# Patient Record
Sex: MALE | Race: BLACK OR AFRICAN AMERICAN | Employment: UNEMPLOYED | URBAN - METROPOLITAN AREA
[De-identification: names, ages, dates, MRNs, and addresses within clinical notes are randomized per-mention and may not be internally consistent; named-entity substitution may affect disease eponyms.]

---

## 2017-09-10 ENCOUNTER — APPOINTMENT (OUTPATIENT)
Dept: GENERAL RADIOLOGY | Age: 72
DRG: 064 | End: 2017-09-10
Attending: EMERGENCY MEDICINE
Payer: MEDICARE

## 2017-09-10 ENCOUNTER — APPOINTMENT (OUTPATIENT)
Dept: CT IMAGING | Age: 72
DRG: 064 | End: 2017-09-10
Attending: EMERGENCY MEDICINE
Payer: MEDICARE

## 2017-09-10 ENCOUNTER — HOSPITAL ENCOUNTER (INPATIENT)
Age: 72
LOS: 3 days | Discharge: NURSING, REHAB OR LONG-TERM CARE | DRG: 064 | End: 2017-09-13
Attending: EMERGENCY MEDICINE | Admitting: FAMILY MEDICINE
Payer: MEDICARE

## 2017-09-10 DIAGNOSIS — I63.331 CEREBROVASCULAR ACCIDENT (CVA) DUE TO THROMBOSIS OF RIGHT POSTERIOR CEREBRAL ARTERY (HCC): ICD-10-CM

## 2017-09-10 DIAGNOSIS — R56.9 SEIZURE (HCC): ICD-10-CM

## 2017-09-10 DIAGNOSIS — I63.9 ACUTE ISCHEMIC STROKE (HCC): Primary | ICD-10-CM

## 2017-09-10 PROBLEM — R47.81 SLURRED SPEECH: Status: ACTIVE | Noted: 2017-09-10

## 2017-09-10 PROBLEM — R41.82 ALTERED MENTAL STATUS: Status: ACTIVE | Noted: 2017-09-10

## 2017-09-10 LAB
ALBUMIN SERPL-MCNC: 3.5 G/DL (ref 3.5–5)
ALBUMIN/GLOB SERPL: 0.8 {RATIO} (ref 1.1–2.2)
ALP SERPL-CCNC: 79 U/L (ref 45–117)
ALT SERPL-CCNC: 15 U/L (ref 12–78)
AMPHET UR QL SCN: NEGATIVE
ANION GAP SERPL CALC-SCNC: 7 MMOL/L (ref 5–15)
APPEARANCE UR: CLEAR
APTT PPP: <20 SEC (ref 22.1–32.5)
AST SERPL-CCNC: 19 U/L (ref 15–37)
BACTERIA URNS QL MICRO: NEGATIVE /HPF
BARBITURATES UR QL SCN: NEGATIVE
BASOPHILS # BLD: 0 K/UL (ref 0–0.1)
BASOPHILS NFR BLD: 0 % (ref 0–1)
BENZODIAZ UR QL: NEGATIVE
BILIRUB SERPL-MCNC: 0.3 MG/DL (ref 0.2–1)
BILIRUB UR QL: NEGATIVE
BUN SERPL-MCNC: 12 MG/DL (ref 6–20)
BUN/CREAT SERPL: 15 (ref 12–20)
CALCIUM SERPL-MCNC: 9 MG/DL (ref 8.5–10.1)
CANNABINOIDS UR QL SCN: NEGATIVE
CHLORIDE SERPL-SCNC: 96 MMOL/L (ref 97–108)
CK SERPL-CCNC: 83 U/L (ref 39–308)
CO2 SERPL-SCNC: 31 MMOL/L (ref 21–32)
COCAINE UR QL SCN: NEGATIVE
COLOR UR: ABNORMAL
CREAT SERPL-MCNC: 0.81 MG/DL (ref 0.7–1.3)
DRUG SCRN COMMENT,DRGCM: NORMAL
EOSINOPHIL # BLD: 0 K/UL (ref 0–0.4)
EOSINOPHIL NFR BLD: 0 % (ref 0–7)
EPITH CASTS URNS QL MICRO: ABNORMAL /LPF
ERYTHROCYTE [DISTWIDTH] IN BLOOD BY AUTOMATED COUNT: 16.2 % (ref 11.5–14.5)
ETHANOL SERPL-MCNC: <10 MG/DL
GLOBULIN SER CALC-MCNC: 4.2 G/DL (ref 2–4)
GLUCOSE SERPL-MCNC: 182 MG/DL (ref 65–100)
GLUCOSE UR STRIP.AUTO-MCNC: NEGATIVE MG/DL
HCT VFR BLD AUTO: 41.7 % (ref 36.6–50.3)
HGB BLD-MCNC: 13.2 G/DL (ref 12.1–17)
HGB UR QL STRIP: ABNORMAL
INR BLD: 1.2 (ref 0.9–1.2)
INR PPP: 1.1 (ref 0.9–1.1)
KETONES UR QL STRIP.AUTO: NEGATIVE MG/DL
LEUKOCYTE ESTERASE UR QL STRIP.AUTO: NEGATIVE
LYMPHOCYTES # BLD: 0.9 K/UL (ref 0.8–3.5)
LYMPHOCYTES NFR BLD: 13 % (ref 12–49)
MCH RBC QN AUTO: 27.4 PG (ref 26–34)
MCHC RBC AUTO-ENTMCNC: 31.7 G/DL (ref 30–36.5)
MCV RBC AUTO: 86.7 FL (ref 80–99)
METHADONE UR QL: NEGATIVE
MONOCYTES # BLD: 0.4 K/UL (ref 0–1)
MONOCYTES NFR BLD: 6 % (ref 5–13)
NEUTS SEG # BLD: 5.5 K/UL (ref 1.8–8)
NEUTS SEG NFR BLD: 81 % (ref 32–75)
NITRITE UR QL STRIP.AUTO: NEGATIVE
OPIATES UR QL: NEGATIVE
OTHER,OTHU: ABNORMAL
PCP UR QL: NEGATIVE
PH UR STRIP: 5 [PH] (ref 5–8)
PLATELET # BLD AUTO: 240 K/UL (ref 150–400)
POTASSIUM SERPL-SCNC: 4.1 MMOL/L (ref 3.5–5.1)
PROT SERPL-MCNC: 7.7 G/DL (ref 6.4–8.2)
PROT UR STRIP-MCNC: NEGATIVE MG/DL
PROTHROMBIN TIME: 11 SEC (ref 9–11.1)
RBC # BLD AUTO: 4.81 M/UL (ref 4.1–5.7)
RBC #/AREA URNS HPF: ABNORMAL /HPF (ref 0–5)
SODIUM SERPL-SCNC: 134 MMOL/L (ref 136–145)
SP GR UR REFRACTOMETRY: 1.01 (ref 1–1.03)
THERAPEUTIC RANGE,PTTT: ABNORMAL SECS (ref 58–77)
TROPONIN I SERPL-MCNC: <0.04 NG/ML
UROBILINOGEN UR QL STRIP.AUTO: 0.2 EU/DL (ref 0.2–1)
WBC # BLD AUTO: 6.8 K/UL (ref 4.1–11.1)
WBC URNS QL MICRO: ABNORMAL /HPF (ref 0–4)

## 2017-09-10 PROCEDURE — 80307 DRUG TEST PRSMV CHEM ANLYZR: CPT | Performed by: FAMILY MEDICINE

## 2017-09-10 PROCEDURE — 36415 COLL VENOUS BLD VENIPUNCTURE: CPT | Performed by: EMERGENCY MEDICINE

## 2017-09-10 PROCEDURE — 65660000000 HC RM CCU STEPDOWN

## 2017-09-10 PROCEDURE — 74011250636 HC RX REV CODE- 250/636

## 2017-09-10 PROCEDURE — 85730 THROMBOPLASTIN TIME PARTIAL: CPT | Performed by: EMERGENCY MEDICINE

## 2017-09-10 PROCEDURE — 51702 INSERT TEMP BLADDER CATH: CPT

## 2017-09-10 PROCEDURE — 74011636320 HC RX REV CODE- 636/320: Performed by: EMERGENCY MEDICINE

## 2017-09-10 PROCEDURE — 99285 EMERGENCY DEPT VISIT HI MDM: CPT

## 2017-09-10 PROCEDURE — 80307 DRUG TEST PRSMV CHEM ANLYZR: CPT | Performed by: EMERGENCY MEDICINE

## 2017-09-10 PROCEDURE — 70450 CT HEAD/BRAIN W/O DYE: CPT

## 2017-09-10 PROCEDURE — 81001 URINALYSIS AUTO W/SCOPE: CPT | Performed by: EMERGENCY MEDICINE

## 2017-09-10 PROCEDURE — 74011000258 HC RX REV CODE- 258: Performed by: EMERGENCY MEDICINE

## 2017-09-10 PROCEDURE — 71010 XR CHEST PORT: CPT

## 2017-09-10 PROCEDURE — 85610 PROTHROMBIN TIME: CPT | Performed by: EMERGENCY MEDICINE

## 2017-09-10 PROCEDURE — 84484 ASSAY OF TROPONIN QUANT: CPT | Performed by: EMERGENCY MEDICINE

## 2017-09-10 PROCEDURE — 96374 THER/PROPH/DIAG INJ IV PUSH: CPT

## 2017-09-10 PROCEDURE — 70496 CT ANGIOGRAPHY HEAD: CPT

## 2017-09-10 PROCEDURE — 80053 COMPREHEN METABOLIC PANEL: CPT | Performed by: EMERGENCY MEDICINE

## 2017-09-10 PROCEDURE — 77030034848

## 2017-09-10 PROCEDURE — 85610 PROTHROMBIN TIME: CPT

## 2017-09-10 PROCEDURE — 82550 ASSAY OF CK (CPK): CPT | Performed by: EMERGENCY MEDICINE

## 2017-09-10 PROCEDURE — 85025 COMPLETE CBC W/AUTO DIFF WBC: CPT | Performed by: EMERGENCY MEDICINE

## 2017-09-10 RX ORDER — SODIUM CHLORIDE 0.9 % (FLUSH) 0.9 %
10 SYRINGE (ML) INJECTION
Status: COMPLETED | OUTPATIENT
Start: 2017-09-10 | End: 2017-09-10

## 2017-09-10 RX ORDER — LORAZEPAM 2 MG/ML
1 INJECTION INTRAMUSCULAR
Status: COMPLETED | OUTPATIENT
Start: 2017-09-10 | End: 2017-09-10

## 2017-09-10 RX ORDER — LORAZEPAM 2 MG/ML
INJECTION INTRAMUSCULAR
Status: COMPLETED
Start: 2017-09-10 | End: 2017-09-10

## 2017-09-10 RX ADMIN — LORAZEPAM 1 MG: 2 INJECTION INTRAMUSCULAR at 21:16

## 2017-09-10 RX ADMIN — Medication 10 ML: at 21:47

## 2017-09-10 RX ADMIN — IOPAMIDOL 100 ML: 755 INJECTION, SOLUTION INTRAVENOUS at 21:47

## 2017-09-10 RX ADMIN — LORAZEPAM 1 MG: 2 INJECTION INTRAMUSCULAR; INTRAVENOUS at 21:16

## 2017-09-10 RX ADMIN — SODIUM CHLORIDE 100 ML: 900 INJECTION, SOLUTION INTRAVENOUS at 21:47

## 2017-09-10 NOTE — IP AVS SNAPSHOT
[address] 
[phone] Patient: Thom Maki MRN: [id number] ODU:5/0/6418 Current Discharge Medication List  
  
START taking these medications Dose & Instructions Dispensing Information Comments Morning Noon Evening Bedtime  
 aspirin 81 mg chewable tablet Your last dose was: Your next dose is:    
   
   
 Dose:  81 mg Take 1 Tab by mouth daily. Quantity:  60 Tab Refills:  0  
     
   
   
   
  
 atorvastatin 40 mg tablet Commonly known as:  LIPITOR Your last dose was: Your next dose is:    
   
   
 Dose:  40 mg Take 1 Tab by mouth nightly. Quantity:  60 Tab Refills:  0  
     
   
   
   
  
 levETIRAcetam 500 mg tablet Commonly known as:  KEPPRA Your last dose was: Your next dose is:    
   
   
 Dose:  500 mg Take 1 Tab by mouth two (2) times a day. Quantity:  60 Tab Refills:  0  
     
   
   
   
  
 oxyCODONE-acetaminophen  mg per tablet Commonly known as:  PERCOCET 10 Your last dose was: Your next dose is:    
   
   
 Dose:  1 Tab Take 1 Tab by mouth every six (6) hours as needed. Max Daily Amount: 4 Tabs. Quantity:  20 Tab Refills:  0 CONTINUE these medications which have NOT CHANGED Dose & Instructions Dispensing Information Comments Morning Noon Evening Bedtime FIBER GUMMIES PO Your last dose was: Your next dose is: Take  by mouth. Takes two po once daily. Refills:  0 INCRUSE ELLIPTA 62.5 mcg/actuation inhaler Generic drug:  umeclidinium Your last dose was: Your next dose is:    
   
   
 Dose:  1 Puff Take 1 Puff by inhalation daily. Indications: Rescue inhaler Refills:  0  
     
   
   
   
  
 SYMBICORT 160-4.5 mcg/actuation HFA inhaler Generic drug:  budesonide-formoterol Your last dose was: Your next dose is:    
   
   
 Dose:  2 Puff Take 2 Puffs by inhalation two (2) times a day. Indications: MAINTENANCE THERAPY FOR ASTHMA Refills:  0 Where to Get Your Medications These medications were sent to [address] [place] [address] 1  800 [facility], [address] [place] [address] Phone:  [phone]  
  aspirin 81 mg chewable tablet  
 atorvastatin 40 mg tablet  
 levETIRAcetam 500 mg tablet Information on where to get these meds will be given to you by the nurse or doctor. ! Ask your nurse or doctor about these medications  
  oxyCODONE-acetaminophen  mg per tablet

## 2017-09-10 NOTE — IP AVS SNAPSHOT
[address] 
[phone] Patient: Marylen Odor MRN: [id number] TDO:5/2/4446 You are allergic to the following No active allergies Recent Documentation Height Weight BMI Smoking Status 1.88 m 75 kg 21.23 kg/m2 Current Every Day Smoker Emergency Contacts Name Discharge Info Relation Home Work Mobile [employer] [1] Other Relative [6] [phone] About your hospitalization You were admitted on:  September 10, 2017 You last received care in the:  [facility] 6S NEURO-SCI TELE You were discharged on:  September 13, 2017 Unit phone number:  [phone] Why you were hospitalized Your primary diagnosis was:  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) Your diagnoses also included:  Slurred Speech, Seizures (Hcc), Altered Mental Status Providers Seen During Your Hospitalizations Provider Role Specialty Primary office phone Susannah Acosta MD Attending Provider Emergency Medicine [phone] Jewel Hernandez MD Attending Provider [facility] [phone] Oneil Cardoso MD Attending Provider Internal Medicine [phone] Your Primary Care Physician (PCP) Primary Care Physician Office Phone Office Fax Luis E Monsivais [phone] Follow-up Information Follow up With Details Comments Contact Info Richelle Holcomb MD   [facility] 7 [address] 
[phone] [address] [place] [address], [address]  Referred for inpatient rehab. [address] [facility] [address] 
[phone] Current Discharge Medication List  
  
START taking these medications Dose & Instructions Dispensing Information Comments Morning Noon Evening Bedtime  
 aspirin 81 mg chewable tablet Your last dose was: Your next dose is:    
   
   
 Dose:  81 mg Take 1 Tab by mouth daily. Quantity:  60 Tab Refills:  0  
     
   
   
   
  
 atorvastatin 40 mg tablet Commonly known as:  LIPITOR Your last dose was: Your next dose is:    
   
   
 Dose:  40 mg Take 1 Tab by mouth nightly. Quantity:  60 Tab Refills:  0  
     
   
   
   
  
 levETIRAcetam 500 mg tablet Commonly known as:  KEPPRA Your last dose was: Your next dose is:    
   
   
 Dose:  500 mg Take 1 Tab by mouth two (2) times a day. Quantity:  60 Tab Refills:  0  
     
   
   
   
  
 oxyCODONE-acetaminophen  mg per tablet Commonly known as:  PERCOCET 10 Your last dose was: Your next dose is:    
   
   
 Dose:  1 Tab Take 1 Tab by mouth every six (6) hours as needed. Max Daily Amount: 4 Tabs. Quantity:  20 Tab Refills:  0 CONTINUE these medications which have NOT CHANGED Dose & Instructions Dispensing Information Comments Morning Noon Evening Bedtime FIBER GUMMIES PO Your last dose was: Your next dose is: Take  by mouth. Takes two po once daily. Refills:  0 INCRUSE ELLIPTA 62.5 mcg/actuation inhaler Generic drug:  umeclidinium Your last dose was: Your next dose is:    
   
   
 Dose:  1 Puff Take 1 Puff by inhalation daily. Indications: Rescue inhaler Refills:  0  
     
   
   
   
  
 SYMBICORT 160-4.5 mcg/actuation HFA inhaler Generic drug:  budesonide-formoterol Your last dose was: Your next dose is:    
   
   
 Dose:  2 Puff Take 2 Puffs by inhalation two (2) times a day. Indications: MAINTENANCE THERAPY FOR ASTHMA Refills:  0 Where to Get Your Medications These medications were sent to [facility]pharmacy #[id number], [address] [facility] [address]  [address] Final Phone:  [phone]  
  aspirin 81 mg chewable tablet  
 atorvastatin 40 mg tablet  
 levETIRAcetam 500 mg tablet Information on where to get these meds will be given to you by the nurse or doctor. ! Ask your nurse or doctor about these medications  
  oxyCODONE-acetaminophen  mg per tablet Discharge Instructions Discharge Instructions PATIENT ID: Maile Hillman MRN: [id number] YOB: 1945 DATE OF ADMISSION: 9/10/2017  9:03 PM   
DATE OF DISCHARGE: 9/13/2017 PRIMARY CARE PROVIDER: Addis Sanches MD  
 
ATTENDING PHYSICIAN: Osiris Ennis MD 
DISCHARGING PROVIDER: Osiris Ennis MD   
To contact this individual call [phone] and ask the  to page. If unavailable ask to be transferred the Adult Hospitalist Department. DISCHARGE DIAGNOSES: CVA CONSULTATIONS: IP CONSULT TO HOSPITALIST 
IP CONSULT TO NEUROLOGY PROCEDURES/SURGERIES: * No surgery found * PENDING TEST RESULTS:  
At the time of discharge the following test results are still pending: None FOLLOW UP APPOINTMENTS:  
Follow-up Information None ADDITIONAL CARE RECOMMENDATIONS: None discharged to Rehab center DIET: Cardiac Diet ACTIVITY: Activity as tolerated, patient is on home oxygen will be sent with home oxygen. DISCHARGE MEDICATIONS: 
 See Medication Reconciliation Form · It is important that you take the medication exactly as they are prescribed. · Keep your medication in the bottles provided by the pharmacist and keep a list of the medication names, dosages, and times to be taken in your wallet. · Do not take other medications without consulting your doctor. NOTIFY YOUR PHYSICIAN FOR ANY OF THE FOLLOWING:  
Fever over 101 degrees for 24 hours. Chest pain, shortness of breath, fever, chills, nausea, vomiting, diarrhea, change in mentation, falling, weakness, bleeding.  Severe pain or pain not relieved by medications. Or, any other signs or symptoms that you may have questions about. DISPOSITION: 
  Home With: 
 OT  PT  HH  RN  
  
X SNF/Inpatient Rehab/LTAC Independent/assisted living Hospice Other: CDMP Checked: Yes X PROBLEM LIST Updated: Yes X Signed:  
Rabia Issa MD 
9/13/2017 
2:27 PM 
 
Discharge Orders None [employer] be Announcement We are excited to announce that we are making your provider's discharge notes available to you in [employer]. You will see these notes when they are completed and signed by the physician that discharged you from your recent hospital stay. If you have any questions or concerns about any information you see in [employer], please call the Health Information Department where you were seen or reach out to your Primary Care Provider for more information about your plan of care. Introducing [facility]! [facility] introduces [employer] patient portal. Now you can access parts of your medical record, email your doctor's office, and request medication refills online. 1. In your internet browser, go to [email or web address] 2. Click on the First Time User? Click Here link in the Sign In box. You will see the New Member Sign Up page. 3. Enter your [employer] Access Code exactly as it appears below. You will not need to use this code after youve completed the sign-up process. If you do not sign up before the expiration date, you must request a new code. · [employer] Access Code: [email or web address] Expires: 12/11/2017 10:42 AM 
 
4. Enter the last four digits of your Social Security Number (xxxx) and Date of Birth (mm/dd/yyyy) as indicated and click Submit. You will be taken to the next sign-up page. 5. Create a [employer] ID. This will be your [employer] login ID and cannot be changed, so think of one that is secure and easy to remember. 6. Create a [employer] password. You can change your password at any time. 7. Enter your Password Reset Question and Answer. This can be used at a later time if you forget your password. 8. Enter your e-mail address. You will receive e-mail notification when new information is available in [address] 9. Click Sign Up. You can now view and download portions of your medical record. 10. Click the Download Summary menu link to download a portable copy of your medical information. If you have questions, please visit the Frequently Asked Questions section of the [employer] website. Remember, [employer] is NOT to be used for urgent needs. For medical emergencies, dial 911. Now available from your iPhone and Android! General Information Please provide this summary of care documentation to your next provider. Patient Signature:  ____________________________________________________________ Date:  ____________________________________________________________  
  
Peggy Mahan Provider Signature:  ____________________________________________________________ Date:  ____________________________________________________________

## 2017-09-10 NOTE — IP AVS SNAPSHOT
Summary of Care Report The Summary of Care report has been created to help improve care coordination. Users with access to [employer] or [address] (Web-based application) may access additional patient information including the Discharge Summary. If you are not currently a [address] user and need more information, please call the number listed below in the [place] [address] section and ask to be connected with Medical Records. Facility Information Name Address Phone [address] [place] [phone] Patient Information Patient Name Sex  Janel Gomez ([id number]) Male 1945 Discharge Information Admitting Provider Service Area Unit Miladys Harper MD / [phone] [address] [facility] / [phone] Discharge Provider Discharge Date/Time Discharge Disposition Destination (none) 2017 Afternoon (Pending) SNF (none) Patient Language Language ENGLISH [13] Hospital Problems as of 2017  Reviewed: 2017 12:22 AM by Miladys Harper MD  
  
  
  
 Class Noted - Resolved Last Modified POA Active Problems Slurred speech  9/10/2017 - Present 9/10/2017 by Miladys Harper MD Unknown Entered by Miladys Harper MD  
  Seizures ([facility] [address]  9/10/2017 - Present 9/10/2017 by Miladys Harper MD Unknown Entered by Miladys Harper MD  
  Altered mental status  9/10/2017 - Present 9/10/2017 by Miladys Harper MD Unknown Entered by Miladys Harper MD  
  * (Principal)Cerebrovascular accident (CVA) due to thrombosis of right posterior cerebral artery ([facility] [address]  9/10/2017 - Present 2017 by Leo De La Cruz DO Unknown Entered by Miladys Harper MD  
  
Non-Hospital Problems as of 2017  Reviewed: 2017 12:22 AM by Miladys Harper MD  
 None You are allergic to the following No active allergies Current Discharge Medication List  
  
START taking these medications Dose & Instructions Dispensing Information Comments  
 aspirin 81 mg chewable tablet Dose:  81 mg Take 1 Tab by mouth daily. Quantity:  60 Tab Refills:  0  
   
 atorvastatin 40 mg tablet Commonly known as:  LIPITOR Dose:  40 mg Take 1 Tab by mouth nightly. Quantity:  60 Tab Refills:  0  
   
 levETIRAcetam 500 mg tablet Commonly known as:  KEPPRA Dose:  500 mg Take 1 Tab by mouth two (2) times a day. Quantity:  60 Tab Refills:  0  
   
 oxyCODONE-acetaminophen  mg per tablet Commonly known as:  PERCOCET 10 Dose:  1 Tab Take 1 Tab by mouth every six (6) hours as needed. Max Daily Amount: 4 Tabs. Quantity:  20 Tab Refills:  0 CONTINUE these medications which have NOT CHANGED Dose & Instructions Dispensing Information Comments FIBER GUMMIES PO Take  by mouth. Takes two po once daily. Refills:  0 INCRUSE ELLIPTA 62.5 mcg/actuation inhaler Generic drug:  umeclidinium Dose:  1 Puff Take 1 Puff by inhalation daily. Indications: Rescue inhaler Refills:  0  
   
 SYMBICORT 160-4.5 mcg/actuation HFA inhaler Generic drug:  budesonide-formoterol Dose:  2 Puff Take 2 Puffs by inhalation two (2) times a day. Indications: MAINTENANCE THERAPY FOR ASTHMA Refills:  0 Follow-up Information Follow up With Details Comments Contact Info Gali Covarrubias MD   [facility] [address] 
[phone] [address] [place] [address], [place] [address]  Referred for inpatient rehab. [address] [place], [place] [address] 
[phone] Discharge Instructions Discharge Instructions PATIENT ID: Francoise Russell MRN: [id number] YOB: 1945 DATE OF ADMISSION: 9/10/2017  9:03 PM   
DATE OF DISCHARGE: 9/13/2017 PRIMARY CARE PROVIDER: Aj Miramontes MD  
 
ATTENDING PHYSICIAN: Mikhail Jones MD 
DISCHARGING PROVIDER: Mikhail Jones MD   
To contact this individual call [phone] and ask the  to page. If unavailable ask to be transferred the Adult Hospitalist Department. DISCHARGE DIAGNOSES: CVA CONSULTATIONS: IP CONSULT TO HOSPITALIST 
IP CONSULT TO NEUROLOGY PROCEDURES/SURGERIES: * No surgery found * PENDING TEST RESULTS:  
At the time of discharge the following test results are still pending: None FOLLOW UP APPOINTMENTS:  
Follow-up Information None ADDITIONAL CARE RECOMMENDATIONS: None discharged to Rehab center DIET: Cardiac Diet ACTIVITY: Activity as tolerated, patient is on home oxygen will be sent with home oxygen. DISCHARGE MEDICATIONS: 
 See Medication Reconciliation Form · It is important that you take the medication exactly as they are prescribed. · Keep your medication in the bottles provided by the pharmacist and keep a list of the medication names, dosages, and times to be taken in your wallet. · Do not take other medications without consulting your doctor. NOTIFY YOUR PHYSICIAN FOR ANY OF THE FOLLOWING:  
Fever over 101 degrees for 24 hours. Chest pain, shortness of breath, fever, chills, nausea, vomiting, diarrhea, change in mentation, falling, weakness, bleeding. Severe pain or pain not relieved by medications. Or, any other signs or symptoms that you may have questions about. DISPOSITION: 
  Home With: 
 OT  PT  HH  RN  
  
X SNF/Inpatient Rehab/LTAC Independent/assisted living Hospice Other: CDMP Checked: Yes X PROBLEM LIST Updated: Yes X Signed:  
Mikhail Jones MD 
9/13/2017 
2:27 PM 
 
Chart Review Routing History Recipient Method Report Sent By Anne Marie Miramontes MD  
Fax: [phone] Phone: [phone]  Fax Zulema Villatoro MD NOTES AUTO ROUTING REPORT Mikahil Jones MD [[id number]] 9/13/2017  2:43 PM 09/13/2017 Valeri Sicard, MD  
Fax: [phone] Phone: [phone] Fax Sindhu Munson MD NOTES AUTO ROUTING REPORT Deshawn Estrella MD [[id number]] 9/13/2017  3:03 PM 09/13/2017

## 2017-09-11 ENCOUNTER — APPOINTMENT (OUTPATIENT)
Dept: MRI IMAGING | Age: 72
DRG: 064 | End: 2017-09-11
Attending: PSYCHIATRY & NEUROLOGY
Payer: MEDICARE

## 2017-09-11 ENCOUNTER — APPOINTMENT (OUTPATIENT)
Dept: MRI IMAGING | Age: 72
DRG: 064 | End: 2017-09-11
Attending: FAMILY MEDICINE
Payer: MEDICARE

## 2017-09-11 LAB
APAP SERPL-MCNC: <2 UG/ML (ref 10–30)
CHOLEST SERPL-MCNC: 187 MG/DL
EST. AVERAGE GLUCOSE BLD GHB EST-MCNC: 140 MG/DL
HBA1C MFR BLD: 6.5 % (ref 4.2–6.3)
HDLC SERPL-MCNC: 63 MG/DL
HDLC SERPL: 3 {RATIO} (ref 0–5)
LDLC SERPL CALC-MCNC: 109 MG/DL (ref 0–100)
LIPID PROFILE,FLP: ABNORMAL
SALICYLATES SERPL-MCNC: <1.7 MG/DL (ref 2.8–20)
TRIGL SERPL-MCNC: 75 MG/DL (ref ?–150)
TSH SERPL DL<=0.05 MIU/L-ACNC: 0.92 UIU/ML (ref 0.36–3.74)
VLDLC SERPL CALC-MCNC: 15 MG/DL

## 2017-09-11 PROCEDURE — 84443 ASSAY THYROID STIM HORMONE: CPT | Performed by: FAMILY MEDICINE

## 2017-09-11 PROCEDURE — 74011250636 HC RX REV CODE- 250/636: Performed by: FAMILY MEDICINE

## 2017-09-11 PROCEDURE — 77030021566 MRA NECK W CONT

## 2017-09-11 PROCEDURE — 92610 EVALUATE SWALLOWING FUNCTION: CPT

## 2017-09-11 PROCEDURE — 74011250636 HC RX REV CODE- 250/636: Performed by: INTERNAL MEDICINE

## 2017-09-11 PROCEDURE — 77010033678 HC OXYGEN DAILY

## 2017-09-11 PROCEDURE — A9577 INJ MULTIHANCE: HCPCS | Performed by: INTERNAL MEDICINE

## 2017-09-11 PROCEDURE — 74011000258 HC RX REV CODE- 258: Performed by: FAMILY MEDICINE

## 2017-09-11 PROCEDURE — 65660000000 HC RM CCU STEPDOWN

## 2017-09-11 PROCEDURE — G8978 MOBILITY CURRENT STATUS: HCPCS

## 2017-09-11 PROCEDURE — 36415 COLL VENOUS BLD VENIPUNCTURE: CPT | Performed by: FAMILY MEDICINE

## 2017-09-11 PROCEDURE — G8979 MOBILITY GOAL STATUS: HCPCS

## 2017-09-11 PROCEDURE — 97161 PT EVAL LOW COMPLEX 20 MIN: CPT

## 2017-09-11 PROCEDURE — 70544 MR ANGIOGRAPHY HEAD W/O DYE: CPT

## 2017-09-11 PROCEDURE — 97530 THERAPEUTIC ACTIVITIES: CPT

## 2017-09-11 PROCEDURE — 74011000258 HC RX REV CODE- 258: Performed by: EMERGENCY MEDICINE

## 2017-09-11 PROCEDURE — 83036 HEMOGLOBIN GLYCOSYLATED A1C: CPT | Performed by: FAMILY MEDICINE

## 2017-09-11 PROCEDURE — 74011250637 HC RX REV CODE- 250/637: Performed by: INTERNAL MEDICINE

## 2017-09-11 PROCEDURE — 70553 MRI BRAIN STEM W/O & W/DYE: CPT

## 2017-09-11 PROCEDURE — 74011250637 HC RX REV CODE- 250/637: Performed by: FAMILY MEDICINE

## 2017-09-11 PROCEDURE — 80061 LIPID PANEL: CPT | Performed by: FAMILY MEDICINE

## 2017-09-11 PROCEDURE — 74011250636 HC RX REV CODE- 250/636: Performed by: EMERGENCY MEDICINE

## 2017-09-11 PROCEDURE — 74011000258 HC RX REV CODE- 258: Performed by: INTERNAL MEDICINE

## 2017-09-11 RX ORDER — BUDESONIDE AND FORMOTEROL FUMARATE DIHYDRATE 160; 4.5 UG/1; UG/1
2 AEROSOL RESPIRATORY (INHALATION) 2 TIMES DAILY
COMMUNITY
End: 2017-09-29

## 2017-09-11 RX ORDER — ATORVASTATIN CALCIUM 40 MG/1
40 TABLET, FILM COATED ORAL
Status: DISCONTINUED | OUTPATIENT
Start: 2017-09-11 | End: 2017-09-13 | Stop reason: HOSPADM

## 2017-09-11 RX ORDER — SODIUM CHLORIDE 0.9 % (FLUSH) 0.9 %
5-10 SYRINGE (ML) INJECTION EVERY 8 HOURS
Status: DISCONTINUED | OUTPATIENT
Start: 2017-09-11 | End: 2017-09-13 | Stop reason: HOSPADM

## 2017-09-11 RX ORDER — ASPIRIN 300 MG/1
300 SUPPOSITORY RECTAL DAILY
Status: DISCONTINUED | OUTPATIENT
Start: 2017-09-11 | End: 2017-09-12

## 2017-09-11 RX ORDER — SODIUM CHLORIDE 0.9 % (FLUSH) 0.9 %
5-10 SYRINGE (ML) INJECTION AS NEEDED
Status: DISCONTINUED | OUTPATIENT
Start: 2017-09-11 | End: 2017-09-13 | Stop reason: HOSPADM

## 2017-09-11 RX ORDER — SODIUM CHLORIDE 0.9 % (FLUSH) 0.9 %
10 SYRINGE (ML) INJECTION
Status: COMPLETED | OUTPATIENT
Start: 2017-09-11 | End: 2017-09-11

## 2017-09-11 RX ADMIN — GADOBENATE DIMEGLUMINE 15 ML: 529 INJECTION, SOLUTION INTRAVENOUS at 15:11

## 2017-09-11 RX ADMIN — Medication 10 ML: at 15:19

## 2017-09-11 RX ADMIN — LEVETIRACETAM 500 MG: 100 INJECTION, SOLUTION, CONCENTRATE INTRAVENOUS at 09:05

## 2017-09-11 RX ADMIN — LEVETIRACETAM 500 MG: 100 INJECTION, SOLUTION, CONCENTRATE INTRAVENOUS at 21:12

## 2017-09-11 RX ADMIN — Medication 10 ML: at 21:12

## 2017-09-11 RX ADMIN — SODIUM CHLORIDE 100 ML: 900 INJECTION, SOLUTION INTRAVENOUS at 15:11

## 2017-09-11 RX ADMIN — ASPIRIN 300 MG: 300 SUPPOSITORY RECTAL at 00:57

## 2017-09-11 RX ADMIN — SODIUM CHLORIDE 1000 MG: 900 INJECTION, SOLUTION INTRAVENOUS at 00:16

## 2017-09-11 RX ADMIN — Medication 10 ML: at 15:11

## 2017-09-11 RX ADMIN — ATORVASTATIN CALCIUM 40 MG: 40 TABLET, FILM COATED ORAL at 21:12

## 2017-09-11 NOTE — ED NOTES
Pt return from CT. Placed on monitor X3. Pt able to speak some words at this moment but speech is slurred and mostly incomprehensible.

## 2017-09-11 NOTE — ED PROVIDER NOTES
HPI Comments: 71 y.o. male with past medical history significant for Asthma and Prostate Cancer who presents from Home [facility] with chief complaint of evaluation for altered mental status. Patient's history was limited due to the condition of the patient. Patient's last known normal was 1430. Per EMS the patient's sister returned to the patient's house \"around 2000\" when she noticed the patient was lethargic and slurring his words. Per EMS upon arrival patient had left hand weakness, questionable L sided facial droop, and hard to understand speech. Per EMS patient had a seizure for a duration of \"one and a half minutes\" in route prior to arrival. Upon arrival to ED, patient is moaning not responding to commands and incontinent of urine. Upon exam, patient had a second a Seizure after which the patient was completely flaccid on the left side. Patient does not have a previous history of seizures. There are no other acute medical concerns at this time. Note written by Arina Franz, as dictated by Dre Guzman MD 9:10 PM    The history is provided by the patient and the EMS personnel. The history is limited by the condition of the patient. Past Medical History:   Diagnosis Date    Asthma     hx of/has wheezing    Cancer ([facility] [address]     prostate - not treated yet    Ill-defined condition     shortness of breath - being evaluated    Psychiatric disorder     mental disability       Past Surgical History:   Procedure Laterality Date    HX OTHER SURGICAL      cyst removed from neck         Family History:   Problem Relation Age of Onset    Colon Cancer Maternal Aunt     Colon Cancer Maternal Aunt     Colon Cancer Maternal Aunt        Social History     Social History    Marital status: LEGALLY      Spouse name: N/A    Number of children: N/A    Years of education: N/A     Occupational History    Not on file.      Social History Main Topics    Smoking status: Current Every Day Smoker  Smokeless tobacco: Not on file      Comment: cigarettes    Alcohol use No    Drug use: Not on file    Sexual activity: Not on file     Other Topics Concern    Not on file     Social History Narrative         ALLERGIES: Review of patient's allergies indicates no known allergies. Review of Systems   Unable to perform ROS: Acuity of condition       Vitals:    09/10/17 2106   BP: 153/89   Pulse: (!) 104   Resp: 20   SpO2: 94%   Weight: 77.6 kg (171 lb 2 oz)   Height: 6' 2\" (1.88 m)            Physical Exam   Vital signs reviewed. Nursing notes reviewed. Const: Moans name when asked, otherwise does not follow commands. Incomprehensible speech  Head:  Atraumatic, normocephalic  Eyes:  Eyes deviated to the right  Neck:  Supple, trachea midline  Cardiovascular:  RRR, no murmurs, no gallops, no rubs  Resp:  No resp distress, no increased work of breathing, no wheezes, no rhonchi, no rales,  Abd:  Soft, mild distended  :  Deferred  MSK:  Flaccid left upper side  Neuro:  Withdraws to pain in bilateral lower extremities, Awake  Skin:  No trauma, no bruising  Note written by Arina Noyola, as dictated by Guillermina Wynn MD 9:10 PM      MDM  Number of Diagnoses or Management Options  Acute ischemic stroke [facility]):   Seizure [facility]):      Amount and/or Complexity of Data Reviewed  Clinical lab tests: ordered and reviewed  Tests in the radiology section of CPT®: ordered and reviewed  Review and summarize past medical records: yes      ED Course     Pt. Presents to the ER with sx concerning for an acute stroke. Pt. Also has had multiple seizures. No mass or bleed seen on head CT.  LIkely acute stroke. Pt.'s seizures have been controlled after one dose of ativan and keppra. Pt. To be evaluated for admission by the hospitalist.      Procedures      CONSULT NOTE:  9:22 PM Guillermina Wynn MD spoke with Dr. Bruce Epsinoza for Neurology. Discussed available diagnostic tests and clinical findings.   He is in agreement with care plans as outlined.   Will see patient in ED

## 2017-09-11 NOTE — PROGRESS NOTES
Problem: Dysphagia (Adult)  Goal: *Acute Goals and Plan of Care (Insert Text)  Speech pathology goals  Initiated 9/11/2017  1. Patient will tolerate full liquid diet with no overt s/s aspiration within 7 days  2. Patient will tolerate solid trials with timely/complete mastication pending family bringing dentures to hospital  [address] EVALUATION  Patient: Alyssa Chery (89 y.o. male)  Date: 9/11/2017  Primary Diagnosis: Stroke (cerebrum) (HCC)  Seizures (HCC)  Altered mental status  Slurred speech        Precautions:          ASSESSMENT :  Based on the objective data described below, the patient presents with a functional oropharyngeal swallow. Patient with prolonged mastication likely related to edentulous status. Suspect swallow function will be at baseline once patient receives his dentures, and patient reports sister will bring in dentures. Pharyngeal phase of swallow also appears functional and no overt s/s aspiration observed. Patient reports motor speech function has returned to baseline, however suspect patient is a poor historian. Per chart review, patient with baseline developmental disability. Patient will benefit from skilled intervention to address the above impairments.   Patients rehabilitation potential is considered to be Good  Factors which may influence rehabilitation potential include:   [ ]            None noted  [X]            Mental ability/status  [X]            Medical condition  [ ]            Home/family situation and support systems  [ ]            Safety awareness  [ ]            Pain tolerance/management  [ ]            Other:        PLAN :  Recommendations and Planned Interventions:  --Full liquid diet  --Straws ok  --Meds whole in thin liquid  --SLP to follow for diet tolerance, liberalization, and motor speech evaluation pending baseline level of function  Frequency/Duration: Patient will be followed by speech-language pathology 3 times a week to address goals. Discharge Recommendations: To Be Determined       SUBJECTIVE:   Patient stated I'm so thankful for all my nurses.  Patient alert, cooperative. Oriented x4. OBJECTIVE:       Past Medical History:   Diagnosis Date    Asthma       hx of/has wheezing    Cancer ([facility] [address]       prostate - not treated yet    Ill-defined condition       shortness of breath - being evaluated    Psychiatric disorder       mental disability     Past Surgical History:   Procedure Laterality Date    HX OTHER SURGICAL         cyst removed from neck     Prior Level of Function/Home Situation:   Home Situation  Home Environment: Private residence  # Steps to Enter: 5  Rails to Enter: Yes  One/Two Story Residence: Two story  # of Interior Steps: 12  Height of Each Step (in): 8 inches  Interior Rails: Right  Lift Chair Available: No  Living Alone: No  Support Systems: Family member(s)  Patient Expects to be Discharged to[de-identified] Unknown  Current DME Used/Available at Home: None  Diet prior to admission: dental soft/thin liquid per patient report  Current Diet:  Mechanical soft/thin   Cognitive and Communication Status:  Neurologic State: Alert, Appropriate for age  Orientation Level: Oriented X4  Cognition: Follows commands  Perception: Appears intact  Perseveration: No perseveration noted  Safety/Judgement: Awareness of environment, Decreased insight into deficits  Oral Assessment:  Oral Assessment  Labial: Left droop  Dentition: Edentulous (reports dentures are at home)  Oral Hygiene: moist oral mucosa  Lingual: Left deviation  Velum: No impairment  Mandible: No impairment  P.O. Trials:  Patient Position: upright in bed, partially side-lying  Vocal quality prior to P.O.: No impairment  Consistency Presented: Ice chips; Thin liquid;Puree; Solid  How Presented: SLP-fed/presented;Spoon;Self-fed/presented;Straw;Successive swallows     Bolus Acceptance: No impairment  Bolus Formation/Control: Impaired  Type of Impairment: Delayed;Mastication  Propulsion: No impairment  Oral Residue: None  Initiation of Swallow: No impairment  Laryngeal Elevation: Functional  Aspiration Signs/Symptoms: None (increased RR with mastication)  Pharyngeal Phase Characteristics: Double swallow  Effective Modifications: Alternate liquids/solids;Straw  Cues for Modifications: Minimal        Oral Phase Severity: Mild  Pharyngeal Phase Severity : No impairment     NOMS:   The NOMS functional outcome measure was used to quantify this patient's level of swallowing impairment. Based on the NOMS, the patient was determined to be at level 3 for swallow function      G Codes: In compliance with CMSs Claims Based Outcome Reporting, the following G-code set was chosen for this patient based the use of the NOMS functional outcome to quantify this patient's level of swallowing impairment. Using the NOMS, the patient was determined to be at level 3 for swallow function which correlates with the CL= 60-79% level of severity. Based on the objective assessment provided within this note, the current, goal, and discharge g-codes are as follows:     Swallow  Swallowing:   Swallow Current Status CL= 60-79%   Swallow Goal Status CJ= 20-39%         NOMS Swallowing Levels:  Level 1 (CN): NPO  Level 2 (CM): NPO but takes consistency in therapy  Level 3 (CL): Takes less than 50% of nutrition p.o. and continues with nonoral feedings; and/or safe with mod cues; and/or max diet restriction  Level 4 (CK): Safe swallow but needs mod cues; and/or mod diet restriction; and/or still requires some nonoral feeding/supplements  Level 5 (CJ): Safe swallow with min diet restriction; and/or needs min cues  Level 6 (CI): Independent with p.o.; rare cues; usually self cues; may need to avoid some foods or needs extra time  Level 7 ([address] [place] [address]): Independent for all p.o.  LUCERO. (2003). National Outcomes Measurement System (NOMS): Adult Speech-Language Pathology User's Guide. Pain:  Pain Scale 1: Numeric (0 - 10)  Pain Intensity 1: 0     After treatment:   [ ]            Patient left in no apparent distress sitting up in chair  [X]            Patient left in no apparent distress in bed  [X]            Call bell left within reach  [X]            Nursing notified  [ ]            Caregiver present  [ ]            Bed alarm activated      COMMUNICATION/EDUCATION:   The patients plan of care including recommendations, planned interventions, and recommended diet changes were discussed with: Registered Nurse. Patient was educated regarding His deficit(s) of dysphagia as this relates to His diagnosis of CVA. He demonstrated Good understanding as evidenced by verbalizing understanding. [X]            Patient/family have participated as able in goal setting and plan of care. [X]            Patient/family agree to work toward stated goals and plan of care. [ ]            Patient understands intent and goals of therapy, but is neutral about his/her participation. [ ]            Patient is unable to participate in goal setting and plan of care.      Thank you for this referral.  NORMAN Condon  Time Calculation: 14 mins

## 2017-09-11 NOTE — CDMP QUERY
Please clarify if this patient is being treated/managed for:    =>Hemiparesis in the setting of CVA requiring increased safety precautions   =>Other Explanation of clinical findings  =>Unable to Determine (no explanation of clinical findings)    The medical record reflects the following clinical findings, treatment, and risk factors:    Risk Factors: CVA  Clinical Indicators: Left-sided weakness. The patient has generalized weakness, but no focal deficits on current exam (could weakness be related to the CVA?)  Treatment: increased safety precautions     Please clarify and document your clinical opinion in the progress notes and discharge summary including the definitive and/or presumptive diagnosis, (suspected or probable), related to the above clinical findings. Please include clinical findings supporting your diagnosis.     Thank you,         Dionne Gutiérrez [facility]

## 2017-09-11 NOTE — CDMP QUERY
#2    Please clarify if this patient is being treated/managed for:    =>Encephalopathy POA in the setting of CVA w/ AMS requiring lab monitoring/neuro consult  =>Other Explanation of clinical findings  =>Unable to Determine (no explanation of clinical findings)    The medical record reflects the following clinical findings, treatment, and risk factors:    Risk Factors: CVA w/ AMS  Clinical Indicators: ED-pt. was lethargic and was slurring his words when she returned home, H/P-Altered mental status. Plan as noted above. Check acetaminophen and salicylate levels, check TSH. Treatment: lab monitoring/neuro consult    Please clarify and document your clinical opinion in the progress notes and discharge summary including the definitive and/or presumptive diagnosis, (suspected or probable), related to the above clinical findings.  Please include clinical findings supporting your diagnosis    Thank you,         Brigette Azevedo [facility]

## 2017-09-11 NOTE — PROGRESS NOTES
Patient seen at the bed side, not in respiratory distress. Patient admitted with a diagnosis of Stroke involving right inferior parietal/posterior temporal area. Patient evaluated by Neurology. Assessment and plan   1. CVA   With CT finding of subacute infarction with right temporal occipital junction   Will follow with recommendations by Neurology   MRI showing minimal petechiae   Started on ASA and statin     2. Left-sided weakness. Improved   Appreciate SLP evaluation and will start feeding as per recommendation   PT-OT evaluation for discharge planning     3. Altered mental status. Resolved     4. Slurred speech. Resolved and seem to be at his baseline   Appreciate SLP evaluation will follow their recommendations     5. R/O ?  Seizures  Neurology on board   Will continue Keppra   EEG with Normal finding     DVT prophylaxis   Continue compression devices

## 2017-09-11 NOTE — ED TRIAGE NOTES
Per EMS were called to pt.'s home by sister because pt. was lethargic and was slurring his words when she returned home. States sister left at 1430 and was normal. EMS reports L wrist and hand weakness and questionable L sided facial droop. En route pt. Had 1 1/2 min SZ. On arrival pt. Mourning, not responding to commands and was incontinent of urine. Immediately after arrival pt. Had another SZ.

## 2017-09-11 NOTE — PROGRESS NOTES
0150 TRANSFER - IN REPORT:    Verbal report received from [facility] (name) on Charis Yuen  being received from ER for routine care  Report consisted of patients Situation, Background, Assessment and   Recommendations(SBAR). Information from the following report(s) SBAR, Kardex, ED Summary, Intake/Output, MAR, Accordion, Recent Results, Med Rec Status, Cardiac Rhythm normal sinus and Alarm Parameters  was reviewed with the receiving nurse. Opportunity for questions and clarification was provided. Assessment completed upon patients arrival to unit and care assumed. Primary Nurse Navi Aragon RN and Saskia Jones RN performed a dual skin assessment on this patient No impairment noted  Carlos Enrique score is 21  Patient does have left sided weakness more upper than lower, no facial droop present.  Patient denies any pain

## 2017-09-11 NOTE — PROGRESS NOTES
0800: Assumed care of patient. Drowsy, oriented x4, 4 L nasal cannula, Travis, peripheral IVs, no complaints of pain, R  > L , PERRL. Plan for MRI & EEG today. 1000: Dr. Nay Coffman at bedside, MD updated on patient's status. 1015: Patient's sister, Bertha Innocent, updated on patient's transfer to [facility]. 1035: Bedside STAND performed, patient drowsy, remains NPO at this time. MRI screening sheet completed. 1045: Report given to Sabino Rincon RN receiving patient to [facility].

## 2017-09-11 NOTE — ROUTINE PROCESS
TRANSFER - OUT REPORT:    Verbal report given to Chandrika Randle RN(name) on Luis M Peter  being transferred to [facility](unit) for routine progression of care       Report consisted of patients Situation, Background, Assessment and   Recommendations(SBAR). Information from the following report(s) SBAR, ED Summary, [place] and Recent Results was reviewed with the receiving nurse. Lines:   Peripheral IV 09/10/17 Left Antecubital (Active)   Site Assessment Clean, dry, & intact 9/10/2017 10:22 PM   Phlebitis Assessment 0 9/10/2017 10:22 PM   Infiltration Assessment 0 9/10/2017 10:22 PM   Dressing Status Clean, dry, & intact 9/10/2017 10:22 PM   Dressing Type Transparent 9/10/2017 10:22 PM   Hub Color/Line Status Pink;Capped;Flushed;Patent 9/10/2017 10:22 PM   Action Taken Blood drawn 9/10/2017 10:22 PM        Opportunity for questions and clarification was provided.       Patient transported with:   Monitor  Registered Nurse

## 2017-09-11 NOTE — ED NOTES
Pt incontinent of urine. Pt linen changed and new disposable brief placed on pt. Pt repositioned on bed for comfort. Mason and pillows provided. Call bell within reach.

## 2017-09-11 NOTE — PROGRESS NOTES
Problem: Mobility Impaired (Adult and Pediatric)  Goal: *Acute Goals and Plan of Care (Insert Text)  Physical Therapy Goals  Initiated 9/11/2017  1. Patient will move from supine to sit and sit to supine and scoot up and down in bed with modified independence within 7 day(s). 2. Patient will transfer from bed to chair and chair to bed with modified independence using the least restrictive device within 7 day(s). 3. Patient will perform sit to stand with modified independence within 7 day(s). 4. Patient will ambulate with modified independence for 150 feet with the least restrictive device within 7 day(s). 5. Patient will ascend/descend 12 stairs with right handrail(s) with modified independence within 7 day(s). 6. Patient will improve Sorto Balance score by 7 points within 7 days. PHYSICAL THERAPY EVALUATION- NEURO POPULATION     Patient: Jess Dennis (70 y.o. male)  Date: 9/11/2017  Primary Diagnosis: Stroke (cerebrum) ([facility])  Seizures (HCC)  Altered mental status  Slurred speech        Precautions: seizure         ASSESSMENT :  Based on the objective data described below, the patient presents with impaired functional mobility as compared to baseline level 2* decreased functional strength, decreased cardiopulmonary tolerance to activity (SOB with minimal activity), occasional L sided inattention and decreased coordination, and c/o lumbar pain with mobility following admission for acute CVA and seizures. CT showed R inferior parietal and occipital lobe infarcts. MRI pending. Prior to this admission, pt reports that he lived with family in a 2story house (5 steps to enter) and was independent with ADLs and mobility. He was on home O2 at baseline - has friends take him to MD appointments. Pt was cleared for mobilization by RN - agreeable to participation in tx session. He required increased time and up to min A for supine>sit 2* back pain.  Demos good sitting balance and dynamic postural control in unsupported sitting at EOB. He required 2 attempts with up to mod A to successfully come to full stand - able to take several steps over to chair with min/mod A. Pt expresses increased SOB with minimal exertion however SpO2 remained >94% on supplemental O2. Pt appears below baseline level and may benefit from rehab upon discharge pending progress. Will continue to progress during acute admission. Patient will benefit from skilled intervention to address the above impairments. Patients rehabilitation potential is considered to be Good  Factors which may influence rehabilitation potential include:   [X]           None noted  [ ]           Mental ability/status  [ ]           Medical condition  [ ]           Home/family situation and support systems  [ ]           Safety awareness  [ ]           Pain tolerance/management  [ ]           Other:        PLAN :  Recommendations and Planned Interventions:  [X]             Bed Mobility Training             [X]      Neuromuscular Re-Education  [X]             Transfer Training                   [ ]      Orthotic/Prosthetic Training  [X]             Gait Training                         [ ]      Modalities  [X]             Therapeutic Exercises           [ ]      Edema Management/Control  [X]             Therapeutic Activities            [X]      Patient and Family Training/Education  [ ]             Other (comment):  Frequency/Duration: Patient will be followed by physical therapy 6 times a week to address goals. Discharge Recommendations: Rehab and To Be Determined  Further Equipment Recommendations for Discharge: to be determined       SUBJECTIVE:   Patient stated You are just the sweetest, can I take you home with me? Aimee Head      OBJECTIVE DATA SUMMARY:   HISTORY:    Past Medical History:   Diagnosis Date    Asthma       hx of/has wheezing    Cancer ([facility] [address]       prostate - not treated yet    Ill-defined condition       shortness of breath - being evaluated    Psychiatric disorder mental disability     Past Surgical History:   Procedure Laterality Date    HX OTHER SURGICAL         cyst removed from neck     Prior Level of Function/Home Situation: lives with family however is alone most of the day. Has 5 steps to enter, bedroom and bathroom on 2nd level. Was indep with all ADLs and mobility, was on home O2. Personal factors and/or comorbidities impacting plan of care:      Home Situation  Home Environment: Private residence  # Steps to Enter: 5  Rails to Enter: Yes  One/Two Story Residence: Two story  # of Interior Steps: 12  Height of Each Step (in): 8 inches  Interior Rails: Right  Lift Chair Available: No  Living Alone: No  Support Systems: Family member(s)  Patient Expects to be Discharged to[de-identified] Unknown  Current DME Used/Available at Home: None     EXAMINATION/PRESENTATION/DECISION MAKING:   Critical Behavior:  Neurologic State: Drowsy  Orientation Level: Oriented X4  Cognition: Follows commands  Safety/Judgement: Decreased insight into deficits, Decreased awareness of need for safety  Hearing: Auditory  Auditory Impairment: None  Skin:  Intact where exposed  Edema: none noted  Range Of Motion:  AROM: Generally decreased, functional     Strength:    Strength: Generally decreased, functional     Tone & Sensation:   Tone: Normal   Sensation: Intact      Coordination:  Coordination: Generally decreased, functional  Vision:   Tracking:  (decreased smooth pursuit and difficulty tracking L)  Diplopia: No  Functional Mobility:  Bed Mobility:     Supine to Sit: Minimum assistance     Transfers:  Sit to Stand: Minimum assistance; Moderate assistance  Stand to Sit: Minimum assistance     Balance:   Sitting: Intact  Standing: Impaired  Standing - Static: Fair  Standing - Dynamic : Fair  Ambulation/Gait Training:  Distance (ft): 3 Feet (ft)  Assistive Device: Gait belt  Ambulation - Level of Assistance: Minimal assistance  Gait Description (WDL): Exceptions to WDL  Gait Abnormalities: Antalgic;Decreased step clearance  Base of Support: Narrowed  Speed/Sylvia: Slow;Shuffled;Pace decreased (<100 feet/min)  Step Length: Right shortened;Left shortened     Functional Measure  Sorto Balance Test:      Sitting to Standin  Standing Unsupported: 0  Sitting with Back Unsupported: 3  Standing to Sittin  Transfers: 1  Standing Unsupported with Eyes Closed: 0  Standing Unsupported with Feet Together: 0  Reach Forward with Outstretched Arm: 0   Object: 0  Turn to Look Over Shoulders: 0  Turn 360 Degrees: 0  Alternate Foot on Step/Stool: 0  Standing Unsupported One Foot in Front: 0  Stand on One Le  Total: 4             56=Maximum possible score;   0-20=High fall risk  21-40=Moderate fall risk   41-56=Low fall risk      Sorto Balance Test and G-code impairment scale:  Percentage of Impairment CH     0%    CI     1-19% CJ     20-39% CK     40-59% CL     60-79% CM     80-99% CN      100%   Sorto   Score 0-56 56 45-55 34-44 23-33 12-22 1-11 0         G codes: In compliance with CMSs Claims Based Outcome Reporting, the following G-code set was chosen for this patient based on their primary functional limitation being treated: The outcome measure chosen to determine the severity of the functional limitation was the Isaac with a score of 4/56 which was correlated with the impairment scale.       · Mobility - Walking and Moving Around:               - CURRENT STATUS:    CM - 80%-99% impaired, limited or restricted               - GOAL STATUS:           CL - 60%-79% impaired, limited or restricted               - D/C STATUS:                       ---------------To be determined---------------       Physical Therapy Evaluation Charge Determination   History Examination Presentation Decision-Making   HIGH Complexity :3+ comorbidities / personal factors will impact the outcome/ POC  MEDIUM Complexity : 3 Standardized tests and measures addressing body structure, function, activity limitation and / or participation in recreation  LOW Complexity : Stable, uncomplicated  HIGH Complexity : FOTO score of 1- 25       Based on the above components, the patient evaluation is determined to be of the following complexity level: LOW      Pain:  Pain Scale 1: Numeric (0 - 10)  Pain Intensity 1: 0           Activity Tolerance:   VSS  Please refer to the flowsheet for vital signs taken during this treatment. After treatment:   [X]     Patient left in no apparent distress sitting up in chair  [ ]     Patient left in no apparent distress in bed  [X]     Call bell left within reach  [X]     Nursing notified  [ ]     Caregiver present  [ ]     Bed alarm activated      COMMUNICATION/EDUCATION:   The patients plan of care was discussed with: Registered Nurse. Patient was educated regarding His deficit(s) of weakness as this relates to His diagnosis of CVA. He demonstrated Good understanding as evidenced by no further questions. Patient and/or family was verbally educated on the BE FAST acronym for signs/symptoms of CVA and TIA. All questions answered with patient indicating good understanding.      [X]  Fall prevention education was provided and the patient/caregiver indicated understanding. [X]  Patient/family have participated as able in goal setting and plan of care. [X]  Patient/family agree to work toward stated goals and plan of care. [ ]  Patient understands intent and goals of therapy, but is neutral about his/her participation. [ ]  Patient is unable to participate in goal setting and plan of care.      Thank you for this referral.  Ana Maria Ha, PT , DPT   Time Calculation: 29 mins

## 2017-09-11 NOTE — ROUTINE PROCESS
1120: RN spoke with SLP recommending eval for diet as patient is alert now and able to participate in swallow evaluation.

## 2017-09-11 NOTE — PROCEDURES
ELECTROENCEPHALOGRAM REPORT     Patient Name: Octavia Tyler  : 1945  Age: 71 y.o. Ordering physician: Candace Mi  Date of EE2017    Interpreting physician: Garrett Cerna DO      PROCEDURE: EEG. CLINICAL INDICATION: The patient is a 71 y.o. male who is being evaluated for baseline electro cerebral activities and to rule out seizure focus. DESCRIPTION OF THE RECORD:   The background of this recording contains a posteriorly-located occipital alpha rhythm of 8-10 Hz that attenuates with eye opening. There was a normal frequency-amplitude gradient. Throughout the recording, there were neither clear areas of focal slowing nor spike or spike-and-wave discharges seen. Hyperventilation was not performed. Photic stimulation produced a minimal driving response in the posterior head regions. INTERPRETATION: This is a normal electroencephalogram with the patient   awake showing no clear focal abnormalities or epileptiform   activity. A normal EEG does not rule out seizures. Clinical correlation recommended.           [address] [place] [address],   Diplomate, American Board of Psychiatry & Neurology (Neurology)

## 2017-09-11 NOTE — ROUTINE PROCESS
0730: Bedside and Verbal shift change report given to Tc Bucio RN (oncoming nurse) by Nico Muhammad RN (offgoing nurse). Report included the following information SBAR, Kardex, Intake/Output, Recent Results, Med Rec Status, Cardiac Rhythm NSR and Alarm Parameters . 1025: TRANSFER - OUT REPORT:    Verbal report given to Mark Valdez RN on Margaux Duarte  being transferred to [facility], Room 656 for routine progression of care       Report consisted of patients Situation, Background, Assessment and   Recommendations(SBAR). Information from the following report(s) SBAR, Kardex, Intake/Output, MAR, Recent Results, Cardiac Rhythm NSR and Alarm Parameters  was reviewed with the receiving nurse. Lines:   Peripheral IV 09/10/17 Left Antecubital (Active)   Site Assessment Clean, dry, & intact 9/11/2017  8:00 AM   Phlebitis Assessment 0 9/11/2017  8:00 AM   Infiltration Assessment 0 9/11/2017  8:00 AM   Dressing Status Clean, dry, & intact 9/11/2017  8:00 AM   Dressing Type Transparent;Tape 9/11/2017  8:00 AM   Hub Color/Line Status Pink;Capped 9/11/2017  8:00 AM   Action Taken Open ports on tubing capped 9/11/2017  8:00 AM       Peripheral IV 09/11/17 Right Forearm (Active)   Site Assessment Clean, dry, & intact 9/11/2017  8:00 AM   Phlebitis Assessment 0 9/11/2017  8:00 AM   Infiltration Assessment 0 9/11/2017  8:00 AM   Dressing Status Clean, dry, & intact 9/11/2017  8:00 AM   Dressing Type Transparent;Tape 9/11/2017  8:00 AM   Hub Color/Line Status Pink;Capped;Flushed 9/11/2017  8:00 AM   Action Taken Open ports on tubing capped 9/11/2017  8:00 AM        Opportunity for questions and clarification was provided.       Patient transported with:   Monitor  O2 @ 4 liters  Registered Nurse  [facility]

## 2017-09-11 NOTE — H&P
[address] [facility] [place] [address]   HISTORY AND PHYSICAL       Name:  Holly Jacques   MR#:  [id number]   :  1945   Account #:  [de-identified]        Date of Adm:  09/10/2017       CHIEF COMPLAINT: The patient is not able to provide. HISTORY OF PRESENT ILLNESS: A 81-year-old    male with past medical history of asthma, prostate cancer, mental   disability, presented to the emergency department from home via EMS   with reported altered mental status. The patient is a poor historian. As   such, majority of history was obtained from review of ED and medical   reports. Per the collective reports, the patient was last seen normal at   approximately 14:30 hours on 09/10/2017. According to reports, the   patient's sister returned to the patient's house at approximately 20:00   hours when she noted the patient was lethargic and slurring his speech   with left hand weakness, questionable left facial droop with   incomprehensible speech. EMS had been called to scene; however,   noted that the patient had a seizure en route. On arrival in emergency   department, the patient had been noted to have a second seizure and   had been flaccid on his left side, according to the ER note. Initial   recorded vital signs were blood pressure 153/89, heart rate 104,   respiratory rate 20, O2 saturation 94% on 6 liters oxygen via nasal   cannula. Workup included CT code neuro head without contrast, which   showed low attenuation area in the right inferior parietal and right   occipital lobe, most likely due to acute infarction.  The patient also had   a CTA code neuro head with IV contrast tonight which showed no   acute thrombosis or significant stenosis in the extracranial or   intracranial circulation with wedge-shaped low attenuation areas in the   right inferior parietal/posterior temporal regions, most likely due to an   acute infarction gyriform enhancement along the lateral margin   suggesting a subacute component. Chest x-ray, portable, which   showed mild discoid atelectasis of the right lung base. ED provider   notably has already spoken with neuro-telemedicine stroke specialist.   The patient not noted to be a t-PA candidate. The patient had been   given Ativan and Keppra per the ED. He is now seen for admission to   our hospitalist service for continued evaluation and treatment. PAST MEDICAL HISTORY:   1. Asthma. 2. Prostate cancer. 3. Mental disability (nonspecified). PAST SURGICAL HISTORY: Cyst removal from neck. MEDICATIONS: Fiber gummies 1-2 tablets p.o. daily. ALLERGIES: NO KNOWN DRUG ALLERGIES. SOCIAL HISTORY: Per chart records, reportedly quit smoking   cigarettes 5 days ago. Reportedly quit alcohol. No reports of illicit   drugs. FAMILY HISTORY: Colon cancer maternal aunt. REVIEW OF SYSTEMS   Unable to obtain a complete review of systems as the patient not   answering questions appropriately. PHYSICAL EXAMINATION   VITAL SIGNS: Temperature is 97.7 degrees Fahrenheit, blood   pressure 132/69, heart rate 82, respiratory rate of 12, O2 saturation   100% on 4 liters oxygen nasal cannula. Reported weight 171 pounds   (77.6 kg), reported height of 6 feet 2 inches tall. GENERAL: Severely debilitated male in no acute respiratory distress. PSYCHIATRIC: The patient was initially asleep, but aroused to loud   verbal stimuli then oriented slowly x3, slow with responses to   questions. NEUROLOGIC: GCS of 14 (E4 V4 M6), spontaneous eye opening,   occasional inappropriate verbal responses. Follows some commands. Moves extremities x4 with generalized weakness. Strength   approximately at 2/5 bilateral lower extremities and 3/5 bilateral upper   extremities. Sensation is grossly intact with slow, but not slurred   speech. Slight left facial droop. HEENT: Normocephalic, atraumatic. PERRLA. EOMs intact. Sclerae   anicteric. Conjunctiva clear.   Nares patent. Oropharynx clear. Tongue midline/ non-edematous. NECK: Supple, without lymphadenopathy, JVD, carotid bruits,   thyromegaly, adenopathy. RESPIRATORY: Lungs clear to auscultation bilaterally. CARDIOVASCULAR: Heart is regular rhythm, normal S1, S2, without   murmurs, rubs or gallops. ABDOMEN: Soft, nontender, nondistended, normoactive bowel   sounds. No rebound, guarding or rigidity. No auscultated abdominal   bruits. No pulsatile mass. BACK: No CVA tenderness. No step-off deformity. MUSCULOSKELETAL: No acute palpable bony deformity. Negative for   calf tenderness. VASCULAR: 2+ radial, 1+ dorsalis pedis pulses, without cyanosis,   clubbing. Negative for edema. SKIN: Warm and dry. LABORATORY DATA: I reviewed as follows, sodium 134, potassium   4.1, chloride 96, CO2 of 31, BUN of 12, creatinine 0.81, glucose 182,   anion gap 7, calcium is 9.3, GFR greater than 60, total bilirubin 0.3,   total protein 7.7, albumin 3.5, ALT 15, AST 19, alkaline phosphatase   79. CK total 83. Troponin I less than 0.04. WBC 6.8, hemoglobin 13.2,   hematocrit 41.7, platelets 905, neutrophils 81%. INR 1.1, PT of 11.0. PTT less than 20. Urine drug screen negative. Serum alcohol less than   10. CT code neuro of the head without contrast and CTA code   neuro with IV contrast: Results reviewed and noted in HPI. Chest x-  ray, portable, which showed mild discoid atelectasis at the right lung   base. IMPRESSION AND PLAN:   1. Stroke involving the right inferior parietal/posterior temporal regions. The patient on neurovascular checks, fall precautions. Admit to the   neuro stroke floor. Consult neurologist. Order MRA of the head and   neck. 2. Left-sided weakness. The patient has generalized weakness, but no   focal deficits on current exam. Continue to monitor closely. Consult   with physical and occupational therapists. 3. Altered mental status. Plan as noted above.  Check acetaminophen   and salicylate levels, check TSH. 4. Slurred speech. Plan as noted above, keep n.p.o. until he passes   dysphagia screen. Consult with speech pathologist.   5. Seizures, new onset. Continue on Keppra 500 mg IV q.12h. Consult   with neurologist. Order EEG, further workup. 6. Generalized weakness and debility. Plan as noted above. 7. Tachycardia. Continue with telemetry monitoring. 8. Venous thromboembolism prophylaxis: Sequential compression   devices, bilateral lower extremities. ADDIE Leyva MD      MP / HC   D:  09/11/2017   01:36   T:  09/11/2017   06:20   Job #:  [id number]

## 2017-09-11 NOTE — CONSULTS
NEUROLOGY  9/11/2017     Consulted by: Deshawn Estrella MD        Patient ID:  Charis Yuen  [id number]  71 y.o.  1945    Chief Complaint   Patient presents with    Seizure    Dysarthria       HPI    Charis Yuen is a 76-year-old gentleman with a history of developmental disability who presented with worsening left-sided weakness and slurred speech. Report of a seizure during transport. CTA was done without e/o any occlusive disease but there appear to be evidence for a right-sided infarct. LDL is 109. He is not a very good historian. He tells me he feels fine. EEG today benign. Review of Systems   Unable to perform ROS: Mental acuity       Past Medical History:   Diagnosis Date    Asthma     hx of/has wheezing    Cancer ([facility] [address]     prostate - not treated yet    Ill-defined condition     shortness of breath - being evaluated    Psychiatric disorder     mental disability     Family History   Problem Relation Age of Onset    Colon Cancer Maternal Aunt     Colon Cancer Maternal Aunt     Colon Cancer Maternal Aunt      Social History     Social History    Marital status: LEGALLY      Spouse name: N/A    Number of children: N/A    Years of education: N/A     Occupational History    Not on file.      Social History Main Topics    Smoking status: Current Every Day Smoker    Smokeless tobacco: Not on file      Comment: cigarettes    Alcohol use No    Drug use: Not on file    Sexual activity: Not on file     Other Topics Concern    Not on file     Social History Narrative     Current Facility-Administered Medications   Medication Dose Route Frequency    sodium chloride (NS) flush 5-10 mL  5-10 mL IntraVENous Q8H    sodium chloride (NS) flush 5-10 mL  5-10 mL IntraVENous PRN    aspirin (ASA) suppository 300 mg  300 mg Rectal DAILY    levETIRAcetam (KEPPRA) 500 mg in 0.9% sodium chloride IVPB  500 mg IntraVENous Q12H     No Known Allergies    Visit Vitals    /77 (BP 1 Location: Right arm, BP Patient Position: At rest)    Pulse 86    Temp 97.9 °F (36.6 °C)    Resp 16    Ht 6' 2\" (1.88 m)    Wt 73.7 kg (162 lb 7.7 oz)    SpO2 92%    BMI 20.86 kg/m2     Physical Exam   Constitutional: He appears well-developed and well-nourished. Cardiovascular: Normal rate. Pulmonary/Chest: Effort normal.   Skin: Skin is warm and dry. Neurologic Exam     Mental Status   Level of consciousness: alert       In bed, wakes easily to voice. Pleasant and talkative. Slightly dysarthric. I do not know his baseline. Cranial Nerves   Cranial nerves II through XII intact. CN VII   Left facial weakness: central    Motor Exam   Muscle bulk: normal  Overall muscle tone: normal       Her is grossly intact but slightly decreased  on the left     Sensory Exam        Grossly intact to touch     Gait, Coordination, and Reflexes     Gait  Gait: (Deferred due to patient condition)    Tremor   Resting tremor: absent           Lab Results  Component Value Date/Time   WBC 6.8 09/10/2017 10:09 PM   HGB 13.2 09/10/2017 10:09 PM   HCT 41.7 09/10/2017 10:09 PM   PLATELET 580 51/17/7796 10:09 PM   MCV 86.7 09/10/2017 10:09 PM     Lab Results  Component Value Date/Time   Hemoglobin A1c 6.5 09/11/2017 05:53 AM   Glucose 182 09/10/2017 10:09 PM   LDL, calculated 109 09/11/2017 05:53 AM   Creatinine 0.81 09/10/2017 10:09 PM      Lab Results  Component Value Date/Time   Cholesterol, total 187 09/11/2017 05:53 AM   HDL Cholesterol 63 09/11/2017 05:53 AM   LDL, calculated 109 09/11/2017 05:53 AM   Triglyceride 75 09/11/2017 05:53 AM   CHOL/HDL Ratio 3.0 09/11/2017 05:53 AM   Lab Results  Component Value Date/Time   ALT (SGPT) 15 09/10/2017 10:09 PM   AST (SGOT) 19 09/10/2017 10:09 PM   Alk.  phosphatase 79 09/10/2017 10:09 PM   Bilirubin, total 0.3 09/10/2017 10:09 PM   Albumin 3.5 09/10/2017 10:09 PM   Protein, total 7.7 09/10/2017 10:09 PM   INR 1.1 09/10/2017 10:09 PM   INR (POC) 1.2 09/10/2017 10:21 PM Prothrombin time 11.0 09/10/2017 10:09 PM   PLATELET 032 38/57/4212 10:09 PM       Lab Results  Component Value Date/Time   TSH 0.92 09/11/2017 05:53 AM                     CT Results (maximum last 3): Results from [facility] [place] encounter on 09/10/17   CTA CODE NEURO HEAD AND NECK W CONT   Narrative EXAM:  CTA CODE NEURO HEAD AND NECK W CONT    INDICATION:  possible stroke    COMPARISON:  Noncontrast head CT of earlier in the day    TECHNIQUE:    Multislice helical axial CT angiography was performed from the aortic arch to  the top of the head during uneventful rapid bolus intravenous administration of  mL of Isovue 370. Postprocessing was performed to include MIP and volume  rendered images. Standard images of the head were also obtained following  contrast administration and a delay. . CT dose reduction was achieved through  the use of a standardized protocol tailored for this examination and automatic  exposure control for dose modulation. CT HEAD  The visualized portion of the brain demonstrates a wedge-shaped low-attenuation  area in the right inferior parietal lobe. There is gyriform enhancement along  the margin of this region, suggesting that there may be an element of subacute  infarction. CTA NECK  There is conventional three vessel arch anatomy. The bilateral subclavian and  common carotid  are patent with no flow-limiting stenosis. There is streak  artifact projecting through the proximal common carotid arteries bilaterally  such that enhancement is not seen in focal areas which are bilaterally  symmetrical. The common carotid arteries are patent. There is mild plaque  formation in the carotid bifurcations bilaterally without flow-limiting  stenosis. There is a 0-25% stenosis of the proximal internal carotid arteries. The distal cervical internal carotid arteries are patent. NASCET method was  utilized for calculating stenosis. The vertebral arteries are codominant and  patent.   The cervical soft tissues are unremarkable. There are degenerative  changes in the cervical spine. CTA HEAD  The vertebral arteries are codominant. The basilar artery and its branches  demonstrate no significant abnormalities. . The internal carotid, anterior  cerebral, and middle cerebral arteries are patent. No thrombus or flow limiting  stenosis is demonstrated. There is lack of vessels in the right posterior  temporal/inferior parietal region in the area of acute infarction, in the right  middle cerebral artery territory. No major vessel occlusion is demonstrated. . No  aneurysms are demonstrated. . There are no no significant posterior communicating  arteries. Impression IMPRESSION:  1. No acute thrombosis or significant stenosis in the extracranial or  intracranial circulation. 2. Wedge-shaped low-attenuation area in the right inferior parietal/posterior  temporal regions, most likely due to an acute infarct. Gyriform enhancement  along the lateral margin suggests a subacute component. Clinical correlation and  follow-up recommended. CT CODE NEURO HEAD WO CONTRAST   Narrative EXAM:  CT CODE NEURO HEAD WO CONTRAST    INDICATION:   L sided weakness slurred speech    COMPARISON: None. CONTRAST:  None. TECHNIQUE: Unenhanced CT of the head was performed using 5 mm images. Brain and  bone windows were generated. CT dose reduction was achieved through use of a  standardized protocol tailored for this examination and automatic exposure  control for dose modulation. FINDINGS:  The ventricles and sulci are normal in size, shape and configuration and  midline. There is a moderate-sized low-attenuation area in the right inferior  parietal lobe and right occipital lobe, likely due to an area of acute  infarction. There is no associated hemorrhage or mass effect there is  preservation of cortical density in some areas with an appearance suggesting  vasogenic edema.  Therefore, follow-up imaging is recommended to confirm that  this area represents ischemic infarction. . There is no intracranial hemorrhage,  extra-axial collection, mass, mass effect or midline shift. The basilar  cisterns are open. . The bone windows demonstrate no abnormalities. The  visualized portions of the paranasal sinuses and mastoid air cells are clear. Impression IMPRESSION: Low-attenuation area in the right inferior parietal and right  occipital lobe, most likely due to acute infarction. Recommend follow-up as  discussed above. Assessment and Plan        58-year-old gentleman who has some evidence of left-sided weakness to correlate with the right-sided infarct noted on CTA. Will obtain MRI for further clarification. Needs to be on aspirin 81 mg daily and statin therapy. Rehab needs. EEG to be done today given the concern for seizure. Following.         [address] [facility], DO  NEUROLOGIST  Diplomate JOSEN  9/11/2017

## 2017-09-11 NOTE — PROGRESS NOTES
Occupational Therapy Note 1414    Orders acknowledged, chart reviewed. Spoke with RN, patient currently off the floor to MRI. Will follow up for acute OT evaluation tomorrow. Thank you.     Jose Casillas, OTR/L

## 2017-09-11 NOTE — PROGRESS NOTES
Speech pathology note  Reviewed chart and discussed case with RN. Patient off the floor for MRI. Will follow up tomorrow for SLP/swallowing evaluation. Thank you.     Alfred Gil., CCC-SLP

## 2017-09-12 PROBLEM — I63.331 CEREBROVASCULAR ACCIDENT (CVA) DUE TO THROMBOSIS OF RIGHT POSTERIOR CEREBRAL ARTERY (HCC): Status: ACTIVE | Noted: 2017-09-10

## 2017-09-12 PROCEDURE — 65660000000 HC RM CCU STEPDOWN

## 2017-09-12 PROCEDURE — 74011250637 HC RX REV CODE- 250/637: Performed by: FAMILY MEDICINE

## 2017-09-12 PROCEDURE — 97530 THERAPEUTIC ACTIVITIES: CPT

## 2017-09-12 PROCEDURE — 74011000258 HC RX REV CODE- 258: Performed by: FAMILY MEDICINE

## 2017-09-12 PROCEDURE — 97165 OT EVAL LOW COMPLEX 30 MIN: CPT

## 2017-09-12 PROCEDURE — 74011250636 HC RX REV CODE- 250/636: Performed by: FAMILY MEDICINE

## 2017-09-12 PROCEDURE — 92526 ORAL FUNCTION THERAPY: CPT | Performed by: SPEECH-LANGUAGE PATHOLOGIST

## 2017-09-12 PROCEDURE — 74011250637 HC RX REV CODE- 250/637: Performed by: INTERNAL MEDICINE

## 2017-09-12 PROCEDURE — 97116 GAIT TRAINING THERAPY: CPT

## 2017-09-12 RX ORDER — GUAIFENESIN 100 MG/5ML
81 LIQUID (ML) ORAL DAILY
Status: DISCONTINUED | OUTPATIENT
Start: 2017-09-12 | End: 2017-09-13 | Stop reason: HOSPADM

## 2017-09-12 RX ADMIN — ATORVASTATIN CALCIUM 40 MG: 40 TABLET, FILM COATED ORAL at 21:47

## 2017-09-12 RX ADMIN — LEVETIRACETAM 500 MG: 100 INJECTION, SOLUTION, CONCENTRATE INTRAVENOUS at 21:47

## 2017-09-12 RX ADMIN — LEVETIRACETAM 500 MG: 100 INJECTION, SOLUTION, CONCENTRATE INTRAVENOUS at 08:46

## 2017-09-12 RX ADMIN — ASPIRIN 300 MG: 300 SUPPOSITORY RECTAL at 08:30

## 2017-09-12 RX ADMIN — Medication 10 ML: at 13:56

## 2017-09-12 RX ADMIN — Medication 10 ML: at 05:59

## 2017-09-12 RX ADMIN — Medication 10 ML: at 21:47

## 2017-09-12 NOTE — PROGRESS NOTES
Hospitalist Progress Note  Mikhail Jones MD  Office: [phone]      Date of Service:  2017  NAME:  Shakira Castellano  :  1945  MRN:  [id number]      Admission Summary:   A 71year old AA male with history of mentally challenged, prostate cancer, asthma presented tot hospital with a c/c of Altered mental status. MRI showing right temporal occipital infarction with petechiae. Neurology on board. Interval history / Subjective:   Seen at the bed side, sitting on bed, answers to questions appropriately     Visit Vitals    BP (!) 137/93 (BP 1 Location: Right arm, BP Patient Position: At rest)    Pulse 96    Temp 98.5 °F (36.9 °C)    Resp 17    Ht 6' 2\" (1.88 m)    Wt 72.1 kg (158 lb 15.2 oz)    SpO2 98%    BMI 20.41 kg/m2          Assessment & Plan:     CVA   With CT finding of subacute infarction with right temporal occipital junction   Will follow with recommendations by Neurology   MRI showing minimal petechiae   Started on ASA and statin   For possible inpatient Rehabilitation      Left-sided weakness. Improved   Appreciate SLP evaluation and will start feeding as per recommendation   PT-OT evaluation for discharge planning: with a recommendation for Rehab      Altered mental status. Resolved      Slurred speech. Resolved and seem to be at his baseline   Appreciate SLP evaluation will follow their recommendations      R/O ?  Seizures  Neurology on board   Will continue Keppra   EEG with Normal finding       Code status: Full Code   DVT prophylaxis: Continue with     Care Plan discussed with: Patient/Family and Nurse  Disposition: SNF/LTC and TBD     Hospital Problems  Date Reviewed: 2017          Codes Class Noted POA    Slurred speech ICD-10-CM: R47.81  ICD-9-CM: 784.59  9/10/2017 Unknown        Seizures ([facility] [address] ICD-10-CM: R56.9  ICD-9-CM: 780.39  9/10/2017 Unknown        Altered mental status ICD-10-CM: R41.82  ICD-9-CM: 780.97  9/10/2017 Unknown        * (Principal)Stroke (cerebrum) [facility]) ICD-10-CM: I63.9  ICD-9-CM: 434.91  9/10/2017 Unknown                Review of Systems:   A comprehensive review of systems was negative except for that written in the HPI. Vital Signs:    Last 24hrs VS reviewed since prior progress note. Most recent are:  Visit Vitals    /70 (BP 1 Location: Right arm, BP Patient Position: At rest)    Pulse 89    Temp 98.2 °F (36.8 °C)    Resp 22    Ht 6' 2\" (1.88 m)    Wt 72.1 kg (158 lb 15.2 oz)    SpO2 100%    BMI 20.41 kg/m2         Intake/Output Summary (Last 24 hours) at 09/12/17 1051  Last data filed at 09/12/17 0400   Gross per 24 hour   Intake                0 ml   Output              695 ml   Net             -695 ml        Physical Examination:             Constitutional:  No acute distress, cooperative, pleasant    ENT:  Oral mucous moist, oropharynx benign. Neck supple,    Resp:  CTA bilaterally. No wheezing/rhonchi/rales. No accessory muscle use   CV:  Regular rhythm, normal rate, no murmurs, gallops, rubs    GI:  Soft, non distended, non tender. normoactive bowel sounds, no hepatosplenomegaly     Musculoskeletal:  No edema, warm, 2+ pulses throughout    Neurologic:  Moves all extremities. AAOx3, CN II-XII reviewed     Psych:  Good insight, Not anxious nor agitated. Data Review:    Review and/or order of clinical lab test      Labs:     Recent Labs      09/10/17   2209   WBC  6.8   HGB  13.2   HCT  41.7   PLT  240     Recent Labs      09/10/17   2209   NA  134*   K  4.1   CL  96*   CO2  31   BUN  12   CREA  0.81   GLU  182*   CA  9.0     Recent Labs      09/10/17   2209   SGOT  19   ALT  15   AP  79   TBILI  0.3   TP  7.7   ALB  3.5   GLOB  4.2*     Recent Labs      09/10/17   2221  09/10/17   2209   INR  1.2*  1.1   PTP   --   11.0   APTT   --   <20.0*      No results for input(s): FE, TIBC, PSAT, FERR in the last 72 hours.    No results found for: FOL, RBCF   No results for input(s): PH, PCO2, PO2 in the last 72 hours.   Recent Labs      09/10/17   2209   TROIQ  <0.04     Lab Results   Component Value Date/Time    Cholesterol, total 187 09/11/2017 05:53 AM    HDL Cholesterol 63 09/11/2017 05:53 AM    LDL, calculated 109 09/11/2017 05:53 AM    Triglyceride 75 09/11/2017 05:53 AM    CHOL/HDL Ratio 3.0 09/11/2017 05:53 AM     No results found for: GLUCPOC  Lab Results   Component Value Date/Time    Color YELLOW/STRAW 09/10/2017 10:09 PM    Appearance CLEAR 09/10/2017 10:09 PM    Specific gravity 1.010 09/10/2017 10:09 PM    pH (UA) 5.0 09/10/2017 10:09 PM    Protein NEGATIVE  09/10/2017 10:09 PM    Glucose NEGATIVE  09/10/2017 10:09 PM    Ketone NEGATIVE  09/10/2017 10:09 PM    Bilirubin NEGATIVE  09/10/2017 10:09 PM    Urobilinogen 0.2 09/10/2017 10:09 PM    Nitrites NEGATIVE  09/10/2017 10:09 PM    Leukocyte Esterase NEGATIVE  09/10/2017 10:09 PM    Epithelial cells MODERATE 09/10/2017 10:09 PM    Bacteria NEGATIVE  09/10/2017 10:09 PM    WBC 5-10 09/10/2017 10:09 PM    RBC 0-5 09/10/2017 10:09 PM         Medications Reviewed:     Current Facility-Administered Medications   Medication Dose Route Frequency    aspirin chewable tablet 81 mg  81 mg Oral DAILY    sodium chloride (NS) flush 5-10 mL  5-10 mL IntraVENous Q8H    sodium chloride (NS) flush 5-10 mL  5-10 mL IntraVENous PRN    levETIRAcetam (KEPPRA) 500 mg in 0.9% sodium chloride IVPB  500 mg IntraVENous Q12H    atorvastatin (LIPITOR) tablet 40 mg  40 mg Oral QHS     ______________________________________________________________________  EXPECTED LENGTH OF STAY: 2d 7h  ACTUAL LENGTH OF STAY:          2                 Maximus Carroll MD

## 2017-09-12 NOTE — ROUTINE PROCESS
Primary Nurse Renate Clayton RN and Johnny Landau, RN performed a dual skin assessment on this patient No impairment noted, old scars.   Carlos Enrique score is 18

## 2017-09-12 NOTE — PROGRESS NOTES
Problem: Falls - Risk of  Goal: *Absence of Falls  Document Vika Fall Risk and appropriate interventions in the flowsheet.    Outcome: Progressing Towards Goal  Fall Risk Interventions:  Mobility Interventions: Assess mobility with egress test, Patient to call before getting OOB, OT consult for ADLs, PT Consult for assist device competence, PT Consult for mobility concerns     Mentation Interventions: Door open when patient unattended, Familiar objects from home, Increase mobility, More frequent rounding, Toileting rounds, Adequate sleep, hydration, pain control     Medication Interventions: Teach patient to arise slowly, Patient to call before getting OOB, Evaluate medications/consider consulting pharmacy     Elimination Interventions: Call light in reach, Patient to call for help with toileting needs, Toileting schedule/hourly rounds

## 2017-09-12 NOTE — PROGRESS NOTES
Bedside and Verbal shift change report given to Marcus Jarquin (oncoming nurse) by Maycol Mcconnell (offgoing nurse). Report included the following information SBAR, Kardex, ED Summary, Procedure Summary, Intake/Output, Recent Results, Med Rec Status and Cardiac Rhythm NSR/ST.

## 2017-09-12 NOTE — ROUTINE PROCESS
Bedside shift change report given to [facility] (oncoming nurse) by Arsenio Sosa (offgoing nurse). Report included the following information SBAR, Kardex, Recent Results and Cardiac Rhythm NSR.

## 2017-09-12 NOTE — PROGRESS NOTES
Problem: Falls - Risk of  Goal: *Absence of Falls  Document Vika Fall Risk and appropriate interventions in the flowsheet.    Outcome: Progressing Towards Goal  Fall Risk Interventions:  Mobility Interventions: Assess mobility with egress test, Communicate number of staff needed for ambulation/transfer, OT consult for ADLs, Patient to call before getting OOB, PT Consult for mobility concerns, PT Consult for assist device competence, Strengthening exercises (ROM-active/passive)     Mentation Interventions: Adequate sleep, hydration, pain control, Door open when patient unattended, Evaluate medications/consider consulting pharmacy, Eyeglasses and hearing aids, Familiar objects from home, Gait belt with transfers/ambulation, HELP ([address]) if available, Increase mobility, Reorient patient, More frequent rounding, Toileting rounds     Medication Interventions: Assess postural VS orthostatic hypotension, Evaluate medications/consider consulting pharmacy, Patient to call before getting OOB, Teach patient to arise slowly     Elimination Interventions: Call light in reach, Patient to call for help with toileting needs, Toilet paper/wipes in reach, Toileting schedule/hourly rounds

## 2017-09-12 NOTE — PROGRESS NOTES
Bedside shift change report given to Jennifer Kennedy RN (oncoming nurse) by Luis M Subramanian RN (offgoing nurse). Report included the following information SBAR, Kardex, ED Summary, Procedure Summary, Intake/Output, MAR, Accordion, Recent Results, Med Rec Status and Cardiac Rhythm NSR.

## 2017-09-12 NOTE — INTERDISCIPLINARY ROUNDS
IDR/SLIDR Summary          Patient: Roland Cornejo MRN: [id number]    Age: 71 y.o. YOB: 1945 [place] [facility] [place] [facility]   Admit Diagnosis: Stroke (cerebrum) (HCC)  Seizures (HCC)  Altered mental status  Slurred speech  Principal Diagnosis: Stroke (cerebrum) (HCC)   Goals: PT/OT  Readmission: NO  Quality Measure: Not applicable  VTE Prophylaxis: Mechanical  Influenza Vaccine screening completed? YES  Pneumococcal Vaccine screening completed? NO  Mobility needs: Yes   Nutrition plan:Yes  Consults:P.T, O.T. and Speech    Financial concerns:No  Escalated to CM? YES  RRAT Score: 12   Interventions:Home Health  Testing due for pt today?  NO  LOS: 2 days Expected length of stay 3 days  Discharge plan: Pending   PCP: Charles Campbell MD  Transportation needs: Yes    Days before discharge:one day until discharge   Discharge disposition: Rehab    Hugo Weathers  9/12/2017  2:12 AM

## 2017-09-12 NOTE — PROGRESS NOTES
Cm spoke with pt's sister, Edy Swain ([phone]) to introduce her to the role of CM and transition of care. She verbalized understanding. She stated that this pt lives with her and her  and has been independent. He does use continuous O2, but she could not remember the name of the company that provides it. She did verity that he has a concentrator and portable tanks. CM informed her that the team is talking about pt possibly needing inpatient rehab. She is in agreement with this and would want a referral to go to [facility]. Cm sent a referral to [facility]. [address] Management Interventions  PCP Verified by CM: Yes  Palliative Care Consult (Criteria: CHF and RRAT>21): No  Transition of Care Consult (CM Consult): Discharge Planning  MyChart Signup: No  Discharge Durable Medical Equipment: No  Physical Therapy Consult: Yes  Occupational Therapy Consult: Yes  Speech Therapy Consult: No  Current Support Network: Relative's Home (Pt lives with his sister.)  Confirm Follow Up Transport: Family  Plan discussed with Pt/Family/Caregiver: Yes  Freedom of Choice Offered:  Yes

## 2017-09-12 NOTE — PROGRESS NOTES
Problem: Self Care Deficits Care Plan (Adult)  Goal: *Acute Goals and Plan of Care (Insert Text)  Occupational Therapy Goals  Initiated 9/12/2017   1. Patient will perform grooming standing at sink with supervision/set-up within 7 day(s). 2. Patient will perform upper body dressing with supervision/set-up within 7 day(s). 3. Patient will perform lower body dressing with supervision/set-up within 7 day(s). 4. Patient will perform toilet transfers with supervision/set-up within 7 day(s). 5. Patient will perform all aspects of toileting with supervision/set-up within 7 day(s). 6. Patient will participate in upper extremity therapeutic exercise/activities with supervision/set-up for 10 minutes within 7 day(s). OCCUPATIONAL THERAPY EVALUATION  Patient: Devonna Collet (89 y.o. male)  Date: 9/12/2017  Primary Diagnosis: Stroke (cerebrum) (HCC)  Seizures (HCC)  Altered mental status  Slurred speech        Precautions: Fall        ASSESSMENT :  Based on the objective data described below, the patient presents with impairments with functional mobility, BUE strength, and visual-perceptual deficits necessary to complete basic ADLs compared to PLOF. PTA, pt was living with family in [facility], was independent with all ADLs/IADLs. Pt was received sitting in chair, very pleasant and agreeable to therapy evaluation. Breezy [place]s performed with pt, Gross motor UE WFL, pt demo'd slower processing and slightly impaired motor coordination during test, but all subsets were [facility]. Vision screen revealed slight inattention on L side, but pt able to attend to L with cues. Occulomotor, saccades, convergence, and all visual fields tested, all WFL. Pt does report blurriness when attempting to read white board, states he uses glasses for reading. Pt able to reach feet to pull up B socks, completed STS with Min Ax2 to RW level, demo'd sound balance while marching in place at RW level.       Pt educated on role of OT, plan of care and potential d/c to [facility] for rehab, and educated on BEFAST for stroke symptoms, pt verbalizes understanding to all. Pt shows great rehabilitation potential due to his high motivation to return to PLOF, recommend d/c to IRF to maximize pt's independence before home. Patient will benefit from skilled intervention to address the above impairments. Patients rehabilitation potential is considered to be Excellent  Factors which may influence rehabilitation potential include:   [X]                None noted  [ ]                Mental ability/status  [ ]                Medical condition  [ ]                Home/family situation and support systems  [ ]                Safety awareness  [ ]                Pain tolerance/management  [ ]                Other:        PLAN :  Recommendations and Planned Interventions:  [X]                  Self Care Training                  [X]           Therapeutic Activities  [X]                  Functional Mobility Training    [ ]           Cognitive Retraining  [X]                  Therapeutic Exercises           [X]           Endurance Activities  [X]                  Balance Training                   [X]           Neuromuscular Re-Education  [X]                  Visual/Perceptual Training     [X]      Home Safety Training  [X]                  Patient Education                 [X]           Family Training/Education  [ ]                  Other (comment):     Frequency/Duration: Patient will be followed by occupational therapy 5 times a week to address goals. Discharge Recommendations: Inpatient Rehab  Further Equipment Recommendations for Discharge: TBD       SUBJECTIVE:   Patient stated \"The doctors tested my leg and arm strength.       OBJECTIVE DATA SUMMARY:   HISTORY:   Past Medical History:   Diagnosis Date    Asthma       hx of/has wheezing    Cancer ([facility] [address]       prostate - not treated yet    Ill-defined condition       shortness of breath - being evaluated    Psychiatric disorder       mental disability     Past Surgical History:   Procedure Laterality Date    HX OTHER SURGICAL         cyst removed from neck        Prior Level of Function/Home Situation: Living with family in [place], bedroom/bathroom on 2nd floor, reports grab bars in bathroom by tub/shower. Home Situation  Home Environment: Private residence  # Steps to Enter: 5  Rails to Enter: Yes  One/Two Story Residence: Two story  # of Interior Steps: 12  Height of Each Step (in): 8 inches  Interior Rails: Right  Lift Chair Available: No  Living Alone: No  Support Systems: Family member(s)  Patient Expects to be Discharged to[de-identified] Rehabilitation facility  Current DME Used/Available at Home: Grab bars  Tub or Shower Type: Tub/Shower combination  [X]  Right hand dominant             [ ]  Left hand dominant     EXAMINATION OF PERFORMANCE DEFICITS:  Cognitive/Behavioral Status:  Neurologic State: Alert  Orientation Level: Oriented X4  Cognition: Follows commands  Perception: Cues to attend left visual field  Perseveration: No perseveration noted  Safety/Judgement: Awareness of environment     Skin: Intact BUE     Edema: None BUE     Hearing: Auditory  Auditory Impairment: None     Vision/Perceptual:    Tracking: Able to track stimulus in all quadrants w/o difficulty (let inattention)    Saccades: Within Defined Limits    Convergence: Normal (within 6 inches from nose)    Visual Fields: Difficulty detecting stimulus  in right lateral quadrant; Difficulty detecting stimulus in right lower quadrant; Difficulty detecting stimulus in right upper quadrant  Diplopia: No    Acuity: Impaired far vision (unable to read board)    Corrective Lenses: Reading glasses     Range of Motion:  AROM: Generally decreased, functional- BUE        Strength:  Strength: Generally decreased, functional- BUE                 Coordination:  Coordination: Generally decreased, functional  Fine Motor Skills-Upper: Left Impaired;Right Impaired Tone & Sensation:  Tone: Normal  Sensation: Intact                       Balance:  Sitting: Intact  Standing: Intact; With support  Standing - Static: Good  Standing - Dynamic : Fair     Functional Mobility and Transfers for ADLs:  Bed Mobility:  Supine to Sit: Stand-by asssistance; Additional time     Transfers:  Sit to Stand: Contact guard assistance     ADL Intervention and task modifications:  Pt educated on BEFAST for stroke symptoms, pt verbalized understanding. Cognitive Retraining  Safety/Judgement: Awareness of environment        Functional Measure:   Fugl-Morgan Assessment of Motor Recovery after Stroke:       Reflex Activity  Flexors/Biceps/Fingers: Can be elicited  Extensors/Triceps: Can be elicited  Reflex Subtotal: 4     Volitional Movement Within Synergies  Shoulder Retraction: Full  Shoulder Elevation: Full  Shoulder Abduction (90 degrees): Full  Shoulder External Rotation: Full  Elbow Flexion: Full  Forearm Supination: Full  Shoulder Adduction/Internal Rotation: Full  Elbow Extension: Full  Forearm Pronation: Full  Subtotal: 18     Volitional Movement Mixing Synergies  Hand to Lumbar Spine: Full  Shoulder Flexion (0-90 degrees): Full  Pronation-Supination: Full  Subtotal: 6     Volitional Movement With Little or No Synergy  Shoulder Abduction (0-90 degrees): Full  Shoulder Flexion ( degrees): Full  Pronation/Supination: Full  Subtotal : 6     Normal Reflex Activity  Biceps, Triceps, Finger Flexors:  Full  Subtotal : 2     Upper Extremity Total   Upper Extremity Total: 36     Wrist  Stability at 15 Degree Dorsiflexion: Full  Repeated Dorsiflexion/ Volar Flexion: Full  Stability at 15 Degree Dorsiflexion: Full  Repeated Dorsiflexion/ Volar Flexion: Full  Circumduction: Full  Wrist Total: 10     Hand  Mass Flexion: Full  Mass Extension: Full  Grasp A: Full  Grasp B: Full  Grasp C: Full  Grasp D: Full  Grasp E: Full  Hand Total: 14     Coordination/Speed  Tremor: None  Dysmetria: None  Time: <1s  Coordination/Speed Total : 6     Total A-D  Total A-D (Motor Function): 66/66      Percentage of impairment CH  0% CI  1-19% CJ  20-39% CK  40-59% CL  60-79% CM  80-99% CN  100%   Fugl-Morgan score: 0-66 66 53-65 39-52 26-38 13-25 1-12    0      This is a reliable/valid measure of arm function after a neurological event. It has established value to characterize functional status and for measuring spontaneous and therapy-induced recovery; tests proximal and distal motor functions. Fugl-Morgan Assessment  UE scores recorded between five and 30 days post neurologic event can be used to predict UE recovery at six months post neurologic event. Severe = 0-21 points   Moderately Severe = 22-33 points   Moderate = 34-47 points   Mild = 48-66 points  ANTONIO Triplett, NICOLE Rousseau, & LUIS Neal (1992). Measurement of motor recovery after stroke: Outcome assessment and sample size requirements.  Stroke, 23, pp. 5165-2884.   ------------------------------------------------------------------------------------------------------------------------------------------------------------------  MCID:  Stroke:   Nimesh Dee et al, 2001; n = 171; mean age 79 (5) years; assessed within 16 (12) days of stroke, Acute Stroke)  FMA Motor Scores from Admission to Discharge   · 10 point increase in FMA Upper Extremity = 1.5 change in discharge FIM  · 10 point increase in FMA Lower Extremity = 1.9 change in discharge FIM  MDC:   Stroke:   Christiano Mc et al, 2008, n = 14, mean age = 59.9 (14.6) years, assessed on average 14 (6.5) months post stroke, Chronic Stroke)   · FMA = 5.2 points for the Upper Extremity portion of the assessment        Barthel Index:     Barthel and G-code impairment scale:  Percentage of impairment CH  0% CI  1-19% CJ  20-39% CK  40-59% CL  60-79% CM  80-99% CN  100%   Barthel Score 0-100 100 99-80 79-60 59-40 20-39 1-19    0   Barthel Score 0-20 20 17-19 13-16 9-12 5-8 1-4 0      The Barthel ADL Index: Guidelines  1. The index should be used as a record of what a patient does, not as a record of what a patient could do. 2. The main aim is to establish degree of independence from any help, physical or verbal, however minor and for whatever reason. 3. The need for supervision renders the patient not independent. 4. A patient's performance should be established using the best available evidence. Asking the patient, friends/relatives and nurses are the usual sources, but direct observation and common sense are also important. However direct testing is not needed. 5. Usually the patient's performance over the preceding 24-48 hours is important, but occasionally longer periods will be relevant. 6. Middle categories imply that the patient supplies over 50 per cent of the effort. 7. Use of aids to be independent is allowed. Zuleika Byers., Barthel, D.W. ([id number]). Functional evaluation: the Barthel Index. [address] [facility] (14)2. Aryan Sanford gabi ADILIA Cuevas, Colleen Day., Bhargavjareth Garcia., [place], [address] [place] [address] (1999). Measuring the change indisability after inpatient rehabilitation; comparison of the responsiveness of the Barthel Index and Functional [place] Measure. Journal of Neurology, Neurosurgery, and Psychiatry, 66(4), 327-760. Farooq Diaz, N.J.A, DIVINE Jamil, & Carisa Singleton M.A. (2004.) Assessment of post-stroke quality of life in cost-effectiveness studies: The usefulness of the Barthel Index and the EuroQoL-5D. Quality of Life Research, 13, 425-16               G codes: In compliance with CMSs Claims Based Outcome Reporting, the following G-code set was chosen for this patient based on their primary functional limitation being treated: The outcome measure chosen to determine the severity of the functional limitation was the Barthel Index with a score of 60/100 which was correlated with the impairment scale.       · Self Care:               - CURRENT STATUS:    CJ - 20%-39% impaired, limited or restricted               - GOAL STATUS:           CI - 1%-19% impaired, limited or restricted               - D/C STATUS:                       ---------------To be determined---------------       Occupational Therapy Evaluation Charge Determination   History Examination Decision-Making   LOW Complexity : Brief history review  MEDIUM Complexity : 3-5 performance deficits relating to physical, cognitive , or psychosocial skils that result in activity limitations and / or participation restrictions MEDIUM Complexity : Patient may present with comorbidities that affect occupational performnce. Miniml to moderate modification of tasks or assistance (eg, physical or verbal ) with assesment(s) is necessary to enable patient to complete evaluation       Based on the above components, the patient evaluation is determined to be of the following complexity level: LOW      Pain:  Pain Scale 1: Numeric (0 - 10)  Pain Intensity 1: 0              Activity Tolerance: Tolerated evaluation well. Patient received on 2 L O2, at % Spo2. Pt taken off O2(RA) during STS xfer to RW, completed ~1min standing balance activities, O2 dropped to 90%, pt returned to seated position and sats returned to %. Left on 2 L        Please refer to the flowsheet for vital signs taken during this treatment. After treatment:   [X]  Patient left in no apparent distress sitting up in chair  [ ]  Patient left in no apparent distress in bed  [X]  Call bell left within reach  [X]  Nursing notified  [ ]  Caregiver present  [ ]  Bed alarm activated      COMMUNICATION/EDUCATION:   The patients plan of care was discussed with: Physical Therapist, Registered Nurse and patient. Patient and/or family was verbally educated on the BE FAST acronym for signs/symptoms of CVA and TIA. BE FAST was written on patient's communication board  for visual education and reinforcement.   All questions answered with patient indicating good understanding.      [X]      Home safety education was provided and the patient/caregiver indicated understanding. [X]      Patient/family have participated as able and agree with findings and recommendations. [ ]      Patient is unable to participate in plan of care at this time. This patients plan of care is appropriate for delegation to [facility]. Thank you for this referral.  Willaim Bamberger, OTS  Time Calculation: 30 mins               Regarding student involvement in patient care:  A student participated in this treatment session. Per CMS Medicare statements and AOTA guidelines I certify that the following was true:  1. I was present and directly observed the entire session. 2. I made all skilled judgments and clinical decisions regarding care. 3. I am the practitioner responsible for assessment, treatment, and documentation.     Flory Canoing OTR/L

## 2017-09-12 NOTE — PROGRESS NOTES
Speech pathology goals  Initiated 9/11/2017  1. Patient will tolerate full liquid diet with no overt s/s aspiration within 7 days MET 9/12/2017   2. Patient will tolerate solid trials with timely/complete mastication pending family bringing dentures to hospital MET 9/12/2017     Speech language pathology dysphagia treatment/discharge  Patient: Ana Loja (75 y.o. male)  Date: 9/12/2017  Diagnosis: Stroke (cerebrum) ([facility])  Seizures (HCC)  Altered mental status  Slurred speech Stroke (cerebrum) ([facility] [address]       Precautions: Fall      ASSESSMENT:  Patient presents with a functional oropharyngeal swallow. Continues to demonstrate mildly slow but functional mastication now that dentures are in place. Piecemeal propulsion noted, however, patient reports this is baseline stating \"I like to take my time chewing\" and it did not impact function. Patient demonstrated functional hyolaryngeal excursion/elevation with no s/s of penetration or aspiration. Progression toward goals:  [x]           Improving appropriately and progressing toward goals  []           Improving slowly and progressing toward goals  []           Not making progress toward goals and plan of care will be adjusted     PLAN:  -upgrade diet to dental soft, patient reports enjoying softer foods at home  - SLP to follow peripherally to determine need for motor speech evaluation. Patient reports he is at his baseline, however, per yesterdays notes, he was not aware of slurred speech on admission. He is 100% intelligible in conversation with unusual tone to voice that is likely baseline. Will attempt to clarify with family any changes in function. Patient will be discharged from speech therapy at this time for dysphagia. Rationale for discharge:  [x]      Goals Achieved  []      [address]  []      Patient not participating in therapy  []      Other:  Discharge Recommendations:   To Be Determined     SUBJECTIVE:   Patient stated I'm glad you're here taking care of me. When SLP had finished exam. Patient was excited to cooperate with evaluation and wanted to get a full meal. Patient stated he had no concerns about his speech or language but his sister may have a different opinion. OBJECTIVE:   Cognitive and Communication Status:  Neurologic State: Alert  Orientation Level: Oriented X4  Cognition: Follows commands    Perception: Appears intact    Perseveration: No perseveration noted    Safety/Judgement: Awareness of environment  Dysphagia Treatment:  Oral Assessment:  Oral Assessment  Labial: Left droop (mild)  Dentition: Upper & lower dentures  Oral Hygiene: moist mucosa  Lingual: Left deviation  Mandible: No impairment  P.O. Trials:  Patient Position: upright in chair  Vocal quality prior to P.O.: No impairment  Consistency Presented: Thin liquid;Puree; Solid  How Presented: Self-fed/presented;Cup/sip;Spoon;Straw;Successive swallows     Bolus Acceptance: No impairment  Bolus Formation/Control: Impaired  Type of Impairment: Piecemeal;Delayed;Mastication; Other (comment) (Patient reports slow mastication and piecemeal is baseline)  Propulsion: No impairment  Oral Residue: None  Initiation of Swallow: No impairment  Laryngeal Elevation: Functional  Aspiration Signs/Symptoms: None  Pharyngeal Phase Characteristics: No impairment, issues, or problems            Oral Phase Severity: No impairment  Pharyngeal Phase Severity : No impairment    G Codes: In compliance with CMSs Claims Based Outcome Reporting, the following G-code set was chosen for this patient based the use of the NOMS functional outcome to quantify this patient's level of swallowing impairment. Using the NOMS, the patient was determined to be at level 7 for swallow function which correlates with the CH= 0% level of severity.     Based on the objective assessment provided within this note, the current, goal, and discharge g-codes are as follows:    Swallow  Swallowing:   Swallow Goal Status CH= 0%   Swallow D/C Status CH= 0%        NOMS Swallowing Levels:  Level 1 (CN): NPO  Level 2 (CM): NPO but takes consistency in therapy  Level 3 (CL): Takes less than 50% of nutrition p.o. and continues with nonoral feedings; and/or safe with mod cues; and/or max diet restriction  Level 4 (CK): Safe swallow but needs mod cues; and/or mod diet restriction; and/or still requires some nonoral feeding/supplements  Level 5 (CJ): Safe swallow with min diet restriction; and/or needs min cues  Level 6 (CI): Independent with p.o.; rare cues; usually self cues; may need to avoid some foods or needs extra time  Level 7 ([address] [place] [address]): Independent for all p.o.  LUCERO. (2003). National Outcomes Measurement System (NOMS): Adult Speech-Language Pathology User's Guide. Pain:Pain Scale 1: Numeric (0 - 10)  Pain Intensity 1: 0     After treatment:   [x]                Patient left in no apparent distress sitting up in chair  []                Patient left in no apparent distress in bed  [x]                Call bell left within reach  [x]                Nursing notified  []                Caregiver present  []                Bed alarm activated    COMMUNICATION/EDUCATION:   Patient was educated regarding His functional swallow as this relates to His diagnosis of CVA. He demonstrated Good understanding as evidenced by verbalized understanding.     The patients plan of care including recommendations, planned interventions, and recommended diet changes were discussed with:     Walker Ruiz Student SLP  Time Calculation: 14 mins

## 2017-09-12 NOTE — PROGRESS NOTES
Problem: Mobility Impaired (Adult and Pediatric)  Goal: *Acute Goals and Plan of Care (Insert Text)  Physical Therapy Goals  Initiated 9/11/2017  1. Patient will move from supine to sit and sit to supine and scoot up and down in bed with modified independence within 7 day(s). 2. Patient will transfer from bed to chair and chair to bed with modified independence using the least restrictive device within 7 day(s). 3. Patient will perform sit to stand with modified independence within 7 day(s). 4. Patient will ambulate with modified independence for 150 feet with the least restrictive device within 7 day(s). 5. Patient will ascend/descend 12 stairs with right handrail(s) with modified independence within 7 day(s). 6. Patient will improve Sorto Balance score by 7 points within 7 days. PHYSICAL THERAPY TREATMENT  Patient: Valdez Rodriguez (77 y.o. male)  Date: 9/12/2017  Diagnosis: Stroke (cerebrum) (HCC)  Seizures (HCC)  Altered mental status  Slurred speech Stroke (cerebrum) ([facility])       Precautions:    Chart, physical therapy assessment, plan of care and goals were reviewed. ASSESSMENT:  Pt presents with decrease strength and activity tolerance limiting functional mobility. Pt was able to increase gait tolerance but significant decrease david with fatigue. Pt required seated rest break and then was able to return to room. increase dyspnea with activity. SpO2 95-90% on 2 L O2 's bpm. PTA pt was independent. At this time pt appears below baseline and could benefit from inpt rehab vs SNF at discharge. Progression toward goals:  [X]      Improving appropriately and progressing toward goals  [ ]      Improving slowly and progressing toward goals  [ ]      Not making progress toward goals and plan of care will be adjusted       PLAN:  Patient continues to benefit from skilled intervention to address the above impairments. Continue treatment per established plan of care.   Discharge Recommendations: inpt rehab vs SNF at discharge. Further Equipment Recommendations for Discharge: TBD       SUBJECTIVE:   Patient stated You are motivating me.       OBJECTIVE DATA SUMMARY:   Critical Behavior:  Neurologic State: Alert, Appropriate for age  Orientation Level: Oriented X4  Cognition: Follows commands  Safety/Judgement: Awareness of environment, Decreased insight into deficits  Functional Mobility Training:  Bed Mobility:     Supine to Sit: Stand-by asssistance; Additional time                          Transfers:  Sit to Stand: Contact guard assistance  Stand to Sit: Contact guard assistance                             Balance:  Sitting: Intact  Standing: Impaired  Standing - Static: Fair  Standing - Dynamic : Fair  Ambulation/Gait Training:  Distance (ft): 70 Feet (ft) (seated rest break then 30feet )  Assistive Device: Gait belt (wall rail. 2 L O2)  Ambulation - Level of Assistance: Contact guard assistance        Gait Abnormalities: Decreased step clearance        Base of Support: Narrowed     Speed/Sylvia: Slow  Step Length: Left shortened;Right shortened                    Stairs:              Neuro Re-Education:     Therapeutic Exercises:      Pain:  Pain Scale 1: Numeric (0 - 10)  Pain Intensity 1: 0              Activity Tolerance:   limited  Please refer to the flowsheet for vital signs taken during this treatment.   After treatment:   [X] Patient left in no apparent distress sitting up in chair  [ ] Patient left in no apparent distress in bed  [X] Call bell left within reach  [X] Nursing notified  [ ] Caregiver present  [ ] Bed alarm activated      COMMUNICATION/COLLABORATION:   The patients plan of care was discussed with: Registered Nurse     Bridger Elena PTA   Time Calculation: 32 mins

## 2017-09-12 NOTE — CONSULTS
Neurology Progress Note    Patient ID:  Devonna Collet  [id number]  71 y.o.  1945    Chief Complaint: Confusion and weakness    Subjective:     52-year-old gentleman who presented with altered mental status and questionable left-sided weakness and speech changes. MRI was done and it shows a subacute stroke in the right PCA distribution. There was some minimal contrast enhancement but no underlying lesion identified. No hemorrhage. EEG yesterday within normal limits. Today he is up and about. Feeling better. Objective:       ROS:  Cannot obtain secondary to patient medical condition  Developmental disability    Meds:  Current Facility-Administered Medications   Medication Dose Route Frequency    aspirin chewable tablet 81 mg  81 mg Oral DAILY    sodium chloride (NS) flush 5-10 mL  5-10 mL IntraVENous Q8H    sodium chloride (NS) flush 5-10 mL  5-10 mL IntraVENous PRN    levETIRAcetam (KEPPRA) 500 mg in 0.9% sodium chloride IVPB  500 mg IntraVENous Q12H    atorvastatin (LIPITOR) tablet 40 mg  40 mg Oral QHS       MRI Results (maximum last 3): Results from [place] encounter on 09/10/17   MRA NECK W CONT   Narrative INDICATION:  Stroke     COMPARISON:  CT 9/10/2017    TECHNIQUE:  MR imaging of the brain was performed with sagittal T1, axial T1,  T2, FLAIR, GRE, DWI/ADC; pre and post contrast multiplanar T1 utilizing 15 mL  gadolinium. 3-D time-of-flight MRA of the brain was performed. Multiplanar  reconstructions were obtained. Contrast enhanced coronal acquisition MRA of the  neck was performed. Multiplanar reconstructions were obtained. FINDINGS:      Ventricles are midline without hydrocephalus. There is patchy periventricular T2  signal abnormality throughout the supratentorial brain. There is a moderate to  large sized area of abnormal FLAIR hyperintensity with susceptibility artifact  in the cortical surface of the right occipital and posterior temporal lobes.   There is associated vasogenic edema. Due to the susceptibility artifact, no  definite diffusion restriction in this region. Mild sulcal effacement. Associated ill-defined curvilinear enhancement following contrast  administration. Major intracranial vascular flow-voids are patent. MRA NECK    Aortic Arch:  Visualized portions unremarkable. Carotid Arteries:  No significant stenosis by NASCET criteria. Vertebral Arteries:  Patent with no significant stenosis  Additional Comments:  N/A. MRA HEAD    Posterior Circulation:  No flow limiting stenosis or occlusion. Anterior Circulation:  No flow limiting stenosis or occlusion. Additional Comments:  No evidence of aneurysm or vascular malformation. Impression IMPRESSION:  1. Moderate to large area of presumed subacute infarction with associated  petechial hemorrhage in the right temporal occipital junction as above. There is  underlying vasogenic edema slightly atypical however, and a follow-up in 2-3  months is recommended to document improvement/resolution. 2.  No flow limiting stenosis or arterial occlusion. MRI BRAIN W WO CONT   Narrative INDICATION:  Stroke     COMPARISON:  CT 9/10/2017    TECHNIQUE:  MR imaging of the brain was performed with sagittal T1, axial T1,  T2, FLAIR, GRE, DWI/ADC; pre and post contrast multiplanar T1 utilizing 15 mL  gadolinium. 3-D time-of-flight MRA of the brain was performed. Multiplanar  reconstructions were obtained. Contrast enhanced coronal acquisition MRA of the  neck was performed. Multiplanar reconstructions were obtained. FINDINGS:      Ventricles are midline without hydrocephalus. There is patchy periventricular T2  signal abnormality throughout the supratentorial brain. There is a moderate to  large sized area of abnormal FLAIR hyperintensity with susceptibility artifact  in the cortical surface of the right occipital and posterior temporal lobes. There is associated vasogenic edema.  Due to the susceptibility artifact, no  definite diffusion restriction in this region. Mild sulcal effacement. Associated ill-defined curvilinear enhancement following contrast  administration. Major intracranial vascular flow-voids are patent. MRA NECK    Aortic Arch:  Visualized portions unremarkable. Carotid Arteries:  No significant stenosis by NASCET criteria. Vertebral Arteries:  Patent with no significant stenosis  Additional Comments:  N/A. MRA HEAD    Posterior Circulation:  No flow limiting stenosis or occlusion. Anterior Circulation:  No flow limiting stenosis or occlusion. Additional Comments:  No evidence of aneurysm or vascular malformation. Impression IMPRESSION:  1. Moderate to large area of presumed subacute infarction with associated  petechial hemorrhage in the right temporal occipital junction as above. There is  underlying vasogenic edema slightly atypical however, and a follow-up in 2-3  months is recommended to document improvement/resolution. 2.  No flow limiting stenosis or arterial occlusion. MRA BRAIN WO CONT   Narrative INDICATION:  Stroke     COMPARISON:  CT 9/10/2017    TECHNIQUE:  MR imaging of the brain was performed with sagittal T1, axial T1,  T2, FLAIR, GRE, DWI/ADC; pre and post contrast multiplanar T1 utilizing 15 mL  gadolinium. 3-D time-of-flight MRA of the brain was performed. Multiplanar  reconstructions were obtained. Contrast enhanced coronal acquisition MRA of the  neck was performed. Multiplanar reconstructions were obtained. FINDINGS:      Ventricles are midline without hydrocephalus. There is patchy periventricular T2  signal abnormality throughout the supratentorial brain. There is a moderate to  large sized area of abnormal FLAIR hyperintensity with susceptibility artifact  in the cortical surface of the right occipital and posterior temporal lobes. There is associated vasogenic edema.  Due to the susceptibility artifact, no  definite diffusion restriction in this region. Mild sulcal effacement. Associated ill-defined curvilinear enhancement following contrast  administration. Major intracranial vascular flow-voids are patent. MRA NECK    Aortic Arch:  Visualized portions unremarkable. Carotid Arteries:  No significant stenosis by NASCET criteria. Vertebral Arteries:  Patent with no significant stenosis  Additional Comments:  N/A. MRA HEAD    Posterior Circulation:  No flow limiting stenosis or occlusion. Anterior Circulation:  No flow limiting stenosis or occlusion. Additional Comments:  No evidence of aneurysm or vascular malformation. Impression IMPRESSION:  1. Moderate to large area of presumed subacute infarction with associated  petechial hemorrhage in the right temporal occipital junction as above. There is  underlying vasogenic edema slightly atypical however, and a follow-up in 2-3  months is recommended to document improvement/resolution. 2.  No flow limiting stenosis or arterial occlusion. Lab Review No results found for this or any previous visit (from the past 24 hour(s)). Additional comments:I reviewed the patients new imaging test results. Patient Vitals for the past 8 hrs:   BP Temp Pulse Resp SpO2   09/12/17 1101 (!) 137/93 98.5 °F (36.9 °C) 96 17 98 %   09/12/17 0700 124/70 98.2 °F (36.8 °C) 89 22 100 %          09/10 1901 - 09/12 0700  In: 110 [I.V.:110]  Out: 1300 [Urine:1300]    Exam:  Visit Vitals    BP (!) 137/93 (BP 1 Location: Right arm, BP Patient Position: At rest)    Pulse 96    Temp 98.5 °F (36.9 °C)    Resp 17    Ht 6' 2\" (1.88 m)    Wt 72.1 kg (158 lb 15.2 oz)    SpO2 98%    BMI 20.41 kg/m2     Gen:  Well developed  CV: RRR  Lungs: non labored breathing  Abd: soft, non distended  Neuro:  awake and alert, +dysarthria (baseline?)  CN II-XII: PERRL,  face asymmetric, tongue/palate midline  Motor: Subtle weakness left upper extremity  Sensory: intact to LT  DTRs: symmetric  COOR: no limb/truncal ataxia  Gait: Wide and cautious with assistance    PROBLEM LIST:     Patient Active Problem List   Diagnosis Code    Slurred speech R47.81    Seizures ([address] R56.9    Altered mental status R41.82    Cerebrovascular accident (CVA) due to thrombosis of right posterior cerebral artery [facility]) I63.331       Assessment/Plan:      67-year-old gentleman who presented with stroke symptoms. Imaging appears to show a subacute right PCA infarct. Some questionable contrast enhancement but nothing definitive to suggest some type of mass lesion. I would recommend repeating his MRI in 6 months. He will need follow-up with me in the outpatient clinic in the next 2-3 months. Continue aspirin 81 mg for now. Low-dose statin. Okay to continue Keppra as is. I will consider discontinuing that at the next visit. Agata Parada is going to inpatient rehabilitation. He can follow-up with me thereafter.       Signed:  [address] [facility], DO  9/12/2017  1:40 PM

## 2017-09-13 VITALS
OXYGEN SATURATION: 91 % | BODY MASS INDEX: 21.22 KG/M2 | HEIGHT: 74 IN | TEMPERATURE: 97.6 F | DIASTOLIC BLOOD PRESSURE: 80 MMHG | RESPIRATION RATE: 13 BRPM | HEART RATE: 71 BPM | SYSTOLIC BLOOD PRESSURE: 114 MMHG | WEIGHT: 165.34 LBS

## 2017-09-13 PROCEDURE — 74011000258 HC RX REV CODE- 258: Performed by: FAMILY MEDICINE

## 2017-09-13 PROCEDURE — 77010033678 HC OXYGEN DAILY

## 2017-09-13 PROCEDURE — 74011250637 HC RX REV CODE- 250/637: Performed by: INTERNAL MEDICINE

## 2017-09-13 PROCEDURE — 97535 SELF CARE MNGMENT TRAINING: CPT

## 2017-09-13 PROCEDURE — 74011250637 HC RX REV CODE- 250/637: Performed by: PSYCHIATRY & NEUROLOGY

## 2017-09-13 PROCEDURE — 97116 GAIT TRAINING THERAPY: CPT

## 2017-09-13 PROCEDURE — 74011250636 HC RX REV CODE- 250/636: Performed by: FAMILY MEDICINE

## 2017-09-13 RX ORDER — OXYCODONE AND ACETAMINOPHEN 10; 325 MG/1; MG/1
1 TABLET ORAL
Qty: 20 TAB | Refills: 0 | Status: SHIPPED | OUTPATIENT
Start: 2017-09-13 | End: 2017-09-29

## 2017-09-13 RX ORDER — LEVETIRACETAM 500 MG/1
500 TABLET ORAL 2 TIMES DAILY
Qty: 60 TAB | Refills: 0 | Status: SHIPPED | OUTPATIENT
Start: 2017-09-13 | End: 2017-11-17 | Stop reason: SDUPTHER

## 2017-09-13 RX ORDER — GUAIFENESIN 100 MG/5ML
81 LIQUID (ML) ORAL DAILY
Qty: 60 TAB | Refills: 0 | Status: SHIPPED | OUTPATIENT
Start: 2017-09-13 | End: 2017-10-06

## 2017-09-13 RX ORDER — ATORVASTATIN CALCIUM 40 MG/1
40 TABLET, FILM COATED ORAL
Qty: 60 TAB | Refills: 0 | Status: SHIPPED | OUTPATIENT
Start: 2017-09-13 | End: 2020-01-01

## 2017-09-13 RX ORDER — OXYCODONE AND ACETAMINOPHEN 10; 325 MG/1; MG/1
1 TABLET ORAL
Status: DISCONTINUED | OUTPATIENT
Start: 2017-09-13 | End: 2017-09-13 | Stop reason: HOSPADM

## 2017-09-13 RX ADMIN — Medication 10 ML: at 06:55

## 2017-09-13 RX ADMIN — Medication 10 ML: at 14:50

## 2017-09-13 RX ADMIN — ASPIRIN 81 MG 81 MG: 81 TABLET ORAL at 09:12

## 2017-09-13 RX ADMIN — LEVETIRACETAM 500 MG: 100 INJECTION, SOLUTION, CONCENTRATE INTRAVENOUS at 09:19

## 2017-09-13 RX ADMIN — OXYCODONE HYDROCHLORIDE AND ACETAMINOPHEN 1 TABLET: 10; 325 TABLET ORAL at 10:18

## 2017-09-13 NOTE — PROGRESS NOTES
Problem: Self Care Deficits Care Plan (Adult)  Goal: *Acute Goals and Plan of Care (Insert Text)  Occupational Therapy Goals  Initiated 9/12/2017   1. Patient will perform grooming standing at sink with supervision/set-up within 7 day(s). 2. Patient will perform upper body dressing with supervision/set-up within 7 day(s). 3. Patient will perform lower body dressing with supervision/set-up within 7 day(s). 4. Patient will perform toilet transfers with supervision/set-up within 7 day(s). 5. Patient will perform all aspects of toileting with supervision/set-up within 7 day(s). 6. Patient will participate in upper extremity therapeutic exercise/activities with supervision/set-up for 10 minutes within 7 day(s). OCCUPATIONAL THERAPY TREATMENT  Patient: Raheel Conklin (54 y.o. male)  Date: 9/13/2017  Diagnosis: Stroke (cerebrum) (HCC)  Seizures (HCC)  Altered mental status  Slurred speech Cerebrovascular accident (CVA) due to thrombosis of right posterior cerebral artery [facility])       Precautions: Fall  Chart, occupational therapy assessment, plan of care, and goals were reviewed. ASSESSMENT:  Nursing cleared for therapy. Pt received supine in bed, on 2L O2, pleasant and agreeable to therapy. Pt ambulated to bathroom with no AD, CGA, able to tolerate ~10 min standing at sink for oral/facial hygiene task. Upon sitting EOB, pt's O2 sats were %, ambulated to bathroom on RA, unable to get accurate read on pulseOx once standing at sink, pt placed back on 2L O2 at sink due to pt showing signs of SOB, however pt denies any dizziness or SOB throughout session. Pt educated on pursed lip breathing technique, emphasized energy conservation techniques to be used during ADLs when becoming SOB. Ambulated back to bed, pt's O2 sats upon sitting EOB were at 90-91%.  Pt stating today he was \"very motivated\" to get better, continue to recommend pt d/c to Inpatient rehab facility to increase functional endurance and dynamic balance for ADLs. Progression toward goals:  [ ]          Improving appropriately and progressing toward goals  [X]          Improving slowly and progressing toward goals  [ ]          Not making progress toward goals and plan of care will be adjusted       PLAN:  Patient continues to benefit from skilled intervention to address the above impairments. Continue treatment per established plan of care. Discharge Recommendations:  Inpatient Rehab  Further Equipment Recommendations for Discharge:  TBD       SUBJECTIVE:   Patient stated You guys make me feel very motivated.       OBJECTIVE DATA SUMMARY:   Cognitive/Behavioral Status:  Neurologic State: Alert  Orientation Level: Oriented X4  Cognition: Follows commands     Functional Mobility and Transfers for ADLs:              Bed Mobility:     Supine to Sit: Stand-by asssistance  Sit to Supine: Stand-by asssistance                   Transfers:  Sit to Stand: Contact guard assistance  Functional Transfers  Bathroom Mobility: Contact guard assistance     Balance:  Sitting: Intact; Without support  Standing: Impaired; Without support  Standing - Static: Good  Standing - Dynamic : Fair  ADL Intervention:     Pt educated on energy conservation techniques, pursed lip breathing to be used when pt becomes SOB during ADLs. Grooming  Grooming Assistance: Supervision/set up  Brushing Teeth: Supervision/set-up  Cues: Verbal cues provided        Pain:  Pain Scale 1: Numeric (0 - 10)  Pain Intensity 1: 0        Activity Tolerance: Tolerated session fairly well, showed signs of SOB after ~3 minutes of standing at sink on RA, placed back on 2L. O2 sats remained above 90% following activity despite signs of SOB. Pt denies any dizziness, fatigue and states he is \"doing great\" throughout session. Please refer to the flowsheet for vital signs taken during this treatment.   After treatment:   [ ]  Patient left in no apparent distress sitting up in chair  [X]  Patient left in no apparent distress in bed  [X]  Call bell left within reach  [X]  Nursing notified  [ ]  Caregiver present  [ ]  Bed alarm activated      COMMUNICATION/COLLABORATION:   The patients plan of care was discussed with: Registered Nurse and patient     RIGOBERTO Melendez  Time Calculation: 28 mins               Regarding student involvement in patient care:  A student participated in this treatment session. Per CMS Medicare statements and AOTA guidelines I certify that the following was true:  1. I was present and directly observed the entire session. 2. I made all skilled judgments and clinical decisions regarding care. 3. I am the practitioner responsible for assessment, treatment, and documentation.   No PAZ/L

## 2017-09-13 NOTE — PROGRESS NOTES
Bedside RN performed patient education and medication education. Discharge concerns initiated and discussed with patient, including clarification on \"who\" assists the patient at their home and instructions for when the home going patient should call their provider after discharge. Opportunity for questions and clarification was provided. Pt is  Discharging to [facility]  For rehab. Report given to [facility]    at  [phone]. She verbalized understanding of pt's condition and was given the opportunity to ask questions. The pt's stroke book, copy of the [facility], [facility], and Discharge instructions will be sent with transport for the facility. Patient receptive to education: YES  Patient stated: \"You nurses are angels\"  Barriers to Education: cognitive function  Diagnosis Education given:  YES    Length of stay: 3  Expected Day of Discharge: 3  Ask if they have \"Help at Home\" & add to white board?   YES    Education Day #: 3    Medication Education Given:  NO  M in the box Medication name:    Pt aware of [facility] survey: NO

## 2017-09-13 NOTE — PROGRESS NOTES
Problem: Mobility Impaired (Adult and Pediatric)  Goal: *Acute Goals and Plan of Care (Insert Text)  Physical Therapy Goals  Initiated 9/11/2017  1. Patient will move from supine to sit and sit to supine and scoot up and down in bed with modified independence within 7 day(s). 2. Patient will transfer from bed to chair and chair to bed with modified independence using the least restrictive device within 7 day(s). 3. Patient will perform sit to stand with modified independence within 7 day(s). 4. Patient will ambulate with modified independence for 150 feet with the least restrictive device within 7 day(s). 5. Patient will ascend/descend 12 stairs with right handrail(s) with modified independence within 7 day(s). 6. Patient will improve Sorto Balance score by 7 points within 7 days. PHYSICAL THERAPY TREATMENT  Patient: Narinder Maxwell (28 y.o. male)  Date: 9/13/2017  Diagnosis: Stroke (cerebrum) (HCC)  Seizures (HCC)  Altered mental status  Slurred speech Cerebrovascular accident (CVA) due to thrombosis of right posterior cerebral artery ([facility] [address]       Precautions:    Chart, physical therapy assessment, plan of care and goals were reviewed. ASSESSMENT:  Pt was agreeable to treatment reporting a headache earlier this morning. Pt had a slightly unsteady gait requiring use of wall rail to assist with balance. Pt was able to utilize pursed lip breathing to decrease SOB with mobility. Pt SpO2 100% on 2 L O2 at rest HR 80 bpm, SpO2 with activity SpO2 89%on 2 L O2 HR 100bpm. Pt may benefit from inpt rehab to increase activity tolerance, balance and strength   Progression toward goals:  [X]      Improving appropriately and progressing toward goals  [ ]      Improving slowly and progressing toward goals  [ ]      Not making progress toward goals and plan of care will be adjusted       PLAN:  Patient continues to benefit from skilled intervention to address the above impairments.   Continue treatment per established plan of care. Discharge Recommendations:  Inpatient Rehab  Further Equipment Recommendations for Discharge:  TBD       SUBJECTIVE:   Patient stated I have a headache that hasn't gone away.       OBJECTIVE DATA SUMMARY:   Critical Behavior:  Neurologic State: Alert  Orientation Level: Oriented X4  Cognition: Follows commands  Safety/Judgement: Awareness of environment  Functional Mobility Training:  Bed Mobility:     Supine to Sit: Stand-by asssistance                          Transfers:  Sit to Stand: Contact guard assistance  Stand to Sit: Contact guard assistance                             Balance:  Sitting: Intact  Standing: Impaired  Standing - Static: Good  Standing - Dynamic : Fair  Ambulation/Gait Training:  Distance (ft): 80 Feet (ft) (x2 trials) seated rest break to recover  Assistive Device: Gait belt  Ambulation - Level of Assistance: Contact guard assistance;Minimal assistance        Gait Abnormalities: Decreased step clearance        Base of Support: Narrowed     Speed/Sylvia: Slow  Step Length: Left shortened;Right shortened                    Stairs:              Neuro Re-Education:     Therapeutic Exercises:      Pain:  Pain Scale 1: Numeric (0 - 10)  Pain Intensity 1: 0              Activity Tolerance:   Limited   Please refer to the flowsheet for vital signs taken during this treatment.   After treatment:   [X] Patient left in no apparent distress sitting up in chair  [ ] Patient left in no apparent distress in bed  [X] Call bell left within reach  [X] Nursing notified  [ ] Caregiver present  [ ] Bed alarm activated      COMMUNICATION/COLLABORATION:   The patients plan of care was discussed with: Registered Nurse     Betina Amado PTA   Time Calculation: 26 mins

## 2017-09-13 NOTE — PROGRESS NOTES
Problem: Falls - Risk of  Goal: *Absence of Falls  Document Vika Fall Risk and appropriate interventions in the flowsheet.    Outcome: Progressing Towards Goal  Fall Risk Interventions:  Mobility Interventions: Assess mobility with egress test, Communicate number of staff needed for ambulation/transfer, OT consult for ADLs, Patient to call before getting OOB, PT Consult for mobility concerns, PT Consult for assist device competence, Strengthening exercises (ROM-active/passive)     Mentation Interventions: Adequate sleep, hydration, pain control, Door open when patient unattended, Evaluate medications/consider consulting pharmacy, Eyeglasses and hearing aids, Familiar objects from home, Gait belt with transfers/ambulation, HELP ([address]) if available, Increase mobility, More frequent rounding, Reorient patient, Room close to nurse's station     Medication Interventions: Assess postural VS orthostatic hypotension, Evaluate medications/consider consulting pharmacy, Patient to call before getting OOB, Teach patient to arise slowly     Elimination Interventions: Call light in reach, Elevated toilet seat, Patient to call for help with toileting needs, Toilet paper/wipes in reach, Toileting schedule/hourly rounds

## 2017-09-13 NOTE — PROGRESS NOTES
Bedside shift change report given to Fabrice Cox (oncoming nurse) by Segundo Hadley RN (offgoing nurse). Report included the following information SBAR, Kardex, ED Summary, Procedure Summary, Intake/Output, MAR, Accordion, Recent Results, Med Rec Status and Cardiac Rhythm NSR.

## 2017-09-13 NOTE — PROGRESS NOTES
Stroke Education documented in Patient Education: YES  Core Measures Documented in Connect Care:  Risk Factors: YES  Warning signs of stroke: YES  When to Activate 911: YES  Medication Education for Risk Factors: YES  Smoking cessation if applicable: YES  Written Education Given:  YES    Discharge NIH Completed: YES  Score: 1    BRAINS: YES    Follow Up Appointment Made: NO  Date/Time if applicable: one week

## 2017-09-13 NOTE — DISCHARGE INSTRUCTIONS
Discharge Instructions       PATIENT ID: Kimberly Drew  MRN: [id number]   YOB: 1945    DATE OF ADMISSION: 9/10/2017  9:03 PM    DATE OF DISCHARGE: 9/13/2017    PRIMARY CARE PROVIDER: Giuseppe Scruggs MD     ATTENDING PHYSICIAN: Luretha Moritz, MD  DISCHARGING PROVIDER: Luretha Moritz, MD    To contact this individual call [phone] and ask the  to page. If unavailable ask to be transferred the Adult Hospitalist Department. DISCHARGE DIAGNOSES: CVA  CONSULTATIONS: IP CONSULT TO HOSPITALIST  IP CONSULT TO NEUROLOGY    PROCEDURES/SURGERIES: * No surgery found *    PENDING TEST RESULTS:   At the time of discharge the following test results are still pending: None     FOLLOW UP APPOINTMENTS:   Follow-up Information     None           ADDITIONAL CARE RECOMMENDATIONS: None discharged to Rehab center     DIET: Cardiac Diet    ACTIVITY: Activity as tolerated, patient is on home oxygen will be sent with home oxygen. DISCHARGE MEDICATIONS:   See Medication Reconciliation Form    · It is important that you take the medication exactly as they are prescribed. · Keep your medication in the bottles provided by the pharmacist and keep a list of the medication names, dosages, and times to be taken in your wallet. · Do not take other medications without consulting your doctor. NOTIFY YOUR PHYSICIAN FOR ANY OF THE FOLLOWING:   Fever over 101 degrees for 24 hours. Chest pain, shortness of breath, fever, chills, nausea, vomiting, diarrhea, change in mentation, falling, weakness, bleeding. Severe pain or pain not relieved by medications. Or, any other signs or symptoms that you may have questions about. DISPOSITION:    Home With:   OT  PT  HH  RN      X SNF/Inpatient Rehab/LTAC    Independent/assisted living    Hospice    Other:     CDMP Checked: Yes X     PROBLEM LIST Updated:   Yes X       Signed:   Luretha Moritz, MD  9/13/2017  2:27 PM

## 2017-09-13 NOTE — INTERDISCIPLINARY ROUNDS
IDR/SLIDR Summary          Patient: David Hernandes MRN: [id number]    Age: 71 y.o. YOB: 1945 Room/Bed: [facility]   Admit Diagnosis: Stroke (cerebrum) (HCC)  Seizures (HCC)  Altered mental status  Slurred speech  Principal Diagnosis: Cerebrovascular accident (CVA) due to thrombosis of right posterior cerebral artery ([address]   Goals: safety  Readmission: NO  Quality Measure: Not applicable  VTE Prophylaxis: Mechanical  Influenza Vaccine screening completed? YES  Pneumococcal Vaccine screening completed? NO  Mobility needs: Yes   Nutrition plan:Yes  Consults:P.T, O.T. and Speech    Financial concerns:Yes  Escalated to CM? YES  RRAT Score: 12   Interventions:  Testing due for pt today?  NO  LOS: 3 days Expected length of stay 4 days  Discharge plan: Rehab   PCP: Ezio Hyman MD  Transportation needs: Yes    Days before discharge:two or more days before discharge   Discharge disposition: Rehab    Signed:     Ruthann Marques  9/13/2017

## 2017-09-13 NOTE — PROGRESS NOTES
CM follow up. CM spoke with [facility] Liaison (phone#), who said that patient is accepted and bed is available today. Receiving physician is diane Brandon MD/ Nurse Report to be called to [phone]. CM faxed Discharge Summary/ Discharge Instructions(AVS) to the facility. CM sent referral via [employer] for BLS transport to [facility], and referral was accepted for 5:00PM . CM completed PCS and placed it on chart. CM completed CM portion of Emtala. CM gave update to staff nurse.     CM spoke with Bertha Morgan, patient's sister [phone], who said that she will meet patient at [facility] at 6:00PM.

## 2017-09-13 NOTE — DISCHARGE SUMMARY
Discharge Summary       PATIENT ID: Narinder Maxwell  MRN: [id number]   YOB: 1945    DATE OF ADMISSION: 9/10/2017  9:03 PM    DATE OF DISCHARGE: 9/13/2017  PRIMARY CARE PROVIDER: Mirna Peacock MD     ATTENDING PHYSICIAN: Collette Johnson   DISCHARGING PROVIDER: Janusz Ashley MD    To contact this individual call [phone] and ask the  to page. If unavailable ask to be transferred the Adult Hospitalist Department. CONSULTATIONS: IP CONSULT TO HOSPITALIST  IP CONSULT TO NEUROLOGY    PROCEDURES/SURGERIES: * No surgery found *    ADMITTING DIAGNOSES & HOSPITAL COURSE:   A 71year old AA male with history of mentally challenged, prostate cancer, asthma presented tot hospital with a c/c of Altered mental status. MRI showing right temporal occipital infarction. Patient started on statins and ASA followed clinically showed some improvement. PT-OT evaluation with a suggestion to send to Rehab center. DISCHARGE DIAGNOSES / PLAN:      CVA   With CT finding of subacute infarction with right temporal occipital junction   Will send to Acute rehab with ASA and Statins   While in the hospital patient was followed by Neurology. Please refer to their consultation and follow up note in Epic. Patient showed improvement currently at his base line, speaks without slurring of speech. Started on ASA and statin   For possible inpatient Rehabilitation       Left-sided weakness probably 2/2 CVA   Improved and patient at his base line   Please refer to SLP and PT consultation and follow up notes in [facility]   Referral to Rehabilitation facility.       Altered mental status/ Acute encephalopathy (POA) most likely 2/2 CVA   Resolved       Slurred speech. Resolved and seem to be at his baseline       R/O ?  Seizures  Neurology on board   Will continue Keppra   EEG with Normal finding     History of COPD  At this base line will continue with home nasal oxygen        PENDING TEST RESULTS:   At the time of discharge the following test results are still pending: None     FOLLOW UP APPOINTMENTS:    Follow-up Information     Follow up With Details Comments One Alessandra Garcia MD   [address] [id number]  [address] [place] [address]  Referred for inpatient rehab. [address] [place] [address]  [phone]           ADDITIONAL CARE RECOMMENDATIONS: None     DIET: Cardiac Diet       ACTIVITY: Referral to Rehab facility     EQUIPMENT needed: sent to Rehab facility       DISCHARGE MEDICATIONS:  Current Discharge Medication List      START taking these medications    Details   aspirin 81 mg chewable tablet Take 1 Tab by mouth daily. Qty: 60 Tab, Refills: 0      atorvastatin (LIPITOR) 40 mg tablet Take 1 Tab by mouth nightly. Qty: 60 Tab, Refills: 0      oxyCODONE-acetaminophen (PERCOCET 10)  mg per tablet Take 1 Tab by mouth every six (6) hours as needed. Max Daily Amount: 4 Tabs. Qty: 20 Tab, Refills: 0      levETIRAcetam (KEPPRA) 500 mg tablet Take 1 Tab by mouth two (2) times a day. Qty: 60 Tab, Refills: 0         CONTINUE these medications which have NOT CHANGED    Details   budesonide-formoterol (SYMBICORT) 160-4.5 mcg/actuation HFA inhaler Take 2 Puffs by inhalation two (2) times a day. Indications: MAINTENANCE THERAPY FOR ASTHMA      umeclidinium (INCRUSE ELLIPTA) 62.5 mcg/actuation inhaler Take 1 Puff by inhalation daily. Indications: Rescue inhaler      INULIN (FIBER GUMMIES PO) Take  by mouth. Takes two po once daily. NOTIFY YOUR PHYSICIAN FOR ANY OF THE FOLLOWING:   Fever over 101 degrees for 24 hours. Chest pain, shortness of breath, fever, chills, nausea, vomiting, diarrhea, change in mentation, falling, weakness, bleeding. Severe pain or pain not relieved by medications. Or, any other signs or symptoms that you may have questions about.     DISPOSITION:    Home With:   OT  PT  HH  RN      X Long term SNF/Inpatient Rehab    Independent/assisted living    Hospice    Other:       PATIENT CONDITION AT DISCHARGE:     Functional status    Poor     Deconditioned    X Independent      Cognition   X  Lucid     Forgetful     Dementia      Code status   X  Full code     DNR      PHYSICAL EXAMINATION AT DISCHARGE:     Constitutional:  No acute distress, cooperative, pleasant    ENT:  Oral mucous moist, oropharynx benign. Neck supple,    Resp:  CTA bilaterally. No wheezing/rhonchi/rales. No accessory muscle use   CV:  Regular rhythm, normal rate, no murmurs, gallops, rubs    GI:  Soft, non distended, non tender. normoactive bowel sounds, no hepatosplenomegaly     Musculoskeletal:  No edema, warm, 2+ pulses throughout    Neurologic:  Moves all extremities.   AAOx3, CN II-XII reviewed                                             Psych:  Good insight, Not anxious nor agitated      CHRONIC MEDICAL DIAGNOSES:  Problem List as of 9/13/2017  Date Reviewed: 9/11/2017          Codes Class Noted - Resolved    Slurred speech ICD-10-CM: R47.81  ICD-9-CM: 784.59  9/10/2017 - Present        Seizures ([facility] ICD-10-CM: R56.9  ICD-9-CM: 780.39  9/10/2017 - Present        Altered mental status ICD-10-CM: R41.82  ICD-9-CM: 780.97  9/10/2017 - Present        * (Principal)Cerebrovascular accident (CVA) due to thrombosis of right posterior cerebral artery ([facility] ICD-10-CM: J74.105  ICD-9-CM: 434.01  9/10/2017 - Present              Greater than 35 minutes were spent with the patient on counseling and coordination of care    Signed:   Natalia Russo MD  9/13/2017  2:35 PM

## 2017-09-29 ENCOUNTER — HOSPITAL ENCOUNTER (INPATIENT)
Age: 72
LOS: 7 days | Discharge: NURSING, REHAB OR LONG-TERM CARE | DRG: 378 | End: 2017-10-06
Attending: STUDENT IN AN ORGANIZED HEALTH CARE EDUCATION/TRAINING PROGRAM | Admitting: INTERNAL MEDICINE
Payer: MEDICARE

## 2017-09-29 DIAGNOSIS — K92.2 GASTROINTESTINAL HEMORRHAGE, UNSPECIFIED GASTROINTESTINAL HEMORRHAGE TYPE: Primary | ICD-10-CM

## 2017-09-29 LAB
ALBUMIN SERPL-MCNC: 2.6 G/DL (ref 3.5–5)
ALBUMIN/GLOB SERPL: 0.9 {RATIO} (ref 1.1–2.2)
ALP SERPL-CCNC: 48 U/L (ref 45–117)
ALT SERPL-CCNC: 19 U/L (ref 12–78)
ANION GAP SERPL CALC-SCNC: 4 MMOL/L (ref 5–15)
APTT PPP: 24.2 SEC (ref 22.1–32.5)
AST SERPL-CCNC: 18 U/L (ref 15–37)
BASOPHILS # BLD: 0.1 K/UL (ref 0–0.1)
BASOPHILS NFR BLD: 1 % (ref 0–1)
BILIRUB SERPL-MCNC: 0.2 MG/DL (ref 0.2–1)
BUN SERPL-MCNC: 28 MG/DL (ref 6–20)
BUN/CREAT SERPL: 38 (ref 12–20)
CALCIUM SERPL-MCNC: 7.8 MG/DL (ref 8.5–10.1)
CHLORIDE SERPL-SCNC: 111 MMOL/L (ref 97–108)
CO2 SERPL-SCNC: 28 MMOL/L (ref 21–32)
CREAT SERPL-MCNC: 0.74 MG/DL (ref 0.7–1.3)
DIFFERENTIAL METHOD BLD: ABNORMAL
EOSINOPHIL # BLD: 0.2 K/UL (ref 0–0.4)
EOSINOPHIL NFR BLD: 2 % (ref 0–7)
ERYTHROCYTE [DISTWIDTH] IN BLOOD BY AUTOMATED COUNT: 17.2 % (ref 11.5–14.5)
GLOBULIN SER CALC-MCNC: 3 G/DL (ref 2–4)
GLUCOSE SERPL-MCNC: 102 MG/DL (ref 65–100)
HCT VFR BLD AUTO: 17.2 % (ref 36.6–50.3)
HEMOCCULT STL QL: POSITIVE
HGB BLD-MCNC: 5.3 G/DL (ref 12.1–17)
INR PPP: 1.1 (ref 0.9–1.1)
LYMPHOCYTES # BLD: 1.9 K/UL (ref 0.8–3.5)
LYMPHOCYTES NFR BLD: 22 % (ref 12–49)
MCH RBC QN AUTO: 26.6 PG (ref 26–34)
MCHC RBC AUTO-ENTMCNC: 30.8 G/DL (ref 30–36.5)
MCV RBC AUTO: 86.4 FL (ref 80–99)
MONOCYTES # BLD: 0.4 K/UL (ref 0–1)
MONOCYTES NFR BLD: 5 % (ref 5–13)
NEUTS SEG # BLD: 6.2 K/UL (ref 1.8–8)
NEUTS SEG NFR BLD: 70 % (ref 32–75)
PLATELET # BLD AUTO: 197 K/UL (ref 150–400)
PLATELET COMMENTS,PCOM: ABNORMAL
POTASSIUM SERPL-SCNC: 4 MMOL/L (ref 3.5–5.1)
PROT SERPL-MCNC: 5.6 G/DL (ref 6.4–8.2)
PROTHROMBIN TIME: 11.4 SEC (ref 9–11.1)
RBC # BLD AUTO: 1.99 M/UL (ref 4.1–5.7)
RBC MORPH BLD: ABNORMAL
SODIUM SERPL-SCNC: 143 MMOL/L (ref 136–145)
THERAPEUTIC RANGE,PTTT: NORMAL SECS (ref 58–77)
TROPONIN I SERPL-MCNC: <0.04 NG/ML
WBC # BLD AUTO: 8.8 K/UL (ref 4.1–11.1)

## 2017-09-29 PROCEDURE — 74011000250 HC RX REV CODE- 250: Performed by: STUDENT IN AN ORGANIZED HEALTH CARE EDUCATION/TRAINING PROGRAM

## 2017-09-29 PROCEDURE — 86923 COMPATIBILITY TEST ELECTRIC: CPT | Performed by: STUDENT IN AN ORGANIZED HEALTH CARE EDUCATION/TRAINING PROGRAM

## 2017-09-29 PROCEDURE — 99285 EMERGENCY DEPT VISIT HI MDM: CPT

## 2017-09-29 PROCEDURE — 94640 AIRWAY INHALATION TREATMENT: CPT

## 2017-09-29 PROCEDURE — 30253N1 TRANSFUSE NONAUT RED BLOOD CELLS IN PERIPH ART, PERC: ICD-10-PCS | Performed by: STUDENT IN AN ORGANIZED HEALTH CARE EDUCATION/TRAINING PROGRAM

## 2017-09-29 PROCEDURE — 74011000250 HC RX REV CODE- 250: Performed by: INTERNAL MEDICINE

## 2017-09-29 PROCEDURE — 85610 PROTHROMBIN TIME: CPT | Performed by: STUDENT IN AN ORGANIZED HEALTH CARE EDUCATION/TRAINING PROGRAM

## 2017-09-29 PROCEDURE — P9016 RBC LEUKOCYTES REDUCED: HCPCS | Performed by: STUDENT IN AN ORGANIZED HEALTH CARE EDUCATION/TRAINING PROGRAM

## 2017-09-29 PROCEDURE — 77030029684 HC NEB SM VOL KT MONA -A

## 2017-09-29 PROCEDURE — 36415 COLL VENOUS BLD VENIPUNCTURE: CPT | Performed by: STUDENT IN AN ORGANIZED HEALTH CARE EDUCATION/TRAINING PROGRAM

## 2017-09-29 PROCEDURE — 74011250636 HC RX REV CODE- 250/636: Performed by: INTERNAL MEDICINE

## 2017-09-29 PROCEDURE — 82272 OCCULT BLD FECES 1-3 TESTS: CPT | Performed by: STUDENT IN AN ORGANIZED HEALTH CARE EDUCATION/TRAINING PROGRAM

## 2017-09-29 PROCEDURE — 84484 ASSAY OF TROPONIN QUANT: CPT | Performed by: INTERNAL MEDICINE

## 2017-09-29 PROCEDURE — 36430 TRANSFUSION BLD/BLD COMPNT: CPT

## 2017-09-29 PROCEDURE — C9113 INJ PANTOPRAZOLE SODIUM, VIA: HCPCS | Performed by: INTERNAL MEDICINE

## 2017-09-29 PROCEDURE — 85025 COMPLETE CBC W/AUTO DIFF WBC: CPT | Performed by: STUDENT IN AN ORGANIZED HEALTH CARE EDUCATION/TRAINING PROGRAM

## 2017-09-29 PROCEDURE — 65660000000 HC RM CCU STEPDOWN

## 2017-09-29 PROCEDURE — 80053 COMPREHEN METABOLIC PANEL: CPT | Performed by: STUDENT IN AN ORGANIZED HEALTH CARE EDUCATION/TRAINING PROGRAM

## 2017-09-29 PROCEDURE — 74011250636 HC RX REV CODE- 250/636: Performed by: STUDENT IN AN ORGANIZED HEALTH CARE EDUCATION/TRAINING PROGRAM

## 2017-09-29 PROCEDURE — 85730 THROMBOPLASTIN TIME PARTIAL: CPT | Performed by: STUDENT IN AN ORGANIZED HEALTH CARE EDUCATION/TRAINING PROGRAM

## 2017-09-29 PROCEDURE — 86900 BLOOD TYPING SEROLOGIC ABO: CPT | Performed by: STUDENT IN AN ORGANIZED HEALTH CARE EDUCATION/TRAINING PROGRAM

## 2017-09-29 PROCEDURE — C9113 INJ PANTOPRAZOLE SODIUM, VIA: HCPCS | Performed by: STUDENT IN AN ORGANIZED HEALTH CARE EDUCATION/TRAINING PROGRAM

## 2017-09-29 PROCEDURE — 74011250637 HC RX REV CODE- 250/637: Performed by: INTERNAL MEDICINE

## 2017-09-29 RX ORDER — AMOXICILLIN 250 MG
2 CAPSULE ORAL
COMMUNITY
End: 2020-01-01

## 2017-09-29 RX ORDER — FACIAL-BODY WIPES
10 EACH TOPICAL DAILY
COMMUNITY
End: 2020-01-01

## 2017-09-29 RX ORDER — ACETAMINOPHEN 500 MG
TABLET ORAL
COMMUNITY
End: 2020-01-01

## 2017-09-29 RX ORDER — LEVETIRACETAM 500 MG/1
500 TABLET ORAL 2 TIMES DAILY
Status: DISCONTINUED | OUTPATIENT
Start: 2017-09-29 | End: 2017-10-06 | Stop reason: HOSPADM

## 2017-09-29 RX ORDER — SODIUM CHLORIDE 0.9 % (FLUSH) 0.9 %
5-10 SYRINGE (ML) INJECTION EVERY 8 HOURS
Status: DISCONTINUED | OUTPATIENT
Start: 2017-09-29 | End: 2017-10-06 | Stop reason: HOSPADM

## 2017-09-29 RX ORDER — IPRATROPIUM BROMIDE 0.5 MG/2.5ML
0.5 SOLUTION RESPIRATORY (INHALATION)
Status: DISCONTINUED | OUTPATIENT
Start: 2017-09-29 | End: 2017-10-06 | Stop reason: HOSPADM

## 2017-09-29 RX ORDER — SODIUM CHLORIDE 9 MG/ML
250 INJECTION, SOLUTION INTRAVENOUS AS NEEDED
Status: DISCONTINUED | OUTPATIENT
Start: 2017-09-29 | End: 2017-09-29

## 2017-09-29 RX ORDER — AMOXICILLIN 250 MG
2 CAPSULE ORAL
Status: DISCONTINUED | OUTPATIENT
Start: 2017-09-29 | End: 2017-10-06 | Stop reason: HOSPADM

## 2017-09-29 RX ORDER — SODIUM CHLORIDE 9 MG/ML
75 INJECTION, SOLUTION INTRAVENOUS CONTINUOUS
Status: DISCONTINUED | OUTPATIENT
Start: 2017-09-30 | End: 2017-10-05

## 2017-09-29 RX ORDER — ONDANSETRON 2 MG/ML
4 INJECTION INTRAMUSCULAR; INTRAVENOUS
Status: DISCONTINUED | OUTPATIENT
Start: 2017-09-29 | End: 2017-10-06 | Stop reason: HOSPADM

## 2017-09-29 RX ORDER — ARFORMOTEROL TARTRATE 15 UG/2ML
15 SOLUTION RESPIRATORY (INHALATION)
Status: DISCONTINUED | OUTPATIENT
Start: 2017-09-29 | End: 2017-10-06 | Stop reason: HOSPADM

## 2017-09-29 RX ORDER — HEPARIN SODIUM 5000 [USP'U]/ML
5000 INJECTION, SOLUTION INTRAVENOUS; SUBCUTANEOUS EVERY 8 HOURS
COMMUNITY
End: 2020-01-01

## 2017-09-29 RX ORDER — SODIUM CHLORIDE 0.9 % (FLUSH) 0.9 %
5-10 SYRINGE (ML) INJECTION AS NEEDED
Status: DISCONTINUED | OUTPATIENT
Start: 2017-09-29 | End: 2017-10-06 | Stop reason: HOSPADM

## 2017-09-29 RX ORDER — ATORVASTATIN CALCIUM 40 MG/1
40 TABLET, FILM COATED ORAL
Status: DISCONTINUED | OUTPATIENT
Start: 2017-09-29 | End: 2017-10-06 | Stop reason: HOSPADM

## 2017-09-29 RX ORDER — ACETAMINOPHEN 325 MG/1
650 TABLET ORAL
Status: DISCONTINUED | OUTPATIENT
Start: 2017-09-29 | End: 2017-10-06 | Stop reason: HOSPADM

## 2017-09-29 RX ORDER — HYDROCODONE BITARTRATE AND ACETAMINOPHEN 5; 325 MG/1; MG/1
1 TABLET ORAL
COMMUNITY
End: 2020-01-01

## 2017-09-29 RX ORDER — HYDROCODONE BITARTRATE AND ACETAMINOPHEN 5; 325 MG/1; MG/1
1 TABLET ORAL
Status: DISCONTINUED | OUTPATIENT
Start: 2017-09-29 | End: 2017-10-06 | Stop reason: HOSPADM

## 2017-09-29 RX ORDER — FLUTICASONE FUROATE AND VILANTEROL 100; 25 UG/1; UG/1
1 POWDER RESPIRATORY (INHALATION) DAILY
COMMUNITY
End: 2020-01-01

## 2017-09-29 RX ORDER — FACIAL-BODY WIPES
10 EACH TOPICAL DAILY
Status: DISCONTINUED | OUTPATIENT
Start: 2017-09-30 | End: 2017-10-06 | Stop reason: HOSPADM

## 2017-09-29 RX ORDER — BUDESONIDE 0.5 MG/2ML
500 INHALANT ORAL
Status: DISCONTINUED | OUTPATIENT
Start: 2017-09-29 | End: 2017-10-06 | Stop reason: HOSPADM

## 2017-09-29 RX ADMIN — IPRATROPIUM BROMIDE 0.5 MG: 0.5 SOLUTION RESPIRATORY (INHALATION) at 21:05

## 2017-09-29 RX ADMIN — SODIUM CHLORIDE 40 MG: 9 INJECTION INTRAMUSCULAR; INTRAVENOUS; SUBCUTANEOUS at 21:29

## 2017-09-29 RX ADMIN — ATORVASTATIN CALCIUM 40 MG: 20 TABLET, FILM COATED ORAL at 21:29

## 2017-09-29 RX ADMIN — DOCUSATE SODIUM AND SENNOSIDES 2 TABLET: 8.6; 5 TABLET, FILM COATED ORAL at 21:29

## 2017-09-29 RX ADMIN — ARFORMOTEROL TARTRATE 15 MCG: 15 SOLUTION RESPIRATORY (INHALATION) at 21:05

## 2017-09-29 RX ADMIN — SODIUM CHLORIDE 80 MG: 9 INJECTION INTRAMUSCULAR; INTRAVENOUS; SUBCUTANEOUS at 14:29

## 2017-09-29 RX ADMIN — BUDESONIDE 500 MCG: 0.5 INHALANT RESPIRATORY (INHALATION) at 21:05

## 2017-09-29 RX ADMIN — LEVETIRACETAM 500 MG: 500 TABLET, FILM COATED ORAL at 21:29

## 2017-09-29 RX ADMIN — Medication 10 ML: at 19:58

## 2017-09-29 NOTE — PROGRESS NOTES
[address], [address] [place] [address] Ave  [phone]        Thank you for requesting this consultation but this patient has been seen by  Dr. Saranya Medina in the past.  I have informed Dr. Saranya Medina of this request, he will see the patient this afternoon.     Sincerely,  Silver Alston NP

## 2017-09-29 NOTE — PROGRESS NOTES
Admission Medication Reconciliation:    Information obtained from: Acute Care Trandfer sheet (Dr. Dari Ventura note), patient, sister    Significant PMH/Disease States:   Past Medical History:   Diagnosis Date    Asthma     hx of/has wheezing    Cancer ([facility] [address]     prostate - not treated yet    Ill-defined condition     shortness of breath - being evaluated    Psychiatric disorder     mental disability       Chief Complaint for this Admission:  low Hgb    Allergies:  Review of patient's allergies indicates no known allergies. Prior to Admission Medications:   Prior to Admission Medications   Prescriptions Last Dose Informant Patient Reported? Taking? HEPARIN SODIUM,PORCINE (HEPARIN, PORCINE,) 5,000 unit/mL injection 2017 at Unknown time  Yes Yes   Si,000 Units every eight (8) hours. HYDROcodone-acetaminophen (NORCO) 5-325 mg per tablet   Yes Yes   Sig: Take 1 Tab by mouth every four (4) hours as needed for Pain. acetaminophen (TYLENOL) 500 mg tablet 2017 at Unknown time  Yes Yes   Sig: Take  by mouth every four (4) hours as needed for Pain. aspirin 81 mg chewable tablet 2017 at Unknown time  No Yes   Sig: Take 1 Tab by mouth daily. atorvastatin (LIPITOR) 40 mg tablet 2017 at Unknown time  No Yes   Sig: Take 1 Tab by mouth nightly. bisacodyl (DULCOLAX, BISACODYL,) 10 mg suppository 2017 at Unknown time  Yes Yes   Sig: Insert 10 mg into rectum daily. fluticasone-vilanterol (BREO ELLIPTA) 100-25 mcg/dose inhaler 2017 at Unknown time  Yes Yes   Sig: Take 1 Puff by inhalation daily. levETIRAcetam (KEPPRA) 500 mg tablet 2017 at Unknown time  No Yes   Sig: Take 1 Tab by mouth two (2) times a day. senna-docusate (PERICOLACE) 8.6-50 mg per tablet 2017 at Unknown time  Yes Yes   Sig: Take 2 Tabs by mouth nightly. umeclidinium (INCRUSE ELLIPTA) 62.5 mcg/actuation inhaler 2017 at Unknown time  Yes Yes   Sig: Take 1 Puff by inhalation daily.  Indications: Rescue inhaler      Facility-Administered Medications: None         Comments/Recommendations:   Patient is a very pleasant man who participates in interview as much as he can. Sister was there to provide support. Allergies were reviewed and confirmed as NKDA. This list represents best effort to reconcile patient info and drug list provided. Thank you for allowing me to participate in the care of your patient.     Deborah KernsD, RN #[id number]

## 2017-09-29 NOTE — ROUTINE PROCESS
TRANSFER - OUT REPORT:    Verbal report given to Guillermo elena RN(name) on Barney Webber  being transferred to (unit) for routine progression of care       Report consisted of patients Situation, Background, Assessment and   Recommendations(SBAR). Information from the following report(s) SBAR, ED Summary, [facility] and Recent Results was reviewed with the receiving nurse. Lines:   Peripheral IV 09/29/17 Left Wrist (Active)   Site Assessment Clean, dry, & intact 9/29/2017  1:13 PM   Phlebitis Assessment 0 9/29/2017  1:13 PM   Infiltration Assessment 0 9/29/2017  1:13 PM   Dressing Status Clean, dry, & intact 9/29/2017  1:13 PM   Dressing Type Transparent 9/29/2017  1:13 PM   Hub Color/Line Status Blue;Flushed;Patent 9/29/2017  1:13 PM       Peripheral IV 09/29/17 Right Antecubital (Active)   Site Assessment Clean, dry, & intact 9/29/2017  1:14 PM   Phlebitis Assessment 0 9/29/2017  1:14 PM   Infiltration Assessment 0 9/29/2017  1:14 PM   Dressing Status Clean, dry, & intact 9/29/2017  1:14 PM   Dressing Type Transparent 9/29/2017  1:14 PM   Hub Color/Line Status Pink;Flushed;Patent 9/29/2017  1:14 PM   Action Taken Blood drawn 9/29/2017  1:14 PM        Opportunity for questions and clarification was provided.       Patient transported with:   O2 @ 2 liters

## 2017-09-29 NOTE — ED PROVIDER NOTES
HPI Comments: 71 y.o. male with past medical history significant for asthma, prostate CA, and mental disability who presents from home via EMS from [address] for evaluation of abnormal lab results. It is noted by spouse that the pt was febrile 2 days ago with associated dizziness and nausea. He was started on ABx and has not had fever since yesterday. He was also administered injection of anticoagulation into his abdomen. Pt reports having onset of black tarry stools today with intermittent abdominal pains that last 1 - 2 minutes at a time. Pt denies a h/o GI bleed. Blood work from [facility] revealed a low HgB 6.1 yesterday and 5.4 today and was transported here for evaluation and treatment. Pt is in [facility] recovering after a stroke with left sided deficits. He has regained some strength on the left side of his body and speech ability. Pt denies vomiting. He has not taken any ibuprofen or ASA. There are no other acute medical concerns at this time. Social hx: Current every day smoker. No alcohol use. PCP: Suzan Turcios MD    Note written by Arina Cisse, as dictated by Judd Gonzalez MD 1:19 PM      The history is provided by the patient. No  was used. Past Medical History:   Diagnosis Date    Asthma     hx of/has wheezing    Cancer ([address]     prostate - not treated yet    Ill-defined condition     shortness of breath - being evaluated    Psychiatric disorder     mental disability       Past Surgical History:   Procedure Laterality Date    HX OTHER SURGICAL      cyst removed from neck         Family History:   Problem Relation Age of Onset    Colon Cancer Maternal Aunt     Colon Cancer Maternal Aunt     Colon Cancer Maternal Aunt        Social History     Social History    Marital status: LEGALLY      Spouse name: N/A    Number of children: N/A    Years of education: N/A     Occupational History    Not on file.      Social History Main Topics    Smoking status: Current Every Day Smoker    Smokeless tobacco: Not on file      Comment: cigarettes    Alcohol use No    Drug use: Not on file    Sexual activity: Not on file     Other Topics Concern    Not on file     Social History Narrative         ALLERGIES: Review of patient's allergies indicates no known allergies. Review of Systems   Constitutional: Negative for chills and fever. HENT: Negative for sore throat. Eyes: Negative for pain. Respiratory: Negative for cough and shortness of breath. Cardiovascular: Negative for chest pain. Gastrointestinal: Positive for abdominal pain and blood in stool. Negative for vomiting. Genitourinary: Negative for dysuria. Skin: Negative for rash. Neurological: Negative for syncope and headaches. Psychiatric/Behavioral: Negative for confusion. All other systems reviewed and are negative. Vitals:    09/29/17 1252 09/29/17 1302 09/29/17 1330   BP: 110/69 117/68 99/62   Pulse: (!) 101  92   Resp: 18  17   Temp: 98.8 °F (37.1 °C)     SpO2: 96% 96% 97%   Weight: 73.9 kg (163 lb)     Height: 6' 2\" (1.88 m)              Physical Exam   Constitutional: He is oriented to person, place, and time. He appears well-developed. He appears ill (chronically). No distress. HENT:   Head: Normocephalic and atraumatic. Eyes: Conjunctivae and EOM are normal.   Neck: Normal range of motion. Neck supple. Cardiovascular: Normal rate, regular rhythm and normal heart sounds. No murmur heard. Pulmonary/Chest: Effort normal and breath sounds normal. No respiratory distress. Abdominal: Soft. Bowel sounds are normal. He exhibits no distension. There is no tenderness. There is no rebound. Musculoskeletal: Normal range of motion. He exhibits no edema or tenderness. Neurological: He is alert and oriented to person, place, and time. No cranial nerve deficit. He exhibits normal muscle tone. Coordination normal.   Skin: Skin is warm and dry.  There is pallor. Nursing note and vitals reviewed. Note written by Arina Washington, as dictated by Radha Reyes MD 1:21 PM      MDM  Number of Diagnoses or Management Options  Gastrointestinal hemorrhage, unspecified gastrointestinal hemorrhage type: new and requires workup     Amount and/or Complexity of Data Reviewed  Clinical lab tests: ordered and reviewed  Tests in the radiology section of CPT®: ordered and reviewed  Tests in the medicine section of CPT®: ordered and reviewed  Decide to obtain previous medical records or to obtain history from someone other than the patient: yes  Obtain history from someone other than the patient: yes  Review and summarize past medical records: yes  Discuss the patient with other providers: yes  Independent visualization of images, tracings, or specimens: yes    Risk of Complications, Morbidity, and/or Mortality  Presenting problems: high  Diagnostic procedures: high  Management options: high    Critical Care  Total time providing critical care: 30-74 minutes (Total critical care time spend exclusive of procedures:  30 minutes.  )    ED Course       Procedures      CONSULT NOTE:  2:12 PM Radha Reyes MD spoke with Dr. Jake Montalvo, Consult for Hospitalist.  Discussed available diagnostic tests and clinical findings. He is in agreement with care plans as outlined. Dr. Jake Montalvo will see and admit the pt.

## 2017-09-29 NOTE — ROUTINE PROCESS
Primary Nurse Fabricio Euceda RN and Kerry Lee RN performed a dual skin assessment on this patient No impairment noted  Carlos Enrique score is 21

## 2017-09-29 NOTE — ROUTINE PROCESS
TRANSFER - IN REPORT:    Verbal report received from argelia rn(name) on Audelia Kaiser  being received from ed(unit) for routine progression of care      Report consisted of patients Situation, Background, Assessment and   Recommendations(SBAR). Information from the following report(s) ED Summary and Recent Results was reviewed with the receiving nurse. Opportunity for questions and clarification was provided. Assessment completed upon patients arrival to unit and care assumed.

## 2017-09-29 NOTE — IP AVS SNAPSHOT
[address] [facility] 
[phone] Patient: Nereida Saunders MRN: [id number] OIA:6/6/1252 Current Discharge Medication List  
  
START taking these medications Dose & Instructions Dispensing Information Comments Morning Noon Evening Bedtime  
 albuterol-ipratropium 2.5 mg-0.5 mg/3 ml Nebu Commonly known as:  Daryle Oregon Your last dose was: Your next dose is:    
   
   
 Dose:  3 mL  
3 mL by Nebulization route every four (4) hours as needed. Quantity:  30 Nebule Refills:  0  
     
   
   
   
  
 ferrous sulfate 325 mg (65 mg iron) tablet Your last dose was: Your next dose is:    
   
   
 Dose:  325 mg Take 1 Tab by mouth two (2) times daily (with meals). Quantity:  60 Tab Refills:  0  
     
   
   
   
  
 pantoprazole 40 mg tablet Commonly known as:  PROTONIX Your last dose was: Your next dose is:    
   
   
 Dose:  40 mg Take 1 Tab by mouth Before breakfast and dinner. Quantity:  60 Tab Refills:  0 CONTINUE these medications which have NOT CHANGED Dose & Instructions Dispensing Information Comments Morning Noon Evening Bedtime  
 acetaminophen 500 mg tablet Commonly known as:  TYLENOL Your last dose was: Your next dose is: Take  by mouth every four (4) hours as needed for Pain. Refills:  0  
     
   
   
   
  
 atorvastatin 40 mg tablet Commonly known as:  LIPITOR Your last dose was: Your next dose is:    
   
   
 Dose:  40 mg Take 1 Tab by mouth nightly. Quantity:  60 Tab Refills:  0 DULCOLAX (BISACODYL) 10 mg suppository Generic drug:  bisacodyl Your last dose was: Your next dose is:    
   
   
 Dose:  10 mg Insert 10 mg into rectum daily. Refills:  0  
     
   
   
   
  
 fluticasone-vilanterol 100-25 mcg/dose inhaler Commonly known as:  BREO ELLIPTA Your last dose was: Your next dose is:    
   
   
 Dose:  1 Puff Take 1 Puff by inhalation daily. Refills:  0  
     
   
   
   
  
 heparin (porcine) 5,000 unit/mL injection Your last dose was: Your next dose is:    
   
   
 Dose:  5000 Units 5,000 Units every eight (8) hours. Refills:  0 HYDROcodone-acetaminophen 5-325 mg per tablet Commonly known as:  Arpan Graves Your last dose was: Your next dose is:    
   
   
 Dose:  1 Tab Take 1 Tab by mouth every four (4) hours as needed for Pain. Refills:  0 INCRUSE ELLIPTA 62.5 mcg/actuation inhaler Generic drug:  umeclidinium Your last dose was: Your next dose is:    
   
   
 Dose:  1 Puff Take 1 Puff by inhalation daily. Indications: Rescue inhaler Refills:  0  
     
   
   
   
  
 levETIRAcetam 500 mg tablet Commonly known as:  KEPPRA Your last dose was: Your next dose is:    
   
   
 Dose:  500 mg Take 1 Tab by mouth two (2) times a day. Quantity:  60 Tab Refills:  0  
     
   
   
   
  
 senna-docusate 8.6-50 mg per tablet Commonly known as:  Shreya Leon Your last dose was: Your next dose is:    
   
   
 Dose:  2 Tab Take 2 Tabs by mouth nightly. Refills:  0 STOP taking these medications   
 aspirin 81 mg chewable tablet Where to Get Your Medications Information on where to get these meds will be given to you by the nurse or doctor. ! Ask your nurse or doctor about these medications  
  albuterol-ipratropium 2.5 mg-0.5 mg/3 ml Nebu  
 ferrous sulfate 325 mg (65 mg iron) tablet  
 pantoprazole 40 mg tablet

## 2017-09-29 NOTE — CONSULTS
[address]   [address] [facility], [address] [place] [address]       Name:  Elaina Lyman   MR#:  [id number]   :  1945   Account #:  [de-identified]    Date of Consultation:  2017   Date of Adm:  2017       CHIEF COMPLAINT: The patient is at the emergency room at [facility] in room 23. This is a consultation from the emergency   room. HISTORY OF PRESENT ILLNESS: The patient is a 71years old male   with a long history of prostate cancer, and according to the patient and   the family, recent colon cancer. This thing has been treated in another medical center, according to the   patient and the family an anticoagulant was given to the patient a few   days ago. Today, Friday, the , he experienced black stools. He   came to the emergency room where the hemoglobin was found to be 5   grams. The patient is admitted for evaluation for treatment. PAST MEDICAL HISTORY: Prostatic cancer. Colon polyps, resected   in . Former smoker. THE PATIENT IS NOT ALLERGIC TO ANY MEDICATION. CURRENT MEDICATION: As stated in the chart of the patient at the   present time. FAMILY HISTORY: Father  at the age of 80. Mother also . The patient has 3 brothers, 2 sisters, and 3 children. REVIEW OF SYSTEMS: The patient denies any chest pain or any   shortness of breath. No evidence of any significant abdominal   discomfort. Extremities are essentially normal.    PHYSICAL EXAMINATION   GENERAL: The general appearance of the patient is consistent with all   these underlying conditions. The patient is alert. Very well oriented. VITAL SIGNS: Are stable at this point. HEENT: The head and the neck are essentially negative. LUNGS: Clear. No rhonchi, no wheezing, no rales. HEART: Heart sounds are regular. ABDOMEN: The abdomen is soft. Scar from previous surgeries is   noticed. EXTREMITIES: Essentially negative. IMPRESSION   1.  Gastrointestinal bleeding, probably from the upper gastrointestinal   tract. 2. Prostatic cancer and colon cancer by history. 3. History of constipation. RECOMMENDATIONS   1. Certainly the first recommendation was to stop the anticoagulants. 2. Approach the patient with blood transfusions, and I will around and   following the patient with the hospitalist on-call over the weekend and   next week if necessary. I will be available through my cell phone number, [phone].         MD Kimberley Ware / Sagar   D:  09/29/2017   17:00   T:  09/29/2017   18:13   Job #:  [id number]

## 2017-09-29 NOTE — IP AVS SNAPSHOT
[address] [facility] [address] [place] [address] 
[phone] Patient: Sheilda Alpers MRN: [id number] HXP:3/2/4981 You are allergic to the following No active allergies Immunizations Administered for This Admission Name Date Influenza Vaccine (Quad) PF 10/2/2017 Recent Documentation Height Weight BMI Smoking Status 1.88 m 79.8 kg 22.6 kg/m2 Current Every Day Smoker Unresulted Labs Order Current Status SAMPLE TO BLOOD BANK In process Emergency Contacts Name Discharge Info Relation Home Work Mobile Lindy Simms [1] Other Relative [6] [phone] About your hospitalization You were admitted on:  September 29, 2017 You last received care in the:  [facility] 2N MED SURG You were discharged on:  October 6, 2017 Unit phone number:  [phone] Why you were hospitalized Your primary diagnosis was:  Gi Bleed Providers Seen During Your Hospitalizations Provider Role Specialty Primary office phone Anastacio Navarrete MD Attending Provider Emergency Medicine [phone] April Paris MD Attending Provider Internal Medicine [phone] Za Arechiga MD Attending Provider Internal Medicine [phone] Celi Candelaria MD Attending Provider [facility] [phone] America Orellana MD Attending Provider Hospitalist [phone] Your Primary Care Physician (PCP) Primary Care Physician Office Phone Office Fax James Benavidez [phone] Follow-up Information Follow up With Details Comments Contact Info 812 [facility], DO In 1 month  200 [facility] [address] 
[phone] Nelson Blake MD Schedule an appointment as soon as possible for a visit As needed [address] [facility] [address] [place] [address] 
[phone] Asha Goldman MD In 1 week  [facility] 
[address] [id number] 
[phone] [address] [place] [address], [address]   [address]  Lorraine Maldonado [address] 
[phone] Current Discharge Medication List  
  
START taking these medications Dose & Instructions Dispensing Information Comments Morning Noon Evening Bedtime  
 albuterol-ipratropium 2.5 mg-0.5 mg/3 ml Nebu Commonly known as:  Papo Laming Your last dose was: Your next dose is:    
   
   
 Dose:  3 mL  
3 mL by Nebulization route every four (4) hours as needed. Quantity:  30 Nebule Refills:  0  
     
   
   
   
  
 ferrous sulfate 325 mg (65 mg iron) tablet Your last dose was: Your next dose is:    
   
   
 Dose:  325 mg Take 1 Tab by mouth two (2) times daily (with meals). Quantity:  60 Tab Refills:  0  
     
   
   
   
  
 pantoprazole 40 mg tablet Commonly known as:  PROTONIX Your last dose was: Your next dose is:    
   
   
 Dose:  40 mg Take 1 Tab by mouth Before breakfast and dinner. Quantity:  60 Tab Refills:  0 CONTINUE these medications which have NOT CHANGED Dose & Instructions Dispensing Information Comments Morning Noon Evening Bedtime  
 acetaminophen 500 mg tablet Commonly known as:  TYLENOL Your last dose was: Your next dose is: Take  by mouth every four (4) hours as needed for Pain. Refills:  0  
     
   
   
   
  
 atorvastatin 40 mg tablet Commonly known as:  LIPITOR Your last dose was: Your next dose is:    
   
   
 Dose:  40 mg Take 1 Tab by mouth nightly. Quantity:  60 Tab Refills:  0 DULCOLAX (BISACODYL) 10 mg suppository Generic drug:  bisacodyl Your last dose was: Your next dose is:    
   
   
 Dose:  10 mg Insert 10 mg into rectum daily. Refills:  0 fluticasone-vilanterol 100-25 mcg/dose inhaler Commonly known as:  BREO ELLIPTA Your last dose was: Your next dose is:    
   
   
 Dose:  1 Puff Take 1 Puff by inhalation daily. Refills:  0  
     
   
   
   
  
 heparin (porcine) 5,000 unit/mL injection Your last dose was: Your next dose is:    
   
   
 Dose:  5000 Units 5,000 Units every eight (8) hours. Refills:  0 HYDROcodone-acetaminophen 5-325 mg per tablet Commonly known as:  Juwan Ashok Your last dose was: Your next dose is:    
   
   
 Dose:  1 Tab Take 1 Tab by mouth every four (4) hours as needed for Pain. Refills:  0 INCRUSE ELLIPTA 62.5 mcg/actuation inhaler Generic drug:  umeclidinium Your last dose was: Your next dose is:    
   
   
 Dose:  1 Puff Take 1 Puff by inhalation daily. Indications: Rescue inhaler Refills:  0  
     
   
   
   
  
 levETIRAcetam 500 mg tablet Commonly known as:  KEPPRA Your last dose was: Your next dose is:    
   
   
 Dose:  500 mg Take 1 Tab by mouth two (2) times a day. Quantity:  60 Tab Refills:  0  
     
   
   
   
  
 senna-docusate 8.6-50 mg per tablet Commonly known as:  Yuliya Distad Your last dose was: Your next dose is:    
   
   
 Dose:  2 Tab Take 2 Tabs by mouth nightly. Refills:  0 STOP taking these medications   
 aspirin 81 mg chewable tablet Where to Get Your Medications Information on where to get these meds will be given to you by the nurse or doctor. ! Ask your nurse or doctor about these medications  
  albuterol-ipratropium 2.5 mg-0.5 mg/3 ml Nebu  
 ferrous sulfate 325 mg (65 mg iron) tablet  
 pantoprazole 40 mg tablet Discharge Instructions Discharge SNF/Rehab Instructions/LTAC  
 
 
PATIENT ID: Audelia Kaiser MRN: [id number] YOB: 1945 DATE OF ADMISSION: 9/29/2017 12:47 PM   
DATE OF DISCHARGE: 10/6/2017 PRIMARY CARE PROVIDER: Teto Subramanian MD  
 
 
ATTENDING PHYSICIAN: Todd Cazares MD 
DISCHARGING PROVIDER: Todd Cazares MD    
To contact this individual call [phone] and ask the  to page. If unavailable ask to be transferred the Adult Hospitalist Department. CONSULTATIONS: IP CONSULT TO HOSPITALIST 
IP CONSULT TO GASTROENTEROLOGY PROCEDURES/SURGERIES: Procedure(s) with comments: 
COLONOSCOPY - colon ADMITTING DIAGNOSES & HOSPITAL COURSE:  
72-y.o man who presented with melena and orthostatic hypotension. He was found to be anemic and was admitted for GI bleed. Work-up with endoscopy and colonoscopy was largely unrevealing. He was started on a BID PPI empirically. His hemoglobin remained stable prior to discharge. GI bleed 
-EGD and colonoscopy both without a source of bleeding 
-started on a BID PPI. This should be decreased to once daily if there is no recurrence of bleeding. 
-aspirin stopped temporarily Acute blood loss anemia 
-9/29 hgb 5.3 on admission s/p 2 UPRBCs, hgb now in the 7's 
-started on PO ferrous sulfate H/o CVA with left nondominant hemiplegia 
-continue statin 
-aspirin held; consider restarting this in the near future 
  
COPD with chronic hypoxic respiratory failure 
-on 2L O2 at home 
  
Type 2 DM - new diagnosis -hgb a1c 6.5; this should be re-checked in 3 months to confirm diagnosis 
  
Probable Seizures  
-continue Keppra  
-f/u w/ Dr. Khushboo Molina in 2 months 
  
Hypokalemia: resolved; monitor  
 
 
DISCHARGE DIAGNOSES / PLAN:   
 
PENDING TEST RESULTS:  
At the time of discharge the following test results are still pending: n/a FOLLOW UP APPOINTMENTS:   
Follow-up Information Follow up With Details Comments Contact Info 812 [facility], DO In 1 month  200 [facility] [address] [facility] [address] [address] [phone] Bronwyn Gottron, MD In 2 weeks  [address] [facility] [address] [facility] [address] 
[phone] Diana Marin MD In 1 week  [place] [address] 
[phone] ADDITIONAL CARE RECOMMENDATIONS:  See above DIET: Cardiac Diet TUBE FEEDING INSTRUCTIONS: n/a OXYGEN / BiPAP SETTINGS: 2L NC 
 
ACTIVITY: PT/OT Eval and Treat WOUND CARE: n/a 
 
EQUIPMENT needed: n/a DISCHARGE MEDICATIONS: 
 See Medication Reconciliation Form NOTIFY YOUR PHYSICIAN FOR ANY OF THE FOLLOWING:  
Fever over 101 degrees for 24 hours. Chest pain, shortness of breath, fever, chills, nausea, vomiting, diarrhea, change in mentation, falling, weakness, bleeding. Severe pain or pain not relieved by medications. Or, any other signs or symptoms that you may have questions about. DISPOSITION: 
  Home With: 
 OT  PT  HH  RN  
  
x SNF/Inpatient Rehab/LTAC Independent/assisted living Hospice Other:  
 
 
PATIENT CONDITION AT DISCHARGE:  
 
Functional status Poor   
x Deconditioned Independent Cognition Lucid   
x Forgetful Dementia Catheters/lines (plus indication) Travis PICC   
 PEG   
x None Code status  
x  Full code DNR   
 
PHYSICAL EXAMINATION AT DISCHARGE: 
 Refer to Progress Note CHRONIC MEDICAL DIAGNOSES: 
Problem List as of 10/6/2017  Date Reviewed: 9/30/2017 Codes Class Noted - Resolved * (Principal)GI bleed ICD-10-CM: K92.2 ICD-9-CM: 578.9  9/29/2017 - Present Slurred speech ICD-10-CM: R47.81 ICD-9-CM: 784.59  9/10/2017 - Present Seizures ([facility] [address] ICD-10-CM: R56.9 ICD-9-CM: 780.39  9/10/2017 - Present Altered mental status ICD-10-CM: R41.82 
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  
Yes x PROBLEM LIST Updated: 
Yes x Signed:  
Miguel Anand MD 
10/6/2017 
10:16 AM 
 
  
 
 
Discharge Orders None [employer] Announcement We are excited to announce that we are making your provider's discharge notes available to you in [employer]. You will see these notes when they are completed and signed by the physician that discharged you from your recent hospital stay. If you have any questions or concerns about any information you see in [employer], please call the Health Information Department where you were seen or reach out to your Primary Care Provider for more information about your plan of care. Introducing [facility] [place] [facility]! Elpidio Kipsabrina introduces FieldAware patient portal. Now you can access parts of your medical record, email your doctor's office, and request medication refills online. 1. In your internet browser, go to [email or web address] 2. Click on the First Time User? Click Here link in the Sign In box. You will see the New Member Sign Up page. 3. Enter your [employer]Aware Access Code exactly as it appears below. You will not need to use this code after youve completed the sign-up process. If you do not sign up before the expiration date, you must request a new code. · [employer] Access Code: [email or web address] Expires: 12/11/2017 10:42 AM 
 
4. Enter the last four digits of your Social Security Number (xxxx) and Date of Birth (mm/dd/yyyy) as indicated and click Submit. You will be taken to the next sign-up page. 5. Create a FieldAware ID. This will be your [employer]Aware login ID and cannot be changed, so think of one that is secure and easy to remember. 6. Create a [employer] password. You can change your password at any time. 7. Enter your Password Reset Question and Answer. This can be used at a later time if you forget your password. 8. Enter your e-mail address.  You will receive e-mail notification when new information is available in [employer]. 9. Click Sign Up. You can now view and download portions of your medical record. 10. Click the Download Summary menu link to download a portable copy of your medical information. If you have questions, please visit the Frequently Asked Questions section of the [email or web address] website. Remember, [email or web address] is NOT to be used for urgent needs. For medical emergencies, dial 911. Now available from your iPhone and Android! General Information Please provide this summary of care documentation to your next provider. Patient Signature:  ____________________________________________________________ Date:  ____________________________________________________________  
  
Ramos Arevalo Provider Signature:  ____________________________________________________________ Date:  ____________________________________________________________

## 2017-09-29 NOTE — H&P
[facility] [place] [address]   HISTORY AND PHYSICAL       Name:  Shasta Blevins   MR#:  [id number]   :  1945   Account #:  [de-identified]        Date of Adm:  2017       HISTORY OF PRESENT ILLNESS: This 27-year-old black male   presents to the emergency department from [facility] in which he had a 2-day history of nausea and dizziness that is   related to postural positioning, and reportedly hypotension. This   morning he had very dark stools with 1 vomiting episode. He had blood   work drawn and he was told that his \"blood was too low. \" He reported   intermittent suprapubic pain that is nonradiating, of which he is vague   in quality and intensity of the pain, and just states that it is a \"pain\" that   is suprapubic and more to the right. He denies any diarrhea,   constipation, dyspnea on exertion, or worsening shortness of breath,   dysphagia, hematemesis, melena, change in weight, fever, chills, chest   pain. PAST MEDICAL HISTORY   1. Prior CVA/stroke (right temporal occipital junction). 2. Prostate cancer. 3. Chronic obstructive pulmonary disease. 4. Chronic hypoxic respiratory failure (2 liters per minute nasal   cannula). 5. Seizure disorder. 6. Left nondominant hemiplegia. PAST SURGICAL HISTORY   1. Cyst removal from the neck. 2. TURP procedure. 3. Abdominal aortic aneurysm repair. 4. Colectomy with prior colostomy placement and removal.    MEDICATIONS   1. Aspirin 81 mg p.o. every day. 2. Atorvastatin 40 mg p.o. daily. 3. Percocet 10/325 mg p.o. q. 6 hours p.r.n.   4. Keppra 500 mg p.o. b.i.d.   5. Symbicort 2 puffs b.i.d.   6. Umeclidinium 1 puff daily. ALLERGIES: NO KNOWN DRUG ALLERGIES. FAMILY HISTORY: Noncontributory to presentation.     SOCIAL HISTORY: The patient was recently discharged from this   facility approximately just over 2 weeks ago and has been residing at   [facility] nursing facility for rehabilitation. Prior to that, he   lived with his sister and her . He used to smoke tobacco and   quit 5 years ago. He denies any alcohol or illicit drug usage. REVIEW OF SYSTEMS   GENERAL: Per HPI. HEAD: No headaches, dizziness, nausea, vomiting, per HPI. EYES: He does require reading glasses. There is no change in vision,   blurred vision, photophobia, pain. EARS: No change in hearing discharge, tinnitus, or earache. NOSE: No epistaxis, stuffiness, sneezing or discharge. MOUTH AND THROAT: He does have dysarthria and a right facial   droop, but no bleeding gums ulcer, sore throat. CARDIAC: Chest pain, per HPI. No palpitations, orthopnea, dyspnea   on exertion, edema, PND, syncope, claudication. RESPIRATORY: No shortness of breath, wheezing, cough,   hemoptysis. GASTROINTESTINAL: Per HPI. GENITOURINARY: No change in frequency of urination. No dysuria,   incontinence or hematuria. MUSCULOSKELETAL: No joint stiffness, pain. Range of motion has   increased as well as muscle strength, particularly on the left upper and   lower extremities have improved since undergoing rehabilitation. NEUROLOGIC: No loss of sensation, numbness, tingling, tremors,   seizures. He does have residual dysarthria and hemiplegia from prior   CVA. HEMATOLOGIC: No known anemia or easy bruising. ENDOCRINE: No heat or cold intolerance, polyuria, polydipsia. SKIN: No rashes or photosensitivity. PSYCHIATRIC: No depression, anxiety. No suicidal or homicidal   ideations. PHYSICAL EXAMINATION   GENERAL: Black male lying in bed in no acute cardiopulmonary   distress. VITAL SIGNS: Temperature 98.8 degrees Fahrenheit, pulse 92,   respirations 17, pulse 99 on 2 liters nasal cannula. HEENT: Head Normocephalic, atraumatic. No maxillary or   frontotemporal tenderness. EYES: Pupils equal, round, react to light. Anicteric sclerae.    Conjunctivae pink and moist.   NOSE: No deviated or perforated septum. No nasal discharge. MOUTH/THROAT: Poor oral mucosa pink, moist, nonerythematous   throat, no ulcers, no lesions. CARDIOVASCULAR: Heart is regular rate and rhythm. S1, S2 audible. No murmurs, gallops, rubs, no edema, no JVD. No carotid or   abdominal bruits. Radials, dorsalis pedis pulses 2+ bilaterally. CHEST: Lungs clear to auscultation bilaterally. No wheeze, rales or   rhonchi. ABDOMEN: Bowel sounds present, soft, nontender, nondistended. No   guarding or rebound. No hepatosplenomegaly. Multiple surgical scars   noted. MUSCULOSKELETAL: Gross range of motion of the upper and lower   extremities bilaterally. No gross muscle atrophy is noted. NEUROLOGIC: Alert and oriented to person, place, time. Cranial   nerves 2-12 grossly intact with the exception of the cranial nerve 7 on   the right. Biceps and patellar deep tendon reflexes 2+ bilaterally. SKIN: No rashes or pallor. ENDOCRINE: No thyromegaly. LYMPHATIC: Negative for cervical, axillary, or inguinal   lymphadenopathy. LABORATORY DATA:   CBC with differential, WBC 8.8, hemoglobin   5.3, hematocrit 17.3, platelets 439, neutrophils 70%, lymphocytes 22%,   monocytes 5%, eosinophils 2%, basophils 1%, MCV 86.4%. CMP: Sodium 143, potassium 4, chloride 111, bicarbonate 28, anion gap 4,   BUN 28, creatinine 0.74, glucose 102, calcium 7.8, corrected to 8.92,   total bilirubin 0.2, total protein 5.6, albumin 2.6, ALT 19, AST 18,   alkaline phosphatase 48. Coagulation profile, PT 11.4, INR 1.1, PTT 24.2. ASSESSMENT AND PLAN: A 80-year-old black male with acute blood   loss anemia secondary to a lower GI bleed. PROBLEM LIST   1. Acute blood loss anemia (symptomatic). 2. Lower gastrointestinal bleed. 3. Chronic hypoxic respiratory failure. 4. Left nondominant hemiplegia. 5. Dysarthria. 6. Chronic obstructive pulmonary disease, not in acute exacerbation. 7. Asthma, not in acute exacerbation.    8. Nicotine dependence in remission. PLAN: The patient will be admitted to remote telemetry floor. Given his   acute blood loss anemia, most likely his hemoglobin and hematocrit of   5.3 and 17.2 is due to his lower GI bleed. He will be started on   transfusion of 2 units of packed RBCs in the emergency department. GI has been consulted and will see the patient later today. We will   place empiric pantoprazole IV in case his lower GI bleed is due to an   upper GI bleed. Will place strict I's and O's and IV fluids. Given his   history of dizziness that is, postural will place orthostatic vital signs. Currently right now he does not have any diarrhea, but will continue to   monitor, as if he is having upper gastrointestinal bleed, most likely he   would possibly have diarrhea being that blood is a laxative. Unsure as   to an educated guess of the etiology and origin of bleed based on   history. Will hold his aspirin at this time. We will continue supplemental   O2 at 2 liters per minute and he is not requiring any more of that. The   patient's COPD is currently in control and not in acute exacerbation. We will continue his outpatient inhalers in the form of nebulizers of   umeclidinium and Brovana with Pulmicort. Will obtain an EKG   and troponin, given his acute blood loss anemia and GI bleed. We will   hold off placing PT and OT and speech at this time and will do so after   his evaluation by GI. DISPOSITION: The patient most likely will be back to [facility]   skilled nursing facility. CODE STATUS: FULL CODE. DVT PROPHYLAXIS: DVT prophylaxis will be done with SCD hose,   Pantoprazole will cover for GI prophylaxis. TOTAL TIME FOR ADMISSION: 75 minutes.         Apurva Marcus MD      OB / [facility]   D:  09/29/2017   17:01   T:  09/29/2017   18:06   Job #:  [id number]

## 2017-09-29 NOTE — ED TRIAGE NOTES
TRIAGE NOTE: Patient arrives via EMS from [facility] for low hemoglobin: 6.1 yesterday, 5.4 today. There recovering from stroke 1 month ago. +dark stools & intermittent lower abdominal cramping.

## 2017-09-30 LAB
BASOPHILS # BLD: 0.1 K/UL (ref 0–0.1)
BASOPHILS NFR BLD: 1 % (ref 0–1)
DIFFERENTIAL METHOD BLD: ABNORMAL
EOSINOPHIL # BLD: 0.3 K/UL (ref 0–0.4)
EOSINOPHIL NFR BLD: 4 % (ref 0–7)
ERYTHROCYTE [DISTWIDTH] IN BLOOD BY AUTOMATED COUNT: 19.7 % (ref 11.5–14.5)
HCT VFR BLD AUTO: 21.3 % (ref 36.6–50.3)
HCT VFR BLD AUTO: 21.8 % (ref 36.6–50.3)
HCT VFR BLD AUTO: 23.5 % (ref 36.6–50.3)
HGB BLD-MCNC: 7 G/DL (ref 12.1–17)
HGB BLD-MCNC: 7 G/DL (ref 12.1–17)
HGB BLD-MCNC: 7.4 G/DL (ref 12.1–17)
LYMPHOCYTES # BLD: 1.4 K/UL (ref 0.8–3.5)
LYMPHOCYTES NFR BLD: 18 % (ref 12–49)
MCH RBC QN AUTO: 26.5 PG (ref 26–34)
MCHC RBC AUTO-ENTMCNC: 32.9 G/DL (ref 30–36.5)
MCV RBC AUTO: 80.7 FL (ref 80–99)
MONOCYTES # BLD: 0.8 K/UL (ref 0–1)
MONOCYTES NFR BLD: 10 % (ref 5–13)
NEUTS SEG # BLD: 5.3 K/UL (ref 1.8–8)
NEUTS SEG NFR BLD: 67 % (ref 32–75)
NRBC # BLD: 0.6 K/UL (ref 0–0.01)
NRBC BLD-RTO: 7.6 PER 100 WBC
PLATELET # BLD AUTO: 170 K/UL (ref 150–400)
RBC # BLD AUTO: 2.64 M/UL (ref 4.1–5.7)
RBC MORPH BLD: ABNORMAL
WBC # BLD AUTO: 7.9 K/UL (ref 4.1–11.1)
WBC NRBC COR # BLD: ABNORMAL 10*3/UL

## 2017-09-30 PROCEDURE — 77010033678 HC OXYGEN DAILY

## 2017-09-30 PROCEDURE — 94640 AIRWAY INHALATION TREATMENT: CPT

## 2017-09-30 PROCEDURE — 74011250637 HC RX REV CODE- 250/637: Performed by: INTERNAL MEDICINE

## 2017-09-30 PROCEDURE — C9113 INJ PANTOPRAZOLE SODIUM, VIA: HCPCS | Performed by: INTERNAL MEDICINE

## 2017-09-30 PROCEDURE — 74011250636 HC RX REV CODE- 250/636: Performed by: FAMILY MEDICINE

## 2017-09-30 PROCEDURE — 85025 COMPLETE CBC W/AUTO DIFF WBC: CPT | Performed by: INTERNAL MEDICINE

## 2017-09-30 PROCEDURE — 74011000250 HC RX REV CODE- 250: Performed by: INTERNAL MEDICINE

## 2017-09-30 PROCEDURE — 85018 HEMOGLOBIN: CPT | Performed by: NURSE PRACTITIONER

## 2017-09-30 PROCEDURE — 74011250636 HC RX REV CODE- 250/636: Performed by: INTERNAL MEDICINE

## 2017-09-30 PROCEDURE — 65660000000 HC RM CCU STEPDOWN

## 2017-09-30 PROCEDURE — 36415 COLL VENOUS BLD VENIPUNCTURE: CPT | Performed by: INTERNAL MEDICINE

## 2017-09-30 RX ADMIN — Medication 10 ML: at 07:11

## 2017-09-30 RX ADMIN — IPRATROPIUM BROMIDE 0.5 MG: 0.5 SOLUTION RESPIRATORY (INHALATION) at 01:25

## 2017-09-30 RX ADMIN — IPRATROPIUM BROMIDE 0.5 MG: 0.5 SOLUTION RESPIRATORY (INHALATION) at 13:52

## 2017-09-30 RX ADMIN — IPRATROPIUM BROMIDE 0.5 MG: 0.5 SOLUTION RESPIRATORY (INHALATION) at 08:58

## 2017-09-30 RX ADMIN — SODIUM CHLORIDE 40 MG: 9 INJECTION INTRAMUSCULAR; INTRAVENOUS; SUBCUTANEOUS at 09:13

## 2017-09-30 RX ADMIN — ARFORMOTEROL TARTRATE 15 MCG: 15 SOLUTION RESPIRATORY (INHALATION) at 08:58

## 2017-09-30 RX ADMIN — SODIUM CHLORIDE 40 MG: 9 INJECTION INTRAMUSCULAR; INTRAVENOUS; SUBCUTANEOUS at 21:34

## 2017-09-30 RX ADMIN — ATORVASTATIN CALCIUM 40 MG: 20 TABLET, FILM COATED ORAL at 21:34

## 2017-09-30 RX ADMIN — BUDESONIDE 500 MCG: 0.5 INHALANT RESPIRATORY (INHALATION) at 21:14

## 2017-09-30 RX ADMIN — IPRATROPIUM BROMIDE 0.5 MG: 0.5 SOLUTION RESPIRATORY (INHALATION) at 21:14

## 2017-09-30 RX ADMIN — LEVETIRACETAM 500 MG: 500 TABLET, FILM COATED ORAL at 09:00

## 2017-09-30 RX ADMIN — ARFORMOTEROL TARTRATE 15 MCG: 15 SOLUTION RESPIRATORY (INHALATION) at 21:14

## 2017-09-30 RX ADMIN — BUDESONIDE 500 MCG: 0.5 INHALANT RESPIRATORY (INHALATION) at 08:58

## 2017-09-30 RX ADMIN — DOCUSATE SODIUM AND SENNOSIDES 2 TABLET: 8.6; 5 TABLET, FILM COATED ORAL at 21:34

## 2017-09-30 RX ADMIN — LEVETIRACETAM 500 MG: 500 TABLET, FILM COATED ORAL at 21:34

## 2017-09-30 RX ADMIN — SODIUM CHLORIDE 100 ML/HR: 900 INJECTION, SOLUTION INTRAVENOUS at 01:54

## 2017-09-30 NOTE — CONSULTS
GI Note. Consult dictated yesterday, Pt is much better. Alert and oriented. Vital signs adequate. Hgb 7 Gms today. Will continue same approach an plan to endoscope him on Monday Oct 2nd. Bleeding most likely from the upper GI Tract.  Abdomen is soft

## 2017-09-30 NOTE — PROGRESS NOTES
Bedside and Verbal shift change report given to Daysi (oncoming nurse) by Kell Conley (offgoing nurse). Report included the following information SBAR, Kardex and MAR.

## 2017-09-30 NOTE — PROGRESS NOTES
Hospitalist Progress Note            Daily Progress Note: 9/30/2017    I have seen and examined the patient. Case discussed with NP, please see her more detailed note   I agree with the overall treatment plan as documented. GI bleed, likely upper. Appreciate GI. EGD scheduled for Monday. On Protonix. Now on FULL Liquids    Acute blood loss anemia s/p 2 units PRBC. Monitor    New diagnosis of DM likely type 2.  for diet    Recent right PCA stroke. On statin. No anticoagulation sec to GI bleed    Probable Seizures on Keppra. Outpatient follow up with Neurology in 2-3 months.  Dr Sruthi Gant to decide about continuing or stopping it in the clinic    Chronic hypoxic respiratory failure    Left hemiplegia    COPD        Ashok Kapoor MD      Hospitalist, Internal Medicine            Blackberry number:  [phone]

## 2017-09-30 NOTE — PROGRESS NOTES
Hospitalist Progress Note  Velia Negron NP  Answering service: [phone] from in house phone  Cell: [phone]      Date of Service:  2017  NAME:  Steve Multani  :  1945  MRN:  [id number]      Admission Summary:   71-yom presents to the ED from [facility] in which he had a 2-day history of nausea and dizziness that is related to postural positioning, and reportedly hypotension. This morning he had very dark stools with 1 vomiting episode. He had blood work drawn and he was told that his \"blood was too low. \" He reported intermittent suprapubic pain that is nonradiating, of which he is vague   in quality and intensity of the pain, and just states that it is a \"pain\" that is suprapubic and more to the right. Interval history / Subjective:     Reports being hungry. No nausea, vomiting, abdominal pain, or melena. Reports chronic MARVIN (wears O2 2L at home). S/p 2 UPRBC. Hgb currently stable. No dizziness or chest pain.       Assessment & Plan:     Acute blood loss anemia  -hgb 5.3 on admission s/p 2 UPRBCs  -hgb now 7.4, continue to monitor H&H every 12 hrs, transfuse prn   -occult stool +    Gi bleed  -GI following, plan for EGD on Monday  -PPI  -Full liquid diet    H/o CVA with left nondominant hemiplegia  -continue statin  -hold ASA d/t gi bleed    COPD/Asthma with Chronic Hypoxic Respiratory Failure  -not in acute exacerbation  -continue home o2 at 2L    Type 2 DM, new diagnosed  -hgb a1c 6.5  -encourage diet changes, will need repeat a1c in 3 months with pcp    Probable Seizures   -continue Keppra   -scheduled to follow up with Dr Mark Dawn in 2-3 months to determine if pt needs to continue on Keppra    Code status: Full  DVT prophylaxis: Regulo Robles discussed with: Patient/Family and Nurse  Disposition: TBD     Hospital Problems  Date Reviewed: 2017          Codes Class Noted POA    * (Principal)GI bleed ICD-10-CM: K92.2  ICD-9-CM: 578.9  9/29/2017 Yes                Review of Systems:   A comprehensive review of systems was negative except for that written in the HPI. Vital Signs:    Last 24hrs VS reviewed since prior progress note. Most recent are:  Visit Vitals    /72 (BP 1 Location: Left arm, BP Patient Position: At rest)    Pulse 100    Temp 98.8 °F (37.1 °C)    Resp 16    Ht 6' 2\" (1.88 m)    Wt 73.9 kg (163 lb)    SpO2 95%    BMI 20.93 kg/m2         Intake/Output Summary (Last 24 hours) at 09/30/17 1646  Last data filed at 09/30/17 1441   Gross per 24 hour   Intake              480 ml   Output                0 ml   Net              480 ml        Physical Examination:             Constitutional:  No acute distress, cooperative, pleasant    ENT:  Oral mucous moist, oropharynx benign. Neck supple,    Resp:  CTA bilaterally. No wheezing/rhonchi/rales. No accessory muscle use. O2 2L via nasal cannula   CV:  Regular rhythm, normal rate, no murmurs, gallops, rubs    GI:  Soft, non distended, non tender. normoactive bowel sounds, no hepatosplenomegaly     Musculoskeletal:  No edema, warm, 2+ pulses throughout    Neurologic:  Moves all extremities. AAOx3     Psych:  Good insight, Not anxious nor agitated.        Data Review:    Review and/or order of clinical lab test  Review and/or order of tests in the radiology section of CPT  Review and/or order of tests in the medicine section of CPT      Labs:     Recent Labs      09/30/17   0902  09/30/17   0421  09/29/17   1307   WBC   --   7.9  8.8   HGB  7.4*  7.0*  5.3*   HCT  23.5*  21.3*  17.2*   PLT   --   170  197     Recent Labs      09/29/17   1307   NA  143   K  4.0   CL  111*   CO2  28   BUN  28*   CREA  0.74   GLU  102*   CA  7.8*     Recent Labs      09/29/17   1307   SGOT  18   ALT  19   AP  48   TBILI  0.2   TP  5.6*   ALB  2.6*   GLOB  3.0     Recent Labs      09/29/17   1307   INR  1.1   PTP  11.4*   APTT  24.2      No results for input(s): FE, TIBC, PSAT, FERR in the last 72 hours. No results found for: FOL, RBCF   No results for input(s): PH, PCO2, PO2 in the last 72 hours.   Recent Labs      09/29/17   1307   TROIQ  <0.04     Lab Results   Component Value Date/Time    Cholesterol, total 187 09/11/2017 05:53 AM    HDL Cholesterol 63 09/11/2017 05:53 AM    LDL, calculated 109 09/11/2017 05:53 AM    Triglyceride 75 09/11/2017 05:53 AM    CHOL/HDL Ratio 3.0 09/11/2017 05:53 AM     No results found for: [facility]  Lab Results   Component Value Date/Time    Color YELLOW/STRAW 09/10/2017 10:09 PM    Appearance CLEAR 09/10/2017 10:09 PM    Specific gravity 1.010 09/10/2017 10:09 PM    pH (UA) 5.0 09/10/2017 10:09 PM    Protein NEGATIVE  09/10/2017 10:09 PM    Glucose NEGATIVE  09/10/2017 10:09 PM    Ketone NEGATIVE  09/10/2017 10:09 PM    Bilirubin NEGATIVE  09/10/2017 10:09 PM    Urobilinogen 0.2 09/10/2017 10:09 PM    Nitrites NEGATIVE  09/10/2017 10:09 PM    Leukocyte Esterase NEGATIVE  09/10/2017 10:09 PM    Epithelial cells MODERATE 09/10/2017 10:09 PM    Bacteria NEGATIVE  09/10/2017 10:09 PM    WBC 5-10 09/10/2017 10:09 PM    RBC 0-5 09/10/2017 10:09 PM         Medications Reviewed:     Current Facility-Administered Medications   Medication Dose Route Frequency    acetaminophen (TYLENOL) tablet 650 mg  650 mg Oral Q4H PRN    atorvastatin (LIPITOR) tablet 40 mg  40 mg Oral QHS    bisacodyl (DULCOLAX) suppository 10 mg  10 mg Rectal DAILY    HYDROcodone-acetaminophen (NORCO) 5-325 mg per tablet 1 Tab  1 Tab Oral Q4H PRN    levETIRAcetam (KEPPRA) tablet 500 mg  500 mg Oral BID    senna-docusate (PERICOLACE) 8.6-50 mg per tablet 2 Tab  2 Tab Oral QHS    sodium chloride (NS) flush 5-10 mL  5-10 mL IntraVENous Q8H    sodium chloride (NS) flush 5-10 mL  5-10 mL IntraVENous PRN    ondansetron (ZOFRAN) injection 4 mg  4 mg IntraVENous Q4H PRN    pantoprazole (PROTONIX) 40 mg in sodium chloride 0.9 % 10 mL injection  40 mg IntraVENous Q12H    arformoterol (BROVANA) neb solution 15 mcg  15 mcg Nebulization BID RT    And    budesonide (PULMICORT) 500 mcg/2 ml nebulizer suspension  500 mcg Nebulization BID RT    ipratropium (ATROVENT) 0.02 % nebulizer solution 0.5 mg  0.5 mg Nebulization Q6H RT    influenza vaccine 2017-18 (3 yrs+)(PF) (FLUZONE QUAD/FLUARIX QUAD) injection 0.5 mL  0.5 mL IntraMUSCular PRIOR TO DISCHARGE    0.9% sodium chloride infusion  100 mL/hr IntraVENous CONTINUOUS     ______________________________________________________________________  EXPECTED LENGTH OF STAY: - - -  ACTUAL LENGTH OF STAY:          [facility], NP

## 2017-09-30 NOTE — ROUTINE PROCESS
Bedside shift change report given to russell mancera rn (oncoming nurse) by Marcello Anderson (offgoing nurse). Report included the following information SBAR and Kardex.

## 2017-09-30 NOTE — PROGRESS NOTES
Bedside and Verbal shift change report given to [address] (oncoming nurse) by Kelton Morgan RN (offgoing nurse). Report included the following information SBAR, Kardex, ED Summary, OR Summary, Procedure Summary, Intake/Output, MAR and Recent Results.

## 2017-10-01 LAB
ANION GAP SERPL CALC-SCNC: 8 MMOL/L (ref 5–15)
BASOPHILS # BLD: 0 K/UL (ref 0–0.1)
BASOPHILS NFR BLD: 0 % (ref 0–1)
BUN SERPL-MCNC: 7 MG/DL (ref 6–20)
BUN/CREAT SERPL: 14 (ref 12–20)
CALCIUM SERPL-MCNC: 8 MG/DL (ref 8.5–10.1)
CHLORIDE SERPL-SCNC: 106 MMOL/L (ref 97–108)
CO2 SERPL-SCNC: 27 MMOL/L (ref 21–32)
CREAT SERPL-MCNC: 0.51 MG/DL (ref 0.7–1.3)
DIFFERENTIAL METHOD BLD: ABNORMAL
EOSINOPHIL # BLD: 0.1 K/UL (ref 0–0.4)
EOSINOPHIL NFR BLD: 2 % (ref 0–7)
ERYTHROCYTE [DISTWIDTH] IN BLOOD BY AUTOMATED COUNT: 20.3 % (ref 11.5–14.5)
GLUCOSE SERPL-MCNC: 108 MG/DL (ref 65–100)
HCT VFR BLD AUTO: 21.8 % (ref 36.6–50.3)
HCT VFR BLD AUTO: 22.5 % (ref 36.6–50.3)
HCT VFR BLD AUTO: 24.8 % (ref 36.6–50.3)
HGB BLD-MCNC: 7 G/DL (ref 12.1–17)
HGB BLD-MCNC: 7.2 G/DL (ref 12.1–17)
HGB BLD-MCNC: 8 G/DL (ref 12.1–17)
LYMPHOCYTES # BLD: 1.3 K/UL (ref 0.8–3.5)
LYMPHOCYTES NFR BLD: 18 % (ref 12–49)
MCH RBC QN AUTO: 26.3 PG (ref 26–34)
MCHC RBC AUTO-ENTMCNC: 32 G/DL (ref 30–36.5)
MCV RBC AUTO: 82.1 FL (ref 80–99)
MONOCYTES # BLD: 0.8 K/UL (ref 0–1)
MONOCYTES NFR BLD: 11 % (ref 5–13)
MYELOCYTES NFR BLD MANUAL: 1 %
NEUTS SEG # BLD: 4.8 K/UL (ref 1.8–8)
NEUTS SEG NFR BLD: 68 % (ref 32–75)
NRBC # BLD: 1.08 K/UL (ref 0–0.01)
NRBC BLD-RTO: 15.5 PER 100 WBC
PLATELET # BLD AUTO: 185 K/UL (ref 150–400)
POTASSIUM SERPL-SCNC: 3.8 MMOL/L (ref 3.5–5.1)
RBC # BLD AUTO: 2.74 M/UL (ref 4.1–5.7)
RBC MORPH BLD: ABNORMAL
SODIUM SERPL-SCNC: 141 MMOL/L (ref 136–145)
WBC # BLD AUTO: 7 K/UL (ref 4.1–11.1)
WBC NRBC COR # BLD: ABNORMAL 10*3/UL

## 2017-10-01 PROCEDURE — 74011250637 HC RX REV CODE- 250/637: Performed by: INTERNAL MEDICINE

## 2017-10-01 PROCEDURE — 74011250636 HC RX REV CODE- 250/636: Performed by: FAMILY MEDICINE

## 2017-10-01 PROCEDURE — 74011250636 HC RX REV CODE- 250/636: Performed by: INTERNAL MEDICINE

## 2017-10-01 PROCEDURE — 36430 TRANSFUSION BLD/BLD COMPNT: CPT

## 2017-10-01 PROCEDURE — 77010033678 HC OXYGEN DAILY

## 2017-10-01 PROCEDURE — 85025 COMPLETE CBC W/AUTO DIFF WBC: CPT | Performed by: NURSE PRACTITIONER

## 2017-10-01 PROCEDURE — P9040 RBC LEUKOREDUCED IRRADIATED: HCPCS | Performed by: STUDENT IN AN ORGANIZED HEALTH CARE EDUCATION/TRAINING PROGRAM

## 2017-10-01 PROCEDURE — 85018 HEMOGLOBIN: CPT | Performed by: NURSE PRACTITIONER

## 2017-10-01 PROCEDURE — C9113 INJ PANTOPRAZOLE SODIUM, VIA: HCPCS | Performed by: INTERNAL MEDICINE

## 2017-10-01 PROCEDURE — 80048 BASIC METABOLIC PNL TOTAL CA: CPT | Performed by: NURSE PRACTITIONER

## 2017-10-01 PROCEDURE — 36415 COLL VENOUS BLD VENIPUNCTURE: CPT | Performed by: NURSE PRACTITIONER

## 2017-10-01 PROCEDURE — P9016 RBC LEUKOCYTES REDUCED: HCPCS | Performed by: STUDENT IN AN ORGANIZED HEALTH CARE EDUCATION/TRAINING PROGRAM

## 2017-10-01 PROCEDURE — 65660000000 HC RM CCU STEPDOWN

## 2017-10-01 PROCEDURE — 74011000250 HC RX REV CODE- 250: Performed by: INTERNAL MEDICINE

## 2017-10-01 PROCEDURE — 94640 AIRWAY INHALATION TREATMENT: CPT

## 2017-10-01 RX ORDER — SODIUM CHLORIDE 9 MG/ML
250 INJECTION, SOLUTION INTRAVENOUS AS NEEDED
Status: DISCONTINUED | OUTPATIENT
Start: 2017-10-01 | End: 2017-10-06 | Stop reason: HOSPADM

## 2017-10-01 RX ADMIN — BUDESONIDE 500 MCG: 0.5 INHALANT RESPIRATORY (INHALATION) at 09:17

## 2017-10-01 RX ADMIN — ARFORMOTEROL TARTRATE 15 MCG: 15 SOLUTION RESPIRATORY (INHALATION) at 09:17

## 2017-10-01 RX ADMIN — IPRATROPIUM BROMIDE 0.5 MG: 0.5 SOLUTION RESPIRATORY (INHALATION) at 13:51

## 2017-10-01 RX ADMIN — LEVETIRACETAM 500 MG: 500 TABLET, FILM COATED ORAL at 08:58

## 2017-10-01 RX ADMIN — Medication 10 ML: at 21:12

## 2017-10-01 RX ADMIN — IPRATROPIUM BROMIDE 0.5 MG: 0.5 SOLUTION RESPIRATORY (INHALATION) at 09:17

## 2017-10-01 RX ADMIN — DOCUSATE SODIUM AND SENNOSIDES 2 TABLET: 8.6; 5 TABLET, FILM COATED ORAL at 21:12

## 2017-10-01 RX ADMIN — ARFORMOTEROL TARTRATE 15 MCG: 15 SOLUTION RESPIRATORY (INHALATION) at 21:50

## 2017-10-01 RX ADMIN — SODIUM CHLORIDE 100 ML/HR: 900 INJECTION, SOLUTION INTRAVENOUS at 08:57

## 2017-10-01 RX ADMIN — SODIUM CHLORIDE 40 MG: 9 INJECTION INTRAMUSCULAR; INTRAVENOUS; SUBCUTANEOUS at 08:58

## 2017-10-01 RX ADMIN — ATORVASTATIN CALCIUM 40 MG: 20 TABLET, FILM COATED ORAL at 21:12

## 2017-10-01 RX ADMIN — IPRATROPIUM BROMIDE 0.5 MG: 0.5 SOLUTION RESPIRATORY (INHALATION) at 21:50

## 2017-10-01 RX ADMIN — BISACODYL 10 MG: 10 SUPPOSITORY RECTAL at 08:58

## 2017-10-01 RX ADMIN — SODIUM CHLORIDE 40 MG: 9 INJECTION INTRAMUSCULAR; INTRAVENOUS; SUBCUTANEOUS at 21:12

## 2017-10-01 RX ADMIN — Medication 10 ML: at 15:03

## 2017-10-01 RX ADMIN — BUDESONIDE 500 MCG: 0.5 INHALANT RESPIRATORY (INHALATION) at 21:50

## 2017-10-01 RX ADMIN — Medication 10 ML: at 07:01

## 2017-10-01 RX ADMIN — LEVETIRACETAM 500 MG: 500 TABLET, FILM COATED ORAL at 21:12

## 2017-10-01 NOTE — PROGRESS NOTES
Problem: Falls - Risk of  Goal: *Absence of Falls  Document Vika Fall Risk and appropriate interventions in the flowsheet.    Outcome: Progressing Towards Goal  Fall Risk Interventions:                    Elimination Interventions: Call light in reach

## 2017-10-01 NOTE — PROGRESS NOTES
Bedside shift change report given to Jarocho Camejo RN (oncoming nurse) by Cong Newton (offgoing nurse). Report included the following information SBAR, Kardex and MAR.

## 2017-10-01 NOTE — PROGRESS NOTES
Spiritual Care Partner Volunteer visited patient in 2N on 10/1/17. Documented by:  Kemp Romberg, M.Div.    Paging Service [phone])

## 2017-10-01 NOTE — CONSULTS
GI Note. Patient stable. No evidence of further bleeding. Vital signs adequate. Alert and cooperative.  For EGD in AM.

## 2017-10-01 NOTE — PROGRESS NOTES
Documented on 10/1/17:    Spiritual Care Partner Volunteer visited patient in 2N on 9/30/17. Documented by:  Juan Traylor M.Div.    Paging Service [phone])

## 2017-10-01 NOTE — ROUTINE PROCESS
Bedside shift change report given to russell mancera rn (oncoming nurse) by [place] (offgoing nurse). Report included the following information SBAR and Kardex.

## 2017-10-01 NOTE — PROGRESS NOTES
Kolby Lee advised that pt came to ED from their rehab for low hemoglobin, GI on 9/29/2917 and is currently readmitted as inpt. Pt will need new referral/evaluation if plan is to return to [facility] at hospital discharge. Unit CM to follow. Pt will need PT and OT evaluations when appropriate to help assess discharge plan. Will communicate with attending. ASTRID Gardner    Care Management Interventions  Transition of Care Consult (CM Consult):  Other (from [facility] acute rehab, treated and readmitted from [facility])  Discharge Location  Discharge Placement: Rehab hospital/unit acute (will need new evaluation/referral if plan is to return tot [facility] to continue rehab)

## 2017-10-01 NOTE — PROGRESS NOTES
Bedside shift change report given to Heidy Travis RN (oncoming nurse) by Abigail Mckeon RN (offgoing nurse). Report included the following information SBAR, Kardex, Intake/Output, MAR and Recent Results.

## 2017-10-01 NOTE — PROGRESS NOTES
Hospitalist Progress Note  Maisha Griffin MD  Answering service: [phone] OR 36 from in house phone  Cell: [phone]      Date of Service:  10/1/2017  NAME:  Belinda Sweeney  :  1945  MRN:  [id number]      Admission Summary:   72-yom presents to the ED from [facility] in which he had a 2-day history of nausea and dizziness that is related to postural positioning, and reportedly hypotension. This morning he had very dark stools with 1 vomiting episode. He had blood work drawn and he was told that his \"blood was too low. \" He reported intermittent suprapubic pain that is nonradiating, of which he is vague   in quality and intensity of the pain, and just states that it is a \"pain\" that is suprapubic and more to the right. Interval history / Subjective:     F/u GI bleed  No new issues  BMs getting better \"Now no more black\"     Assessment & Plan:     Gi bleed, likely upper  -GI following, plan for EGD on Monday  -Continue PPI  -Full liquid diet    Acute blood loss anemia  -sec to above  -hgb 5.3 on admission s/p 2 UPRBCs  -continue to monitor H&H every 12 hrs, transfuse prn   -occult stool +    H/o CVA with left nondominant hemiplegia  -continue statin  -hold ASA d/t gi bleed  -Likely repeat MRI in 2-3 months of stroke and outpatient follow up with Neurology    COPD/Asthma with Chronic Hypoxic Respiratory Failure  -not in acute exacerbation  -continue home o2 at 2L    Type 2 DM, new diagnosed  -hgb a1c 6.5  -encourage diet changes, will need repeat a1c in 3 months with pcp    Probable Seizures   -continue Keppra   -scheduled to follow up with Dr Christy Fry in 2-3 months to determine if pt needs to continue on Keppra    FULL Liquids    Code status: Full  DVT prophylaxis: SCDs  PTA: Juan Diegomajenn 4: EGD 10/2 and then likely discharge.  Needs PT/OT before discharge    Care Plan discussed with: Patient/Family and Nurse  Disposition: Likely back to [place] on 10/2     Hospital Problems  Date Reviewed: 9/30/2017          Codes Class Noted POA    * (Principal)GI bleed ICD-10-CM: K92.2  ICD-9-CM: 578.9  9/29/2017 Yes                Review of Systems:   A comprehensive review of systems was negative except for that written in the HPI. Vital Signs:    Last 24hrs VS reviewed since prior progress note. Most recent are:  Visit Vitals    /71 (BP 1 Location: Left arm, BP Patient Position: At rest)    Pulse 100    Temp 98.5 °F (36.9 °C)    Resp 18    Ht 6' 2\" (1.88 m)    Wt 73.9 kg (163 lb)    SpO2 95%    BMI 20.93 kg/m2         Intake/Output Summary (Last 24 hours) at 10/01/17 0837  Last data filed at 09/30/17 2223   Gross per 24 hour   Intake              480 ml   Output              250 ml   Net              230 ml        Physical Examination:             Constitutional:  No acute distress, cooperative, pleasant    ENT:  Oral mucous moist, oropharynx benign. Neck supple,    Resp:  CTA bilaterally. No wheezing/rhonchi/rales. No accessory muscle use. O2 2L via nasal cannula   CV:  Regular rhythm, normal rate, no murmurs, gallops, rubs    GI:  Soft, non distended, non tender. normoactive bowel sounds, no hepatosplenomegaly     Musculoskeletal:  No edema, warm, 2+ pulses throughout    Neurologic:  Moves all extremities. AAOx3     Psych:  Good insight, Not anxious nor agitated. Data Review:    Review and/or order of clinical lab test  Review and/or order of tests in the radiology section of CPT  Review and/or order of tests in the medicine section of CPT      Labs:     Recent Labs      10/01/17   0404  09/30/17   2040   09/30/17   0421   WBC  7.0   --    --   7.9   HGB  7.2*  7.0*   < >  7.0*   HCT  22.5*  21.8*   < >  21.3*   PLT  185   --    --   170    < > = values in this interval not displayed.      Recent Labs      10/01/17   0404  09/29/17   1307   NA  141  143   K  3.8  4.0   CL  106  111*   CO2  27 28   BUN  7  28*   CREA  0.51*  0.74   GLU  108*  102*   CA  8.0*  7.8*     Recent Labs      09/29/17   1307   SGOT  18   ALT  19   AP  48   TBILI  0.2   TP  5.6*   ALB  2.6*   GLOB  3.0     Recent Labs      09/29/17   1307   INR  1.1   PTP  11.4*   APTT  24.2      No results for input(s): FE, TIBC, PSAT, FERR in the last 72 hours. No results found for: FOL, RBCF   No results for input(s): PH, PCO2, PO2 in the last 72 hours.   Recent Labs      09/29/17   1307   TROIQ  <0.04     Lab Results   Component Value Date/Time    Cholesterol, total 187 09/11/2017 05:53 AM    HDL Cholesterol 63 09/11/2017 05:53 AM    LDL, calculated 109 09/11/2017 05:53 AM    Triglyceride 75 09/11/2017 05:53 AM    CHOL/HDL Ratio 3.0 09/11/2017 05:53 AM     No results found for: [facility]  Lab Results   Component Value Date/Time    Color YELLOW/STRAW 09/10/2017 10:09 PM    Appearance CLEAR 09/10/2017 10:09 PM    Specific gravity 1.010 09/10/2017 10:09 PM    pH (UA) 5.0 09/10/2017 10:09 PM    Protein NEGATIVE  09/10/2017 10:09 PM    Glucose NEGATIVE  09/10/2017 10:09 PM    Ketone NEGATIVE  09/10/2017 10:09 PM    Bilirubin NEGATIVE  09/10/2017 10:09 PM    Urobilinogen 0.2 09/10/2017 10:09 PM    Nitrites NEGATIVE  09/10/2017 10:09 PM    Leukocyte Esterase NEGATIVE  09/10/2017 10:09 PM    Epithelial cells MODERATE 09/10/2017 10:09 PM    Bacteria NEGATIVE  09/10/2017 10:09 PM    WBC 5-10 09/10/2017 10:09 PM    RBC 0-5 09/10/2017 10:09 PM         Medications Reviewed:     Current Facility-Administered Medications   Medication Dose Route Frequency    acetaminophen (TYLENOL) tablet 650 mg  650 mg Oral Q4H PRN    atorvastatin (LIPITOR) tablet 40 mg  40 mg Oral QHS    bisacodyl (DULCOLAX) suppository 10 mg  10 mg Rectal DAILY    HYDROcodone-acetaminophen (NORCO) 5-325 mg per tablet 1 Tab  1 Tab Oral Q4H PRN    levETIRAcetam (KEPPRA) tablet 500 mg  500 mg Oral BID    senna-docusate (PERICOLACE) 8.6-50 mg per tablet 2 Tab  2 Tab Oral QHS    sodium chloride (NS) flush 5-10 mL  5-10 mL IntraVENous Q8H    sodium chloride (NS) flush 5-10 mL  5-10 mL IntraVENous PRN    ondansetron (ZOFRAN) injection 4 mg  4 mg IntraVENous Q4H PRN    pantoprazole (PROTONIX) 40 mg in sodium chloride 0.9 % 10 mL injection  40 mg IntraVENous Q12H    arformoterol (BROVANA) neb solution 15 mcg  15 mcg Nebulization BID RT    And    budesonide (PULMICORT) 500 mcg/2 ml nebulizer suspension  500 mcg Nebulization BID RT    ipratropium (ATROVENT) 0.02 % nebulizer solution 0.5 mg  0.5 mg Nebulization Q6H RT    influenza vaccine 2017-18 (3 yrs+)(PF) (FLUZONE QUAD/FLUARIX QUAD) injection 0.5 mL  0.5 mL IntraMUSCular PRIOR TO DISCHARGE    0.9% sodium chloride infusion  100 mL/hr IntraVENous CONTINUOUS     ______________________________________________________________________  EXPECTED LENGTH OF STAY: - - -  ACTUAL LENGTH OF STAY:          2                 Lalitha Day MD

## 2017-10-02 ENCOUNTER — ANESTHESIA EVENT (OUTPATIENT)
Dept: ENDOSCOPY | Age: 72
DRG: 378 | End: 2017-10-02
Payer: MEDICARE

## 2017-10-02 ENCOUNTER — ANESTHESIA (OUTPATIENT)
Dept: ENDOSCOPY | Age: 72
DRG: 378 | End: 2017-10-02
Payer: MEDICARE

## 2017-10-02 LAB
ABO + RH BLD: NORMAL
BASOPHILS # BLD: 0 K/UL (ref 0–0.1)
BASOPHILS NFR BLD: 0 % (ref 0–1)
BLD PROD TYP BPU: NORMAL
BLOOD GROUP ANTIBODIES SERPL: NORMAL
BPU ID: NORMAL
CROSSMATCH RESULT,%XM: NORMAL
DIFFERENTIAL METHOD BLD: ABNORMAL
EOSINOPHIL # BLD: 0.3 K/UL (ref 0–0.4)
EOSINOPHIL NFR BLD: 4 % (ref 0–7)
ERYTHROCYTE [DISTWIDTH] IN BLOOD BY AUTOMATED COUNT: 19.3 % (ref 11.5–14.5)
H PYLORI FROM TISSUE: NEGATIVE
HCT VFR BLD AUTO: 28.5 % (ref 36.6–50.3)
HGB BLD-MCNC: 9 G/DL (ref 12.1–17)
KIT LOT NO., HCLOLOT: NORMAL
LYMPHOCYTES # BLD: 1.2 K/UL (ref 0.8–3.5)
LYMPHOCYTES NFR BLD: 15 % (ref 12–49)
MCH RBC QN AUTO: 26.7 PG (ref 26–34)
MCHC RBC AUTO-ENTMCNC: 31.6 G/DL (ref 30–36.5)
MCV RBC AUTO: 84.6 FL (ref 80–99)
MONOCYTES # BLD: 0.7 K/UL (ref 0–1)
MONOCYTES NFR BLD: 9 % (ref 5–13)
NEGATIVE CONTROL: NEGATIVE
NEUTS SEG # BLD: 5.5 K/UL (ref 1.8–8)
NEUTS SEG NFR BLD: 72 % (ref 32–75)
NRBC # BLD: 0.89 K/UL (ref 0–0.01)
NRBC BLD-RTO: 11.5 PER 100 WBC
PLATELET # BLD AUTO: 236 K/UL (ref 150–400)
PLATELET COMMENTS,PCOM: ABNORMAL
POSITIVE CONTROL: POSITIVE
RBC # BLD AUTO: 3.37 M/UL (ref 4.1–5.7)
RBC MORPH BLD: ABNORMAL
RBC MORPH BLD: ABNORMAL
SPECIMEN EXP DATE BLD: NORMAL
STATUS OF UNIT,%ST: NORMAL
UNIT DIVISION, %UDIV: 0
WBC # BLD AUTO: 7.7 K/UL (ref 4.1–11.1)
WBC NRBC COR # BLD: ABNORMAL 10*3/UL

## 2017-10-02 PROCEDURE — 74011250636 HC RX REV CODE- 250/636

## 2017-10-02 PROCEDURE — 74011250637 HC RX REV CODE- 250/637: Performed by: FAMILY MEDICINE

## 2017-10-02 PROCEDURE — 74011250637 HC RX REV CODE- 250/637: Performed by: INTERNAL MEDICINE

## 2017-10-02 PROCEDURE — 97535 SELF CARE MNGMENT TRAINING: CPT

## 2017-10-02 PROCEDURE — 97161 PT EVAL LOW COMPLEX 20 MIN: CPT

## 2017-10-02 PROCEDURE — 76060000031 HC ANESTHESIA FIRST 0.5 HR: Performed by: INTERNAL MEDICINE

## 2017-10-02 PROCEDURE — G8987 SELF CARE CURRENT STATUS: HCPCS

## 2017-10-02 PROCEDURE — 97165 OT EVAL LOW COMPLEX 30 MIN: CPT

## 2017-10-02 PROCEDURE — 77030009426 HC FCPS BIOP ENDOSC BSC -B: Performed by: INTERNAL MEDICINE

## 2017-10-02 PROCEDURE — 85025 COMPLETE CBC W/AUTO DIFF WBC: CPT | Performed by: NURSE PRACTITIONER

## 2017-10-02 PROCEDURE — 36415 COLL VENOUS BLD VENIPUNCTURE: CPT | Performed by: NURSE PRACTITIONER

## 2017-10-02 PROCEDURE — 51798 US URINE CAPACITY MEASURE: CPT

## 2017-10-02 PROCEDURE — 87077 CULTURE AEROBIC IDENTIFY: CPT | Performed by: INTERNAL MEDICINE

## 2017-10-02 PROCEDURE — G8988 SELF CARE GOAL STATUS: HCPCS

## 2017-10-02 PROCEDURE — 74011000250 HC RX REV CODE- 250: Performed by: INTERNAL MEDICINE

## 2017-10-02 PROCEDURE — C9113 INJ PANTOPRAZOLE SODIUM, VIA: HCPCS | Performed by: INTERNAL MEDICINE

## 2017-10-02 PROCEDURE — 74011250636 HC RX REV CODE- 250/636: Performed by: FAMILY MEDICINE

## 2017-10-02 PROCEDURE — 90471 IMMUNIZATION ADMIN: CPT

## 2017-10-02 PROCEDURE — 76040000019: Performed by: INTERNAL MEDICINE

## 2017-10-02 PROCEDURE — 74011250636 HC RX REV CODE- 250/636: Performed by: INTERNAL MEDICINE

## 2017-10-02 PROCEDURE — 77010033678 HC OXYGEN DAILY

## 2017-10-02 PROCEDURE — 90686 IIV4 VACC NO PRSV 0.5 ML IM: CPT | Performed by: INTERNAL MEDICINE

## 2017-10-02 PROCEDURE — G8978 MOBILITY CURRENT STATUS: HCPCS

## 2017-10-02 PROCEDURE — 0DB78ZX EXCISION OF STOMACH, PYLORUS, VIA NATURAL OR ARTIFICIAL OPENING ENDOSCOPIC, DIAGNOSTIC: ICD-10-PCS | Performed by: INTERNAL MEDICINE

## 2017-10-02 PROCEDURE — 74011000250 HC RX REV CODE- 250

## 2017-10-02 PROCEDURE — 94640 AIRWAY INHALATION TREATMENT: CPT

## 2017-10-02 PROCEDURE — G8979 MOBILITY GOAL STATUS: HCPCS

## 2017-10-02 PROCEDURE — 65660000000 HC RM CCU STEPDOWN

## 2017-10-02 RX ORDER — LIDOCAINE HYDROCHLORIDE 20 MG/ML
INJECTION, SOLUTION EPIDURAL; INFILTRATION; INTRACAUDAL; PERINEURAL AS NEEDED
Status: DISCONTINUED | OUTPATIENT
Start: 2017-10-02 | End: 2017-10-02 | Stop reason: HOSPADM

## 2017-10-02 RX ORDER — NALOXONE HYDROCHLORIDE 0.4 MG/ML
0.4 INJECTION, SOLUTION INTRAMUSCULAR; INTRAVENOUS; SUBCUTANEOUS
Status: CANCELLED | OUTPATIENT
Start: 2017-10-02 | End: 2017-10-02

## 2017-10-02 RX ORDER — PROPOFOL 10 MG/ML
INJECTION, EMULSION INTRAVENOUS AS NEEDED
Status: DISCONTINUED | OUTPATIENT
Start: 2017-10-02 | End: 2017-10-02 | Stop reason: HOSPADM

## 2017-10-02 RX ORDER — EPINEPHRINE 0.1 MG/ML
1 INJECTION INTRACARDIAC; INTRAVENOUS
Status: CANCELLED | OUTPATIENT
Start: 2017-10-02 | End: 2017-10-02

## 2017-10-02 RX ORDER — SODIUM CHLORIDE 9 MG/ML
50 INJECTION, SOLUTION INTRAVENOUS CONTINUOUS
Status: CANCELLED | OUTPATIENT
Start: 2017-10-02 | End: 2017-10-02

## 2017-10-02 RX ORDER — FENTANYL CITRATE 50 UG/ML
200 INJECTION, SOLUTION INTRAMUSCULAR; INTRAVENOUS
Status: CANCELLED | OUTPATIENT
Start: 2017-10-02 | End: 2017-10-02

## 2017-10-02 RX ORDER — FLUMAZENIL 0.1 MG/ML
0.2 INJECTION INTRAVENOUS
Status: CANCELLED | OUTPATIENT
Start: 2017-10-02 | End: 2017-10-02

## 2017-10-02 RX ORDER — PANTOPRAZOLE SODIUM 40 MG/1
40 TABLET, DELAYED RELEASE ORAL
Status: DISCONTINUED | OUTPATIENT
Start: 2017-10-02 | End: 2017-10-06 | Stop reason: HOSPADM

## 2017-10-02 RX ORDER — MIDAZOLAM HYDROCHLORIDE 1 MG/ML
.25-1 INJECTION, SOLUTION INTRAMUSCULAR; INTRAVENOUS
Status: CANCELLED | OUTPATIENT
Start: 2017-10-02 | End: 2017-10-02

## 2017-10-02 RX ORDER — METFORMIN HYDROCHLORIDE 500 MG/1
500 TABLET ORAL
Status: DISCONTINUED | OUTPATIENT
Start: 2017-10-02 | End: 2017-10-03

## 2017-10-02 RX ORDER — ATROPINE SULFATE 0.1 MG/ML
0.5 INJECTION INTRAVENOUS
Status: CANCELLED | OUTPATIENT
Start: 2017-10-02 | End: 2017-10-02

## 2017-10-02 RX ORDER — SODIUM CHLORIDE 9 MG/ML
INJECTION, SOLUTION INTRAVENOUS
Status: DISCONTINUED | OUTPATIENT
Start: 2017-10-02 | End: 2017-10-02 | Stop reason: HOSPADM

## 2017-10-02 RX ORDER — SODIUM CHLORIDE 0.9 % (FLUSH) 0.9 %
5-10 SYRINGE (ML) INJECTION AS NEEDED
Status: CANCELLED | OUTPATIENT
Start: 2017-10-02 | End: 2017-10-02

## 2017-10-02 RX ORDER — DEXTROMETHORPHAN/PSEUDOEPHED 2.5-7.5/.8
1.2 DROPS ORAL
Status: CANCELLED | OUTPATIENT
Start: 2017-10-02

## 2017-10-02 RX ORDER — SODIUM CHLORIDE 0.9 % (FLUSH) 0.9 %
5-10 SYRINGE (ML) INJECTION EVERY 8 HOURS
Status: CANCELLED | OUTPATIENT
Start: 2017-10-02 | End: 2017-10-02

## 2017-10-02 RX ADMIN — SODIUM CHLORIDE: 9 INJECTION, SOLUTION INTRAVENOUS at 09:08

## 2017-10-02 RX ADMIN — PROPOFOL 40 MG: 10 INJECTION, EMULSION INTRAVENOUS at 09:10

## 2017-10-02 RX ADMIN — LEVETIRACETAM 500 MG: 500 TABLET, FILM COATED ORAL at 22:17

## 2017-10-02 RX ADMIN — LEVETIRACETAM 500 MG: 500 TABLET, FILM COATED ORAL at 11:45

## 2017-10-02 RX ADMIN — Medication 10 ML: at 22:17

## 2017-10-02 RX ADMIN — ARFORMOTEROL TARTRATE 15 MCG: 15 SOLUTION RESPIRATORY (INHALATION) at 21:27

## 2017-10-02 RX ADMIN — PANTOPRAZOLE SODIUM 40 MG: 40 TABLET, DELAYED RELEASE ORAL at 17:26

## 2017-10-02 RX ADMIN — ATORVASTATIN CALCIUM 40 MG: 20 TABLET, FILM COATED ORAL at 22:17

## 2017-10-02 RX ADMIN — DOCUSATE SODIUM AND SENNOSIDES 2 TABLET: 8.6; 5 TABLET, FILM COATED ORAL at 22:17

## 2017-10-02 RX ADMIN — Medication 10 ML: at 17:26

## 2017-10-02 RX ADMIN — INFLUENZA VIRUS VACCINE 0.5 ML: 15; 15; 15; 15 SUSPENSION INTRAMUSCULAR at 07:06

## 2017-10-02 RX ADMIN — PROPOFOL 20 MG: 10 INJECTION, EMULSION INTRAVENOUS at 09:13

## 2017-10-02 RX ADMIN — SODIUM CHLORIDE 100 ML/HR: 900 INJECTION, SOLUTION INTRAVENOUS at 07:05

## 2017-10-02 RX ADMIN — IPRATROPIUM BROMIDE 0.5 MG: 0.5 SOLUTION RESPIRATORY (INHALATION) at 21:27

## 2017-10-02 RX ADMIN — METFORMIN HYDROCHLORIDE 500 MG: 500 TABLET, FILM COATED ORAL at 17:25

## 2017-10-02 RX ADMIN — IPRATROPIUM BROMIDE 0.5 MG: 0.5 SOLUTION RESPIRATORY (INHALATION) at 15:52

## 2017-10-02 RX ADMIN — SODIUM CHLORIDE 40 MG: 9 INJECTION INTRAMUSCULAR; INTRAVENOUS; SUBCUTANEOUS at 11:45

## 2017-10-02 RX ADMIN — BUDESONIDE 500 MCG: 0.5 INHALANT RESPIRATORY (INHALATION) at 21:27

## 2017-10-02 RX ADMIN — LIDOCAINE HYDROCHLORIDE 40 MG: 20 INJECTION, SOLUTION EPIDURAL; INFILTRATION; INTRACAUDAL; PERINEURAL at 09:10

## 2017-10-02 RX ADMIN — POLYETHYLENE GLYCOL-3350 AND ELECTROLYTES 4000 ML: 236; 6.74; 5.86; 2.97; 22.74 POWDER, FOR SOLUTION ORAL at 17:26

## 2017-10-02 NOTE — PROGRESS NOTES
Orders received, chart reviewed and patient off the floor for EGD. Recommend with nursing patient to complete as able in order to maintain strength, endurance and independence: ADLs with supervision/setup, once Egress Test completed OOB to chair 3x/day and mobilizing to the [facility] or bathroom for toileting with assist. Thank you for your assistance. Patient evaluated by OT 9/12/17 for CVA with noted L slight visual inattention:   Prior Level of Function/Home Situation: Living with family in [facility] home, bedroom/bathroom on 2nd floor, reports grab bars in bathroom by [address]: Private residence  # Steps to Enter: 5  Rails to Enter: Yes  One/Two Story Residence: Two story  # of Interior Steps: 12  Height of Each Step (in): 8 inches  Interior Rails: Right  Lift Chair Available: No  Living Alone: No  Support Systems: Family member(s)  Patient Expects to be Discharged to[de-identified] Rehabilitation facility  Current DME Used/Available at Home: Grab bars  Tub or Shower Type: Tub/Shower combination    Wears reading glasses.

## 2017-10-02 NOTE — PROGRESS NOTES
Physical Therapy Note     Chart reviewed. Patient currently TIMBO for ENDO. PT will follow up for evaluation as able and appropriate.     Ashly Benitez PT, DPT

## 2017-10-02 NOTE — PROGRESS NOTES
Problem: Self Care Deficits Care Plan (Adult)  Goal: *Acute Goals and Plan of Care (Insert Text)  Occupational Therapy Goals  Initiated 10/2/2017  1. Patient will perform bathing with supervision/set-up within 7 day(s). 2. Patient will perform tub transfer with moderate assistance within 7 day(s). 3. Patient will perform simple home management with moderate assistance within 7 day(s). 4. Patient will perform toilet transfers in the bathroom with supervision/set-up within 7 day(s). 5. Patient will perform all aspects of toileting with supervision/set-up within 7 day(s). 6. Patient will participate in upper extremity therapeutic exercise/activities with supervision/set-up for 5 minutes within 7 day(s). 7. Patient will utilize energy conservation techniques during functional activities with verbal cues within 7 day(s). OCCUPATIONAL THERAPY EVALUATION  Patient: Belinda Sweeney (89 y.o. male)  Date: 10/2/2017  Primary Diagnosis: GI bleed  GI Bleed  Procedure(s) (LRB):  ESOPHAGOGASTRODUODENOSCOPY (EGD) (N/A)  ESOPHAGOGASTRODUODENAL (EGD) BIOPSY (N/A) Day of Surgery   Precautions:         ASSESSMENT :  Based on the objective data described below, the patient presents with min to total A upper body ADLs, min to moderate A lower body ADLs, sit<>stand min A. ADLs limited by slight decreased coordination, FM, strength L side, standing balance and tolerance, pulmonary tolerance (baseilne 2L but increased SOB compared to baseline and rest breaks between each task, 5 mins in between each lower body task), complex processing and memory. Patient 1 month out from CVA, recommend inpatient rehab to continue to address neuro re-ed 2* in acute window of recovery. Especially now with GI set back causing additional weakness. (Home hallways not wide enough for w/c and his bed/bathroom upstairs). Patient is highly motivated to be independent and can tolerate 3 hours of therapy.       Recommend with nursing patient to complete as able in order to maintain strength, endurance and independence: ADLs with supervision/setup, OOB to chair 3x/day and mobilizing to the [facility] for toileting with 1 assist. Thank you for your assistance. Patient will benefit from skilled intervention to address the above impairments. Patients rehabilitation potential is considered to be Good  Factors which may influence rehabilitation potential include:   [X]             None noted  [ ]             Mental ability/status  [ ]             Medical condition  [ ]             Home/family situation and support systems  [ ]             Safety awareness  [ ]             Pain tolerance/management  [ ]             Other:       PLAN :  Recommendations and Planned Interventions:  [X]               Self Care Training                  [X]        Therapeutic Activities  [X]               Functional Mobility Training    [X]        Cognitive Retraining  [X]               Therapeutic Exercises           [X]        Endurance Activities  [X]               Balance Training                   [X]        Neuromuscular Re-Education  [X]               Visual/Perceptual Training     [X]   Home Safety Training  [X]               Patient Education                 [X]        Family Training/Education  [ ]               Other (comment):     Frequency/Duration: Patient will be followed by occupational therapy 5 times a week to address goals. Discharge Recommendations: Rehab  Further Equipment Recommendations for Discharge: TBD      SUBJECTIVE:   Patient stated I was doing good over there, almost ready for home.      OBJECTIVE DATA SUMMARY:   HISTORY:           Past Medical History:   Diagnosis Date    Asthma       hx of/has wheezing    Cancer ([facility] [address]       prostate - not treated yet    Ill-defined condition       shortness of breath - being evaluated    Psychiatric disorder       mental disability                Past Surgical History:   Procedure Laterality Date    HX OTHER SURGICAL cyst removed from neck         Prior Level of Function/Home Situation: independent with all ADLs. CVA with L sided weakness beginning of Sept, dc to [employer]  Expanded or extensive additional review of patient history:      Home Situation  Home Environment: Private residence  One/Two Story Residence: Two story  Living Alone: No  Support Systems: [address] [facility], Family member(s)  Patient Expects to be Discharged to[de-identified] Rehabilitation facility  Current DME Used/Available at Home: Oxygen, portable, Nebulizer, Walker, rollator  Tub or Shower Type: Tub  [X]  Right hand dominant                [ ]  Left hand dominant     EXAMINATION OF PERFORMANCE DEFICITS:  Cognitive/Behavioral Status:  Neurologic State: Alert  Orientation Level: Oriented X4  Cognition: Appropriate for age attention/concentration; Follows commands;Memory loss        Safety/Judgement: Awareness of environment     Skin: intact     Edema: intact     Hearing: Auditory  Auditory Impairment: None     Vision/Perceptual:       states resolve of symptoms                               Range of Motion:  B UEs and LEs                             Strength:  B UEs 4/5 L R 5/5                    Coordination:   WDL             Tone & Sensation:  Resolved numbness and tingling s/p cva                             Balance:  Sitting: Intact; Without support     Functional Mobility and Transfers for ADLs:  Bed Mobility:        Transfers:   Patient received sitting in chair, demonstrated sit<>stand min A standing balance. ADL Assessment:  Feeding:  Total assistance (NPO, infer setup)      Oral Facial Hygiene/Grooming: Minimum assistance setup on beside tray, sitting     Bathing: Maximum assistance infer 2* poor endurance     Upper Body Dressing: Minimum assistance; A to gather     Lower Body Dressing: min A: doffed and donned socks and slide on shoes tailor sitting and 5 mins to complete 2* breathing rest breaks     Toileting: min assistance infer from ROM and mock demonstration; A standing balance                 ADL Intervention and task modifications:      Patient instructed and indicated understanding the benefits of maintaining activity tolerance, functional mobility, and independence with self care tasks during acute stay  to ensure safe return home and to baseline. Encouraged patient to increase frequency and duration OOB, be out of bed for all meals, perform daily ADLs (as approved by RN/MD regarding bathing etc), and performing functional mobility to/from bathroom. Cognitive Retraining  Safety/Judgement: Awareness of environment     Therapeutic Exercise:  Encouraged to continue   Functional Measure:  Barthel Index:     Bathin  Bladder: 10  Bowels: 10  Groomin  Dressin  Feedin  Mobility: 5  Stairs: 0  Toilet Use: 5  Transfer (Bed to Chair and Back): 5  Total: 45      Barthel and G-code impairment scale:  Percentage of impairment CH  0% CI  1-19% CJ  20-39% CK  40-59% CL  60-79% CM  80-99% CN  100%   Barthel Score 0-100 100 99-80 79-60 59-40 20-39 1-19 0   Barthel Score 0-20 20 17-19 13-16 9-12 5-8 1-4 0       The Barthel ADL Index: Guidelines  1. The index should be used as a record of what a patient does, not as a record of what a patient could do. 2. The main aim is to establish degree of independence from any help, physical or verbal, however minor and for whatever reason. 3. The need for supervision renders the patient not independent. 4. A patient's performance should be established using the best available evidence. Asking the patient, friends/relatives and nurses are the usual sources, but direct observation and common sense are also important. However direct testing is not needed. 5. Usually the patient's performance over the preceding 24-48 hours is important, but occasionally longer periods will be relevant.   6. Middle categories imply that the patient supplies over 50 per cent of the effort. 7. Use of aids to be independent is allowed. Patria Castelan., Barthel, D.W. (4849). Functional evaluation: the Barthel Index. [address] (14)2. [place] gabi ADILIA Cuevas, Maryellen Light.[facility]., [place], [address] (1999). Measuring the change indisability after inpatient rehabilitation; comparison of the responsiveness of the Barthel Index and Functional [place] Measure. Journal of Neurology, Neurosurgery, and Psychiatry, 66(4), 974-707. MARCELLO Cuba, DIVINE Jamil, & La Rdz M.A. (2004.) Assessment of post-stroke quality of life in cost-effectiveness studies: The usefulness of the Barthel Index and the EuroQoL-5D. Quality of Life Research, 13, 379-53            G codes: In compliance with CMSs Claims Based Outcome Reporting, the following G-code set was chosen for this patient based on their primary functional limitation being treated: The outcome measure chosen to determine the severity of the functional limitation was the BArthel Index with a score of 45/100 which was correlated with the impairment scale. · Self Care:                           - CURRENT STATUS:               CK - 40%-59% impaired, limited or restricted                          - GOAL STATUS:                      CI - 1%-19% impaired, limited or restricted                          - D/C STATUS:                                    ---------------To be determined---------------         Pain:Pain Scale 1: Numeric (0 - 10)  Pain Intensity 1: 0              Activity Tolerance:   VSS  Please refer to the flowsheet for vital signs taken during this treatment.   After treatment:   [X] Patient left in no apparent distress sitting up in chair  [ ] Patient left in no apparent distress in bed  [X] Call bell left within reach  [X] Nursing notified  [ ] Caregiver present  [ ] Bed alarm activated     COMMUNICATION/EDUCATION:   The patients plan of care was discussed with: Physical Therapist and Registered Nurse.  [X] Home safety education was provided and the patient/caregiver indicated understanding. [X] Patient/family have participated as able in goal setting and plan of care. [X] Patient/family agree to work toward stated goals and plan of care. [ ] Patient understands intent and goals of therapy, but is neutral about his/her participation. [ ] Patient is unable to participate in goal setting and plan of care. This patients plan of care is appropriate for delegation to [facility].      Thank you for this referral.  Gerri Epperson  Total time 23 mins

## 2017-10-02 NOTE — PROCEDURES
[address]    Endoscopic Gastroduodenoscopy Procedure Note    Melinda Vanessa  1945  [id number]    Procedure: Endoscopic Gastroduodenoscopy --diagnostic    Indication: Melena     Pre-operative Diagnosis: see indication above    Post-operative Diagnosis: see findings below    : Brendan Hankins MD    Referring Provider:  Dee Diaz MD      Anesthesia/Sedation: MAC   Airway assessment: No airway problems anticipated    Procedure Details     After infomed consent was obtained for the procedure, with all risks and benefits of procedure explained the patient was taken to the endoscopy suite and placed in the left lateral decubitus position. Following sequential administration of sedation as per above, the endoscope was inserted into the mouth and advanced under direct vision to second portion of the duodenum. A careful inspection was made as the gastroscope was withdrawn, including a retroflexed view of the proximal stomach; findings and interventions are described below. Findings:   Esophagus:normal  Stomach: Hiatus hernia. Acute and chronic gastritis  Duodenum/jejunum: normal      Therapies:  none    Specimens: Gastric sample for H Pylori taken           EBL:  None. Complications:   None; patient tolerated the procedure well. Impression:    -Hiatus hernia. Acute and chronic Gastritis    Pt. was Alert. Left the endoscopy suite in good general condition. Recommendations:  -Follow up with me.     Brendan Hankins MD

## 2017-10-02 NOTE — ROUTINE PROCESS
Tanisha Berumenmick  1945  [id number]    Situation:  Verbal report received from: LUIS Saeed RN  Procedure: Procedure(s):  ESOPHAGOGASTRODUODENOSCOPY (EGD)  ESOPHAGOGASTRODUODENAL (EGD) BIOPSY    Background:    Preoperative diagnosis: GI Bleed  Postoperative diagnosis: 1. acute and chronic gastritis  2. hiatal hernia    :  Dr. Rudy Wayne  Assistant(s): Endoscopy Technician-1: Rolfe Landau  Endoscopy RN-1: Chirag Hicks RN    Specimens: * No specimens in log *  H. Pylori  yes    Assessment:  Intra-procedure medications   Anesthesia gave intra-procedure sedation and medications, see anesthesia flow sheet yes    Intravenous fluids: NS@ KVO     Vital signs stable     Abdominal assessment: round and soft     Recommendation:  Discharge patient per MD order.   Return to floor 56  Family or Friend   Permission to share finding with family or friend yes and n/a

## 2017-10-02 NOTE — PROGRESS NOTES
Intravenous Proton Pump Inhibitor (Pantoprazole) Shortage Update    The IV pantoprazole for Rayna Ceron has been converted to PO pantoprazole in accordance with the [place]-Approved IV PPI Shortage Plan. IV pantoprazole will be reserved only for patients with clinically or endoscopically documented upper GI bleeding, confirmed diagnosis of H. pylori or acute erosive esophagitis in patients who are NPO. Please call the main inpatient pharmacy at [phone] with any questions.

## 2017-10-02 NOTE — PROGRESS NOTES
Problem: Mobility Impaired (Adult and Pediatric)  Goal: *Acute Goals and Plan of Care (Insert Text)  Physical Therapy Goals  Initiated 10/2/2017  1. Patient will move from supine to sit and sit to supine , scoot up and down and roll side to side in bed with modified independence within 7 day(s). 2. Patient will transfer from bed to chair and chair to bed with modified independence using the least restrictive device within 7 day(s). 3. Patient will perform sit to stand with modified independence within 7 day(s). 4. Patient will ambulate with modified independence for 150 feet with the least restrictive device and O2 sats >90% throughout on 2L within 7 day(s). 5. Patient will ascend/descend 12 stairs with both handrail(s) with modified independence within 7 day(s). PHYSICAL THERAPY EVALUATION  Patient: Oumou Hernandez (70 y.o. male)  Date: 10/2/2017  Primary Diagnosis: GI bleed  GI Bleed  colon  Procedure(s) (LRB):  ESOPHAGOGASTRODUODENOSCOPY (EGD) (N/A)  ESOPHAGOGASTRODUODENAL (EGD) BIOPSY (N/A) Day of Surgery   Precautions: Fall         ASSESSMENT :  Based on the objective data described below, the patient presents with decreased functional independence compared to baseline with associated decreased strength, impaired balance, significantly decreased tolerance to activity/endruance, and impaired gait secondary to GI bleed. Chart reviewed and RN cleared patient for mobility. Patient was agreeable to participate and was received in bed. Patient performed all bed mobility independently and required Min A x 1 for transfers to/from bed. Patient ambulated with RW for 60 feet with CGA however upon returning to room patient's O2 sats via pulse ox were 79% and HR steadily increased to 120bpm before plateuing and recovering. PT increased portable O2 to 3L briefly and patient slowly recovered over ~5min with O2 sats ending at 95% and HR at 100 on 2L.  Following vitals assessment, patient ended session in bed with all needs were placed within reach and RN notified of patient's status. PT recommending Rehab at discharge at patient is below functional baseline on 2L O2. During session patient desaturated with ambulating household distances and could benefit from rehab at discharge to improve functional endurance and activity tolerance. Patient will benefit from skilled intervention to address the above impairments. Patients rehabilitation potential is considered to be Fair  Factors which may influence rehabilitation potential include:   [ ]         None noted  [ ]         Mental ability/status  [X]         Medical condition  [ ]         Home/family situation and support systems  [ ]         Safety awareness  [ ]         Pain tolerance/management  [ ]         Other:        PLAN :  Recommendations and Planned Interventions:  [ ]           Bed Mobility Training             [X]    Neuromuscular Re-Education  [X]           Transfer Training                   [ ]    Orthotic/Prosthetic Training  [X]           Gait Training                         [ ]    Modalities  [X]           Therapeutic Exercises           [ ]    Edema Management/Control  [X]           Therapeutic Activities            [X]    Patient and Family Training/Education  [ ]           Other (comment):     Frequency/Duration: Patient will be followed by physical therapy  5 times a week to address goals. Discharge Recommendations: Rehab  Further Equipment Recommendations for Discharge: TBD       SUBJECTIVE:   Patient stated At home I sometimes go to 3 for a short bit if I'm feeling out of breath.       OBJECTIVE DATA SUMMARY:   HISTORY:    Past Medical History:   Diagnosis Date    Asthma       hx of/has wheezing    Cancer ([facility] [address]       prostate - not treated yet    Ill-defined condition       shortness of breath - being evaluated    Psychiatric disorder       mental disability     Past Surgical History:   Procedure Laterality Date    HX OTHER SURGICAL         cyst removed from neck     Prior Level of Function/Home Situation: IP Rehab prior to admission, modified independent prior to previous admission   Personal factors and/or comorbidities impacting plan of care:      Home Situation  Home Environment: Private residence (came from [place])  # Steps to Enter: 4  Rails to Enter: Yes  Hand Rails : Bilateral  One/Two Story Residence: Two story  # of Interior Steps: 12  Interior Rails: Both  Living Alone: No  Support Systems: [employer] / meg community, Family member(s)  Patient Expects to be Discharged to[de-identified] Rehabilitation facility  Current DME Used/Available at Home: Oxygen, portable, Walker, rolling  Tub or Shower Type: Tub     EXAMINATION/PRESENTATION/DECISION MAKING:   Critical Behavior:  Neurologic State: Alert  Orientation Level: Oriented X4  Cognition: Appropriate for age attention/concentration, Follows commands, Memory loss  Safety/Judgement: Awareness of environment  Hearing: Auditory  Auditory Impairment: None  Skin:    Edema:   Range Of Motion:  AROM: Within functional limits                       Strength:    Strength: Within functional limits                    Tone & Sensation:   Tone: Normal              Sensation: Intact               Coordination:  Coordination: Within functional limits  Vision:   Acuity: Impaired near vision  Corrective Lenses: Reading glasses  Functional Mobility:  Bed Mobility:  Rolling: Independent; Additional time  Supine to Sit: Independent; Additional time  Sit to Supine: Independent; Additional time     Transfers:  Sit to Stand: Minimum assistance  Stand to Sit: Minimum assistance                       Balance:   Sitting: Intact; Without support  Standing: Impaired; Without support (one hand on supportive surface)  Standing - Static: Fair  Standing - Dynamic : Fair  Ambulation/Gait Training:  Distance (ft): 60 Feet (ft)  Assistive Device: Gait belt;Walker, rolling (portable O2 2L)  Ambulation - Level of Assistance: Contact guard assistance Gait Abnormalities: Decreased step clearance;Trunk sway increased; Path deviations        Base of Support: Widened     Speed/Sylvia: Pace decreased (<100 feet/min)                                              Stairs: Therapeutic Exercises:         Functional Measure:  Tinetti test:      Sitting Balance: 1  Arises: 0  Attempts to Rise: 0  Immediate Standing Balance: 1  Standing Balance: 1  Nudged: 2  Eyes Closed: 1  Turn 360 Degrees - Continuous/Discontinuous: 1  Turn 360 Degrees - Steady/Unsteady: 1  Sitting Down: 1  Balance Score: 9  Indication of Gait: 1  R Step Length/Height: 1  L Step Length/Height: 1  R Foot Clearance: 1  L Foot Clearance: 1  Step Symmetry: 1  Step Continuity: 1  Path: 1  Trunk: 0  Walking Time: 0  Gait Score: 8  Total Score: 17         Tinetti Test and G-code impairment scale:  Percentage of Impairment CH     0%    CI     1-19% CJ     20-39% CK     40-59% CL     60-79% CM     80-99% CN      100%   Tinetti  Score 0-28 28 23-27 17-22 12-16 6-11 1-5 0          Tinetti Tool Score Risk of Falls  <19 = High Fall Risk  19-24 = Moderate Fall Risk  25-28 = Low Fall Risk  Tinetti ME. Performance-Oriented Assessment of Mobility Problems in Elderly Patients. Yusuf 66; [id number]. (Scoring Description: PT Bulletin Feb. 10, 1993)     Older adults: Kashmir Lambert et al, 2009; n = 1000 [facility] elderly evaluated with ABC, DOUG, ADL, and IADL)  · Mean DOUG score for males aged 69-68 years = 26.21(3.40)  · Mean DOUG score for females age 69-68 years = 25.16(4.30)  · Mean DOUG score for males over 80 years = 23.29(6.02)  · Mean DOUG score for females over 80 years = 17.20(8.32)            G codes: In compliance with CMSs Claims Based Outcome Reporting, the following G-code set was chosen for this patient based on their primary functional limitation being treated:      The outcome measure chosen to determine the severity of the functional limitation was the Tinetti with a score of 17/28 which was correlated with the impairment scale. · Mobility - Walking and Moving Around:               - CURRENT STATUS:    CJ - 20%-39% impaired, limited or restricted               - GOAL STATUS:           CI - 1%-19% impaired, limited or restricted               - D/C STATUS:                       ---------------To be determined---------------      Physical Therapy Evaluation Charge Determination   History Examination Presentation Decision-Making   HIGH Complexity :3+ comorbidities / personal factors will impact the outcome/ POC  MEDIUM Complexity : 3 Standardized tests and measures addressing body structure, function, activity limitation and / or participation in recreation  LOW Complexity : Stable, uncomplicated  Other outcome measures Tinetti  MEDIUM      Based on the above components, the patient evaluation is determined to be of the following complexity level: LOW      Pain:  Pain Scale 1: Numeric (0 - 10)  Pain Intensity 1: 0              Activity Tolerance:   Fair, patient fully participated however O2 sats dropped to 79% following 60 feet, recovered within 5 minutes on 3L O2 before ending session on 2L  Please refer to the flowsheet for vital signs taken during this treatment. After treatment:   [ ]         Patient left in no apparent distress sitting up in chair  [ ]         Patient left in no apparent distress in bed  [ ]         Call bell left within reach  [ ]         Nursing notified  [ ]         Caregiver present  [ ]         Bed alarm activated      COMMUNICATION/EDUCATION:   The patients plan of care was discussed with: Registered Nurse and CM. [ ]         Fall prevention education was provided and the patient/caregiver indicated understanding. [ ]         Patient/family have participated as able in goal setting and plan of care. [ ]         Patient/family agree to work toward stated goals and plan of care.   [ ]         Patient understands intent and goals of therapy, but is neutral about his/her participation. [ ]         Patient is unable to participate in goal setting and plan of care.      Thank you for this referral.  Shreyas Avila PT, DPT   Time Calculation: 20 mins

## 2017-10-02 NOTE — PROGRESS NOTES
Met with pt and sister this am. They live in a two story home with steps to enter home. Sister works outside of the home during the day. Prior to recent healthcare issues, pt was able to remain alone in the home while sister was working. Since his stay at [facility] rehab, the goal was to have him back to some baseline so he can remain at home. Note PT/OT evaluation. Referral sent again to [facility]; for, both feel he needs to have more rehab. Sister also questioned about consumer directed medicaid program. Will await rehab decision. If accepted, will try to reach assigned  at [facility]. Dr. Pablo Moya is pt's PCP. Continue to follow. Davon Jj LCSW  Care Management Interventions  Transition of Care Consult (CM Consult):  Other (from [facility] acute rehab, treated and readmitted from [facility] [place] [facility])  Discharge Location  Discharge Placement: Rehab hospital/unit acute (will need new evaluation/referral if plan is to return tot [facility] to continue rehab)

## 2017-10-02 NOTE — PROGRESS NOTES

## 2017-10-02 NOTE — PROGRESS NOTES
Hospitalist Progress Note  Bakari Billingsley NP  Answering service: [phone] from in house phone  Cell: [phone]      Date of Service:  10/2/2017  NAME:  Barney Webber  :  1945  MRN:  [id number]      Admission Summary:   72-yom presents to the ED from [facility] facility in which he had a 2-day history of nausea and dizziness that is related to postural positioning, and reportedly hypotension. This morning he had very dark stools with 1 vomiting episode. He had blood work drawn and he was told that his \"blood was too low. \" He reported intermittent suprapubic pain that is nonradiating, of which he is vague   in quality and intensity of the pain, and just states that it is a \"pain\" that is suprapubic and more to the right. Interval history / Subjective:   No n/v or dark stools. EGD today showing hernia and acute gastritis without hemorrhage. Hgb stable. Plan for colonoscopy tomorrow. Assessment & Plan:     Gi bleed, likely upper  -GI following, Egd with no source of bleed.  Colonoscopy in am  -Continue PPI  -Full liquid diet, NPO after midnight    Acute blood loss anemia  -sec to above  - hgb 5.3 on admission s/p 2 UPRBCs, currently hgb 8.0  -monitor hgb, transfuse prn   -occult stool +    H/o CVA with left nondominant hemiplegia  -continue statin  -hold ASA d/t gi bleed  -Likely repeat MRI in 2-3 months of stroke and outpatient follow up with Neurology    COPD/Asthma with Chronic Hypoxic Respiratory Failure  -not in acute exacerbation  -continue home o2 at 2L  -continue home nebs    Type 2 DM, new diagnosed  -hgb a1c 6.5  -encourage diet changes, will need repeat a1c in 3 months with pcp    Probable Seizures   -continue Keppra   -scheduled to follow up with Dr Jesus Alberto Short in 2-3 months to determine if pt needs to continue on Keppra    FULL Liquids    Code status: Full  DVT prophylaxis: SCDs  PTA: [facility]    Plan: Colonoscopy tomorrow. PT/OT before discharge    Care Plan discussed with: Patient/Family and Nurse  Disposition: Likely back to [address] [place] [address] Problems  Date Reviewed: 9/30/2017          Codes Class Noted POA    * (Principal)GI bleed ICD-10-CM: K92.2  ICD-9-CM: 578.9  9/29/2017 Yes                Review of Systems:   A comprehensive review of systems was negative except for that written in the HPI. Vital Signs:    Last 24hrs VS reviewed since prior progress note. Most recent are:  Visit Vitals    /75    Pulse 84    Temp 98.6 °F (37 °C)    Resp 15    Ht 6' 2\" (1.88 m)    Wt 79.8 kg (176 lb)    SpO2 98%    BMI 22.6 kg/m2         Intake/Output Summary (Last 24 hours) at 10/02/17 1327  Last data filed at 10/02/17 0920   Gross per 24 hour   Intake           1394.6 ml   Output             1075 ml   Net            319.6 ml        Physical Examination:             Constitutional:  No acute distress, cooperative, pleasant    ENT:  Oral mucous moist, oropharynx benign. Neck supple,    Resp:  CTA bilaterally. No wheezing/rhonchi/rales. No accessory muscle use. O2 2L via nasal cannula   CV:  Regular rhythm, normal rate, no murmurs, gallops, rubs    GI:  Soft, non distended, non tender. normoactive bowel sounds, no hepatosplenomegaly     Musculoskeletal:  No edema, warm, 2+ pulses throughout    Neurologic:  Moves all extremities. AAOx3     Psych:  Good insight, Not anxious nor agitated. Data Review:    Review and/or order of clinical lab test  Review and/or order of tests in the radiology section of CPT  Review and/or order of tests in the medicine section of CPT      Labs:     Recent Labs      10/01/17   2254  10/01/17   1610  10/01/17   0404   09/30/17   0421   WBC   --    --   7.0   --   7.9   HGB  8.0*  7.0*  7.2*   < >  7.0*   HCT  24.8*  21.8*  22.5*   < >  21.3*   PLT   --    --   185   --   170    < > = values in this interval not displayed.      Recent Labs 10/01/17   0404   NA  141   K  3.8   CL  106   CO2  27   BUN  7   CREA  0.51*   GLU  108*   CA  8.0*     No results for input(s): SGOT, GPT, ALT, AP, TBIL, TBILI, TP, ALB, GLOB, GGT, AML, LPSE in the last 72 hours. No lab exists for component: AMYP, HLPSE  No results for input(s): INR, PTP, APTT in the last 72 hours. No lab exists for component: INREXT, INREXT   No results for input(s): FE, TIBC, PSAT, FERR in the last 72 hours. No results found for: FOL, RBCF   No results for input(s): PH, PCO2, PO2 in the last 72 hours. No results for input(s): CPK, CKNDX, TROIQ in the last 72 hours.     No lab exists for component: CPKMB  Lab Results   Component Value Date/Time    Cholesterol, total 187 09/11/2017 05:53 AM    HDL Cholesterol 63 09/11/2017 05:53 AM    LDL, calculated 109 09/11/2017 05:53 AM    Triglyceride 75 09/11/2017 05:53 AM    CHOL/HDL Ratio 3.0 09/11/2017 05:53 AM     No results found for: GLUCPOC  Lab Results   Component Value Date/Time    Color YELLOW/STRAW 09/10/2017 10:09 PM    Appearance CLEAR 09/10/2017 10:09 PM    Specific gravity 1.010 09/10/2017 10:09 PM    pH (UA) 5.0 09/10/2017 10:09 PM    Protein NEGATIVE  09/10/2017 10:09 PM    Glucose NEGATIVE  09/10/2017 10:09 PM    Ketone NEGATIVE  09/10/2017 10:09 PM    Bilirubin NEGATIVE  09/10/2017 10:09 PM    Urobilinogen 0.2 09/10/2017 10:09 PM    Nitrites NEGATIVE  09/10/2017 10:09 PM    Leukocyte Esterase NEGATIVE  09/10/2017 10:09 PM    Epithelial cells MODERATE 09/10/2017 10:09 PM    Bacteria NEGATIVE  09/10/2017 10:09 PM    WBC 5-10 09/10/2017 10:09 PM    RBC 0-5 09/10/2017 10:09 PM         Medications Reviewed:     Current Facility-Administered Medications   Medication Dose Route Frequency    pantoprazole (PROTONIX) tablet 40 mg  40 mg Oral ACB&D    peg 3350-electrolytes (COLYTE) 4000 mL  4,000 mL Oral ONCE    0.9% sodium chloride infusion 250 mL  250 mL IntraVENous PRN    acetaminophen (TYLENOL) tablet 650 mg  650 mg Oral Q4H PRN    atorvastatin (LIPITOR) tablet 40 mg  40 mg Oral QHS    bisacodyl (DULCOLAX) suppository 10 mg  10 mg Rectal DAILY    HYDROcodone-acetaminophen (NORCO) 5-325 mg per tablet 1 Tab  1 Tab Oral Q4H PRN    levETIRAcetam (KEPPRA) tablet 500 mg  500 mg Oral BID    senna-docusate (PERICOLACE) 8.6-50 mg per tablet 2 Tab  2 Tab Oral QHS    sodium chloride (NS) flush 5-10 mL  5-10 mL IntraVENous Q8H    sodium chloride (NS) flush 5-10 mL  5-10 mL IntraVENous PRN    ondansetron (ZOFRAN) injection 4 mg  4 mg IntraVENous Q4H PRN    arformoterol (BROVANA) neb solution 15 mcg  15 mcg Nebulization BID RT    And    budesonide (PULMICORT) 500 mcg/2 ml nebulizer suspension  500 mcg Nebulization BID RT    ipratropium (ATROVENT) 0.02 % nebulizer solution 0.5 mg  0.5 mg Nebulization Q6H RT    0.9% sodium chloride infusion  100 mL/hr IntraVENous CONTINUOUS     ______________________________________________________________________  EXPECTED LENGTH OF STAY: - - -  ACTUAL LENGTH OF STAY:          Addi Rivera NP

## 2017-10-02 NOTE — PROGRESS NOTES
TRANSFER - OUT REPORT:    Verbal report given to nurse Uriel(name) on Steve Multani  being transferred to [facility](unit) for routine progression of care       Report consisted of patients Situation, Background, Assessment and   Recommendations(SBAR). Information from the following report(s) SBAR, Procedure Summary, Intake/Output and Recent Results was reviewed with the receiving nurse. Lines:   Venous Access Device portacath 09/30/17 Upper chest (subclavicular area, right (Active)   Central Line Being Utilized Yes 10/1/2017  4:57 PM   Criteria for Appropriate Use Limited/no vessel suitable for conventional peripheral access 10/2/2017  2:37 AM   Site Assessment Clean, dry, & intact 10/2/2017  2:37 AM   Date of Last Dressing Change 09/30/17 10/2/2017  2:37 AM   Dressing Status Clean, dry, & intact 10/2/2017  2:37 AM   Dressing Type Disk with Chlorhexadine gluconate (CHG) 10/2/2017  2:37 AM   Action Taken Open ports on tubing capped 10/1/2017  4:57 PM   Date Accessed (Medial Site) 09/30/17 10/1/2017  3:00 PM   Access Time (Medial Site) 2100 10/1/2017  3:00 PM   Access Needle Size (Site #1) 20 G 10/1/2017  3:00 PM   Access Needle Length (Medial Site) 1 inch 10/1/2017  3:00 PM   Positive Blood Return (Medial Site) Yes 10/1/2017  4:57 PM   Alcohol Cap Used Yes 10/1/2017  4:57 PM       Peripheral IV 10/02/17 (Active)        Opportunity for questions and clarification was provided.       Patient transported with:   Poll Everywhere

## 2017-10-02 NOTE — ANESTHESIA POSTPROCEDURE EVALUATION
Post-Anesthesia Evaluation and Assessment    Patient: Vernon Mchugh MRN: [id number]  SSN: [id number]    YOB: 1945  Age: 71 y.o. Sex: male       Cardiovascular Function/Vital Signs  Visit Vitals    /58    Pulse 88    Temp 37 °C (98.6 °F)    Resp 27    Ht 6' 2\" (1.88 m)    Wt 79.8 kg (176 lb)    SpO2 99%    BMI 22.6 kg/m2       Patient is status post MAC anesthesia for Procedure(s):  ESOPHAGOGASTRODUODENOSCOPY (EGD)  ESOPHAGOGASTRODUODENAL (EGD) BIOPSY. Nausea/Vomiting: None    Postoperative hydration reviewed and adequate. Pain:  Pain Scale 1: Adult Nonverbal Pain Scale (10/02/17 0929)  Pain Intensity 1: 0 (10/02/17 0929)   Managed    Neurological Status: At baseline    Mental Status and Level of Consciousness: Arousable    Pulmonary Status:   O2 Device: Nasal cannula (10/02/17 0937)   Adequate oxygenation and airway patent    Complications related to anesthesia: None    Post-anesthesia assessment completed.  No concerns    Signed By: Tony Diaz MD     October 2, 2017

## 2017-10-02 NOTE — PROGRESS NOTES
Problem: Falls - Risk of  Goal: *Absence of Falls  Document Vika Fall Risk and appropriate interventions in the flowsheet.    Outcome: Progressing Towards Goal  Fall Risk Interventions:  Mobility Interventions: Patient to call before getting OOB           Medication Interventions: Patient to call before getting OOB     Elimination Interventions: Call light in reach, Patient to call for help with toileting needs

## 2017-10-02 NOTE — PROGRESS NOTES
Hospitalist Progress Note  Edmundo Shepherd MD  Answering service: [phone] from in house phone  Cell: [phone]      Date of Service:  10/2/2017  NAME:  Rosey Hernandez  :  1945  MRN:  [id number]      Admission Summary:   72-yom presents to the ED from [facility] nursing facility in which he had a 2-day history of nausea and dizziness that is related to postural positioning, and reportedly hypotension. This morning he had very dark stools with 1 vomiting episode. He had blood work drawn and he was told that his \"blood was too low. \" He reported intermittent suprapubic pain that is nonradiating, of which he is vague   in quality and intensity of the pain, and just states that it is a \"pain\" that is suprapubic and more to the right.      Interval history / Subjective:     F/u GI bleed  No new issues  BMs getting better \"Now no more black\"     Assessment & Plan:     Gi bleed, likely upper  -GI following, plan for EGD today  -Continue PPI  -Full liquid diet change to a diabetic diet once advanced per GI     Acute blood loss anemia  -sec to above  -hgb 5.3 on admission s/p 2 UPRBCs  -continue to monitor H&H every q day    -occult stool +     H/o CVA with left nondominant hemiplegia  -continue statin  -hold ASA d/t gi bleed  -Likely repeat MRI in 2-3 months of stroke and outpatient follow up with Neurology    COPD/Asthma with Chronic Hypoxic Respiratory Failure  -acute exacerbation added on neb and improved will continue home o2 at 2L  Avoid systemic steroids for now due to elevated bs and no severe exacerbation symptoms appear well controlled with inhaled steriods    Type 2 DM, new diagnosed  -hgb a1c 6.5  -encourage diet changes, will need repeat a1c in 3 months with pcp  Started on metformin will have diabetic education while in hospital as well as teaching of glucometer do prn monitor for need of ISS       Probable Seizures -continue Keppra   -scheduled to follow up with Dr Rosie Coombs in 2-3 months to determine if pt needs to continue on Keppra        Code status: Full  DVT prophylaxis: SCDs  PTA: Zaynab 4: Needs PT/OT before discharge s/w case management they are aware     Care Plan discussed with: Patient/Family and Nurse  Disposition: Likely back to [address] [place] [address] on 10/2     Hospital Problems  Date Reviewed: 9/30/2017          Codes Class Noted POA    * (Principal)GI bleed ICD-10-CM: K92.2  ICD-9-CM: 578.9  9/29/2017 Yes                Review of Systems:   A comprehensive review of systems was negative except for that written in the HPI. Vital Signs:    Last 24hrs VS reviewed since prior progress note. Most recent are:  Visit Vitals    /61 (BP 1 Location: Left arm, BP Patient Position: At rest)    Pulse 91    Temp 98 °F (36.7 °C)    Resp 16    Ht 6' 2\" (1.88 m)    Wt 79.8 kg (176 lb)    SpO2 93%    BMI 22.6 kg/m2         Intake/Output Summary (Last 24 hours) at 10/02/17 0740  Last data filed at 10/02/17 0100   Gross per 24 hour   Intake           1754.6 ml   Output             1875 ml   Net           -120.4 ml        Physical Examination:             Constitutional:  No acute distress, cooperative, pleasant    ENT:  Oral mucous moist, oropharynx benign. Neck supple,    Resp:  CTA bilaterally. No wheezing/rhonchi/rales. No accessory muscle use. O2 2L via nasal cannula   CV:  Regular rhythm, normal rate, no murmurs, gallops, rubs    GI:  Soft, non distended, non tender. normoactive bowel sounds, no hepatosplenomegaly     Musculoskeletal:  No edema, warm, 2+ pulses throughout    Neurologic:  Moves all extremities. AAOx3     Psych:  Good insight, Not anxious nor agitated.        Data Review:    Review and/or order of clinical lab test  Review and/or order of tests in the radiology section of CPT  Review and/or order of tests in the medicine section of CPT      Labs:     Recent Labs      10/01/17   6387 10/01/17   1610  10/01/17   0404   09/30/17   0421   WBC   --    --   7.0   --   7.9   HGB  8.0*  7.0*  7.2*   < >  7.0*   HCT  24.8*  21.8*  22.5*   < >  21.3*   PLT   --    --   185   --   170    < > = values in this interval not displayed. Recent Labs      10/01/17   0404  09/29/17   1307   NA  141  143   K  3.8  4.0   CL  106  111*   CO2  27  28   BUN  7  28*   CREA  0.51*  0.74   GLU  108*  102*   CA  8.0*  7.8*     Recent Labs      09/29/17   1307   SGOT  18   ALT  19   AP  48   TBILI  0.2   TP  5.6*   ALB  2.6*   GLOB  3.0     Recent Labs      09/29/17   1307   INR  1.1   PTP  11.4*   APTT  24.2      No results for input(s): FE, TIBC, PSAT, FERR in the last 72 hours. No results found for: FOL, RBCF   No results for input(s): PH, PCO2, PO2 in the last 72 hours.   Recent Labs      09/29/17   1307   TROIQ  <0.04     Lab Results   Component Value Date/Time    Cholesterol, total 187 09/11/2017 05:53 AM    HDL Cholesterol 63 09/11/2017 05:53 AM    LDL, calculated 109 09/11/2017 05:53 AM    Triglyceride 75 09/11/2017 05:53 AM    CHOL/HDL Ratio 3.0 09/11/2017 05:53 AM     No results found for: [facility]  Lab Results   Component Value Date/Time    Color YELLOW/STRAW 09/10/2017 10:09 PM    Appearance CLEAR 09/10/2017 10:09 PM    Specific gravity 1.010 09/10/2017 10:09 PM    pH (UA) 5.0 09/10/2017 10:09 PM    Protein NEGATIVE  09/10/2017 10:09 PM    Glucose NEGATIVE  09/10/2017 10:09 PM    Ketone NEGATIVE  09/10/2017 10:09 PM    Bilirubin NEGATIVE  09/10/2017 10:09 PM    Urobilinogen 0.2 09/10/2017 10:09 PM    Nitrites NEGATIVE  09/10/2017 10:09 PM    Leukocyte Esterase NEGATIVE  09/10/2017 10:09 PM    Epithelial cells MODERATE 09/10/2017 10:09 PM    Bacteria NEGATIVE  09/10/2017 10:09 PM    WBC 5-10 09/10/2017 10:09 PM    RBC 0-5 09/10/2017 10:09 PM         Medications Reviewed:     Current Facility-Administered Medications   Medication Dose Route Frequency    0.9% sodium chloride infusion 250 mL  250 mL IntraVENous PRN    acetaminophen (TYLENOL) tablet 650 mg  650 mg Oral Q4H PRN    atorvastatin (LIPITOR) tablet 40 mg  40 mg Oral QHS    bisacodyl (DULCOLAX) suppository 10 mg  10 mg Rectal DAILY    HYDROcodone-acetaminophen (NORCO) 5-325 mg per tablet 1 Tab  1 Tab Oral Q4H PRN    levETIRAcetam (KEPPRA) tablet 500 mg  500 mg Oral BID    senna-docusate (PERICOLACE) 8.6-50 mg per tablet 2 Tab  2 Tab Oral QHS    sodium chloride (NS) flush 5-10 mL  5-10 mL IntraVENous Q8H    sodium chloride (NS) flush 5-10 mL  5-10 mL IntraVENous PRN    ondansetron (ZOFRAN) injection 4 mg  4 mg IntraVENous Q4H PRN    pantoprazole (PROTONIX) 40 mg in sodium chloride 0.9 % 10 mL injection  40 mg IntraVENous Q12H    arformoterol (BROVANA) neb solution 15 mcg  15 mcg Nebulization BID RT    And    budesonide (PULMICORT) 500 mcg/2 ml nebulizer suspension  500 mcg Nebulization BID RT    ipratropium (ATROVENT) 0.02 % nebulizer solution 0.5 mg  0.5 mg Nebulization Q6H RT    0.9% sodium chloride infusion  100 mL/hr IntraVENous CONTINUOUS     ______________________________________________________________________  EXPECTED LENGTH OF STAY: - - -  ACTUAL LENGTH OF STAY:          3                 Jaron Pinon MD

## 2017-10-02 NOTE — CONSULTS
GI Note. EGD showed no free blood in the upper GI Tract. Acute and Chronic Gastritis. A Hiatus hernia. Recommend to continue same approach and we will schedule Pt. For Colonoscopy.  Will discuss

## 2017-10-02 NOTE — PROGRESS NOTES
Bedside and Verbal shift change report given to [address] (oncoming nurse) by Yary Richter (offgoing nurse). Report included the following information SBAR, Kardex, Procedure Summary, Intake/Output, MAR and Recent Results. [facility] with Art Hinton MD regarding upgrading patient's diet. Spoke with patient and wants to have colonoscopy while still in hospital. Made MD aware of patient's choice. Received orders for full liquid diet. NPO at midnight.  Marva

## 2017-10-02 NOTE — PROGRESS NOTES
Agustin Julissa  1945  [id number]    Situation:    Scheduled Procedure: Procedure(s):  ESOPHAGOGASTRODUODENOSCOPY (EGD)  Verbal report received from: Sudha Villafana RN  Preoperative diagnosis: GI Bleed    Background:    Procedure: Procedure(s):  ESOPHAGOGASTRODUODENOSCOPY (EGD)  Physician performing procedure; Dr. Rene Zapata RN    NPO Status/Last PO Intake: 10/1/17    Pregnancy Test:Not applicable If yes, result: none    Is the patient taking Blood Thinners: NO If yes, list:  and last taken   Is the patient diabetic:no       If yes, what was the last BS:    Time taken? Anything given? no           Does the patient have a Pacemaker/Defibrillator in place?: no   Does the patient need antibiotics before/during/after procedure: no   If the patient is having a colon, How much prep was drank? What were the Colon prep results? Does the patient have SCD in place:no   Is patient on CONTACT precautions:no        If yes, what kind of CONTACT precautions:     Assessment:  Are the vital signs stable prior to patient coming to ENDO?  yes  Is the patient alert/oriented and able to sign consent for the procedures:yes  How does the patient's abdomen feel prior to coming to ENDO? round and soft yes   Does the patient have a patient IV in place?  Yes       Recommendation:  Family or Friend present no     Permission to share finding with Family or Friend yes and n/a

## 2017-10-02 NOTE — PROGRESS NOTES
0858 spoke to JAGRUTI Moreira to get report. Informed pt. Ate 1/2 eggs, bite of muffin, 2 grapes, 1/2 cup of coffee. Informed charge nurse Teofilo Kemp and Dr. Anya Donaldson. Case aborted for now. 0724 report given on wrong patient, RN cesar Angel who informed me pt. Has been NPO since yesterday.   Procedure is for 0900

## 2017-10-02 NOTE — ANESTHESIA PREPROCEDURE EVALUATION
Anesthetic History               Review of Systems / Medical History  Patient summary reviewed, nursing notes reviewed and pertinent labs reviewed    Pulmonary            Asthma        Neuro/Psych     seizures  CVA  Psychiatric history    Comments: Left sided weakness.  Cardiovascular                       GI/Hepatic/Renal               Comments: GI bleed Endo/Other             Other Findings            Physical Exam    Airway  Mallampati: I  TM Distance: > 6 cm  Neck ROM: normal range of motion   Mouth opening: Normal     Cardiovascular    Rhythm: regular  Rate: normal         Dental    Dentition: Edentulous     Pulmonary  Breath sounds clear to auscultation               Abdominal         Other Findings            Anesthetic Plan    ASA: 3  Anesthesia type: MAC          Induction: Intravenous  Anesthetic plan and risks discussed with: Patient

## 2017-10-03 ENCOUNTER — ANESTHESIA (OUTPATIENT)
Dept: ENDOSCOPY | Age: 72
DRG: 378 | End: 2017-10-03
Payer: MEDICARE

## 2017-10-03 ENCOUNTER — ANESTHESIA EVENT (OUTPATIENT)
Dept: ENDOSCOPY | Age: 72
DRG: 378 | End: 2017-10-03
Payer: MEDICARE

## 2017-10-03 LAB
ANION GAP SERPL CALC-SCNC: 5 MMOL/L (ref 5–15)
BASOPHILS # BLD: 0 K/UL (ref 0–0.1)
BASOPHILS NFR BLD: 0 % (ref 0–1)
BUN SERPL-MCNC: 4 MG/DL (ref 6–20)
BUN/CREAT SERPL: 8 (ref 12–20)
CALCIUM SERPL-MCNC: 8.5 MG/DL (ref 8.5–10.1)
CHLORIDE SERPL-SCNC: 102 MMOL/L (ref 97–108)
CO2 SERPL-SCNC: 33 MMOL/L (ref 21–32)
CREAT SERPL-MCNC: 0.53 MG/DL (ref 0.7–1.3)
DIFFERENTIAL METHOD BLD: ABNORMAL
EOSINOPHIL # BLD: 0.3 K/UL (ref 0–0.4)
EOSINOPHIL NFR BLD: 5 % (ref 0–7)
ERYTHROCYTE [DISTWIDTH] IN BLOOD BY AUTOMATED COUNT: 19 % (ref 11.5–14.5)
GLUCOSE BLD STRIP.AUTO-MCNC: 103 MG/DL (ref 65–100)
GLUCOSE BLD STRIP.AUTO-MCNC: 103 MG/DL (ref 65–100)
GLUCOSE BLD STRIP.AUTO-MCNC: 85 MG/DL (ref 65–100)
GLUCOSE SERPL-MCNC: 103 MG/DL (ref 65–100)
HCT VFR BLD AUTO: 24.9 % (ref 36.6–50.3)
HGB BLD-MCNC: 8 G/DL (ref 12.1–17)
LYMPHOCYTES # BLD: 0.7 K/UL (ref 0.8–3.5)
LYMPHOCYTES NFR BLD: 12 % (ref 12–49)
MAGNESIUM SERPL-MCNC: 2.1 MG/DL (ref 1.6–2.4)
MCH RBC QN AUTO: 27.1 PG (ref 26–34)
MCHC RBC AUTO-ENTMCNC: 32.1 G/DL (ref 30–36.5)
MCV RBC AUTO: 84.4 FL (ref 80–99)
MONOCYTES # BLD: 0.8 K/UL (ref 0–1)
MONOCYTES NFR BLD: 14 % (ref 5–13)
NEUTS SEG # BLD: 4.1 K/UL (ref 1.8–8)
NEUTS SEG NFR BLD: 69 % (ref 32–75)
NRBC # BLD: 0.79 K/UL (ref 0–0.01)
NRBC BLD-RTO: 13.3 PER 100 WBC
PLATELET # BLD AUTO: 234 K/UL (ref 150–400)
PLATELET COMMENTS,PCOM: ABNORMAL
POTASSIUM SERPL-SCNC: 3.5 MMOL/L (ref 3.5–5.1)
RBC # BLD AUTO: 2.95 M/UL (ref 4.1–5.7)
RBC MORPH BLD: ABNORMAL
SERVICE CMNT-IMP: ABNORMAL
SERVICE CMNT-IMP: ABNORMAL
SERVICE CMNT-IMP: NORMAL
SODIUM SERPL-SCNC: 140 MMOL/L (ref 136–145)
WBC # BLD AUTO: 5.9 K/UL (ref 4.1–11.1)
WBC NRBC COR # BLD: ABNORMAL 10*3/UL

## 2017-10-03 PROCEDURE — 74011000250 HC RX REV CODE- 250: Performed by: INTERNAL MEDICINE

## 2017-10-03 PROCEDURE — 65660000000 HC RM CCU STEPDOWN

## 2017-10-03 PROCEDURE — 94640 AIRWAY INHALATION TREATMENT: CPT

## 2017-10-03 PROCEDURE — 36415 COLL VENOUS BLD VENIPUNCTURE: CPT | Performed by: NURSE PRACTITIONER

## 2017-10-03 PROCEDURE — 82962 GLUCOSE BLOOD TEST: CPT

## 2017-10-03 PROCEDURE — 77010033678 HC OXYGEN DAILY

## 2017-10-03 PROCEDURE — 74011250637 HC RX REV CODE- 250/637: Performed by: INTERNAL MEDICINE

## 2017-10-03 PROCEDURE — 85025 COMPLETE CBC W/AUTO DIFF WBC: CPT | Performed by: NURSE PRACTITIONER

## 2017-10-03 PROCEDURE — 80048 BASIC METABOLIC PNL TOTAL CA: CPT | Performed by: NURSE PRACTITIONER

## 2017-10-03 PROCEDURE — 83735 ASSAY OF MAGNESIUM: CPT | Performed by: HOSPITALIST

## 2017-10-03 PROCEDURE — 74011250636 HC RX REV CODE- 250/636: Performed by: HOSPITALIST

## 2017-10-03 RX ORDER — POTASSIUM CHLORIDE 7.45 MG/ML
10 INJECTION INTRAVENOUS
Status: COMPLETED | OUTPATIENT
Start: 2017-10-03 | End: 2017-10-03

## 2017-10-03 RX ADMIN — BUDESONIDE 500 MCG: 0.5 INHALANT RESPIRATORY (INHALATION) at 07:57

## 2017-10-03 RX ADMIN — PANTOPRAZOLE SODIUM 40 MG: 40 TABLET, DELAYED RELEASE ORAL at 10:07

## 2017-10-03 RX ADMIN — POTASSIUM CHLORIDE 10 MEQ: 10 INJECTION, SOLUTION INTRAVENOUS at 18:20

## 2017-10-03 RX ADMIN — POTASSIUM CHLORIDE 10 MEQ: 10 INJECTION, SOLUTION INTRAVENOUS at 16:18

## 2017-10-03 RX ADMIN — POTASSIUM CHLORIDE 10 MEQ: 10 INJECTION, SOLUTION INTRAVENOUS at 14:36

## 2017-10-03 RX ADMIN — Medication 10 ML: at 07:19

## 2017-10-03 RX ADMIN — BUDESONIDE 500 MCG: 0.5 INHALANT RESPIRATORY (INHALATION) at 22:13

## 2017-10-03 RX ADMIN — ARFORMOTEROL TARTRATE 15 MCG: 15 SOLUTION RESPIRATORY (INHALATION) at 22:13

## 2017-10-03 RX ADMIN — IPRATROPIUM BROMIDE 0.5 MG: 0.5 SOLUTION RESPIRATORY (INHALATION) at 22:13

## 2017-10-03 RX ADMIN — POTASSIUM CHLORIDE 10 MEQ: 10 INJECTION, SOLUTION INTRAVENOUS at 12:21

## 2017-10-03 RX ADMIN — LEVETIRACETAM 500 MG: 500 TABLET, FILM COATED ORAL at 21:42

## 2017-10-03 RX ADMIN — Medication 10 ML: at 14:36

## 2017-10-03 RX ADMIN — PANTOPRAZOLE SODIUM 40 MG: 40 TABLET, DELAYED RELEASE ORAL at 18:14

## 2017-10-03 RX ADMIN — IPRATROPIUM BROMIDE 0.5 MG: 0.5 SOLUTION RESPIRATORY (INHALATION) at 07:56

## 2017-10-03 RX ADMIN — IPRATROPIUM BROMIDE 0.5 MG: 0.5 SOLUTION RESPIRATORY (INHALATION) at 14:33

## 2017-10-03 RX ADMIN — ATORVASTATIN CALCIUM 40 MG: 20 TABLET, FILM COATED ORAL at 21:42

## 2017-10-03 RX ADMIN — ARFORMOTEROL TARTRATE 15 MCG: 15 SOLUTION RESPIRATORY (INHALATION) at 07:56

## 2017-10-03 RX ADMIN — LEVETIRACETAM 500 MG: 500 TABLET, FILM COATED ORAL at 10:00

## 2017-10-03 RX ADMIN — DOCUSATE SODIUM AND SENNOSIDES 2 TABLET: 8.6; 5 TABLET, FILM COATED ORAL at 21:42

## 2017-10-03 NOTE — CONSULTS
GI Notes. Pt was not prepared enough for Colonoscopy. We will try again tomorrow. Maria L. Mari Garcia

## 2017-10-03 NOTE — PROGRESS NOTES
Bedside and Verbal shift change report given to Daysi (oncoming nurse) by Regina Morocho (offgoing nurse). Report included the following information SBAR, Kardex and MAR.

## 2017-10-03 NOTE — PROGRESS NOTES
Hospitalist Progress Note  Ankit Hoover NP  Answering service: [phone] from in house phone  Cell: [phone]      Date of Service:  10/3/2017  NAME:  Agustin Costa  :  1945  MRN:  [id number]      Admission Summary:   72-yom presents to the ED from [facility] facility in which he had a 2-day history of nausea and dizziness that is related to postural positioning, and reportedly hypotension. This morning he had very dark stools with 1 vomiting episode. He had blood work drawn and he was told that his \"blood was too low. \" He reported intermittent suprapubic pain that is nonradiating, of which he is vague   in quality and intensity of the pain, and just states that it is a \"pain\" that is suprapubic and more to the right. Interval history / Subjective:   10/2 EGD acute gastritis no source of bleeding. Unable to complete colonoscopy today due to not being prepped enough. Rescheduled for tomorrow. Currently doing prep with dark formed stools. No nausea or vomiting. Hgb stable. Assessment & Plan:     Gi bleed, likely upper  -GI following, Egd with no source of bleed.  Colonoscopy in am  -Continue PPI  -Full liquid diet, NPO after midnight    Acute blood loss anemia  -secondary  to above  - hgb 5.3 on admission s/p 2 UPRBCs, currently hgb 8.0  -monitor hgb, transfuse prn   -occult stool +  -plan as above    H/o CVA with left nondominant hemiplegia  -continue statin  -hold ASA d/t gi bleed  -Likely repeat MRI in 2-3 months of stroke and outpatient follow up with Neurology    COPD/Asthma with Chronic Hypoxic Respiratory Failure  -not in acute exacerbation  -continue home o2 at 2L  -continue home nebs    Type 2 DM, new diagnosed  -hgb a1c 6.5  -encourage diet changes, will need repeat a1c in 3 months with pcp  -monitor accuchecks    Probable Seizures   -continue Keppra   -scheduled to follow up with  Lee in 2-3 months to determine if pt needs to continue on Keppra    Vtach   -5 beat, asymptomatic   -K 3.5 replenish K, Mag wnl   -monitor on remote tele    FULL Liquids    Code status: Full  DVT prophylaxis: SCDs  PTA: [facility]    Plan: Colonoscopy tomorrow. CM working on getting approval to return to [facility] tomorrow afternoon vs Thursday morning pending colonoscopy results    Care Plan discussed with: Patient/Family, Nurse and  Dr Rex Wolf  Disposition: Likely back to [facility] soon     Hospital Problems  Date Reviewed: 9/30/2017          Codes Class Noted POA    * (Principal)GI bleed ICD-10-CM: K92.2  ICD-9-CM: 578.9  9/29/2017 Yes                Review of Systems:   A comprehensive review of systems was negative except for that written in the HPI. No chest pain or dizziness. Chronic MARVIN. Vital Signs:    Last 24hrs VS reviewed since prior progress note. Most recent are:  Visit Vitals    /82 (BP 1 Location: Left arm, BP Patient Position: At rest)    Pulse 79    Temp 98.3 °F (36.8 °C)    Resp 17    Ht 6' 2\" (1.88 m)    Wt 79.8 kg (176 lb)    SpO2 96%    BMI 22.6 kg/m2         Intake/Output Summary (Last 24 hours) at 10/03/17 1143  Last data filed at 10/03/17 0153   Gross per 24 hour   Intake              480 ml   Output              550 ml   Net              -70 ml        Physical Examination:             Constitutional:  No acute distress, cooperative, pleasant    ENT:  Oral mucous moist, oropharynx benign. Neck supple,    Resp:  CTA bilaterally. No wheezing/rhonchi/rales. No accessory muscle use. O2 2L via nasal cannula   CV:  Regular rhythm, normal rate, no murmurs, gallops, rubs    GI:  Soft, non distended, non tender. normoactive bowel sounds, no hepatosplenomegaly     Musculoskeletal:  No edema, warm, 2+ pulses throughout    Neurologic:  Moves all extremities. AAOx3     Psych:  Good insight, Not anxious nor agitated.        Data Review:    Review and/or order of clinical lab test  Review and/or order of tests in the radiology section of CPT  Review and/or order of tests in the medicine section of CPT      Labs:     Recent Labs      10/03/17   0507  10/02/17   1730   WBC  5.9  7.7   HGB  8.0*  9.0*   HCT  24.9*  28.5*   PLT  234  236     Recent Labs      10/03/17   0507  10/01/17   0404   NA  140  141   K  3.5  3.8   CL  102  106   CO2  33*  27   BUN  4*  7   CREA  0.53*  0.51*   GLU  103*  108*   CA  8.5  8.0*     No results for input(s): SGOT, GPT, ALT, AP, TBIL, TBILI, TP, ALB, GLOB, GGT, AML, LPSE in the last 72 hours. No lab exists for component: AMYP, HLPSE  No results for input(s): INR, PTP, APTT in the last 72 hours. No lab exists for component: INREXT, INREXT   No results for input(s): FE, TIBC, PSAT, FERR in the last 72 hours. No results found for: FOL, RBCF   No results for input(s): PH, PCO2, PO2 in the last 72 hours. No results for input(s): CPK, CKNDX, TROIQ in the last 72 hours.     No lab exists for component: CPKMB  Lab Results   Component Value Date/Time    Cholesterol, total 187 09/11/2017 05:53 AM    HDL Cholesterol 63 09/11/2017 05:53 AM    LDL, calculated 109 09/11/2017 05:53 AM    Triglyceride 75 09/11/2017 05:53 AM    CHOL/HDL Ratio 3.0 09/11/2017 05:53 AM     Lab Results   Component Value Date/Time    Glucose (POC) 103 10/03/2017 11:20 AM     Lab Results   Component Value Date/Time    Color YELLOW/STRAW 09/10/2017 10:09 PM    Appearance CLEAR 09/10/2017 10:09 PM    Specific gravity 1.010 09/10/2017 10:09 PM    pH (UA) 5.0 09/10/2017 10:09 PM    Protein NEGATIVE  09/10/2017 10:09 PM    Glucose NEGATIVE  09/10/2017 10:09 PM    Ketone NEGATIVE  09/10/2017 10:09 PM    Bilirubin NEGATIVE  09/10/2017 10:09 PM    Urobilinogen 0.2 09/10/2017 10:09 PM    Nitrites NEGATIVE  09/10/2017 10:09 PM    Leukocyte Esterase NEGATIVE  09/10/2017 10:09 PM    Epithelial cells MODERATE 09/10/2017 10:09 PM    Bacteria NEGATIVE  09/10/2017 10:09 PM    WBC 5-10 09/10/2017 10:09 PM    RBC 0-5 09/10/2017 10:09 PM         Medications Reviewed:     Current Facility-Administered Medications   Medication Dose Route Frequency    potassium chloride 10 mEq in 100 ml IVPB  10 mEq IntraVENous Q1H    pantoprazole (PROTONIX) tablet 40 mg  40 mg Oral ACB&D    0.9% sodium chloride infusion 250 mL  250 mL IntraVENous PRN    acetaminophen (TYLENOL) tablet 650 mg  650 mg Oral Q4H PRN    atorvastatin (LIPITOR) tablet 40 mg  40 mg Oral QHS    bisacodyl (DULCOLAX) suppository 10 mg  10 mg Rectal DAILY    HYDROcodone-acetaminophen (NORCO) 5-325 mg per tablet 1 Tab  1 Tab Oral Q4H PRN    levETIRAcetam (KEPPRA) tablet 500 mg  500 mg Oral BID    senna-docusate (PERICOLACE) 8.6-50 mg per tablet 2 Tab  2 Tab Oral QHS    sodium chloride (NS) flush 5-10 mL  5-10 mL IntraVENous Q8H    sodium chloride (NS) flush 5-10 mL  5-10 mL IntraVENous PRN    ondansetron (ZOFRAN) injection 4 mg  4 mg IntraVENous Q4H PRN    arformoterol (BROVANA) neb solution 15 mcg  15 mcg Nebulization BID RT    And    budesonide (PULMICORT) 500 mcg/2 ml nebulizer suspension  500 mcg Nebulization BID RT    ipratropium (ATROVENT) 0.02 % nebulizer solution 0.5 mg  0.5 mg Nebulization Q6H RT    0.9% sodium chloride infusion  100 mL/hr IntraVENous CONTINUOUS     ______________________________________________________________________  EXPECTED LENGTH OF STAY: 3d 4h  ACTUAL LENGTH OF STAY:          [address] [place] [address],

## 2017-10-03 NOTE — PROGRESS NOTES
Bedside shift change report given to Mary (oncoming nurse) by Radha Hernandez RN (offgoing nurse). Report included the following information SBAR, MAR and Recent Results.

## 2017-10-03 NOTE — PROGRESS NOTES
[facility] liaison re: acceptance. Facility has not made decision. He was admitted from [address] but was ready for discharge. At [facility]: [phone]) called this am stating a referral for home health had been received. They are ready accepted pt. However, sister feels pt is not strong enough to return to their home now due to medical events causing his admission to acute care. Pt has colonscopy scheduled for Wednesday. May be stable for transition to rehab post test vs Thursday per MD. Anny Hi 45.. Continue to follow. Met with pt and  Sister this pm. If [address] declines, they are in agreement with home health as long as an aide is ordered for assist with his ADL's until stronger.  Pt has had these services in the past.    Farrell Goldmann, LCSW

## 2017-10-03 NOTE — PROGRESS NOTES
Physical Therapy Note     Chart reviewed and discussed with RN. Patient has been transferring to/from [facility] multiple times throughout the afternoon with RN/PCT and continues to feel the need to void frequently. Per patient request, PT will defer treatment today to let patient rest and follow up tomorrow. PT recommending patient ambulate with RN/PCT 1-2x/day as able and appropriate.      Tahmina Casey PT, DPT

## 2017-10-04 LAB
ANION GAP SERPL CALC-SCNC: 7 MMOL/L (ref 5–15)
BASOPHILS # BLD: 0.1 K/UL (ref 0–0.1)
BASOPHILS NFR BLD: 1 % (ref 0–1)
BUN SERPL-MCNC: 4 MG/DL (ref 6–20)
BUN/CREAT SERPL: 7 (ref 12–20)
CALCIUM SERPL-MCNC: 8.3 MG/DL (ref 8.5–10.1)
CHLORIDE SERPL-SCNC: 104 MMOL/L (ref 97–108)
CO2 SERPL-SCNC: 30 MMOL/L (ref 21–32)
CREAT SERPL-MCNC: 0.55 MG/DL (ref 0.7–1.3)
DIFFERENTIAL METHOD BLD: ABNORMAL
EOSINOPHIL # BLD: 0.3 K/UL (ref 0–0.4)
EOSINOPHIL NFR BLD: 5 % (ref 0–7)
ERYTHROCYTE [DISTWIDTH] IN BLOOD BY AUTOMATED COUNT: 19.3 % (ref 11.5–14.5)
GLUCOSE BLD STRIP.AUTO-MCNC: 146 MG/DL (ref 65–100)
GLUCOSE BLD STRIP.AUTO-MCNC: 78 MG/DL (ref 65–100)
GLUCOSE BLD STRIP.AUTO-MCNC: 85 MG/DL (ref 65–100)
GLUCOSE BLD STRIP.AUTO-MCNC: 86 MG/DL (ref 65–100)
GLUCOSE BLD STRIP.AUTO-MCNC: 92 MG/DL (ref 65–100)
GLUCOSE SERPL-MCNC: 93 MG/DL (ref 65–100)
HCT VFR BLD AUTO: 25.2 % (ref 36.6–50.3)
HGB BLD-MCNC: 7.9 G/DL (ref 12.1–17)
LYMPHOCYTES # BLD: 0.9 K/UL (ref 0.8–3.5)
LYMPHOCYTES NFR BLD: 15 % (ref 12–49)
MCH RBC QN AUTO: 26.6 PG (ref 26–34)
MCHC RBC AUTO-ENTMCNC: 31.3 G/DL (ref 30–36.5)
MCV RBC AUTO: 84.8 FL (ref 80–99)
MONOCYTES # BLD: 0.5 K/UL (ref 0–1)
MONOCYTES NFR BLD: 9 % (ref 5–13)
NEUTS SEG # BLD: 4.3 K/UL (ref 1.8–8)
NEUTS SEG NFR BLD: 70 % (ref 32–75)
NRBC # BLD: 0.31 K/UL (ref 0–0.01)
NRBC BLD-RTO: 5.2 PER 100 WBC
PLATELET # BLD AUTO: 252 K/UL (ref 150–400)
PLATELET COMMENTS,PCOM: ABNORMAL
POTASSIUM SERPL-SCNC: 3.4 MMOL/L (ref 3.5–5.1)
RBC # BLD AUTO: 2.97 M/UL (ref 4.1–5.7)
RBC MORPH BLD: ABNORMAL
SERVICE CMNT-IMP: ABNORMAL
SERVICE CMNT-IMP: NORMAL
SODIUM SERPL-SCNC: 141 MMOL/L (ref 136–145)
WBC # BLD AUTO: 6.1 K/UL (ref 4.1–11.1)
WBC NRBC COR # BLD: ABNORMAL 10*3/UL

## 2017-10-04 PROCEDURE — 94640 AIRWAY INHALATION TREATMENT: CPT

## 2017-10-04 PROCEDURE — 76060000031 HC ANESTHESIA FIRST 0.5 HR: Performed by: INTERNAL MEDICINE

## 2017-10-04 PROCEDURE — 77010033678 HC OXYGEN DAILY

## 2017-10-04 PROCEDURE — 36415 COLL VENOUS BLD VENIPUNCTURE: CPT | Performed by: NURSE PRACTITIONER

## 2017-10-04 PROCEDURE — 74011250636 HC RX REV CODE- 250/636: Performed by: FAMILY MEDICINE

## 2017-10-04 PROCEDURE — 76040000019: Performed by: INTERNAL MEDICINE

## 2017-10-04 PROCEDURE — 82962 GLUCOSE BLOOD TEST: CPT

## 2017-10-04 PROCEDURE — 74011250636 HC RX REV CODE- 250/636: Performed by: HOSPITALIST

## 2017-10-04 PROCEDURE — 0DJD8ZZ INSPECTION OF LOWER INTESTINAL TRACT, VIA NATURAL OR ARTIFICIAL OPENING ENDOSCOPIC: ICD-10-PCS | Performed by: INTERNAL MEDICINE

## 2017-10-04 PROCEDURE — 74011000250 HC RX REV CODE- 250: Performed by: INTERNAL MEDICINE

## 2017-10-04 PROCEDURE — 74011250637 HC RX REV CODE- 250/637: Performed by: INTERNAL MEDICINE

## 2017-10-04 PROCEDURE — 80048 BASIC METABOLIC PNL TOTAL CA: CPT | Performed by: NURSE PRACTITIONER

## 2017-10-04 PROCEDURE — 74011250637 HC RX REV CODE- 250/637: Performed by: HOSPITALIST

## 2017-10-04 PROCEDURE — 85025 COMPLETE CBC W/AUTO DIFF WBC: CPT | Performed by: NURSE PRACTITIONER

## 2017-10-04 PROCEDURE — 77030027957 HC TBNG IRR ENDOGTR BUSS -B: Performed by: INTERNAL MEDICINE

## 2017-10-04 PROCEDURE — 74011250636 HC RX REV CODE- 250/636

## 2017-10-04 PROCEDURE — 65660000000 HC RM CCU STEPDOWN

## 2017-10-04 RX ORDER — NALOXONE HYDROCHLORIDE 0.4 MG/ML
0.4 INJECTION, SOLUTION INTRAMUSCULAR; INTRAVENOUS; SUBCUTANEOUS
Status: DISCONTINUED | OUTPATIENT
Start: 2017-10-04 | End: 2017-10-04 | Stop reason: HOSPADM

## 2017-10-04 RX ORDER — POTASSIUM CHLORIDE 750 MG/1
40 TABLET, FILM COATED, EXTENDED RELEASE ORAL ONCE
Status: COMPLETED | OUTPATIENT
Start: 2017-10-04 | End: 2017-10-04

## 2017-10-04 RX ORDER — MIDAZOLAM HYDROCHLORIDE 1 MG/ML
.25-1 INJECTION, SOLUTION INTRAMUSCULAR; INTRAVENOUS
Status: DISCONTINUED | OUTPATIENT
Start: 2017-10-04 | End: 2017-10-04 | Stop reason: HOSPADM

## 2017-10-04 RX ORDER — PROPOFOL 10 MG/ML
INJECTION, EMULSION INTRAVENOUS AS NEEDED
Status: DISCONTINUED | OUTPATIENT
Start: 2017-10-04 | End: 2017-10-04 | Stop reason: HOSPADM

## 2017-10-04 RX ORDER — ALBUTEROL SULFATE 0.83 MG/ML
2.5 SOLUTION RESPIRATORY (INHALATION)
Status: DISCONTINUED | OUTPATIENT
Start: 2017-10-04 | End: 2017-10-06 | Stop reason: HOSPADM

## 2017-10-04 RX ORDER — ATROPINE SULFATE 0.1 MG/ML
0.5 INJECTION INTRAVENOUS
Status: DISCONTINUED | OUTPATIENT
Start: 2017-10-04 | End: 2017-10-04 | Stop reason: HOSPADM

## 2017-10-04 RX ORDER — EPINEPHRINE 0.1 MG/ML
1 INJECTION INTRACARDIAC; INTRAVENOUS
Status: DISCONTINUED | OUTPATIENT
Start: 2017-10-04 | End: 2017-10-04 | Stop reason: HOSPADM

## 2017-10-04 RX ORDER — POTASSIUM CHLORIDE 7.45 MG/ML
10 INJECTION INTRAVENOUS
Status: DISPENSED | OUTPATIENT
Start: 2017-10-04 | End: 2017-10-04

## 2017-10-04 RX ORDER — FLUMAZENIL 0.1 MG/ML
0.2 INJECTION INTRAVENOUS
Status: DISCONTINUED | OUTPATIENT
Start: 2017-10-04 | End: 2017-10-04 | Stop reason: HOSPADM

## 2017-10-04 RX ORDER — SODIUM CHLORIDE 0.9 % (FLUSH) 0.9 %
5-10 SYRINGE (ML) INJECTION EVERY 8 HOURS
Status: ACTIVE | OUTPATIENT
Start: 2017-10-04 | End: 2017-10-04

## 2017-10-04 RX ORDER — SODIUM CHLORIDE 9 MG/ML
INJECTION, SOLUTION INTRAVENOUS
Status: DISCONTINUED | OUTPATIENT
Start: 2017-10-04 | End: 2017-10-04 | Stop reason: HOSPADM

## 2017-10-04 RX ORDER — SODIUM CHLORIDE 9 MG/ML
50 INJECTION, SOLUTION INTRAVENOUS CONTINUOUS
Status: DISPENSED | OUTPATIENT
Start: 2017-10-04 | End: 2017-10-04

## 2017-10-04 RX ORDER — DEXTROMETHORPHAN/PSEUDOEPHED 2.5-7.5/.8
1.2 DROPS ORAL
Status: DISCONTINUED | OUTPATIENT
Start: 2017-10-04 | End: 2017-10-04 | Stop reason: HOSPADM

## 2017-10-04 RX ORDER — SODIUM CHLORIDE 0.9 % (FLUSH) 0.9 %
5-10 SYRINGE (ML) INJECTION AS NEEDED
Status: ACTIVE | OUTPATIENT
Start: 2017-10-04 | End: 2017-10-04

## 2017-10-04 RX ORDER — FENTANYL CITRATE 50 UG/ML
200 INJECTION, SOLUTION INTRAMUSCULAR; INTRAVENOUS
Status: DISCONTINUED | OUTPATIENT
Start: 2017-10-04 | End: 2017-10-04 | Stop reason: HOSPADM

## 2017-10-04 RX ADMIN — POTASSIUM CHLORIDE 10 MEQ: 10 INJECTION, SOLUTION INTRAVENOUS at 10:38

## 2017-10-04 RX ADMIN — IPRATROPIUM BROMIDE 0.5 MG: 0.5 SOLUTION RESPIRATORY (INHALATION) at 08:45

## 2017-10-04 RX ADMIN — Medication 10 ML: at 21:12

## 2017-10-04 RX ADMIN — SODIUM CHLORIDE 100 ML/HR: 900 INJECTION, SOLUTION INTRAVENOUS at 03:15

## 2017-10-04 RX ADMIN — Medication 10 ML: at 07:32

## 2017-10-04 RX ADMIN — PROPOFOL 30 MG: 10 INJECTION, EMULSION INTRAVENOUS at 13:16

## 2017-10-04 RX ADMIN — PROPOFOL 40 MG: 10 INJECTION, EMULSION INTRAVENOUS at 13:22

## 2017-10-04 RX ADMIN — POTASSIUM CHLORIDE 40 MEQ: 750 TABLET, FILM COATED, EXTENDED RELEASE ORAL at 16:37

## 2017-10-04 RX ADMIN — IPRATROPIUM BROMIDE 0.5 MG: 0.5 SOLUTION RESPIRATORY (INHALATION) at 17:36

## 2017-10-04 RX ADMIN — IPRATROPIUM BROMIDE 0.5 MG: 0.5 SOLUTION RESPIRATORY (INHALATION) at 21:04

## 2017-10-04 RX ADMIN — DOCUSATE SODIUM AND SENNOSIDES 2 TABLET: 8.6; 5 TABLET, FILM COATED ORAL at 21:09

## 2017-10-04 RX ADMIN — ATORVASTATIN CALCIUM 40 MG: 20 TABLET, FILM COATED ORAL at 21:09

## 2017-10-04 RX ADMIN — PROPOFOL 30 MG: 10 INJECTION, EMULSION INTRAVENOUS at 13:13

## 2017-10-04 RX ADMIN — BISACODYL 10 MG: 10 SUPPOSITORY RECTAL at 08:35

## 2017-10-04 RX ADMIN — BUDESONIDE 500 MCG: 0.5 INHALANT RESPIRATORY (INHALATION) at 08:45

## 2017-10-04 RX ADMIN — ARFORMOTEROL TARTRATE 15 MCG: 15 SOLUTION RESPIRATORY (INHALATION) at 21:04

## 2017-10-04 RX ADMIN — PROPOFOL 30 MG: 10 INJECTION, EMULSION INTRAVENOUS at 13:11

## 2017-10-04 RX ADMIN — PROPOFOL 70 MG: 10 INJECTION, EMULSION INTRAVENOUS at 13:09

## 2017-10-04 RX ADMIN — PANTOPRAZOLE SODIUM 40 MG: 40 TABLET, DELAYED RELEASE ORAL at 07:31

## 2017-10-04 RX ADMIN — PROPOFOL 20 MG: 10 INJECTION, EMULSION INTRAVENOUS at 13:10

## 2017-10-04 RX ADMIN — SODIUM CHLORIDE: 9 INJECTION, SOLUTION INTRAVENOUS at 13:09

## 2017-10-04 RX ADMIN — BUDESONIDE 500 MCG: 0.5 INHALANT RESPIRATORY (INHALATION) at 21:04

## 2017-10-04 RX ADMIN — LEVETIRACETAM 500 MG: 500 TABLET, FILM COATED ORAL at 21:09

## 2017-10-04 RX ADMIN — POTASSIUM CHLORIDE 10 MEQ: 10 INJECTION, SOLUTION INTRAVENOUS at 08:35

## 2017-10-04 RX ADMIN — PROPOFOL 30 MG: 10 INJECTION, EMULSION INTRAVENOUS at 13:19

## 2017-10-04 RX ADMIN — LEVETIRACETAM 500 MG: 500 TABLET, FILM COATED ORAL at 08:35

## 2017-10-04 NOTE — ANESTHESIA PREPROCEDURE EVALUATION
Anesthetic History   No history of anesthetic complications            Review of Systems / Medical History  Patient summary reviewed, nursing notes reviewed and pertinent labs reviewed    Pulmonary    COPD      Smoker  Asthma        Neuro/Psych     seizures  CVA  TIA    Comments: Left sided weakness per pt.  Cardiovascular  Within defined limits                     GI/Hepatic/Renal  Within defined limits              Endo/Other        Anemia     Other Findings            Physical Exam    Airway  Mallampati: II  TM Distance: > 6 cm  Neck ROM: normal range of motion   Mouth opening: Normal     Cardiovascular  Regular rate and rhythm,  S1 and S2 normal,  no murmur, click, rub, or gallop             Dental    Dentition: Edentulous     Pulmonary  Breath sounds clear to auscultation               Abdominal  GI exam deferred       Other Findings            Anesthetic Plan    ASA: 3  Anesthesia type: MAC          Induction: Intravenous  Anesthetic plan and risks discussed with: Patient

## 2017-10-04 NOTE — PROGRESS NOTES
Problem: Falls - Risk of  Goal: *Absence of Falls  Document Vika Fall Risk and appropriate interventions in the flowsheet.    Outcome: Progressing Towards Goal  Fall Risk Interventions:  Mobility Interventions: Patient to call before getting OOB           Medication Interventions: Patient to call before getting OOB     Elimination Interventions: Patient to call for help with toileting needs

## 2017-10-04 NOTE — PERIOP NOTES
TRANSFER - IN REPORT:    Verbal report received from Lam Urenaliz Mendoza (name) on Charlyne Lesch  being received from 202(unit) for ordered procedure      Report consisted of patients Situation, Background, Assessment and   Recommendations(SBAR). Information from the following report(s) SBAR, OR Summary, MAR and Recent Results was reviewed with the receiving nurse. Opportunity for questions and clarification was provided. Assessment completed upon patients arrival to unit and care assumed.

## 2017-10-04 NOTE — ROUTINE PROCESS
Betsy Moreno  1945  [id number]    Situation:  Verbal report received from: Yvon Mi  Procedure: Procedure(s) with comments:  COLONOSCOPY - colon    Background:    Preoperative diagnosis: colon/ GI Bleed  Postoperative diagnosis: GI Bleed    :  Dr. Yannick Thompson  Assistant(s): Endoscopy Technician-1: Radha Dubois  Endoscopy RN-1: Francia Taylor RN  Endoscopy RN-2: Latrice Armendariz    Specimens: * No specimens in log *  H. Pylori  no    Assessment:  Intra-procedure medications   Anesthesia gave intra-procedure sedation and medications, see anesthesia flow sheet yes    Intravenous fluids: NS@ KVO     Vital signs stable     Abdominal assessment: round and semi-soft     Recommendation:  Return to floor.

## 2017-10-04 NOTE — PROGRESS NOTES
TRANSFER - OUT REPORT:    Verbal report given to Lidia(name) on Monty Smith  being transferred to endo(unit) for routine progression of care       Report consisted of patients Situation, Background, Assessment and   Recommendations(SBAR). Information from the following report(s) SBAR, Kardex and MAR was reviewed with the receiving nurse. Lines:   Venous Access Device portacath 09/30/17 Upper chest (subclavicular area, right (Active)   Central Line Being Utilized Yes 10/4/2017 12:00 AM   Criteria for Appropriate Use Limited/no vessel suitable for conventional peripheral access 10/4/2017 12:00 AM   Site Assessment Clean, dry, & intact 10/4/2017 12:00 AM   Date of Last Dressing Change 09/30/17 10/4/2017 12:00 AM   Dressing Status Clean, dry, & intact 10/4/2017 12:00 AM   Dressing Type Disk with Chlorhexadine gluconate (CHG); Transparent 10/4/2017 12:00 AM   Action Taken Open ports on tubing capped 10/4/2017 12:00 AM   Date Accessed (Medial Site) 09/30/17 10/1/2017  3:00 PM   Access Time (Medial Site) 2100 10/1/2017  3:00 PM   Access Needle Size (Site #1) 20 G 10/1/2017  3:00 PM   Access Needle Length (Medial Site) 1 inch 10/1/2017  3:00 PM   Positive Blood Return (Medial Site) Yes 10/4/2017 12:00 AM   Action Taken (Lateral Site) Flushed; Infusing 10/3/2017  4:15 PM   Alcohol Cap Used Yes 10/3/2017  8:37 PM        Opportunity for questions and clarification was provided.       Patient transported with:   O2 @ 2 liters

## 2017-10-04 NOTE — PROGRESS NOTES
Physical Therapy Note    Chart reviewed and discussed with RN. Patient currently TIMBO for colonoscopy. PT will follow up later today as able and appropriate.     Yanci Lower PT, DPT

## 2017-10-04 NOTE — PROGRESS NOTES
1350 TRANSFER - IN REPORT:    Verbal report received from josué(name) on Thad Pérez  being received from endo(unit) for routine progression of care      Report consisted of patients Situation, Background, Assessment and   Recommendations(SBAR). Information from the following report(s) SBAR, Kardex and MAR was reviewed with the receiving nurse. Opportunity for questions and clarification was provided. Assessment completed upon patients arrival to unit and care assumed.

## 2017-10-04 NOTE — PROGRESS NOTES
Bedside shift change report given to Zhen Burris RN (oncoming nurse) by Shi Camarillo RN (offgoing nurse). Report included the following information SBAR, Kardex and MAR.

## 2017-10-04 NOTE — PROGRESS NOTES
TRANSFER - OUT REPORT:    Verbal report given to Caio Jalloh RN on Jass Silva  being transferred to  for routine post - op       Report consisted of patients Situation, Background, Assessment and   Recommendations(SBAR). Information from the following report(s) Procedure Summary was reviewed with the receiving nurse. Lines:   Venous Access Device portacath 09/30/17 Upper chest (subclavicular area, right (Active)   Central Line Being Utilized Yes 10/4/2017 12:00 AM   Criteria for Appropriate Use Limited/no vessel suitable for conventional peripheral access 10/4/2017 12:00 AM   Site Assessment Clean, dry, & intact 10/4/2017 12:00 AM   Date of Last Dressing Change 09/30/17 10/4/2017 12:00 AM   Dressing Status Clean, dry, & intact 10/4/2017 12:00 AM   Dressing Type Disk with Chlorhexadine gluconate (CHG); Transparent 10/4/2017 12:00 AM   Action Taken Open ports on tubing capped 10/4/2017 12:00 AM   Date Accessed (Medial Site) 09/30/17 10/1/2017  3:00 PM   Access Time (Medial Site) 2100 10/1/2017  3:00 PM   Access Needle Size (Site #1) 20 G 10/1/2017  3:00 PM   Access Needle Length (Medial Site) 1 inch 10/1/2017  3:00 PM   Positive Blood Return (Medial Site) Yes 10/4/2017 12:00 AM   Action Taken (Lateral Site) Flushed; Infusing 10/3/2017  4:15 PM   Alcohol Cap Used Yes 10/3/2017  8:37 PM        Opportunity for questions and clarification was provided. Patient transported with:    On stretcher

## 2017-10-04 NOTE — PERIOP NOTES

## 2017-10-04 NOTE — PROGRESS NOTES
CM reviewed chart and attended [facility] rounds. Attempted to reach [place], [phone], liaison for [facility]. CM left  with request for return call.      Zach Quarles RN ACM CRM

## 2017-10-04 NOTE — ANESTHESIA POSTPROCEDURE EVALUATION
Post-Anesthesia Evaluation and Assessment    Patient: Sima Hodges MRN: [id number]  SSN: [id number]    YOB: 1945  Age: 71 y.o. Sex: male       Cardiovascular Function/Vital Signs  Visit Vitals    BP (!) 88/51    Pulse 88    Temp 36.6 °C (97.8 °F)    Resp 12    Ht 6' 2\" (1.88 m)    Wt 79.8 kg (176 lb)    SpO2 99%    BMI 22.6 kg/m2       Patient is status post MAC anesthesia for Procedure(s):  COLONOSCOPY. Nausea/Vomiting: None    Postoperative hydration reviewed and adequate. Pain:  Pain Scale 1: Numeric (0 - 10) (10/04/17 1219)  Pain Intensity 1: 0 (10/04/17 1219)   Managed    Neurological Status:   Neuro  Neurologic State: Alert (10/03/17 0930)  Orientation Level: Oriented X4 (10/03/17 0930)  Cognition: Appropriate decision making; Appropriate for age attention/concentration; Appropriate safety awareness; Follows commands (10/03/17 0930)   At baseline    Mental Status and Level of Consciousness: Arousable    Pulmonary Status:   O2 Device: Nasal cannula (10/04/17 1325)   Adequate oxygenation and airway patent    Complications related to anesthesia: None    Post-anesthesia assessment completed.  No concerns    Signed By: Lena Huggins MD     October 4, 2017

## 2017-10-04 NOTE — PROGRESS NOTES
Hospitalist Progress Note  Todd Cazares MD      Date of Service:  10/4/2017  NAME:  Rosey Hernandez  :  1945  MRN:  [id number]      Admission Summary:   72-yom presents to the ED from [facility] in which he had a 2-day history of nausea and dizziness that is related to postural positioning, and reportedly hypotension. This morning he had very dark stools with 1 vomiting episode. He had blood work drawn and he was told that his \"blood was too low. \" He reported intermittent suprapubic pain that is nonradiating, of which he is vague   in quality and intensity of the pain, and just states that it is a \"pain\" that is suprapubic and more to the right. Interval history / Subjective:   He has no complaints; denies abd pain, n/v/d, dyspnea. Doesn't seem to be fully prepped yet     Assessment & Plan:     Gi bleed, likely upper  -GI following, Egd with no source of bleed.  Colonoscopy pending  -Continue PPI  -Full liquid diet, NPO after midnight    Acute blood loss anemia  -secondary to the above  - hgb 5.3 on admission s/p 2 UPRBCs, hgb now stable  -monitor hgb, transfuse prn   -occult stool +    H/o CVA with left nondominant hemiplegia  -continue statin  -hold ASA d/t gi bleed  -Likely repeat MRI in 2-3 months of stroke and outpatient follow up with Neurology    COPD/Asthma with Chronic Hypoxic Respiratory Failure  -not in acute exacerbation  -continue home o2 at 2L  -continue home nebs    Type 2 DM, new diagnosed  -hgb a1c 6.5  -encourage diet changes, will need repeat a1c in 3 months with pcp  -monitor accuchecks    Probable Seizures   -continue Keppra   -scheduled to follow up with Dr Khushboo Molina in 2-3 months to determine if pt needs to continue on Keppra    Vtach: had a 5-beat run on tele without recurrence  -potassium 3.4; replete    FULL Liquids    Code status: Full  DVT prophylaxis: SCDs  PTA: [facility] Plan discussed with: Patient/Family, Nurse and   Disposition: [facility] vs home with home health pending colonoscopy results     Hospital Problems  Date Reviewed: 9/30/2017          Codes Class Noted POA    * (Principal)GI bleed ICD-10-CM: K92.2  ICD-9-CM: 578.9  9/29/2017 Yes                Review of Systems:   Pertinent items are noted in HPI. Vital Signs:    Last 24hrs VS reviewed since prior progress note. Most recent are:  Visit Vitals    /72 (BP 1 Location: Left arm, BP Patient Position: At rest)    Pulse 83    Temp 97.9 °F (36.6 °C)    Resp 16    Ht 6' 2\" (1.88 m)    Wt 79.8 kg (176 lb)    SpO2 96%    BMI 22.6 kg/m2         Intake/Output Summary (Last 24 hours) at 10/04/17 1115  Last data filed at 10/04/17 0500   Gross per 24 hour   Intake             2960 ml   Output             1100 ml   Net             1860 ml        Physical Examination:             Constitutional:  No acute distress, cooperative, pleasant    ENT:  Oral mucousa moist, oropharynx benign. Neck supple,    Resp:  CTA bilaterally. No wheezing/rhonchi/rales. No accessory muscle use. O2 2L via nasal cannula   CV:  Regular rhythm, normal rate, no murmurs, gallops, rubs    GI:  Soft, non distended, non tender. normoactive bowel sounds     Musculoskeletal:  No edema, warm    Neurologic:  Moves all extremities. CN grossly intact, normal speech     Psych:  Good insight, Not anxious nor agitated.        Data Review:    Review and/or order of clinical lab test  Review and/or order of tests in the radiology section of CPT  Review and/or order of tests in the medicine section of CPT      Labs:     Recent Labs      10/04/17   0531  10/03/17   0507   WBC  6.1  5.9   HGB  7.9*  8.0*   HCT  25.2*  24.9*   PLT  252  234     Recent Labs      10/04/17   0531  10/03/17   0507   NA  141  140   K  3.4*  3.5   CL  104  102   CO2  30  33*   BUN  4*  4*   CREA  0.55*  0.53*   GLU  93  103*   CA  8.3*  8.5   MG   --   2.1     No results for input(s): SGOT, GPT, ALT, AP, TBIL, TBILI, TP, ALB, GLOB, GGT, AML, LPSE in the last 72 hours. No lab exists for component: AMYP, HLPSE  No results for input(s): INR, PTP, APTT in the last 72 hours. No lab exists for component: INREXT, INREXT   No results for input(s): FE, TIBC, PSAT, FERR in the last 72 hours. No results found for: FOL, RBCF   No results for input(s): PH, PCO2, PO2 in the last 72 hours. No results for input(s): CPK, CKNDX, TROIQ in the last 72 hours.     No lab exists for component: CPKMB  Lab Results   Component Value Date/Time    Cholesterol, total 187 09/11/2017 05:53 AM    HDL Cholesterol 63 09/11/2017 05:53 AM    LDL, calculated 109 09/11/2017 05:53 AM    Triglyceride 75 09/11/2017 05:53 AM    CHOL/HDL Ratio 3.0 09/11/2017 05:53 AM     Lab Results   Component Value Date/Time    Glucose (POC) 85 10/04/2017 11:07 AM    Glucose (POC) 92 10/04/2017 06:40 AM    Glucose (POC) 103 10/03/2017 09:49 PM    Glucose (POC) 85 10/03/2017 04:31 PM    Glucose (POC) 103 10/03/2017 11:20 AM     Lab Results   Component Value Date/Time    Color YELLOW/STRAW 09/10/2017 10:09 PM    Appearance CLEAR 09/10/2017 10:09 PM    Specific gravity 1.010 09/10/2017 10:09 PM    pH (UA) 5.0 09/10/2017 10:09 PM    Protein NEGATIVE  09/10/2017 10:09 PM    Glucose NEGATIVE  09/10/2017 10:09 PM    Ketone NEGATIVE  09/10/2017 10:09 PM    Bilirubin NEGATIVE  09/10/2017 10:09 PM    Urobilinogen 0.2 09/10/2017 10:09 PM    Nitrites NEGATIVE  09/10/2017 10:09 PM    Leukocyte Esterase NEGATIVE  09/10/2017 10:09 PM    Epithelial cells MODERATE 09/10/2017 10:09 PM    Bacteria NEGATIVE  09/10/2017 10:09 PM    WBC 5-10 09/10/2017 10:09 PM    RBC 0-5 09/10/2017 10:09 PM         Medications Reviewed:     Current Facility-Administered Medications   Medication Dose Route Frequency    potassium chloride 10 mEq in 100 ml IVPB  10 mEq IntraVENous Q1H    pantoprazole (PROTONIX) tablet 40 mg  40 mg Oral ACB&D    0.9% sodium chloride infusion 250 mL  250 mL IntraVENous PRN    acetaminophen (TYLENOL) tablet 650 mg  650 mg Oral Q4H PRN    atorvastatin (LIPITOR) tablet 40 mg  40 mg Oral QHS    bisacodyl (DULCOLAX) suppository 10 mg  10 mg Rectal DAILY    HYDROcodone-acetaminophen (NORCO) 5-325 mg per tablet 1 Tab  1 Tab Oral Q4H PRN    levETIRAcetam (KEPPRA) tablet 500 mg  500 mg Oral BID    senna-docusate (PERICOLACE) 8.6-50 mg per tablet 2 Tab  2 Tab Oral QHS    sodium chloride (NS) flush 5-10 mL  5-10 mL IntraVENous Q8H    sodium chloride (NS) flush 5-10 mL  5-10 mL IntraVENous PRN    ondansetron (ZOFRAN) injection 4 mg  4 mg IntraVENous Q4H PRN    arformoterol (BROVANA) neb solution 15 mcg  15 mcg Nebulization BID RT    And    budesonide (PULMICORT) 500 mcg/2 ml nebulizer suspension  500 mcg Nebulization BID RT    ipratropium (ATROVENT) 0.02 % nebulizer solution 0.5 mg  0.5 mg Nebulization Q6H RT    0.9% sodium chloride infusion  100 mL/hr IntraVENous CONTINUOUS     ______________________________________________________________________  EXPECTED LENGTH OF STAY: 3d 4h  ACTUAL LENGTH OF STAY:          5                 Teja Grant MD

## 2017-10-04 NOTE — PROGRESS NOTES
Bedside shift change report given to Marcus Pitts May (oncoming nurse) by Jatin Gar (offgoing nurse). Report included the following information SBAR, Kardex and MAR.

## 2017-10-04 NOTE — ROUTINE PROCESS
Bedside shift change report given to russell mancera rn (oncoming nurse) by Marielle Del Rio (offgoing nurse). Report included the following information SBAR and Kardex.

## 2017-10-04 NOTE — PROCEDURES
Dr Denise Liu [address]. [address] [facility]  [phone]      Neelima Slipper  [id number]  1945    Colonoscopy Operative Report    Procedure Type:   Colonoscopy --diagnostic     Indications:  GI Bleeding     Pre-operative Diagnosis: see indication above    Post-operative Diagnosis:  See findings below    :  Nicole Tompkins MD    Referring Provider: Mikhail Salazar MD      Sedation:  MAC anesthesia Propofol    Procedure Details:  After informed consent was obtained with all risks and benefits of procedure explained and preoperative exam completed, the patient was taken to the endoscopy suite and placed in the left lateral decubitus position. Upon sequential sedation as per above, a digital rectal exam was performed demonstrating no hemorrhoids. The Olympus videocolonoscope  was inserted in the rectum and carefully advanced to the cecum, which was identified by the ileocecal valve. The cecum was identified by the ileocecal valve and appendiceal orifice. The quality of preparation was good. The colonoscope was slowly withdrawn with careful evaluation between folds. Retroflexion in the rectum was completed demonstrating no hemorrhoids. Findings:   Rectum: normal  Sigmoid: normal  Descending Colon: normal  Transverse Colon: normal  Ascending Colon: normal  Cecum: normal  Terminal Ileum: not seen      Specimen Removed:  none    Complications: None. EBL:  None. Impression:    normal colonic mucosa throughout    Recommendations: --Follow up with me. Regular diet. Resume normal medication(s). Discharge Disposition:  Home in the company of a  when able to ambulate.

## 2017-10-05 LAB
ERYTHROCYTE [DISTWIDTH] IN BLOOD BY AUTOMATED COUNT: 18.9 % (ref 11.5–14.5)
GLUCOSE BLD STRIP.AUTO-MCNC: 109 MG/DL (ref 65–100)
GLUCOSE BLD STRIP.AUTO-MCNC: 115 MG/DL (ref 65–100)
GLUCOSE BLD STRIP.AUTO-MCNC: 124 MG/DL (ref 65–100)
GLUCOSE BLD STRIP.AUTO-MCNC: 126 MG/DL (ref 65–100)
HCT VFR BLD AUTO: 24.4 % (ref 36.6–50.3)
HGB BLD-MCNC: 7.7 G/DL (ref 12.1–17)
MCH RBC QN AUTO: 27 PG (ref 26–34)
MCHC RBC AUTO-ENTMCNC: 31.6 G/DL (ref 30–36.5)
MCV RBC AUTO: 85.6 FL (ref 80–99)
NRBC # BLD: 0.15 K/UL (ref 0–0.01)
NRBC BLD-RTO: 2.7 PER 100 WBC
PLATELET # BLD AUTO: 271 K/UL (ref 150–400)
RBC # BLD AUTO: 2.85 M/UL (ref 4.1–5.7)
SERVICE CMNT-IMP: ABNORMAL
WBC # BLD AUTO: 5.5 K/UL (ref 4.1–11.1)
WBC NRBC COR # BLD: ABNORMAL 10*3/UL

## 2017-10-05 PROCEDURE — 97530 THERAPEUTIC ACTIVITIES: CPT

## 2017-10-05 PROCEDURE — 74011250637 HC RX REV CODE- 250/637: Performed by: INTERNAL MEDICINE

## 2017-10-05 PROCEDURE — 65270000029 HC RM PRIVATE

## 2017-10-05 PROCEDURE — 74011000250 HC RX REV CODE- 250: Performed by: INTERNAL MEDICINE

## 2017-10-05 PROCEDURE — 85027 COMPLETE CBC AUTOMATED: CPT | Performed by: HOSPITALIST

## 2017-10-05 PROCEDURE — 74011250637 HC RX REV CODE- 250/637: Performed by: HOSPITALIST

## 2017-10-05 PROCEDURE — 77010033678 HC OXYGEN DAILY

## 2017-10-05 PROCEDURE — 82962 GLUCOSE BLOOD TEST: CPT

## 2017-10-05 PROCEDURE — 36415 COLL VENOUS BLD VENIPUNCTURE: CPT | Performed by: HOSPITALIST

## 2017-10-05 PROCEDURE — 94640 AIRWAY INHALATION TREATMENT: CPT

## 2017-10-05 PROCEDURE — 97116 GAIT TRAINING THERAPY: CPT

## 2017-10-05 RX ORDER — LANOLIN ALCOHOL/MO/W.PET/CERES
1 CREAM (GRAM) TOPICAL 2 TIMES DAILY WITH MEALS
Status: DISCONTINUED | OUTPATIENT
Start: 2017-10-05 | End: 2017-10-06 | Stop reason: HOSPADM

## 2017-10-05 RX ADMIN — BISACODYL 10 MG: 10 SUPPOSITORY RECTAL at 09:28

## 2017-10-05 RX ADMIN — ATORVASTATIN CALCIUM 40 MG: 20 TABLET, FILM COATED ORAL at 21:28

## 2017-10-05 RX ADMIN — LEVETIRACETAM 500 MG: 500 TABLET, FILM COATED ORAL at 09:28

## 2017-10-05 RX ADMIN — DOCUSATE SODIUM AND SENNOSIDES 2 TABLET: 8.6; 5 TABLET, FILM COATED ORAL at 21:28

## 2017-10-05 RX ADMIN — BUDESONIDE 500 MCG: 0.5 INHALANT RESPIRATORY (INHALATION) at 07:44

## 2017-10-05 RX ADMIN — LEVETIRACETAM 500 MG: 500 TABLET, FILM COATED ORAL at 21:29

## 2017-10-05 RX ADMIN — ARFORMOTEROL TARTRATE 15 MCG: 15 SOLUTION RESPIRATORY (INHALATION) at 21:16

## 2017-10-05 RX ADMIN — PANTOPRAZOLE SODIUM 40 MG: 40 TABLET, DELAYED RELEASE ORAL at 17:57

## 2017-10-05 RX ADMIN — IPRATROPIUM BROMIDE 0.5 MG: 0.5 SOLUTION RESPIRATORY (INHALATION) at 07:43

## 2017-10-05 RX ADMIN — BUDESONIDE 500 MCG: 0.5 INHALANT RESPIRATORY (INHALATION) at 21:16

## 2017-10-05 RX ADMIN — Medication 10 ML: at 15:23

## 2017-10-05 RX ADMIN — ARFORMOTEROL TARTRATE 15 MCG: 15 SOLUTION RESPIRATORY (INHALATION) at 07:44

## 2017-10-05 RX ADMIN — Medication 10 ML: at 21:29

## 2017-10-05 RX ADMIN — PANTOPRAZOLE SODIUM 40 MG: 40 TABLET, DELAYED RELEASE ORAL at 07:22

## 2017-10-05 RX ADMIN — Medication 10 ML: at 07:59

## 2017-10-05 RX ADMIN — IPRATROPIUM BROMIDE 0.5 MG: 0.5 SOLUTION RESPIRATORY (INHALATION) at 21:16

## 2017-10-05 RX ADMIN — IPRATROPIUM BROMIDE 0.5 MG: 0.5 SOLUTION RESPIRATORY (INHALATION) at 14:00

## 2017-10-05 RX ADMIN — FERROUS SULFATE TAB 325 MG (65 MG ELEMENTAL FE) 325 MG: 325 (65 FE) TAB at 17:57

## 2017-10-05 NOTE — DIABETES MGMT
DTC Progress Note    Recommendations/ Comments: Chart reviewed d/t hypoglycemic event yesterday afternoon, likely d/t NPO from colonoscopy earlier that morning. Chart reviewed on Cara Gomez. Patient is a 71 y.o. male with newly diagnosed with DM2. A1c:   Lab Results   Component Value Date/Time    Hemoglobin A1c 6.5 09/11/2017 05:53 AM       Recent Glucose Results: Lab Results   Component Value Date/Time    GLUCPOC 109 (H) 10/05/2017 06:54 AM    GLUCPOC 146 (H) 10/04/2017 09:39 PM    GLUCPOC 86 10/04/2017 04:57 PM        Lab Results   Component Value Date/Time    Creatinine 0.55 10/04/2017 05:31 AM     Estimated Creatinine Clearance: 139 mL/min (based on Cr of 0.55). Active Orders   Diet    DIET REGULAR        PO intake: Patient Vitals for the past 72 hrs:   % Diet Eaten   10/05/17 0751 100 %   10/03/17 1943 100 %   10/03/17 1700 100 %   10/03/17 1151 50 %   10/02/17 1351 100 %       Current hospital DM medication: Metformin 500 mg at dinner    Will continue to follow as needed. Thank you  Elsie Cortez RD, CDE

## 2017-10-05 NOTE — PROGRESS NOTES
Hospitalist Progress Note  Cher Mills MD      Date of Service:  10/5/2017  NAME:  Cara Gomez  :  1945  MRN:  [id number]      Admission Summary:   72-yom presents to the ED from [facility] in which he had a 2-day history of nausea and dizziness that is related to postural positioning, and reportedly hypotension. This morning he had very dark stools with 1 vomiting episode. He had blood work drawn and he was told that his \"blood was too low. \" He reported intermittent suprapubic pain that is nonradiating, of which he is vague   in quality and intensity of the pain, and just states that it is a \"pain\" that is suprapubic and more to the right. Interval history / Subjective:   He has no complaints; denies abd pain, n/v/d, dyspnea. No bleeding. Assessment & Plan:     Gi bleed, likely upper  -GI following, Egd with no source of bleed.  Colonoscopy unremarkable.  -continue PO PPI    Acute blood loss anemia  -secondary to the above  - hgb 5.3 on admission s/p 2 UPRBCs, hgb now stable  -monitor hgb, now stable   -occult stool +    H/o CVA with left nondominant hemiplegia  -continue statin  -hold ASA d/t gi bleed  -Likely repeat MRI in 2-3 months of stroke and outpatient follow up with Neurology    COPD/Asthma with Chronic Hypoxic Respiratory Failure  -not in acute exacerbation  -continue home o2 at 2L  -continue home nebs    Type 2 DM, new diagnosed  -hgb a1c 6.5  -encourage diet changes, will need repeat a1c in 3 months with pcp  -monitor accuchecks    Probable Seizures   -continue Keppra   -scheduled to follow up with Dr Wesley Veliz in 2-3 months to determine if pt needs to continue on Keppra    Vtach: had a 5-beat run on tele without recurrence    Hypokalemia: resolved; monitor    FULL Liquids    Code status: Full  DVT prophylaxis: SCDs  PTA: Akua Pearson 81 discussed with: Patient/Family, Nurse and   Disposition: medically stable for [facility] tomorrow (no bed available)     Hospital Problems  Date Reviewed: 9/30/2017          Codes Class Noted POA    * (Principal)GI bleed ICD-10-CM: K92.2  ICD-9-CM: 578.9  9/29/2017 Yes                Review of Systems:   Pertinent items are noted in HPI. Vital Signs:    Last 24hrs VS reviewed since prior progress note. Most recent are:  Visit Vitals    /75 (BP 1 Location: Right arm, BP Patient Position: At rest)    Pulse 90    Temp 98.1 °F (36.7 °C)    Resp 18    Ht 6' 2\" (1.88 m)    Wt 79.8 kg (176 lb)    SpO2 96%    BMI 22.6 kg/m2         Intake/Output Summary (Last 24 hours) at 10/05/17 1517  Last data filed at 10/05/17 1423   Gross per 24 hour   Intake              960 ml   Output              401 ml   Net              559 ml        Physical Examination:             Constitutional:  No acute distress, cooperative, pleasant    ENT:  Oral mucousa moist, oropharynx benign. Neck supple,    Resp:  CTA bilaterally. No wheezing/rhonchi/rales. No accessory muscle use. O2 2L via nasal cannula   CV:  Regular rhythm, normal rate, no murmurs, gallops, rubs    GI:  Soft, non distended, non tender. normoactive bowel sounds     Musculoskeletal:  No edema, warm    Neurologic:  Moves all extremities. CN grossly intact, normal speech     Psych:  Good insight, Not anxious nor agitated.        Data Review:    Review and/or order of clinical lab test  Review and/or order of tests in the radiology section of CPT  Review and/or order of tests in the medicine section of CPT      Labs:     Recent Labs      10/05/17   0428  10/04/17   0531   WBC  5.5  6.1   HGB  7.7*  7.9*   HCT  24.4*  25.2*   PLT  271  252     Recent Labs      10/04/17   0531  10/03/17   0507   NA  141  140   K  3.4*  3.5   CL  104  102   CO2  30  33*   BUN  4*  4*   CREA  0.55*  0.53*   GLU  93  103*   CA  8.3*  8.5   MG   --   2.1     No results for input(s): SGOT, GPT, ALT, AP, TBIL, TBILI, TP, ALB, GLOB, GGT, AML, LPSE in the last 72 hours. No lab exists for component: AMYP, HLPSE  No results for input(s): INR, PTP, APTT in the last 72 hours. No lab exists for component: INREXT, INREXT   No results for input(s): FE, TIBC, PSAT, FERR in the last 72 hours. No results found for: FOL, RBCF   No results for input(s): PH, PCO2, PO2 in the last 72 hours. No results for input(s): CPK, CKNDX, TROIQ in the last 72 hours.     No lab exists for component: CPKMB  Lab Results   Component Value Date/Time    Cholesterol, total 187 09/11/2017 05:53 AM    HDL Cholesterol 63 09/11/2017 05:53 AM    LDL, calculated 109 09/11/2017 05:53 AM    Triglyceride 75 09/11/2017 05:53 AM    CHOL/HDL Ratio 3.0 09/11/2017 05:53 AM     Lab Results   Component Value Date/Time    Glucose (POC) 115 10/05/2017 11:46 AM    Glucose (POC) 109 10/05/2017 06:54 AM    Glucose (POC) 146 10/04/2017 09:39 PM    Glucose (POC) 86 10/04/2017 04:57 PM    Glucose (POC) 78 10/04/2017 04:22 PM     Lab Results   Component Value Date/Time    Color YELLOW/STRAW 09/10/2017 10:09 PM    Appearance CLEAR 09/10/2017 10:09 PM    Specific gravity 1.010 09/10/2017 10:09 PM    pH (UA) 5.0 09/10/2017 10:09 PM    Protein NEGATIVE  09/10/2017 10:09 PM    Glucose NEGATIVE  09/10/2017 10:09 PM    Ketone NEGATIVE  09/10/2017 10:09 PM    Bilirubin NEGATIVE  09/10/2017 10:09 PM    Urobilinogen 0.2 09/10/2017 10:09 PM    Nitrites NEGATIVE  09/10/2017 10:09 PM    Leukocyte Esterase NEGATIVE  09/10/2017 10:09 PM    Epithelial cells MODERATE 09/10/2017 10:09 PM    Bacteria NEGATIVE  09/10/2017 10:09 PM    WBC 5-10 09/10/2017 10:09 PM    RBC 0-5 09/10/2017 10:09 PM         Medications Reviewed:     Current Facility-Administered Medications   Medication Dose Route Frequency    albuterol (PROVENTIL VENTOLIN) nebulizer solution 2.5 mg  2.5 mg Nebulization Q4H PRN    pantoprazole (PROTONIX) tablet 40 mg  40 mg Oral ACB&D    0.9% sodium chloride infusion 250 mL  250 mL IntraVENous PRN    acetaminophen (TYLENOL) tablet 650 mg  650 mg Oral Q4H PRN    atorvastatin (LIPITOR) tablet 40 mg  40 mg Oral QHS    bisacodyl (DULCOLAX) suppository 10 mg  10 mg Rectal DAILY    HYDROcodone-acetaminophen (NORCO) 5-325 mg per tablet 1 Tab  1 Tab Oral Q4H PRN    levETIRAcetam (KEPPRA) tablet 500 mg  500 mg Oral BID    senna-docusate (PERICOLACE) 8.6-50 mg per tablet 2 Tab  2 Tab Oral QHS    sodium chloride (NS) flush 5-10 mL  5-10 mL IntraVENous Q8H    sodium chloride (NS) flush 5-10 mL  5-10 mL IntraVENous PRN    ondansetron (ZOFRAN) injection 4 mg  4 mg IntraVENous Q4H PRN    arformoterol (BROVANA) neb solution 15 mcg  15 mcg Nebulization BID RT    And    budesonide (PULMICORT) 500 mcg/2 ml nebulizer suspension  500 mcg Nebulization BID RT    ipratropium (ATROVENT) 0.02 % nebulizer solution 0.5 mg  0.5 mg Nebulization Q6H RT    0.9% sodium chloride infusion  75 mL/hr IntraVENous CONTINUOUS     ______________________________________________________________________  EXPECTED LENGTH OF STAY: 3d 4h  ACTUAL LENGTH OF STAY:          6                 Douglas Lees MD

## 2017-10-05 NOTE — PROGRESS NOTES
Bedside and Verbal shift change report given to Leif Lovelace RN (oncoming nurse) by Macho Hook RN (offgoing nurse). Report included the following information SBAR, Kardex, Procedure Summary, Intake/Output, MAR and Recent Results.

## 2017-10-05 NOTE — PROGRESS NOTES
Problem: Mobility Impaired (Adult and Pediatric)  Goal: *Acute Goals and Plan of Care (Insert Text)  Physical Therapy Goals  Initiated 10/2/2017  1. Patient will move from supine to sit and sit to supine , scoot up and down and roll side to side in bed with modified independence within 7 day(s). 2. Patient will transfer from bed to chair and chair to bed with modified independence using the least restrictive device within 7 day(s). 3. Patient will perform sit to stand with modified independence within 7 day(s). 4. Patient will ambulate with modified independence for 150 feet with the least restrictive device and O2 sats >90% throughout on 2L within 7 day(s). 5. Patient will ascend/descend 12 stairs with both handrail(s) with modified independence within 7 day(s). PHYSICAL THERAPY TREATMENT  Patient: Rosey Hernandez (53 y.o. male)  Date: 10/5/2017  Diagnosis: GI bleed  GI Bleed  colon GI bleed  Procedure(s) (LRB):  COLONOSCOPY (Left) 1 Day Post-Op  Precautions: Fall      ASSESSMENT:  Chart reviewed and RN cleared patient for mobility. Patient was agreeable to participate and was received in chair on 2L O2. PT discussed discharge planning with patient for extensive portion of session due to patient's current level of function. Patient was willing to attempt stairs this session to determine if he would need a short stay in rehab prior to going home. Patient ambulated 120 feet in RW on portable O2 and required 2 short rest breaks prior to reaching stair well. Patient became anxious and O2 sats dropped from 91% to ~84% (inconsistent readings as patient's hands were cold) but patient also reported increase in RPE from 0/10 at rest to 5/10 once in stair well. PT and patient agreed that patient was not ready to attempt stairs and patient returned to room.  PT recommending rehab as patient is motivated, can progress to tolerating 3 hours of therapy, and is below functional baseline of ambulating household distances and ascending/descending a flight of stairs with no difficulty. All needs were placed within reach and RN notified of patient's status as well as agreement with PT's recommendation. Progression toward goals:  [X]    Improving appropriately and progressing toward goals  [ ]    Improving slowly and progressing toward goals  [ ]    Not making progress toward goals and plan of care will be adjusted       PLAN:  Patient continues to benefit from skilled intervention to address the above impairments. Continue treatment per established plan of care. Discharge Recommendations:  Inpatient Rehab  Further Equipment Recommendations for Discharge:  None anticipated       SUBJECTIVE:   Patient stated I know you have my best interest in mind.       OBJECTIVE DATA SUMMARY:   Critical Behavior:  Neurologic State: Alert  Orientation Level: Oriented X4  Cognition: Appropriate decision making, Appropriate safety awareness, Follows commands  Safety/Judgement: Awareness of environment  Functional Mobility Training:  Bed Mobility:                    Transfers:  Sit to Stand: Modified independent  Stand to Sit: Modified independent  Stand Pivot Transfers: Modified independent                          Balance:  Sitting: Intact  Standing: Intact; With support  Standing - Static: Constant support;Good  Standing - Dynamic : Fair  Ambulation/Gait Training:  Distance (ft): 120 Feet (ft)  Assistive Device: Gait belt;Walker, rolling (portable O2)  Ambulation - Level of Assistance: Supervision;Stand-by asssistance        Gait Abnormalities: Decreased step clearance;Trunk sway increased; Path deviations              Speed/Sylvia: Pace decreased (<100 feet/min)                                  Stairs:                       Neuro Re-Education:     Therapeutic Exercises:      Pain:                    Activity Tolerance:   Good, patient ambulated greater distances than previous sessions, deferred stairs due to mild shortness of breath, increased RPE, and low (but inconsistent) O2 sats      Please refer to the flowsheet for vital signs taken during this treatment.   After treatment:   [X]    Patient left in no apparent distress sitting up in chair  [ ]    Patient left in no apparent distress in bed  [X]    Call bell left within reach  [X]    Nursing notified  [ ]    Caregiver present  [ ]    Bed alarm activated      COMMUNICATION/COLLABORATION:   The patients plan of care was discussed with: Registered Nurse     Viri Ceballos PT, DPT   Time Calculation: 34 mins

## 2017-10-05 NOTE — PROGRESS NOTES
Problem: Falls - Risk of  Goal: *Absence of Falls  Document Vika Fall Risk and appropriate interventions in the flowsheet.    Outcome: Progressing Towards Goal  Fall Risk Interventions:  Mobility Interventions: Patient to call before getting OOB           Medication Interventions: Patient to call before getting OOB     Elimination Interventions: Call light in reach

## 2017-10-06 VITALS
WEIGHT: 176 LBS | DIASTOLIC BLOOD PRESSURE: 68 MMHG | RESPIRATION RATE: 16 BRPM | SYSTOLIC BLOOD PRESSURE: 119 MMHG | BODY MASS INDEX: 22.59 KG/M2 | HEART RATE: 89 BPM | TEMPERATURE: 98.8 F | HEIGHT: 74 IN | OXYGEN SATURATION: 97 %

## 2017-10-06 PROBLEM — K92.2 GI BLEED: Status: RESOLVED | Noted: 2017-09-29 | Resolved: 2017-10-06

## 2017-10-06 LAB
GLUCOSE BLD STRIP.AUTO-MCNC: 117 MG/DL (ref 65–100)
GLUCOSE BLD STRIP.AUTO-MCNC: 99 MG/DL (ref 65–100)
SERVICE CMNT-IMP: ABNORMAL
SERVICE CMNT-IMP: NORMAL

## 2017-10-06 PROCEDURE — 77010033678 HC OXYGEN DAILY

## 2017-10-06 PROCEDURE — 74011250637 HC RX REV CODE- 250/637: Performed by: INTERNAL MEDICINE

## 2017-10-06 PROCEDURE — 74011250637 HC RX REV CODE- 250/637: Performed by: HOSPITALIST

## 2017-10-06 PROCEDURE — 74011250636 HC RX REV CODE- 250/636: Performed by: HOSPITALIST

## 2017-10-06 PROCEDURE — 74011000250 HC RX REV CODE- 250: Performed by: INTERNAL MEDICINE

## 2017-10-06 PROCEDURE — 82962 GLUCOSE BLOOD TEST: CPT

## 2017-10-06 PROCEDURE — 94640 AIRWAY INHALATION TREATMENT: CPT

## 2017-10-06 RX ORDER — IPRATROPIUM BROMIDE AND ALBUTEROL SULFATE 2.5; .5 MG/3ML; MG/3ML
3 SOLUTION RESPIRATORY (INHALATION)
Qty: 30 NEBULE | Refills: 0 | Status: SHIPPED
Start: 2017-10-06 | End: 2020-01-01

## 2017-10-06 RX ORDER — HEPARIN 100 UNIT/ML
500 SYRINGE INTRAVENOUS AS NEEDED
Status: DISCONTINUED | OUTPATIENT
Start: 2017-10-06 | End: 2017-10-06 | Stop reason: HOSPADM

## 2017-10-06 RX ORDER — PANTOPRAZOLE SODIUM 40 MG/1
40 TABLET, DELAYED RELEASE ORAL
Qty: 60 TAB | Refills: 0 | Status: SHIPPED
Start: 2017-10-06 | End: 2020-01-01 | Stop reason: DRUGHIGH

## 2017-10-06 RX ORDER — LANOLIN ALCOHOL/MO/W.PET/CERES
325 CREAM (GRAM) TOPICAL 2 TIMES DAILY WITH MEALS
Qty: 60 TAB | Refills: 0 | Status: SHIPPED
Start: 2017-10-06 | End: 2020-01-01

## 2017-10-06 RX ADMIN — ARFORMOTEROL TARTRATE 15 MCG: 15 SOLUTION RESPIRATORY (INHALATION) at 09:22

## 2017-10-06 RX ADMIN — Medication 10 ML: at 06:45

## 2017-10-06 RX ADMIN — SODIUM CHLORIDE, PRESERVATIVE FREE 500 UNITS: 5 INJECTION INTRAVENOUS at 14:26

## 2017-10-06 RX ADMIN — BISACODYL 10 MG: 10 SUPPOSITORY RECTAL at 09:08

## 2017-10-06 RX ADMIN — LEVETIRACETAM 500 MG: 500 TABLET, FILM COATED ORAL at 09:08

## 2017-10-06 RX ADMIN — IPRATROPIUM BROMIDE 0.5 MG: 0.5 SOLUTION RESPIRATORY (INHALATION) at 14:41

## 2017-10-06 RX ADMIN — FERROUS SULFATE TAB 325 MG (65 MG ELEMENTAL FE) 325 MG: 325 (65 FE) TAB at 07:00

## 2017-10-06 RX ADMIN — IPRATROPIUM BROMIDE 0.5 MG: 0.5 SOLUTION RESPIRATORY (INHALATION) at 09:22

## 2017-10-06 RX ADMIN — BUDESONIDE 500 MCG: 0.5 INHALANT RESPIRATORY (INHALATION) at 09:22

## 2017-10-06 RX ADMIN — PANTOPRAZOLE SODIUM 40 MG: 40 TABLET, DELAYED RELEASE ORAL at 06:45

## 2017-10-06 NOTE — DISCHARGE INSTRUCTIONS
Discharge SNF/Rehab Instructions/LTAC       PATIENT ID: Nereida Saunders  MRN: [id number]   YOB: 1945    DATE OF ADMISSION: 9/29/2017 12:47 PM    DATE OF DISCHARGE: 10/6/2017    PRIMARY CARE PROVIDER: Ita Aguirre MD       ATTENDING PHYSICIAN: Wojciech Humphrey MD  DISCHARGING PROVIDER: Wojciech Humphrey MD     To contact this individual call [phone] and ask the  to page. If unavailable ask to be transferred the Adult Hospitalist Department. CONSULTATIONS: IP CONSULT TO HOSPITALIST  IP CONSULT TO GASTROENTEROLOGY    PROCEDURES/SURGERIES: Procedure(s) with comments:  COLONOSCOPY - colon    ADMITTING DIAGNOSES & HOSPITAL COURSE:   76-y.o man who presented with melena and orthostatic hypotension. He was found to be anemic and was admitted for GI bleed. Work-up with endoscopy and colonoscopy was largely unrevealing. He was started on a BID PPI empirically. His hemoglobin remained stable prior to discharge. GI bleed  -EGD and colonoscopy both without a source of bleeding  -started on a BID PPI.  This should be decreased to once daily if there is no recurrence of bleeding.  -aspirin stopped temporarily    Acute blood loss anemia  -9/29 hgb 5.3 on admission s/p 2 UPRBCs, hgb now in the 7's  -started on PO ferrous sulfate    H/o CVA with left nondominant hemiplegia  -continue statin  -aspirin held; consider restarting this in the near future     COPD with chronic hypoxic respiratory failure  -on 2L O2 at home     Type 2 DM - new diagnosis  -hgb a1c 6.5; this should be re-checked in 3 months to confirm diagnosis     Probable Seizures   -continue Keppra   -f/u w/ Dr. Gareth Villalba in 2 months     Hypokalemia: resolved; monitor       DISCHARGE DIAGNOSES / PLAN:      PENDING TEST RESULTS:   At the time of discharge the following test results are still pending: n/a    FOLLOW UP APPOINTMENTS:    Follow-up Information     Follow up With Details Comments Contact Info    [address],  In 1 month  [facility] [place] [address]      Refugio Gowers, MD In 2 weeks  [address] [facility] [address] [facility] [address] [facility] [place]      Ita Aguirre MD In 1 week  [address] [facility] [address]  [phone]             ADDITIONAL CARE RECOMMENDATIONS:  See above    DIET: Cardiac Diet    TUBE FEEDING INSTRUCTIONS: n/a    OXYGEN / BiPAP SETTINGS: 2L NC    ACTIVITY: PT/OT Eval and Treat    WOUND CARE: n/a    EQUIPMENT needed: n/a      DISCHARGE MEDICATIONS:   See Medication Reconciliation Form      NOTIFY YOUR PHYSICIAN FOR ANY OF THE FOLLOWING:   Fever over 101 degrees for 24 hours. Chest pain, shortness of breath, fever, chills, nausea, vomiting, diarrhea, change in mentation, falling, weakness, bleeding. Severe pain or pain not relieved by medications. Or, any other signs or symptoms that you may have questions about.     DISPOSITION:    Home With:   OT  PT  HH  RN      x SNF/Inpatient Rehab/LTAC    Independent/assisted living    Hospice    Other:       PATIENT CONDITION AT DISCHARGE:     Functional status    Poor    x Deconditioned     Independent      Cognition     Lucid    x Forgetful     Dementia      Catheters/lines (plus indication)    Travis     PICC     PEG    x None      Code status   x  Full code     DNR      PHYSICAL EXAMINATION AT DISCHARGE:   Refer to Progress Note      CHRONIC MEDICAL DIAGNOSES:  Problem List as of 10/6/2017  Date Reviewed: 9/30/2017          Codes Class Noted - Resolved    * (Principal)GI bleed ICD-10-CM: K92.2  ICD-9-CM: 578.9  9/29/2017 - Present        Slurred speech ICD-10-CM: R47.81  ICD-9-CM: 784.59  9/10/2017 - Present        Seizures ([facility] [address] ICD-10-CM: R56.9  ICD-9-CM: 780.39  9/10/2017 - Present        Altered mental status ICD-10-CM: R41.82  ICD-9-CM: 780.97  9/10/2017 - Present        Cerebrovascular accident (CVA) due to thrombosis of right posterior cerebral artery [facility] ICD-10-CM: N16.776  ICD-9-CM: 434.01  9/10/2017 - Present                CDMP Checked:   Yes x     PROBLEM LIST Updated:  Yes x         Signed:   Liana Garcia MD  10/6/2017  10:16 AM

## 2017-10-06 NOTE — DISCHARGE SUMMARY
Discharge Summary     Patient: Betsy Moreno MRN: [id number]  SSN: [id number]    YOB: 1945  Age: 71 y.o. Sex: male       Admit Date: 9/29/2017    Discharge Date: 10/6/2017      Admission Diagnoses: GI bleed    Discharge Diagnoses:   GI bleed  Acute blood loss anemia  Hypokalemia  COPD  Type 2 DM      Discharge Condition: Stable    Hospital Course: 76-y.o man who presented with melena and orthostatic hypotension. He was found to be anemic and was admitted for GI bleed. Work-up with endoscopy and colonoscopy was largely unrevealing. He was started on a BID PPI empirically. His hemoglobin remained stable prior to discharge. GI bleed  -EGD and colonoscopy both without a source of bleeding  -started on a BID PPI. This should be decreased to once daily if there is no recurrence of bleeding.  -aspirin stopped temporarily    Acute blood loss anemia  -9/29 hgb 5.3 on admission s/p 2 UPRBCs, hgb now in the 7's  -started on PO ferrous sulfate    H/o CVA with left nondominant hemiplegia  -continue statin  -aspirin held; consider restarting this in the near future     COPD with chronic hypoxic respiratory failure  -on 2L O2 at home     Type 2 DM - new diagnosis  -hgb a1c 6.5; this should be re-checked in 3 months to confirm diagnosis     Probable Seizures   -continue Keppra   -f/u w/ Dr. Byron Brooke in 2 months     Hypokalemia: resolved; monitor       Consults: Gastroenterology    Significant Diagnostic Studies: see above    Disposition: inpatient rehab    S: feels well today, no bleeding, dizziness, dyspnea, chest pain, n/v/d.  Graylin Alter to leave the hospital.    O:   Visit Vitals    /73 (BP 1 Location: Left arm, BP Patient Position: Sitting)    Pulse 89    Temp 98.5 °F (36.9 °C)    Resp 16    Ht 6' 2\" (1.88 m)    Wt 79.8 kg (176 lb)    SpO2 92%    BMI 22.6 kg/m2     Gen: NAD  HEENT: anicteric sclerae, pale conjunctiva  Heart: RRR, no MRG, no JVD, no peripheral edema  Lungs: CTA b/l, normal work of breathing  Abd: soft, NT, ND, bs+  Extr: warm    Discharge Medications:   Current Discharge Medication List      START taking these medications    Details   ferrous sulfate 325 mg (65 mg iron) tablet Take 1 Tab by mouth two (2) times daily (with meals). Qty: 60 Tab, Refills: 0      albuterol-ipratropium (DUO-NEB) 2.5 mg-0.5 mg/3 ml nebu 3 mL by Nebulization route every four (4) hours as needed. Qty: 30 Nebule, Refills: 0      pantoprazole (PROTONIX) 40 mg tablet Take 1 Tab by mouth Before breakfast and dinner. Qty: 60 Tab, Refills: 0         CONTINUE these medications which have NOT CHANGED    Details   senna-docusate (PERICOLACE) 8.6-50 mg per tablet Take 2 Tabs by mouth nightly. fluticasone-vilanterol (BREO ELLIPTA) 100-25 mcg/dose inhaler Take 1 Puff by inhalation daily. HEPARIN SODIUM,PORCINE (HEPARIN, PORCINE,) 5,000 unit/mL injection 5,000 Units every eight (8) hours. acetaminophen (TYLENOL) 500 mg tablet Take  by mouth every four (4) hours as needed for Pain. HYDROcodone-acetaminophen (NORCO) 5-325 mg per tablet Take 1 Tab by mouth every four (4) hours as needed for Pain. bisacodyl (DULCOLAX, BISACODYL,) 10 mg suppository Insert 10 mg into rectum daily. atorvastatin (LIPITOR) 40 mg tablet Take 1 Tab by mouth nightly. Qty: 60 Tab, Refills: 0      levETIRAcetam (KEPPRA) 500 mg tablet Take 1 Tab by mouth two (2) times a day. Qty: 60 Tab, Refills: 0      umeclidinium (INCRUSE ELLIPTA) 62.5 mcg/actuation inhaler Take 1 Puff by inhalation daily. Indications: Rescue inhaler         STOP taking these medications       aspirin 81 mg chewable tablet Comments:   Reason for Stopping:              Follow-up Information     Follow up With Details Comments [address], DO In 1 month  [address] [place] [address] Neurology [address] [facility] [place] [address]      Anais Horton MD Schedule an appointment as soon as possible for a visit As needed [address] [facility] [address]  [phone]      Titus Peres MD In 1 week  [address] [facility]  [phone]            Signed By: Brandi Truong MD     October 6, 2017      Greater than 30 minutes spent on discharge management.

## 2017-10-06 NOTE — PROGRESS NOTES
I have reviewed discharge instructions with the patient. The patient verbalized understanding. Given extra copy of DC instructions for his sister.

## 2017-10-06 NOTE — PROGRESS NOTES
TRANSFER - OUT REPORT:    Verbal report given to Jamel   (name) on Oumou Hernandez  being transferred to [facility](unit) for routine progression of care       Report consisted of patients Situation, Background, Assessment and   Recommendations(SBAR). Information from the following report(s) SBAR, Kardex and MAR was reviewed with the receiving nurse. Lines:       Opportunity for questions and clarification was provided.       Patient transported with:   O2 @ 2 liters

## 2017-10-06 NOTE — PROGRESS NOTES
0800 Bedside shift change report given to May and Shaggy Quarles (oncoming nurse) by Flash Valdes (offgoing nurse). Report included the following information SBAR, Kardex and MAR.

## 2017-10-06 NOTE — ROUTINE PROCESS
Bedside and Verbal shift change report given to Daysi (oncoming nurse) by Juan Fu (offgoing nurse). Report included the following information SBAR, Kardex and MAR.

## 2017-11-10 RX ORDER — LEVETIRACETAM 500 MG/1
TABLET ORAL
Qty: 60 TAB | Refills: 0 | OUTPATIENT
Start: 2017-11-10

## 2017-11-10 RX ORDER — PANTOPRAZOLE SODIUM 40 MG/1
TABLET, DELAYED RELEASE ORAL
Qty: 60 TAB | Refills: 0 | OUTPATIENT
Start: 2017-11-10

## 2017-11-17 ENCOUNTER — OFFICE VISIT (OUTPATIENT)
Dept: NEUROLOGY | Age: 72
End: 2017-11-17

## 2017-11-17 VITALS
HEART RATE: 78 BPM | WEIGHT: 169.75 LBS | DIASTOLIC BLOOD PRESSURE: 76 MMHG | SYSTOLIC BLOOD PRESSURE: 122 MMHG | RESPIRATION RATE: 16 BRPM | HEIGHT: 74 IN | OXYGEN SATURATION: 94 % | BODY MASS INDEX: 21.79 KG/M2

## 2017-11-17 DIAGNOSIS — R56.9 PROVOKED SEIZURE (HCC): ICD-10-CM

## 2017-11-17 DIAGNOSIS — I69.354 HEMIPARESIS AFFECTING LEFT SIDE AS LATE EFFECT OF CEREBROVASCULAR ACCIDENT (HCC): Primary | ICD-10-CM

## 2017-11-17 DIAGNOSIS — R90.89 ABNORMAL BRAIN MRI: ICD-10-CM

## 2017-11-17 RX ORDER — LEVETIRACETAM 500 MG/1
500 TABLET ORAL 2 TIMES DAILY
Qty: 60 TAB | Refills: 4 | Status: SHIPPED | OUTPATIENT
Start: 2017-11-17 | End: 2018-03-16 | Stop reason: SDUPTHER

## 2017-11-17 RX ORDER — LEVETIRACETAM 500 MG/1
500 TABLET ORAL 2 TIMES DAILY
Qty: 60 TAB | Refills: 3 | Status: SHIPPED | OUTPATIENT
Start: 2017-11-17 | End: 2017-11-17 | Stop reason: SDUPTHER

## 2017-11-17 NOTE — COMMUNICATION BODY
Chief Complaint   Patient presents with    Seizure       HPI    Mr. Kranthi Baker is a 43-year-old gentleman whom I evaluated in the hospital on September 11 of this year when he presented with left-sided weakness. He had also a reported seizure while transported to the hospital.  Workup revealing a right PCA infarct but there was some unusual mild contrast enhancement. Since his discharge he remains on aspirin and Keppra daily. No repeat seizures. He still has some slight weakness on the left he feels. He lives with his sister and brother. He is on home oxygen. Review of Systems   Neurological: Positive for focal weakness. All other systems reviewed and are negative. Past Medical History:   Diagnosis Date    Asthma     hx of/has wheezing    Cancer ([facility] [address]     prostate - not treated yet    Ill-defined condition     shortness of breath - being evaluated    Psychiatric disorder     mental disability     Family History   Problem Relation Age of Onset    Colon Cancer Maternal Aunt     Colon Cancer Maternal Aunt     Colon Cancer Maternal Aunt      Social History     Social History    Marital status: LEGALLY      Spouse name: N/A    Number of children: N/A    Years of education: N/A     Occupational History    Not on file. Social History Main Topics    Smoking status: Current Every Day Smoker    Smokeless tobacco: Never Used      Comment: cigarettes    Alcohol use No    Drug use: Not on file    Sexual activity: Not on file     Other Topics Concern    Not on file     Social History Narrative     No Known Allergies      Current Outpatient Prescriptions   Medication Sig    levETIRAcetam (KEPPRA) 500 mg tablet Take 1 Tab by mouth two (2) times a day.  ferrous sulfate 325 mg (65 mg iron) tablet Take 1 Tab by mouth two (2) times daily (with meals).  albuterol-ipratropium (DUO-NEB) 2.5 mg-0.5 mg/3 ml nebu 3 mL by Nebulization route every four (4) hours as needed.     pantoprazole (PROTONIX) 40 mg tablet Take 1 Tab by mouth Before breakfast and dinner.  senna-docusate (PERICOLACE) 8.6-50 mg per tablet Take 2 Tabs by mouth nightly.  fluticasone-vilanterol (BREO ELLIPTA) 100-25 mcg/dose inhaler Take 1 Puff by inhalation daily.  HEPARIN SODIUM,PORCINE (HEPARIN, PORCINE,) 5,000 unit/mL injection 5,000 Units every eight (8) hours.  acetaminophen (TYLENOL) 500 mg tablet Take  by mouth every four (4) hours as needed for Pain.  HYDROcodone-acetaminophen (NORCO) 5-325 mg per tablet Take 1 Tab by mouth every four (4) hours as needed for Pain.  bisacodyl (DULCOLAX, BISACODYL,) 10 mg suppository Insert 10 mg into rectum daily.  atorvastatin (LIPITOR) 40 mg tablet Take 1 Tab by mouth nightly.  umeclidinium (INCRUSE ELLIPTA) 62.5 mcg/actuation inhaler Take 1 Puff by inhalation daily. Indications: Rescue inhaler     No current facility-administered medications for this visit. Neurologic Exam     Mental Status        WD/WN adult in NAD, normal grooming  VSS  Awake and alert. Friendly. Wearing a nasal cannula with portable oxygen    PERRL, nonicteric  Face is asymmetric, tongue midline  Speech is dysarthric same as previous eval  No limb ataxia. No abnl movements. Moving all extemities spontaneously and symmetric  Slightly wide gait with very mild circumduction of the left. CVS RRR  Lungs nonlabored  Skin is warm and dry         Visit Vitals    /76    Pulse 78    Resp 16    Ht 6' 2\" (1.88 m)    Wt 77 kg (169 lb 12.1 oz)    SpO2 94%    BMI 21.8 kg/m2       Assessment and Plan   Diagnoses and all orders for this visit:    1. Hemiparesis affecting left side as late effect of cerebrovascular accident [facility])  -     MRI BRAIN W WO CONT; Future    2. Abnormal brain MRI  -     MRI BRAIN W WO CONT; Future    3. Provoked seizure ([facility] [address]    Other orders  -     levETIRAcetam (KEPPRA) 500 mg tablet; Take 1 Tab by mouth two (2) times a day.       67-year-old gentleman who had a stroke in September of this year. The imaging was suspicious for possible underlying lesion so I am going to repeat the imaging 6 months from previous. He is clinically stable without worsening today. Still some slight weakness on the left. Continue Keppra for now. I will see him after his imaging is done, sooner if needed.         2 LTAC, located within [facility] [address]  Diplomate PAYAM

## 2017-11-17 NOTE — MR AVS SNAPSHOT
Visit Information Date & Time Provider Department Dept. Phone Encounter #  
 11/17/2017 10:00  [facility], DO [facility] at [address] [id number] Follow-up Instructions Return in about 4 months (around 3/17/2018). Upcoming Health Maintenance Date Due Hepatitis C Screening 1945 DTaP/Tdap/Td series (1 - Tdap) 3/5/1967 ZOSTER VACCINE AGE 60> 1/5/2006 GLAUCOMA SCREENING Q2Y 3/5/2011 Pneumococcal 65+ Low/Medium Risk (1 of 2 - PCV13) 3/5/2011 MEDICARE YEARLY EXAM 3/5/2011 FOBT Q 1 YEAR AGE 50-75 9/29/2018 Allergies as of 11/17/2017  Review Complete On: 11/17/2017 By: Benedict Elmore No Known Allergies Current Immunizations  Never Reviewed Name Date Influenza Vaccine (Quad) PF 10/2/2017  7:06 AM  
  
 Not reviewed this visit You Were Diagnosed With   
  
 Codes Comments Hemiparesis affecting left side as late effect of cerebrovascular accident [facility])    -  Primary ICD-10-CM: V38.350 ICD-9-CM: 438.20 Abnormal brain MRI     ICD-10-CM: R90.89 ICD-9-CM: 793.0 Provoked seizure ([address]     ICD-10-CM: R56.9 ICD-9-CM: 780.39 Vitals BP Pulse Resp Height(growth percentile) Weight(growth percentile) SpO2  
 122/76 78 16 6' 2\" (1.88 m) 169 lb 12.1 oz (77 kg) 94% BMI Smoking Status 21.8 kg/m2 Current Every Day Smoker Vitals History BMI and BSA Data Body Mass Index Body Surface Area  
 21.8 kg/m 2 2.01 m 2 Preferred Pharmacy Pharmacy Name Phone [phone], [address] [facility] 1 [phone] Your Updated Medication List  
  
   
This list is accurate as of: 11/17/17 10:22 AM.  Always use your most recent med list.  
  
  
  
  
 acetaminophen 500 mg tablet Commonly known as:  TYLENOL Take  by mouth every four (4) hours as needed for Pain. albuterol-ipratropium 2.5 mg-0.5 mg/3 ml Nebu Commonly known as:  DUO-NEB  
3 mL by Nebulization route every four (4) hours as needed. atorvastatin 40 mg tablet Commonly known as:  LIPITOR Take 1 Tab by mouth nightly. DULCOLAX (BISACODYL) 10 mg suppository Generic drug:  bisacodyl Insert 10 mg into rectum daily. ferrous sulfate 325 mg (65 mg iron) tablet Take 1 Tab by mouth two (2) times daily (with meals). fluticasone-vilanterol 100-25 mcg/dose inhaler Commonly known as:  BREO ELLIPTA Take 1 Puff by inhalation daily. heparin (porcine) 5,000 unit/mL injection 5,000 Units every eight (8) hours. HYDROcodone-acetaminophen 5-325 mg per tablet Commonly known as:  [address] Take 1 Tab by mouth every four (4) hours as needed for Pain. INCRUSE ELLIPTA 62.5 mcg/actuation inhaler Generic drug:  umeclidinium Take 1 Puff by inhalation daily. Indications: Rescue inhaler  
  
 levETIRAcetam 500 mg tablet Commonly known as:  KEPPRA Take 1 Tab by mouth two (2) times a day. pantoprazole 40 mg tablet Commonly known as:  PROTONIX Take 1 Tab by mouth Before breakfast and dinner. senna-docusate 8.6-50 mg per tablet Commonly known as:  Miachel Huge Take 2 Tabs by mouth nightly. Prescriptions Sent to Pharmacy Refills  
 levETIRAcetam (KEPPRA) 500 mg tablet 3 Sig: Take 1 Tab by mouth two (2) times a day. Class: Normal  
 Pharmacy: [address] [place] [address] #: [phone] Route: Oral  
  
Follow-up Instructions Return in about 4 months (around 3/17/2018). To-Do List   
 02/26/2018 Imaging:  MRI BRAIN W WO CONT Patient Instructions A Healthy Lifestyle: Care Instructions Your Care Instructions A healthy lifestyle can help you feel good, stay at a healthy weight, and have plenty of energy for both work and play.  A healthy lifestyle is something you can share with your whole family. A healthy lifestyle also can lower your risk for serious health problems, such as high blood pressure, heart disease, and diabetes. You can follow a few steps listed below to improve your health and the health of your family. Follow-up care is a key part of your treatment and safety. Be sure to make and go to all appointments, and call your doctor if you are having problems. It's also a good idea to know your test results and keep a list of the medicines you take. How can you care for yourself at home? · Do not eat too much sugar, fat, or fast foods. You can still have dessert and treats now and then. The goal is moderation. · Start small to improve your eating habits. Pay attention to portion sizes, drink less juice and soda pop, and eat more fruits and vegetables. ¨ Eat a healthy amount of food. A 3-ounce serving of meat, for example, is about the size of a deck of cards. Fill the rest of your plate with vegetables and whole grains. ¨ Limit the amount of soda and sports drinks you have every day. Drink more water when you are thirsty. ¨ Eat at least 5 servings of fruits and vegetables every day. It may seem like a lot, but it is not hard to reach this goal. A serving or helping is 1 piece of fruit, 1 cup of vegetables, or 2 cups of leafy, raw vegetables. Have an apple or some carrot sticks as an afternoon snack instead of a candy bar. Try to have fruits and/or vegetables at every meal. 
· Make exercise part of your daily routine. You may want to start with simple activities, such as walking, bicycling, or slow swimming. Try to be active 30 to 60 minutes every day. You do not need to do all 30 to 60 minutes all at once. For example, you can exercise 3 times a day for 10 or 20 minutes.  Moderate exercise is safe for most people, but it is always a good idea to talk to your doctor before starting an exercise program. 
 · Keep moving. Arlene Dove the lawn, work in the garden, or [employer]. Take the stairs instead of the elevator at work. · If you smoke, quit. People who smoke have an increased risk for heart attack, stroke, cancer, and other lung illnesses. Quitting is hard, but there are ways to boost your chance of quitting tobacco for good. ¨ Use nicotine gum, patches, or lozenges. ¨ Ask your doctor about stop-smoking programs and medicines. ¨ Keep trying. In addition to reducing your risk of diseases in the future, you will notice some benefits soon after you stop using tobacco. If you have shortness of breath or asthma symptoms, they will likely get better within a few weeks after you quit. · Limit how much alcohol you drink. Moderate amounts of alcohol (up to 2 drinks a day for men, 1 drink a day for women) are okay. But drinking too much can lead to liver problems, high blood pressure, and other health problems. Family health If you have a family, there are many things you can do together to improve your health. · Eat meals together as a family as often as possible. · Eat healthy foods. This includes fruits, vegetables, lean meats and dairy, and whole grains. · Include your family in your fitness plan. Most people think of activities such as jogging or tennis as the way to fitness, but there are many ways you and your family can be more active. Anything that makes you breathe hard and gets your heart pumping is exercise. Here are some tips: 
¨ Walk to do errands or to take your child to school or the bus. ¨ Go for a family bike ride after dinner instead of watching TV. Where can you learn more? Go to http://megan-boaz.info/. Enter F776 in the search box to learn more about \"A Healthy Lifestyle: Care Instructions. \" Current as of: May 12, 2017 Content Version: 11.4 © 5575-3773 Healthwise, Incorporated.  Care instructions adapted under license by [address] (which disclaims liability or warranty for this information). If you have questions about a medical condition or this instruction, always ask your healthcare professional. [address] any warranty or liability for your use of this information. Introducing [facility]! [address] [place] introduces [employer] patient portal. Now you can access parts of your medical record, email your doctor's office, and request medication refills online. 1. In your internet browser, go to [email or web address] 2. Click on the First Time User? Click Here link in the Sign In box. You will see the New Member Sign Up page. 3. Enter your [employer] Access Code exactly as it appears below. You will not need to use this code after youve completed the sign-up process. If you do not sign up before the expiration date, you must request a new code. · [employer] Access Code: [email or web address] Expires: 12/11/2017  9:42 AM 
 
4. Enter the last four digits of your Social Security Number (xxxx) and Date of Birth (mm/dd/yyyy) as indicated and click Submit. You will be taken to the next sign-up page. 5. Create a [employer] ID. This will be your [employer] login ID and cannot be changed, so think of one that is secure and easy to remember. 6. Create a [employer] password. You can change your password at any time. 7. Enter your Password Reset Question and Answer. This can be used at a later time if you forget your password. 8. Enter your e-mail address. You will receive e-mail notification when new information is available in [address] 9. Click Sign Up. You can now view and download portions of your medical record. 10. Click the Download Summary menu link to download a portable copy of your medical information. If you have questions, please visit the Frequently Asked Questions section of the [employer] website.  Remember, [employer] is NOT to be used for urgent needs. For medical emergencies, dial 911. Now available from your iPhone and Android! Please provide this summary of care documentation to your next provider. Your primary care clinician is listed as Ita Aguirre. If you have any questions after today's visit, please call [phone].

## 2017-11-17 NOTE — LETTER
11/17/2017 Patient:  Itzel Zamora YOB: 1945 Date of Visit: 11/17/2017 Dear Kanchan Schumacher MD 
[facility] [address] [address] VIA Facsimile: [phone] 
 : 
 
 
I was requested by Kanchan Schumacher MD to evaluate Mr. Itzel Zamora  for Chief Complaint Patient presents with  Seizure Alfreda Heredia I am recommending the following:  
 
Diagnoses and all orders for this visit: 1. Hemiparesis affecting left side as late effect of cerebrovascular accident ([address] -     MRI BRAIN W WO CONT; Future 2. Abnormal brain MRI 
-     MRI BRAIN W WO CONT; Future 3. Provoked seizure ([address] Other orders -     levETIRAcetam (KEPPRA) 500 mg tablet; Take 1 Tab by mouth two (2) times a day. 
 
 
 
---------------------------------------------------------------------------------------------------------------------- Below is my encounter: Chief Complaint Patient presents with  Seizure HPI Mr. Kranthi Baker is a 71-year-old gentleman whom I evaluated in the hospital on September 11 of this year when he presented with left-sided weakness. He had also a reported seizure while transported to the hospital.  Workup revealing a right PCA infarct but there was some unusual mild contrast enhancement. Since his discharge he remains on aspirin and Keppra daily. No repeat seizures. He still has some slight weakness on the left he feels. He lives with his sister and brother. He is on home oxygen. Review of Systems Neurological: Positive for focal weakness. All other systems reviewed and are negative. Past Medical History:  
Diagnosis Date  Asthma   
 hx of/has wheezing  Cancer [facility])   
 prostate - not treated yet  Ill-defined condition   
 shortness of breath - being evaluated  Psychiatric disorder   
 mental disability Family History Problem Relation Age of Onset  Colon Cancer Maternal Aunt  Colon Cancer Maternal Aunt  Colon Cancer Maternal Aunt Social History Social History  Marital status: LEGALLY  Spouse name: N/A  
 Number of children: N/A  
 Years of education: N/A Occupational History  Not on file. Social History Main Topics  Smoking status: Current Every Day Smoker  Smokeless tobacco: Never Used Comment: cigarettes  Alcohol use No  
 Drug use: Not on file  Sexual activity: Not on file Other Topics Concern  Not on file Social History Narrative No Known Allergies Current Outpatient Prescriptions Medication Sig  levETIRAcetam (KEPPRA) 500 mg tablet Take 1 Tab by mouth two (2) times a day.  ferrous sulfate 325 mg (65 mg iron) tablet Take 1 Tab by mouth two (2) times daily (with meals).  albuterol-ipratropium (DUO-NEB) 2.5 mg-0.5 mg/3 ml nebu 3 mL by Nebulization route every four (4) hours as needed.  pantoprazole (PROTONIX) 40 mg tablet Take 1 Tab by mouth Before breakfast and dinner.  senna-docusate (PERICOLACE) 8.6-50 mg per tablet Take 2 Tabs by mouth nightly.  fluticasone-vilanterol (BREO ELLIPTA) 100-25 mcg/dose inhaler Take 1 Puff by inhalation daily.  HEPARIN SODIUM,PORCINE (HEPARIN, PORCINE,) 5,000 unit/mL injection 5,000 Units every eight (8) hours.  acetaminophen (TYLENOL) 500 mg tablet Take  by mouth every four (4) hours as needed for Pain.  HYDROcodone-acetaminophen (NORCO) 5-325 mg per tablet Take 1 Tab by mouth every four (4) hours as needed for Pain.  bisacodyl (DULCOLAX, BISACODYL,) 10 mg suppository Insert 10 mg into rectum daily.  atorvastatin (LIPITOR) 40 mg tablet Take 1 Tab by mouth nightly.  umeclidinium (INCRUSE ELLIPTA) 62.5 mcg/actuation inhaler Take 1 Puff by inhalation daily. Indications: Rescue inhaler No current facility-administered medications for this visit. Neurologic Exam  
 
Mental Status WD/WN adult in NAD, normal grooming VSS Awake and alert. Friendly. Wearing a nasal cannula with portable oxygen PERRL, nonicteric Face is asymmetric, tongue midline Speech is dysarthric same as previous eval 
No limb ataxia. No abnl movements. Moving all extemities spontaneously and symmetric Slightly wide gait with very mild circumduction of the left. CVS RRR Lungs nonlabored Skin is warm and dry Visit Vitals  /76  Pulse 78  Resp 16  
 Ht 6' 2\" (1.88 m)  Wt 77 kg (169 lb 12.1 oz)  SpO2 94%  BMI 21.8 kg/m2 Assessment and Plan 1. Hemiparesis affecting left side as late effect of cerebrovascular accident ([address] -     MRI BRAIN W WO CONT; Future 2. Abnormal brain MRI 
-     MRI BRAIN W WO CONT; Future 3. Provoked seizure ([address] Other orders -     levETIRAcetam (KEPPRA) 500 mg tablet; Take 1 Tab by mouth two (2) times a day. 67-year-old gentleman who had a stroke in September of this year. The imaging was suspicious for possible underlying lesion so I am going to repeat the imaging 6 months from previous. He is clinically stable without worsening today. Still some slight weakness on the left. Continue Keppra for now. I will see him after his imaging is done, sooner if needed. Thank you for giving me the opportunity to assist in the care of Mr. Charlyne Lesch. If you have questions, please do not hesitate to contact me. Sincerely, Fabian Lao,  Neurologist 
Juanomatdaniela HARE

## 2017-11-17 NOTE — PROGRESS NOTES
Chief Complaint   Patient presents with    Seizure       HPI    Mr. Pernell Rinne is a 78-year-old gentleman whom I evaluated in the hospital on September 11 of this year when he presented with left-sided weakness. He had also a reported seizure while transported to the hospital.  Workup revealing a right PCA infarct but there was some unusual mild contrast enhancement. Since his discharge he remains on aspirin and Keppra daily. No repeat seizures. He still has some slight weakness on the left he feels. He lives with his sister and brother. He is on home oxygen. Review of Systems   Neurological: Positive for focal weakness. All other systems reviewed and are negative. Past Medical History:   Diagnosis Date    Asthma     hx of/has wheezing    Cancer ([facility]     prostate - not treated yet    Ill-defined condition     shortness of breath - being evaluated    Psychiatric disorder     mental disability     Family History   Problem Relation Age of Onset    Colon Cancer Maternal Aunt     Colon Cancer Maternal Aunt     Colon Cancer Maternal Aunt      Social History     Social History    Marital status: LEGALLY      Spouse name: N/A    Number of children: N/A    Years of education: N/A     Occupational History    Not on file. Social History Main Topics    Smoking status: Current Every Day Smoker    Smokeless tobacco: Never Used      Comment: cigarettes    Alcohol use No    Drug use: Not on file    Sexual activity: Not on file     Other Topics Concern    Not on file     Social History Narrative     No Known Allergies      Current Outpatient Prescriptions   Medication Sig    levETIRAcetam (KEPPRA) 500 mg tablet Take 1 Tab by mouth two (2) times a day.  ferrous sulfate 325 mg (65 mg iron) tablet Take 1 Tab by mouth two (2) times daily (with meals).  albuterol-ipratropium (DUO-NEB) 2.5 mg-0.5 mg/3 ml nebu 3 mL by Nebulization route every four (4) hours as needed.     pantoprazole (PROTONIX) 40 mg tablet Take 1 Tab by mouth Before breakfast and dinner.  senna-docusate (PERICOLACE) 8.6-50 mg per tablet Take 2 Tabs by mouth nightly.  fluticasone-vilanterol (BREO ELLIPTA) 100-25 mcg/dose inhaler Take 1 Puff by inhalation daily.  HEPARIN SODIUM,PORCINE (HEPARIN, PORCINE,) 5,000 unit/mL injection 5,000 Units every eight (8) hours.  acetaminophen (TYLENOL) 500 mg tablet Take  by mouth every four (4) hours as needed for Pain.  HYDROcodone-acetaminophen (NORCO) 5-325 mg per tablet Take 1 Tab by mouth every four (4) hours as needed for Pain.  bisacodyl (DULCOLAX, BISACODYL,) 10 mg suppository Insert 10 mg into rectum daily.  atorvastatin (LIPITOR) 40 mg tablet Take 1 Tab by mouth nightly.  umeclidinium (INCRUSE ELLIPTA) 62.5 mcg/actuation inhaler Take 1 Puff by inhalation daily. Indications: Rescue inhaler     No current facility-administered medications for this visit. Neurologic Exam     Mental Status        WD/WN adult in NAD, normal grooming  VSS  Awake and alert. Friendly. Wearing a nasal cannula with portable oxygen    PERRL, nonicteric  Face is asymmetric, tongue midline  Speech is dysarthric same as previous eval  No limb ataxia. No abnl movements. Moving all extemities spontaneously and symmetric  Slightly wide gait with very mild circumduction of the left. CVS RRR  Lungs nonlabored  Skin is warm and dry         Visit Vitals    /76    Pulse 78    Resp 16    Ht 6' 2\" (1.88 m)    Wt 77 kg (169 lb 12.1 oz)    SpO2 94%    BMI 21.8 kg/m2       Assessment and Plan   Diagnoses and all orders for this visit:    1. Hemiparesis affecting left side as late effect of cerebrovascular accident [facility])  -     MRI BRAIN W WO CONT; Future    2. Abnormal brain MRI  -     MRI BRAIN W WO CONT; Future    3. Provoked [facility] [address]    Other orders  -     levETIRAcetam (KEPPRA) 500 mg tablet; Take 1 Tab by mouth two (2) times a day.       60-year-old gentleman who had a stroke in September of this year. The imaging was suspicious for possible underlying lesion so I am going to repeat the imaging 6 months from previous. He is clinically stable without worsening today. Still some slight weakness on the left. Continue Keppra for now. I will see him after his imaging is done, sooner if needed.         Lori Nunez, [address]  Diplomate ABPN

## 2017-12-04 ENCOUNTER — HOSPITAL ENCOUNTER (OUTPATIENT)
Dept: MRI IMAGING | Age: 72
Discharge: HOME | End: 2017-12-04
Attending: PSYCHIATRY & NEUROLOGY
Payer: MEDICARE

## 2017-12-04 VITALS — WEIGHT: 166 LBS | BODY MASS INDEX: 21.31 KG/M2

## 2017-12-04 DIAGNOSIS — R90.89 ABNORMAL BRAIN MRI: ICD-10-CM

## 2017-12-04 DIAGNOSIS — I69.354 HEMIPARESIS AFFECTING LEFT SIDE AS LATE EFFECT OF CEREBROVASCULAR ACCIDENT (HCC): ICD-10-CM

## 2017-12-04 PROCEDURE — 74011250636 HC RX REV CODE- 250/636: Performed by: PSYCHIATRY & NEUROLOGY

## 2017-12-04 PROCEDURE — 70553 MRI BRAIN STEM W/O & W/DYE: CPT

## 2017-12-04 PROCEDURE — A9576 INJ PROHANCE MULTIPACK: HCPCS | Performed by: PSYCHIATRY & NEUROLOGY

## 2017-12-04 RX ADMIN — GADOTERIDOL 15 ML: 279.3 INJECTION, SOLUTION INTRAVENOUS at 08:00

## 2018-03-16 ENCOUNTER — OFFICE VISIT (OUTPATIENT)
Dept: NEUROLOGY | Age: 73
End: 2018-03-16

## 2018-03-16 VITALS
RESPIRATION RATE: 16 BRPM | OXYGEN SATURATION: 95 % | BODY MASS INDEX: 21.17 KG/M2 | HEIGHT: 74 IN | WEIGHT: 165 LBS | SYSTOLIC BLOOD PRESSURE: 124 MMHG | DIASTOLIC BLOOD PRESSURE: 76 MMHG | HEART RATE: 78 BPM

## 2018-03-16 DIAGNOSIS — R56.9 PROVOKED SEIZURE (HCC): ICD-10-CM

## 2018-03-16 DIAGNOSIS — I69.354 HEMIPARESIS AFFECTING LEFT SIDE AS LATE EFFECT OF CEREBROVASCULAR ACCIDENT (HCC): Primary | ICD-10-CM

## 2018-03-16 RX ORDER — LEVETIRACETAM 500 MG/1
500 TABLET ORAL 2 TIMES DAILY
Qty: 180 TAB | Refills: 3 | Status: SHIPPED | OUTPATIENT
Start: 2018-03-16 | End: 2019-04-04 | Stop reason: SDUPTHER

## 2018-03-16 NOTE — MR AVS SNAPSHOT
[facility] [address] [place] [address] 
[phone] Patient: Robert Patel MRN: [id number] [facility]:[id number]/0/[id number] Visit Information Date & Time Provider Department Dept. Phone Encounter #  
 3/16/2018 10:20 AM Lyndy Acre Merlin Police,  [facility] at [address] [place] [address] [id number] Follow-up Instructions Return in about 1 year (around 3/16/2019). Your Appointments 3/16/2018 10:20 AM  
Follow Up with [facility],  [facility] at [facility] [address]) Appt Note: 4 month follow up,  KRU 11/17 [address] [address]  
[phone]  
  
   
 400 [facility] 298 [facility] [address] Upcoming Health Maintenance Date Due Hepatitis C Screening 1945 DTaP/Tdap/Td series (1 - Tdap) 3/5/1967 ZOSTER VACCINE AGE 60> 1/5/2006 GLAUCOMA SCREENING Q2Y 3/5/2011 Pneumococcal 65+ Low/Medium Risk (1 of 2 - PCV13) 3/5/2011 MEDICARE YEARLY EXAM 3/5/2011 FOBT Q 1 YEAR AGE 50-75 9/29/2018 Allergies as of 3/16/2018  Review Complete On: 3/16/2018 By: Scottie Cueva No Known Allergies Current Immunizations  Never Reviewed Name Date Influenza Vaccine (Quad) PF 10/2/2017  7:06 AM  
  
 Not reviewed this visit You Were Diagnosed With   
  
 Codes Comments Hemiparesis affecting left side as late effect of cerebrovascular accident [facility])    -  Primary ICD-10-CM: J76.505 ICD-9-CM: 438.20 Provoked seizure ([facility]     ICD-10-CM: R56.9 ICD-9-CM: 780.39 Vitals Smoking Status Current Every Day Smoker Preferred Pharmacy Pharmacy Name Phone [phone] [phone] [facility] 8 [phone] Your Updated Medication List  
  
   
 This list is accurate as of 3/16/18 10:08 AM.  Always use your most recent med list.  
  
  
  
  
 acetaminophen 500 mg tablet Commonly known as:  TYLENOL Take  by mouth every four (4) hours as needed for Pain. albuterol-ipratropium 2.5 mg-0.5 mg/3 ml Nebu Commonly known as:  DUO-NEB  
3 mL by Nebulization route every four (4) hours as needed. atorvastatin 40 mg tablet Commonly known as:  LIPITOR Take 1 Tab by mouth nightly. DULCOLAX (BISACODYL) 10 mg suppository Generic drug:  bisacodyl Insert 10 mg into rectum daily. ferrous sulfate 325 mg (65 mg iron) tablet Take 1 Tab by mouth two (2) times daily (with meals). fluticasone-vilanterol 100-25 mcg/dose inhaler Commonly known as:  BREO ELLIPTA Take 1 Puff by inhalation daily. heparin (porcine) 5,000 unit/mL injection 5,000 Units every eight (8) hours. HYDROcodone-acetaminophen 5-325 mg per tablet Commonly known as:  [place] Take 1 Tab by mouth every four (4) hours as needed for Pain. INCRUSE ELLIPTA 62.5 mcg/actuation inhaler Generic drug:  umeclidinium Take 1 Puff by inhalation daily. Indications: Rescue inhaler  
  
 levETIRAcetam 500 mg tablet Commonly known as:  KEPPRA Take 1 Tab by mouth two (2) times a day. pantoprazole 40 mg tablet Commonly known as:  PROTONIX Take 1 Tab by mouth Before breakfast and dinner. senna-docusate 8.6-50 mg per tablet Commonly known as:  Rhunette Anon Take 2 Tabs by mouth nightly. Prescriptions Sent to Pharmacy Refills  
 levETIRAcetam (KEPPRA) 500 mg tablet 3 Sig: Take 1 Tab by mouth two (2) times a day. Class: Normal  
 Pharmacy: [address] [place] [address] 1  #: [phone] Route: Oral  
  
Follow-up Instructions Return in about 1 year (around 3/16/2019). Introducing [facility]! [facility] introduces [employer] patient portal. Now you can access parts of your medical record, email your doctor's office, and request medication refills online. 1. In your internet browser, go to [email or web address] 2. Click on the First Time User? Click Here link in the Sign In box. You will see the New Member Sign Up page. 3. Enter your [employer] Access Code exactly as it appears below. You will not need to use this code after youve completed the sign-up process. If you do not sign up before the expiration date, you must request a new code. · [employer] Access Code: [email or web address] Expires: 3/26/2018  4:18 PM 
 
4. Enter the last four digits of your Social Security Number (xxxx) and Date of Birth (mm/dd/yyyy) as indicated and click Submit. You will be taken to the next sign-up page. 5. Create a [employer] ID. This will be your [employer] login ID and cannot be changed, so think of one that is secure and easy to remember. 6. Create a [employer] password. You can change your password at any time. 7. Enter your Password Reset Question and Answer. This can be used at a later time if you forget your password. 8. Enter your e-mail address. You will receive e-mail notification when new information is available in [address] 9. Click Sign Up. You can now view and download portions of your medical record. 10. Click the Download Summary menu link to download a portable copy of your medical information. If you have questions, please visit the Frequently Asked Questions section of the [employer] website. Remember, [employer] is NOT to be used for urgent needs. For medical emergencies, dial 911. Now available from your iPhone and Android! Please provide this summary of care documentation to your next provider. Your primary care clinician is listed as Pat Espinoza. If you have any questions after today's visit, please call [phone].

## 2018-03-16 NOTE — PROGRESS NOTES
Chief Complaint   Patient presents with    Ischemic Stroke    Epilepsy       HPI    Mr. Maggie Cruz is a 78-year-old gentleman whom I evaluated last year for stroke. He presented with stroke and seizure September 2017 found to have right PCA/MCA distribution infarct. He remains on aspirin since then. He takes Keppra 500 mg twice a day. No breakthrough seizures. He still has some residual left-sided weakness that can fluctuate at times. He is on home oxygenation. I repeated the MRI recently because of the initial report suggesting possible underlying lesion. Repeat MRI without any underlying process. Old infarct involving as expected. No acute changes since I last saw him. Review of Systems   Respiratory: Positive for shortness of breath. Neurological: Positive for focal weakness. All other systems reviewed and are negative. Past Medical History:   Diagnosis Date    Asthma     hx of/has wheezing    Cancer ([facility]     prostate - not treated yet    Cerebral artery occlusion with cerebral infarction ([facility]     [facility]     Ill-defined condition     shortness of breath - being evaluated    Psychiatric disorder     mental disability     Family History   Problem Relation Age of Onset    Colon Cancer Maternal Aunt     Colon Cancer Maternal Aunt     Colon Cancer Maternal Aunt      Social History     Social History    Marital status: LEGALLY      Spouse name: N/A    Number of children: N/A    Years of education: N/A     Occupational History    Not on file.      Social History Main Topics    Smoking status: Current Every Day Smoker    Smokeless tobacco: Never Used      Comment: cigarettes    Alcohol use No    Drug use: No    Sexual activity: Not on file     Other Topics Concern    Not on file     Social History Narrative     No Known Allergies      Current Outpatient Prescriptions   Medication Sig    levETIRAcetam (KEPPRA) 500 mg tablet Take 1 Tab by mouth two (2) times a day.    ferrous sulfate 325 mg (65 mg iron) tablet Take 1 Tab by mouth two (2) times daily (with meals).  albuterol-ipratropium (DUO-NEB) 2.5 mg-0.5 mg/3 ml nebu 3 mL by Nebulization route every four (4) hours as needed.  pantoprazole (PROTONIX) 40 mg tablet Take 1 Tab by mouth Before breakfast and dinner.  senna-docusate (PERICOLACE) 8.6-50 mg per tablet Take 2 Tabs by mouth nightly.  fluticasone-vilanterol (BREO ELLIPTA) 100-25 mcg/dose inhaler Take 1 Puff by inhalation daily.  HEPARIN SODIUM,PORCINE (HEPARIN, PORCINE,) 5,000 unit/mL injection 5,000 Units every eight (8) hours.  acetaminophen (TYLENOL) 500 mg tablet Take  by mouth every four (4) hours as needed for Pain.  HYDROcodone-acetaminophen (NORCO) 5-325 mg per tablet Take 1 Tab by mouth every four (4) hours as needed for Pain.  bisacodyl (DULCOLAX, BISACODYL,) 10 mg suppository Insert 10 mg into rectum daily.  atorvastatin (LIPITOR) 40 mg tablet Take 1 Tab by mouth nightly.  umeclidinium (INCRUSE ELLIPTA) 62.5 mcg/actuation inhaler Take 1 Puff by inhalation daily. Indications: Rescue inhaler     No current facility-administered medications for this visit. Neurologic Exam     Mental Status        WD/WN adult in NAD, normal grooming  VSS  Awake and alert. Very pleasant. PERRL, nonicteric  Face is asymmetric, tongue midline  Speech is dysarthric-baseline  No limb ataxia. No abnl movements. Left arm drift  Narrow good tempo gait with slight circumduction and reduced arm swing on the left    [facility] RRR  Lungs nonlabored  Skin is warm and dry         There were no vitals taken for this visit. Assessment and Plan   Diagnoses and all orders for this visit:    1. Hemiparesis affecting left side as late effect of cerebrovascular accident ([address]    2. Provoked seizure ([address]    Other orders  -     levETIRAcetam (KEPPRA) 500 mg tablet; Take 1 Tab by mouth two (2) times a day.       78-year-old gentleman with mild left hemiparesis due to his stroke last year. He had seizures at presentation. I reviewed the MRI and given that there is some cortical involvement which can be a source of seizures I am going to have him stay on Keppra 500 mg. He is on low-dose medication and not suffering any side effects. No signs of toxicity on examination. I discussed with him at the MRI did not show any evidence of underlying process like a tumor or mass. Continue aspirin for stroke prevention. Questions were answered. I like to see him annually or sooner if needed. I reviewed and decided to continue the current medications.       2 [facility], [address]  Diplomate ABPN

## 2019-01-01 ENCOUNTER — TELEPHONE (OUTPATIENT)
Dept: NEUROLOGY | Age: 74
End: 2019-01-01

## 2019-01-01 RX ORDER — LEVETIRACETAM 500 MG/1
500 TABLET ORAL 2 TIMES DAILY
Qty: 180 TAB | Refills: 0 | Status: SHIPPED | OUTPATIENT
Start: 2019-01-01 | End: 2019-01-01 | Stop reason: SDUPTHER

## 2019-04-08 RX ORDER — LEVETIRACETAM 500 MG/1
500 TABLET ORAL 2 TIMES DAILY
Qty: 180 TAB | Refills: 0 | Status: SHIPPED | OUTPATIENT
Start: 2019-04-08 | End: 2019-01-01 | Stop reason: SDUPTHER

## 2019-10-23 NOTE — TELEPHONE ENCOUNTER
Fax request for refill from [facility].    Levetiracetam 500mg      LOV  3/16/2018    No pending    Have given 30d supply only for last couple of months. See printed request on your desk. Jessica Be

## 2019-12-11 NOTE — PROGRESS NOTES
Pt here for follow up on his seizures and stroke. Pt denies any seizures since last visit. He states he is doing well. vomiting

## 2020-01-01 ENCOUNTER — APPOINTMENT (OUTPATIENT)
Dept: GENERAL RADIOLOGY | Age: 75
DRG: 175 | End: 2020-01-01
Attending: PHYSICIAN ASSISTANT
Payer: MEDICARE

## 2020-01-01 ENCOUNTER — HOSPITAL ENCOUNTER (INPATIENT)
Age: 75
LOS: 19 days | Discharge: NURSING, REHAB OR LONG-TERM CARE | DRG: 175 | End: 2020-03-10
Attending: EMERGENCY MEDICINE | Admitting: ANESTHESIOLOGY
Payer: MEDICARE

## 2020-01-01 ENCOUNTER — APPOINTMENT (OUTPATIENT)
Dept: CT IMAGING | Age: 75
DRG: 175 | End: 2020-01-01
Attending: EMERGENCY MEDICINE
Payer: MEDICARE

## 2020-01-01 ENCOUNTER — PATIENT OUTREACH (OUTPATIENT)
Dept: INTERNAL MEDICINE CLINIC | Age: 75
End: 2020-01-01

## 2020-01-01 ENCOUNTER — APPOINTMENT (OUTPATIENT)
Dept: GENERAL RADIOLOGY | Age: 75
DRG: 175 | End: 2020-01-01
Attending: INTERNAL MEDICINE
Payer: MEDICARE

## 2020-01-01 ENCOUNTER — APPOINTMENT (OUTPATIENT)
Dept: CT IMAGING | Age: 75
DRG: 175 | End: 2020-01-01
Attending: INTERNAL MEDICINE
Payer: MEDICARE

## 2020-01-01 ENCOUNTER — APPOINTMENT (OUTPATIENT)
Dept: VASCULAR SURGERY | Age: 75
DRG: 175 | End: 2020-01-01
Attending: HOSPITALIST
Payer: MEDICARE

## 2020-01-01 ENCOUNTER — PATIENT OUTREACH (OUTPATIENT)
Dept: CASE MANAGEMENT | Age: 75
End: 2020-01-01

## 2020-01-01 ENCOUNTER — TELEPHONE (OUTPATIENT)
Dept: NEUROLOGY | Age: 75
End: 2020-01-01

## 2020-01-01 ENCOUNTER — APPOINTMENT (OUTPATIENT)
Dept: ULTRASOUND IMAGING | Age: 75
DRG: 175 | End: 2020-01-01
Attending: HOSPITALIST
Payer: MEDICARE

## 2020-01-01 ENCOUNTER — HOSPITAL ENCOUNTER (INPATIENT)
Age: 75
LOS: 3 days | DRG: 177 | End: 2020-04-12
Attending: EMERGENCY MEDICINE | Admitting: FAMILY MEDICINE
Payer: MEDICARE

## 2020-01-01 ENCOUNTER — APPOINTMENT (OUTPATIENT)
Dept: GENERAL RADIOLOGY | Age: 75
DRG: 177 | End: 2020-01-01
Attending: INTERNAL MEDICINE
Payer: MEDICARE

## 2020-01-01 ENCOUNTER — APPOINTMENT (OUTPATIENT)
Dept: GENERAL RADIOLOGY | Age: 75
DRG: 177 | End: 2020-01-01
Attending: NURSE PRACTITIONER
Payer: MEDICARE

## 2020-01-01 ENCOUNTER — APPOINTMENT (OUTPATIENT)
Dept: NON INVASIVE DIAGNOSTICS | Age: 75
DRG: 175 | End: 2020-01-01
Attending: HOSPITALIST
Payer: MEDICARE

## 2020-01-01 ENCOUNTER — APPOINTMENT (OUTPATIENT)
Dept: GENERAL RADIOLOGY | Age: 75
DRG: 177 | End: 2020-01-01
Attending: EMERGENCY MEDICINE
Payer: MEDICARE

## 2020-01-01 ENCOUNTER — APPOINTMENT (OUTPATIENT)
Dept: GENERAL RADIOLOGY | Age: 75
DRG: 175 | End: 2020-01-01
Attending: EMERGENCY MEDICINE
Payer: MEDICARE

## 2020-01-01 VITALS
TEMPERATURE: 98.1 F | OXYGEN SATURATION: 97 % | HEIGHT: 74 IN | WEIGHT: 189.38 LBS | SYSTOLIC BLOOD PRESSURE: 145 MMHG | BODY MASS INDEX: 24.3 KG/M2 | RESPIRATION RATE: 20 BRPM | DIASTOLIC BLOOD PRESSURE: 87 MMHG | HEART RATE: 94 BPM

## 2020-01-01 VITALS
WEIGHT: 156.5 LBS | TEMPERATURE: 102.8 F | HEIGHT: 74 IN | DIASTOLIC BLOOD PRESSURE: 31 MMHG | SYSTOLIC BLOOD PRESSURE: 168 MMHG | OXYGEN SATURATION: 68 % | RESPIRATION RATE: 40 BRPM | BODY MASS INDEX: 20.09 KG/M2 | HEART RATE: 113 BPM

## 2020-01-01 DIAGNOSIS — R60.9 PERIPHERAL EDEMA: ICD-10-CM

## 2020-01-01 DIAGNOSIS — R53.81 DEBILITY: ICD-10-CM

## 2020-01-01 DIAGNOSIS — Z20.822 SUSPECTED COVID-19 VIRUS INFECTION: ICD-10-CM

## 2020-01-01 DIAGNOSIS — R09.02 HYPOXIA: ICD-10-CM

## 2020-01-01 DIAGNOSIS — R06.02 SOB (SHORTNESS OF BREATH): Primary | ICD-10-CM

## 2020-01-01 DIAGNOSIS — J96.01 ACUTE RESPIRATORY FAILURE WITH HYPOXIA (HCC): Primary | ICD-10-CM

## 2020-01-01 DIAGNOSIS — Z71.89 GOALS OF CARE, COUNSELING/DISCUSSION: ICD-10-CM

## 2020-01-01 LAB
ALBUMIN SERPL-MCNC: 2.6 G/DL (ref 3.5–5)
ALBUMIN SERPL-MCNC: 2.7 G/DL (ref 3.5–5)
ALBUMIN SERPL-MCNC: 2.8 G/DL (ref 3.5–5)
ALBUMIN SERPL-MCNC: 2.9 G/DL (ref 3.5–5)
ALBUMIN SERPL-MCNC: 2.9 G/DL (ref 3.5–5)
ALBUMIN SERPL-MCNC: 3.2 G/DL (ref 3.5–5)
ALBUMIN SERPL-MCNC: 3.4 G/DL (ref 3.5–5)
ALBUMIN/GLOB SERPL: 0.6 {RATIO} (ref 1.1–2.2)
ALBUMIN/GLOB SERPL: 0.7 {RATIO} (ref 1.1–2.2)
ALBUMIN/GLOB SERPL: 0.7 {RATIO} (ref 1.1–2.2)
ALBUMIN/GLOB SERPL: 0.8 {RATIO} (ref 1.1–2.2)
ALBUMIN/GLOB SERPL: 0.8 {RATIO} (ref 1.1–2.2)
ALBUMIN/GLOB SERPL: 0.9 {RATIO} (ref 1.1–2.2)
ALBUMIN/GLOB SERPL: 0.9 {RATIO} (ref 1.1–2.2)
ALP SERPL-CCNC: 67 U/L (ref 45–117)
ALP SERPL-CCNC: 68 U/L (ref 45–117)
ALP SERPL-CCNC: 69 U/L (ref 45–117)
ALP SERPL-CCNC: 72 U/L (ref 45–117)
ALP SERPL-CCNC: 75 U/L (ref 45–117)
ALP SERPL-CCNC: 77 U/L (ref 45–117)
ALP SERPL-CCNC: 88 U/L (ref 45–117)
ALT SERPL-CCNC: 15 U/L (ref 12–78)
ALT SERPL-CCNC: 19 U/L (ref 12–78)
ALT SERPL-CCNC: 20 U/L (ref 12–78)
ALT SERPL-CCNC: 22 U/L (ref 12–78)
ALT SERPL-CCNC: 23 U/L (ref 12–78)
ALT SERPL-CCNC: 24 U/L (ref 12–78)
ALT SERPL-CCNC: 26 U/L (ref 12–78)
ANION GAP SERPL CALC-SCNC: 0 MMOL/L (ref 5–15)
ANION GAP SERPL CALC-SCNC: 0 MMOL/L (ref 5–15)
ANION GAP SERPL CALC-SCNC: 2 MMOL/L (ref 5–15)
ANION GAP SERPL CALC-SCNC: 3 MMOL/L (ref 5–15)
ANION GAP SERPL CALC-SCNC: 3 MMOL/L (ref 5–15)
ANION GAP SERPL CALC-SCNC: 5 MMOL/L (ref 5–15)
ANION GAP SERPL CALC-SCNC: 6 MMOL/L (ref 5–15)
ANION GAP SERPL CALC-SCNC: 7 MMOL/L (ref 5–15)
APTT PPP: 33.4 SEC (ref 22.1–32)
ARTERIAL PATENCY WRIST A: ABNORMAL
ARTERIAL PATENCY WRIST A: ABNORMAL
ARTERIAL PATENCY WRIST A: YES
AST SERPL-CCNC: 10 U/L (ref 15–37)
AST SERPL-CCNC: 10 U/L (ref 15–37)
AST SERPL-CCNC: 12 U/L (ref 15–37)
AST SERPL-CCNC: 16 U/L (ref 15–37)
AST SERPL-CCNC: 16 U/L (ref 15–37)
AST SERPL-CCNC: 18 U/L (ref 15–37)
AST SERPL-CCNC: 30 U/L (ref 15–37)
ATRIAL RATE: 108 BPM
ATRIAL RATE: 112 BPM
ATRIAL RATE: 114 BPM
ATRIAL RATE: 86 BPM
ATRIAL RATE: 94 BPM
AV VELOCITY RATIO: 0.87
B PERT DNA SPEC QL NAA+PROBE: NOT DETECTED
BACTERIA SPEC CULT: ABNORMAL
BACTERIA SPEC CULT: ABNORMAL
BACTERIA SPEC CULT: NORMAL
BASE EXCESS BLD CALC-SCNC: 12 MMOL/L
BASE EXCESS BLD CALC-SCNC: 12 MMOL/L
BASE EXCESS BLD CALC-SCNC: 13 MMOL/L
BASE EXCESS BLD CALC-SCNC: 14 MMOL/L
BASE EXCESS BLD CALC-SCNC: 17 MMOL/L
BASE EXCESS BLD CALC-SCNC: 17 MMOL/L
BASE EXCESS BLD CALC-SCNC: 19 MMOL/L
BASE EXCESS BLD CALC-SCNC: 20 MMOL/L
BASE EXCESS BLD CALC-SCNC: 22 MMOL/L
BASE EXCESS BLD CALC-SCNC: 5 MMOL/L
BASE EXCESS BLD CALC-SCNC: 9 MMOL/L
BASE EXCESS BLD CALC-SCNC: 9 MMOL/L
BASOPHILS # BLD: 0 K/UL (ref 0–0.1)
BASOPHILS # BLD: 0.1 K/UL (ref 0–0.1)
BASOPHILS # BLD: 0.1 K/UL (ref 0–0.1)
BASOPHILS NFR BLD: 0 % (ref 0–1)
BASOPHILS NFR BLD: 1 % (ref 0–1)
BDY SITE: ABNORMAL
BILIRUB SERPL-MCNC: 0.2 MG/DL (ref 0.2–1)
BILIRUB SERPL-MCNC: 0.3 MG/DL (ref 0.2–1)
BILIRUB SERPL-MCNC: 0.3 MG/DL (ref 0.2–1)
BILIRUB SERPL-MCNC: 0.4 MG/DL (ref 0.2–1)
BNP SERPL-MCNC: 52 PG/ML
BNP SERPL-MCNC: 58 PG/ML
BNP SERPL-MCNC: 91 PG/ML
BORDETELLA PARAPERTUSSIS PCR, BORPAR: NOT DETECTED
BUN SERPL-MCNC: 10 MG/DL (ref 6–20)
BUN SERPL-MCNC: 10 MG/DL (ref 6–20)
BUN SERPL-MCNC: 13 MG/DL (ref 6–20)
BUN SERPL-MCNC: 15 MG/DL (ref 6–20)
BUN SERPL-MCNC: 15 MG/DL (ref 6–20)
BUN SERPL-MCNC: 16 MG/DL (ref 6–20)
BUN SERPL-MCNC: 17 MG/DL (ref 6–20)
BUN SERPL-MCNC: 18 MG/DL (ref 6–20)
BUN SERPL-MCNC: 18 MG/DL (ref 6–20)
BUN SERPL-MCNC: 19 MG/DL (ref 6–20)
BUN SERPL-MCNC: 19 MG/DL (ref 6–20)
BUN SERPL-MCNC: 20 MG/DL (ref 6–20)
BUN SERPL-MCNC: 20 MG/DL (ref 6–20)
BUN SERPL-MCNC: 22 MG/DL (ref 6–20)
BUN SERPL-MCNC: 24 MG/DL (ref 6–20)
BUN/CREAT SERPL: 15 (ref 12–20)
BUN/CREAT SERPL: 20 (ref 12–20)
BUN/CREAT SERPL: 21 (ref 12–20)
BUN/CREAT SERPL: 22 (ref 12–20)
BUN/CREAT SERPL: 23 (ref 12–20)
BUN/CREAT SERPL: 25 (ref 12–20)
BUN/CREAT SERPL: 25 (ref 12–20)
BUN/CREAT SERPL: 26 (ref 12–20)
BUN/CREAT SERPL: 27 (ref 12–20)
BUN/CREAT SERPL: 28 (ref 12–20)
BUN/CREAT SERPL: 29 (ref 12–20)
BUN/CREAT SERPL: 30 (ref 12–20)
BUN/CREAT SERPL: 33 (ref 12–20)
BUN/CREAT SERPL: 33 (ref 12–20)
BUN/CREAT SERPL: 34 (ref 12–20)
BUN/CREAT SERPL: 35 (ref 12–20)
BUN/CREAT SERPL: 37 (ref 12–20)
C PNEUM DNA SPEC QL NAA+PROBE: NOT DETECTED
CA-I BLD-SCNC: 1.2 MMOL/L (ref 1.12–1.32)
CA-I BLD-SCNC: 1.21 MMOL/L (ref 1.12–1.32)
CA-I BLD-SCNC: 1.22 MMOL/L (ref 1.12–1.32)
CA-I BLD-SCNC: 1.26 MMOL/L (ref 1.12–1.32)
CA-I BLD-SCNC: 1.27 MMOL/L (ref 1.12–1.32)
CA-I BLD-SCNC: 1.28 MMOL/L (ref 1.12–1.32)
CA-I BLD-SCNC: 1.28 MMOL/L (ref 1.12–1.32)
CA-I BLD-SCNC: 1.29 MMOL/L (ref 1.12–1.32)
CA-I BLD-SCNC: 1.3 MMOL/L (ref 1.12–1.32)
CA-I BLD-SCNC: 1.35 MMOL/L (ref 1.12–1.32)
CA-I BLD-SCNC: 1.4 MMOL/L (ref 1.12–1.32)
CALCIUM SERPL-MCNC: 10.3 MG/DL (ref 8.5–10.1)
CALCIUM SERPL-MCNC: 10.5 MG/DL (ref 8.5–10.1)
CALCIUM SERPL-MCNC: 9.2 MG/DL (ref 8.5–10.1)
CALCIUM SERPL-MCNC: 9.2 MG/DL (ref 8.5–10.1)
CALCIUM SERPL-MCNC: 9.3 MG/DL (ref 8.5–10.1)
CALCIUM SERPL-MCNC: 9.4 MG/DL (ref 8.5–10.1)
CALCIUM SERPL-MCNC: 9.4 MG/DL (ref 8.5–10.1)
CALCIUM SERPL-MCNC: 9.5 MG/DL (ref 8.5–10.1)
CALCIUM SERPL-MCNC: 9.7 MG/DL (ref 8.5–10.1)
CALCIUM SERPL-MCNC: 9.7 MG/DL (ref 8.5–10.1)
CALCIUM SERPL-MCNC: 9.8 MG/DL (ref 8.5–10.1)
CALCIUM SERPL-MCNC: 9.9 MG/DL (ref 8.5–10.1)
CALCULATED P AXIS, ECG09: 53 DEGREES
CALCULATED P AXIS, ECG09: 70 DEGREES
CALCULATED P AXIS, ECG09: 75 DEGREES
CALCULATED P AXIS, ECG09: 89 DEGREES
CALCULATED R AXIS, ECG10: 145 DEGREES
CALCULATED R AXIS, ECG10: 77 DEGREES
CALCULATED R AXIS, ECG10: 77 DEGREES
CALCULATED R AXIS, ECG10: 79 DEGREES
CALCULATED R AXIS, ECG10: 90 DEGREES
CALCULATED T AXIS, ECG11: 141 DEGREES
CALCULATED T AXIS, ECG11: 34 DEGREES
CALCULATED T AXIS, ECG11: 53 DEGREES
CALCULATED T AXIS, ECG11: 70 DEGREES
CALCULATED T AXIS, ECG11: 82 DEGREES
CHLORIDE SERPL-SCNC: 100 MMOL/L (ref 97–108)
CHLORIDE SERPL-SCNC: 100 MMOL/L (ref 97–108)
CHLORIDE SERPL-SCNC: 102 MMOL/L (ref 97–108)
CHLORIDE SERPL-SCNC: 103 MMOL/L (ref 97–108)
CHLORIDE SERPL-SCNC: 103 MMOL/L (ref 97–108)
CHLORIDE SERPL-SCNC: 104 MMOL/L (ref 97–108)
CHLORIDE SERPL-SCNC: 91 MMOL/L (ref 97–108)
CHLORIDE SERPL-SCNC: 92 MMOL/L (ref 97–108)
CHLORIDE SERPL-SCNC: 94 MMOL/L (ref 97–108)
CHLORIDE SERPL-SCNC: 95 MMOL/L (ref 97–108)
CHLORIDE SERPL-SCNC: 97 MMOL/L (ref 97–108)
CHLORIDE SERPL-SCNC: 98 MMOL/L (ref 97–108)
CHLORIDE SERPL-SCNC: 99 MMOL/L (ref 97–108)
CHLORIDE SERPL-SCNC: 99 MMOL/L (ref 97–108)
CK SERPL-CCNC: 241 U/L (ref 39–308)
CO2 SERPL-SCNC: 27 MMOL/L (ref 21–32)
CO2 SERPL-SCNC: 30 MMOL/L (ref 21–32)
CO2 SERPL-SCNC: 31 MMOL/L (ref 21–32)
CO2 SERPL-SCNC: 33 MMOL/L (ref 21–32)
CO2 SERPL-SCNC: 34 MMOL/L (ref 21–32)
CO2 SERPL-SCNC: 34 MMOL/L (ref 21–32)
CO2 SERPL-SCNC: 35 MMOL/L (ref 21–32)
CO2 SERPL-SCNC: 35 MMOL/L (ref 21–32)
CO2 SERPL-SCNC: 36 MMOL/L (ref 21–32)
CO2 SERPL-SCNC: 36 MMOL/L (ref 21–32)
CO2 SERPL-SCNC: 37 MMOL/L (ref 21–32)
CO2 SERPL-SCNC: 37 MMOL/L (ref 21–32)
CO2 SERPL-SCNC: 38 MMOL/L (ref 21–32)
CO2 SERPL-SCNC: 39 MMOL/L (ref 21–32)
CO2 SERPL-SCNC: 39 MMOL/L (ref 21–32)
CO2 SERPL-SCNC: 40 MMOL/L (ref 21–32)
CO2 SERPL-SCNC: 40 MMOL/L (ref 21–32)
CO2 SERPL-SCNC: 41 MMOL/L (ref 21–32)
CO2 SERPL-SCNC: 41 MMOL/L (ref 21–32)
COMMENT, HOLDF: NORMAL
COMMENT, HOLDF: NORMAL
CREAT SERPL-MCNC: 0.43 MG/DL (ref 0.7–1.3)
CREAT SERPL-MCNC: 0.51 MG/DL (ref 0.7–1.3)
CREAT SERPL-MCNC: 0.51 MG/DL (ref 0.7–1.3)
CREAT SERPL-MCNC: 0.55 MG/DL (ref 0.7–1.3)
CREAT SERPL-MCNC: 0.57 MG/DL (ref 0.7–1.3)
CREAT SERPL-MCNC: 0.57 MG/DL (ref 0.7–1.3)
CREAT SERPL-MCNC: 0.58 MG/DL (ref 0.7–1.3)
CREAT SERPL-MCNC: 0.6 MG/DL (ref 0.7–1.3)
CREAT SERPL-MCNC: 0.61 MG/DL (ref 0.7–1.3)
CREAT SERPL-MCNC: 0.62 MG/DL (ref 0.7–1.3)
CREAT SERPL-MCNC: 0.62 MG/DL (ref 0.7–1.3)
CREAT SERPL-MCNC: 0.63 MG/DL (ref 0.7–1.3)
CREAT SERPL-MCNC: 0.64 MG/DL (ref 0.7–1.3)
CREAT SERPL-MCNC: 0.64 MG/DL (ref 0.7–1.3)
CREAT SERPL-MCNC: 0.65 MG/DL (ref 0.7–1.3)
CREAT SERPL-MCNC: 0.66 MG/DL (ref 0.7–1.3)
CREAT SERPL-MCNC: 0.69 MG/DL (ref 0.7–1.3)
CREAT SERPL-MCNC: 0.73 MG/DL (ref 0.7–1.3)
CREAT SERPL-MCNC: 0.74 MG/DL (ref 0.7–1.3)
CREAT SERPL-MCNC: 0.77 MG/DL (ref 0.7–1.3)
CREAT SERPL-MCNC: 0.79 MG/DL (ref 0.7–1.3)
CRP SERPL HS-MCNC: >9.5 MG/L
D DIMER PPP FEU-MCNC: 1.47 MG/L FEU (ref 0–0.65)
D DIMER PPP FEU-MCNC: 1.76 MG/L FEU (ref 0–0.65)
DATE LAST DOSE: NORMAL
DIAGNOSIS, 93000: NORMAL
DIFFERENTIAL METHOD BLD: ABNORMAL
ECHO AO ROOT DIAM: 3.91 CM
ECHO AV AREA PEAK VELOCITY: 2.7 CM2
ECHO AV PEAK GRADIENT: 4.3 MMHG
ECHO AV PEAK VELOCITY: 103.4 CM/S
ECHO EST RA PRESSURE: 5 MMHG
ECHO LA MAJOR AXIS: 3.12 CM
ECHO LA TO AORTIC ROOT RATIO: 0.8
ECHO LV INTERNAL DIMENSION DIASTOLIC: 4.05 CM (ref 4.2–5.9)
ECHO LV INTERNAL DIMENSION SYSTOLIC: 2.61 CM
ECHO LV IVSD: 0.83 CM (ref 0.6–1)
ECHO LV MASS 2D: 131.5 G (ref 88–224)
ECHO LV MASS INDEX 2D: 65.2 G/M2 (ref 49–115)
ECHO LV POSTERIOR WALL DIASTOLIC: 1.03 CM (ref 0.6–1)
ECHO LVOT DIAM: 2 CM
ECHO LVOT PEAK GRADIENT: 3.2 MMHG
ECHO LVOT PEAK VELOCITY: 90.09 CM/S
ECHO PULMONARY ARTERY SYSTOLIC PRESSURE (PASP): 66.4 MMHG
ECHO PV MAX VELOCITY: 85.55 CM/S
ECHO PV PEAK GRADIENT: 2.9 MMHG
ECHO RIGHT VENTRICULAR SYSTOLIC PRESSURE (RVSP): 66.4 MMHG
ECHO TV REGURGITANT MAX VELOCITY: 391.69 CM/S
ECHO TV REGURGITANT PEAK GRADIENT: 61.4 MMHG
EOSINOPHIL # BLD: 0 K/UL (ref 0–0.4)
EOSINOPHIL # BLD: 0.1 K/UL (ref 0–0.4)
EOSINOPHIL # BLD: 0.1 K/UL (ref 0–0.4)
EOSINOPHIL NFR BLD: 0 % (ref 0–7)
EOSINOPHIL NFR BLD: 1 % (ref 0–7)
ERYTHROCYTE [DISTWIDTH] IN BLOOD BY AUTOMATED COUNT: 17.7 % (ref 11.5–14.5)
ERYTHROCYTE [DISTWIDTH] IN BLOOD BY AUTOMATED COUNT: 17.9 % (ref 11.5–14.5)
ERYTHROCYTE [DISTWIDTH] IN BLOOD BY AUTOMATED COUNT: 18 % (ref 11.5–14.5)
ERYTHROCYTE [DISTWIDTH] IN BLOOD BY AUTOMATED COUNT: 18.1 % (ref 11.5–14.5)
ERYTHROCYTE [DISTWIDTH] IN BLOOD BY AUTOMATED COUNT: 18.2 % (ref 11.5–14.5)
ERYTHROCYTE [DISTWIDTH] IN BLOOD BY AUTOMATED COUNT: 18.3 % (ref 11.5–14.5)
ERYTHROCYTE [DISTWIDTH] IN BLOOD BY AUTOMATED COUNT: 18.3 % (ref 11.5–14.5)
ERYTHROCYTE [DISTWIDTH] IN BLOOD BY AUTOMATED COUNT: 18.4 % (ref 11.5–14.5)
ERYTHROCYTE [DISTWIDTH] IN BLOOD BY AUTOMATED COUNT: 18.8 % (ref 11.5–14.5)
ERYTHROCYTE [DISTWIDTH] IN BLOOD BY AUTOMATED COUNT: 19.1 % (ref 11.5–14.5)
ERYTHROCYTE [DISTWIDTH] IN BLOOD BY AUTOMATED COUNT: 19.1 % (ref 11.5–14.5)
ERYTHROCYTE [DISTWIDTH] IN BLOOD BY AUTOMATED COUNT: 19.2 % (ref 11.5–14.5)
ERYTHROCYTE [DISTWIDTH] IN BLOOD BY AUTOMATED COUNT: 19.3 % (ref 11.5–14.5)
ERYTHROCYTE [DISTWIDTH] IN BLOOD BY AUTOMATED COUNT: 19.5 % (ref 11.5–14.5)
EST. AVERAGE GLUCOSE BLD GHB EST-MCNC: 177 MG/DL
FLUAV H1 2009 PAND RNA SPEC QL NAA+PROBE: NOT DETECTED
FLUAV H1 RNA SPEC QL NAA+PROBE: NOT DETECTED
FLUAV H3 RNA SPEC QL NAA+PROBE: NOT DETECTED
FLUAV SUBTYP SPEC NAA+PROBE: NOT DETECTED
FLUBV RNA SPEC QL NAA+PROBE: NOT DETECTED
GAS FLOW.O2 O2 DELIVERY SYS: ABNORMAL L/MIN
GAS FLOW.O2 SETTING OXYMISER: 10 L/M
GAS FLOW.O2 SETTING OXYMISER: 3 L/M
GAS FLOW.O2 SETTING OXYMISER: 4 L/M
GAS FLOW.O2 SETTING OXYMISER: 4 L/M
GAS FLOW.O2 SETTING OXYMISER: 45 L/M
GAS FLOW.O2 SETTING OXYMISER: 5 L/M
GAS FLOW.O2 SETTING OXYMISER: 6 L/M
GAS FLOW.O2 SETTING OXYMISER: 9 L/M
GLOBULIN SER CALC-MCNC: 3.4 G/DL (ref 2–4)
GLOBULIN SER CALC-MCNC: 3.6 G/DL (ref 2–4)
GLOBULIN SER CALC-MCNC: 3.6 G/DL (ref 2–4)
GLOBULIN SER CALC-MCNC: 3.7 G/DL (ref 2–4)
GLOBULIN SER CALC-MCNC: 4.2 G/DL (ref 2–4)
GLOBULIN SER CALC-MCNC: 4.3 G/DL (ref 2–4)
GLOBULIN SER CALC-MCNC: 4.5 G/DL (ref 2–4)
GLUCOSE BLD STRIP.AUTO-MCNC: 100 MG/DL (ref 65–100)
GLUCOSE BLD STRIP.AUTO-MCNC: 102 MG/DL (ref 65–100)
GLUCOSE BLD STRIP.AUTO-MCNC: 104 MG/DL (ref 65–100)
GLUCOSE BLD STRIP.AUTO-MCNC: 106 MG/DL (ref 65–100)
GLUCOSE BLD STRIP.AUTO-MCNC: 106 MG/DL (ref 65–100)
GLUCOSE BLD STRIP.AUTO-MCNC: 107 MG/DL (ref 65–100)
GLUCOSE BLD STRIP.AUTO-MCNC: 109 MG/DL (ref 65–100)
GLUCOSE BLD STRIP.AUTO-MCNC: 112 MG/DL (ref 65–100)
GLUCOSE BLD STRIP.AUTO-MCNC: 113 MG/DL (ref 65–100)
GLUCOSE BLD STRIP.AUTO-MCNC: 113 MG/DL (ref 65–100)
GLUCOSE BLD STRIP.AUTO-MCNC: 116 MG/DL (ref 65–100)
GLUCOSE BLD STRIP.AUTO-MCNC: 118 MG/DL (ref 65–100)
GLUCOSE BLD STRIP.AUTO-MCNC: 120 MG/DL (ref 65–100)
GLUCOSE BLD STRIP.AUTO-MCNC: 124 MG/DL (ref 65–100)
GLUCOSE BLD STRIP.AUTO-MCNC: 127 MG/DL (ref 65–100)
GLUCOSE BLD STRIP.AUTO-MCNC: 127 MG/DL (ref 65–100)
GLUCOSE BLD STRIP.AUTO-MCNC: 128 MG/DL (ref 65–100)
GLUCOSE BLD STRIP.AUTO-MCNC: 129 MG/DL (ref 65–100)
GLUCOSE BLD STRIP.AUTO-MCNC: 130 MG/DL (ref 65–100)
GLUCOSE BLD STRIP.AUTO-MCNC: 131 MG/DL (ref 65–100)
GLUCOSE BLD STRIP.AUTO-MCNC: 135 MG/DL (ref 65–100)
GLUCOSE BLD STRIP.AUTO-MCNC: 137 MG/DL (ref 65–100)
GLUCOSE BLD STRIP.AUTO-MCNC: 137 MG/DL (ref 65–100)
GLUCOSE BLD STRIP.AUTO-MCNC: 141 MG/DL (ref 65–100)
GLUCOSE BLD STRIP.AUTO-MCNC: 144 MG/DL (ref 65–100)
GLUCOSE BLD STRIP.AUTO-MCNC: 148 MG/DL (ref 65–100)
GLUCOSE BLD STRIP.AUTO-MCNC: 150 MG/DL (ref 65–100)
GLUCOSE BLD STRIP.AUTO-MCNC: 150 MG/DL (ref 65–100)
GLUCOSE BLD STRIP.AUTO-MCNC: 152 MG/DL (ref 65–100)
GLUCOSE BLD STRIP.AUTO-MCNC: 154 MG/DL (ref 65–100)
GLUCOSE BLD STRIP.AUTO-MCNC: 155 MG/DL (ref 65–100)
GLUCOSE BLD STRIP.AUTO-MCNC: 157 MG/DL (ref 65–100)
GLUCOSE BLD STRIP.AUTO-MCNC: 159 MG/DL (ref 65–100)
GLUCOSE BLD STRIP.AUTO-MCNC: 160 MG/DL (ref 65–100)
GLUCOSE BLD STRIP.AUTO-MCNC: 161 MG/DL (ref 65–100)
GLUCOSE BLD STRIP.AUTO-MCNC: 161 MG/DL (ref 65–100)
GLUCOSE BLD STRIP.AUTO-MCNC: 162 MG/DL (ref 65–100)
GLUCOSE BLD STRIP.AUTO-MCNC: 163 MG/DL (ref 65–100)
GLUCOSE BLD STRIP.AUTO-MCNC: 163 MG/DL (ref 65–100)
GLUCOSE BLD STRIP.AUTO-MCNC: 164 MG/DL (ref 65–100)
GLUCOSE BLD STRIP.AUTO-MCNC: 165 MG/DL (ref 65–100)
GLUCOSE BLD STRIP.AUTO-MCNC: 165 MG/DL (ref 65–100)
GLUCOSE BLD STRIP.AUTO-MCNC: 172 MG/DL (ref 65–100)
GLUCOSE BLD STRIP.AUTO-MCNC: 173 MG/DL (ref 65–100)
GLUCOSE BLD STRIP.AUTO-MCNC: 174 MG/DL (ref 65–100)
GLUCOSE BLD STRIP.AUTO-MCNC: 176 MG/DL (ref 65–100)
GLUCOSE BLD STRIP.AUTO-MCNC: 180 MG/DL (ref 65–100)
GLUCOSE BLD STRIP.AUTO-MCNC: 180 MG/DL (ref 65–100)
GLUCOSE BLD STRIP.AUTO-MCNC: 182 MG/DL (ref 65–100)
GLUCOSE BLD STRIP.AUTO-MCNC: 182 MG/DL (ref 65–100)
GLUCOSE BLD STRIP.AUTO-MCNC: 184 MG/DL (ref 65–100)
GLUCOSE BLD STRIP.AUTO-MCNC: 186 MG/DL (ref 65–100)
GLUCOSE BLD STRIP.AUTO-MCNC: 187 MG/DL (ref 65–100)
GLUCOSE BLD STRIP.AUTO-MCNC: 190 MG/DL (ref 65–100)
GLUCOSE BLD STRIP.AUTO-MCNC: 191 MG/DL (ref 65–100)
GLUCOSE BLD STRIP.AUTO-MCNC: 191 MG/DL (ref 65–100)
GLUCOSE BLD STRIP.AUTO-MCNC: 192 MG/DL (ref 65–100)
GLUCOSE BLD STRIP.AUTO-MCNC: 193 MG/DL (ref 65–100)
GLUCOSE BLD STRIP.AUTO-MCNC: 201 MG/DL (ref 65–100)
GLUCOSE BLD STRIP.AUTO-MCNC: 202 MG/DL (ref 65–100)
GLUCOSE BLD STRIP.AUTO-MCNC: 203 MG/DL (ref 65–100)
GLUCOSE BLD STRIP.AUTO-MCNC: 206 MG/DL (ref 65–100)
GLUCOSE BLD STRIP.AUTO-MCNC: 220 MG/DL (ref 65–100)
GLUCOSE BLD STRIP.AUTO-MCNC: 221 MG/DL (ref 65–100)
GLUCOSE BLD STRIP.AUTO-MCNC: 236 MG/DL (ref 65–100)
GLUCOSE BLD STRIP.AUTO-MCNC: 239 MG/DL (ref 65–100)
GLUCOSE BLD STRIP.AUTO-MCNC: 240 MG/DL (ref 65–100)
GLUCOSE BLD STRIP.AUTO-MCNC: 255 MG/DL (ref 65–100)
GLUCOSE BLD STRIP.AUTO-MCNC: 260 MG/DL (ref 65–100)
GLUCOSE BLD STRIP.AUTO-MCNC: 260 MG/DL (ref 65–100)
GLUCOSE BLD STRIP.AUTO-MCNC: 268 MG/DL (ref 65–100)
GLUCOSE BLD STRIP.AUTO-MCNC: 273 MG/DL (ref 65–100)
GLUCOSE BLD STRIP.AUTO-MCNC: 274 MG/DL (ref 65–100)
GLUCOSE BLD STRIP.AUTO-MCNC: 279 MG/DL (ref 65–100)
GLUCOSE BLD STRIP.AUTO-MCNC: 284 MG/DL (ref 65–100)
GLUCOSE BLD STRIP.AUTO-MCNC: 78 MG/DL (ref 65–100)
GLUCOSE BLD STRIP.AUTO-MCNC: 94 MG/DL (ref 65–100)
GLUCOSE BLD STRIP.AUTO-MCNC: 95 MG/DL (ref 65–100)
GLUCOSE BLD STRIP.AUTO-MCNC: 98 MG/DL (ref 65–100)
GLUCOSE SERPL-MCNC: 106 MG/DL (ref 65–100)
GLUCOSE SERPL-MCNC: 109 MG/DL (ref 65–100)
GLUCOSE SERPL-MCNC: 119 MG/DL (ref 65–100)
GLUCOSE SERPL-MCNC: 119 MG/DL (ref 65–100)
GLUCOSE SERPL-MCNC: 120 MG/DL (ref 65–100)
GLUCOSE SERPL-MCNC: 122 MG/DL (ref 65–100)
GLUCOSE SERPL-MCNC: 132 MG/DL (ref 65–100)
GLUCOSE SERPL-MCNC: 133 MG/DL (ref 65–100)
GLUCOSE SERPL-MCNC: 135 MG/DL (ref 65–100)
GLUCOSE SERPL-MCNC: 138 MG/DL (ref 65–100)
GLUCOSE SERPL-MCNC: 139 MG/DL (ref 65–100)
GLUCOSE SERPL-MCNC: 145 MG/DL (ref 65–100)
GLUCOSE SERPL-MCNC: 152 MG/DL (ref 65–100)
GLUCOSE SERPL-MCNC: 154 MG/DL (ref 65–100)
GLUCOSE SERPL-MCNC: 155 MG/DL (ref 65–100)
GLUCOSE SERPL-MCNC: 158 MG/DL (ref 65–100)
GLUCOSE SERPL-MCNC: 163 MG/DL (ref 65–100)
GLUCOSE SERPL-MCNC: 170 MG/DL (ref 65–100)
GLUCOSE SERPL-MCNC: 222 MG/DL (ref 65–100)
GLUCOSE SERPL-MCNC: 238 MG/DL (ref 65–100)
GLUCOSE SERPL-MCNC: 96 MG/DL (ref 65–100)
GRAM STN SPEC: ABNORMAL
HADV DNA SPEC QL NAA+PROBE: NOT DETECTED
HBA1C MFR BLD: 7.8 % (ref 4–5.6)
HBV SURFACE AG SER QL: <0.1 INDEX
HBV SURFACE AG SER QL: NEGATIVE
HCO3 BLD-SCNC: 31.9 MMOL/L (ref 22–26)
HCO3 BLD-SCNC: 33.8 MMOL/L (ref 22–26)
HCO3 BLD-SCNC: 35.1 MMOL/L (ref 22–26)
HCO3 BLD-SCNC: 36.7 MMOL/L (ref 22–26)
HCO3 BLD-SCNC: 37.7 MMOL/L (ref 22–26)
HCO3 BLD-SCNC: 38 MMOL/L (ref 22–26)
HCO3 BLD-SCNC: 38.6 MMOL/L (ref 22–26)
HCO3 BLD-SCNC: 41.9 MMOL/L (ref 22–26)
HCO3 BLD-SCNC: 42.4 MMOL/L (ref 22–26)
HCO3 BLD-SCNC: 43.9 MMOL/L (ref 22–26)
HCO3 BLD-SCNC: 44.9 MMOL/L (ref 22–26)
HCO3 BLD-SCNC: 46.9 MMOL/L (ref 22–26)
HCOV 229E RNA SPEC QL NAA+PROBE: NOT DETECTED
HCOV HKU1 RNA SPEC QL NAA+PROBE: NOT DETECTED
HCOV NL63 RNA SPEC QL NAA+PROBE: NOT DETECTED
HCOV OC43 RNA SPEC QL NAA+PROBE: NOT DETECTED
HCT VFR BLD AUTO: 31.7 % (ref 36.6–50.3)
HCT VFR BLD AUTO: 32.1 % (ref 36.6–50.3)
HCT VFR BLD AUTO: 32.5 % (ref 36.6–50.3)
HCT VFR BLD AUTO: 34.1 % (ref 36.6–50.3)
HCT VFR BLD AUTO: 36.5 % (ref 36.6–50.3)
HCT VFR BLD AUTO: 37 % (ref 36.6–50.3)
HCT VFR BLD AUTO: 37 % (ref 36.6–50.3)
HCT VFR BLD AUTO: 37.1 % (ref 36.6–50.3)
HCT VFR BLD AUTO: 37.3 % (ref 36.6–50.3)
HCT VFR BLD AUTO: 37.6 % (ref 36.6–50.3)
HCT VFR BLD AUTO: 38.8 % (ref 36.6–50.3)
HCT VFR BLD AUTO: 38.9 % (ref 36.6–50.3)
HCT VFR BLD AUTO: 39.3 % (ref 36.6–50.3)
HCT VFR BLD AUTO: 39.5 % (ref 36.6–50.3)
HCT VFR BLD AUTO: 40 % (ref 36.6–50.3)
HCT VFR BLD AUTO: 40.7 % (ref 36.6–50.3)
HCT VFR BLD AUTO: 41.3 % (ref 36.6–50.3)
HCT VFR BLD AUTO: 41.5 % (ref 36.6–50.3)
HCT VFR BLD AUTO: 46.3 % (ref 36.6–50.3)
HCT VFR BLD AUTO: 47.1 % (ref 36.6–50.3)
HCV AB SERPL QL IA: NONREACTIVE
HCV COMMENT,HCGAC: NORMAL
HGB BLD-MCNC: 10.1 G/DL (ref 12.1–17)
HGB BLD-MCNC: 10.7 G/DL (ref 12.1–17)
HGB BLD-MCNC: 10.9 G/DL (ref 12.1–17)
HGB BLD-MCNC: 10.9 G/DL (ref 12.1–17)
HGB BLD-MCNC: 11 G/DL (ref 12.1–17)
HGB BLD-MCNC: 11 G/DL (ref 12.1–17)
HGB BLD-MCNC: 11.1 G/DL (ref 12.1–17)
HGB BLD-MCNC: 11.2 G/DL (ref 12.1–17)
HGB BLD-MCNC: 11.3 G/DL (ref 12.1–17)
HGB BLD-MCNC: 11.5 G/DL (ref 12.1–17)
HGB BLD-MCNC: 11.7 G/DL (ref 12.1–17)
HGB BLD-MCNC: 11.8 G/DL (ref 12.1–17)
HGB BLD-MCNC: 11.8 G/DL (ref 12.1–17)
HGB BLD-MCNC: 12.2 G/DL (ref 12.1–17)
HGB BLD-MCNC: 12.3 G/DL (ref 12.1–17)
HGB BLD-MCNC: 13.3 G/DL (ref 12.1–17)
HGB BLD-MCNC: 13.5 G/DL (ref 12.1–17)
HGB BLD-MCNC: 9.3 G/DL (ref 12.1–17)
HGB BLD-MCNC: 9.4 G/DL (ref 12.1–17)
HGB BLD-MCNC: 9.5 G/DL (ref 12.1–17)
HIV1 P24 AG SERPL QL IA: NONREACTIVE
HIV1+2 AB SERPL QL IA: NONREACTIVE
HMPV RNA SPEC QL NAA+PROBE: NOT DETECTED
HPIV1 RNA SPEC QL NAA+PROBE: NOT DETECTED
HPIV2 RNA SPEC QL NAA+PROBE: NOT DETECTED
HPIV3 RNA SPEC QL NAA+PROBE: NOT DETECTED
HPIV4 RNA SPEC QL NAA+PROBE: NOT DETECTED
IL6 SERPL-MCNC: 15.1 PG/ML (ref 0–15.5)
IMM GRANULOCYTES # BLD AUTO: 0 K/UL
IMM GRANULOCYTES # BLD AUTO: 0 K/UL
IMM GRANULOCYTES # BLD AUTO: 0 K/UL (ref 0–0.04)
IMM GRANULOCYTES # BLD AUTO: 0.1 K/UL (ref 0–0.04)
IMM GRANULOCYTES # BLD AUTO: 0.2 K/UL (ref 0–0.04)
IMM GRANULOCYTES # BLD AUTO: 0.3 K/UL (ref 0–0.04)
IMM GRANULOCYTES NFR BLD AUTO: 0 %
IMM GRANULOCYTES NFR BLD AUTO: 0 %
IMM GRANULOCYTES NFR BLD AUTO: 0 % (ref 0–0.5)
IMM GRANULOCYTES NFR BLD AUTO: 1 % (ref 0–0.5)
IMM GRANULOCYTES NFR BLD AUTO: 2 % (ref 0–0.5)
IMM GRANULOCYTES NFR BLD AUTO: 3 % (ref 0–0.5)
INR PPP: 1.1 (ref 0.9–1.1)
LACTATE SERPL-SCNC: 1.2 MMOL/L (ref 0.4–2)
LDH SERPL L TO P-CCNC: 190 U/L (ref 85–241)
LVFS 2D: 35.42 %
LYMPHOCYTES # BLD: 0.3 K/UL (ref 0.8–3.5)
LYMPHOCYTES # BLD: 0.5 K/UL (ref 0.8–3.5)
LYMPHOCYTES # BLD: 1 K/UL (ref 0.8–3.5)
LYMPHOCYTES # BLD: 1.2 K/UL (ref 0.8–3.5)
LYMPHOCYTES # BLD: 1.3 K/UL (ref 0.8–3.5)
LYMPHOCYTES # BLD: 1.6 K/UL (ref 0.8–3.5)
LYMPHOCYTES # BLD: 1.6 K/UL (ref 0.8–3.5)
LYMPHOCYTES # BLD: 1.7 K/UL (ref 0.8–3.5)
LYMPHOCYTES # BLD: 1.8 K/UL (ref 0.8–3.5)
LYMPHOCYTES # BLD: 1.8 K/UL (ref 0.8–3.5)
LYMPHOCYTES # BLD: 1.9 K/UL (ref 0.8–3.5)
LYMPHOCYTES # BLD: 3 K/UL (ref 0.8–3.5)
LYMPHOCYTES # BLD: 3.3 K/UL (ref 0.8–3.5)
LYMPHOCYTES NFR BLD: 13 % (ref 12–49)
LYMPHOCYTES NFR BLD: 16 % (ref 12–49)
LYMPHOCYTES NFR BLD: 20 % (ref 12–49)
LYMPHOCYTES NFR BLD: 21 % (ref 12–49)
LYMPHOCYTES NFR BLD: 22 % (ref 12–49)
LYMPHOCYTES NFR BLD: 22 % (ref 12–49)
LYMPHOCYTES NFR BLD: 23 % (ref 12–49)
LYMPHOCYTES NFR BLD: 30 % (ref 12–49)
LYMPHOCYTES NFR BLD: 34 % (ref 12–49)
LYMPHOCYTES NFR BLD: 4 % (ref 12–49)
LYMPHOCYTES NFR BLD: 5 % (ref 12–49)
M PNEUMO DNA SPEC QL NAA+PROBE: NOT DETECTED
MAGNESIUM SERPL-MCNC: 1.9 MG/DL (ref 1.6–2.4)
MAGNESIUM SERPL-MCNC: 1.9 MG/DL (ref 1.6–2.4)
MAGNESIUM SERPL-MCNC: 2 MG/DL (ref 1.6–2.4)
MAGNESIUM SERPL-MCNC: 2.1 MG/DL (ref 1.6–2.4)
MAGNESIUM SERPL-MCNC: 2.2 MG/DL (ref 1.6–2.4)
MAGNESIUM SERPL-MCNC: 2.9 MG/DL (ref 1.6–2.4)
MCH RBC QN AUTO: 25.4 PG (ref 26–34)
MCH RBC QN AUTO: 25.4 PG (ref 26–34)
MCH RBC QN AUTO: 25.5 PG (ref 26–34)
MCH RBC QN AUTO: 25.6 PG (ref 26–34)
MCH RBC QN AUTO: 25.7 PG (ref 26–34)
MCH RBC QN AUTO: 25.7 PG (ref 26–34)
MCH RBC QN AUTO: 25.8 PG (ref 26–34)
MCH RBC QN AUTO: 25.9 PG (ref 26–34)
MCH RBC QN AUTO: 26 PG (ref 26–34)
MCH RBC QN AUTO: 26.1 PG (ref 26–34)
MCH RBC QN AUTO: 26.1 PG (ref 26–34)
MCH RBC QN AUTO: 26.4 PG (ref 26–34)
MCHC RBC AUTO-ENTMCNC: 28 G/DL (ref 30–36.5)
MCHC RBC AUTO-ENTMCNC: 28.7 G/DL (ref 30–36.5)
MCHC RBC AUTO-ENTMCNC: 28.7 G/DL (ref 30–36.5)
MCHC RBC AUTO-ENTMCNC: 29 G/DL (ref 30–36.5)
MCHC RBC AUTO-ENTMCNC: 29 G/DL (ref 30–36.5)
MCHC RBC AUTO-ENTMCNC: 29.2 G/DL (ref 30–36.5)
MCHC RBC AUTO-ENTMCNC: 29.3 G/DL (ref 30–36.5)
MCHC RBC AUTO-ENTMCNC: 29.4 G/DL (ref 30–36.5)
MCHC RBC AUTO-ENTMCNC: 29.4 G/DL (ref 30–36.5)
MCHC RBC AUTO-ENTMCNC: 29.5 G/DL (ref 30–36.5)
MCHC RBC AUTO-ENTMCNC: 29.6 G/DL (ref 30–36.5)
MCHC RBC AUTO-ENTMCNC: 29.7 G/DL (ref 30–36.5)
MCHC RBC AUTO-ENTMCNC: 29.8 G/DL (ref 30–36.5)
MCHC RBC AUTO-ENTMCNC: 30 G/DL (ref 30–36.5)
MCV RBC AUTO: 86 FL (ref 80–99)
MCV RBC AUTO: 86.9 FL (ref 80–99)
MCV RBC AUTO: 87 FL (ref 80–99)
MCV RBC AUTO: 87.1 FL (ref 80–99)
MCV RBC AUTO: 87.2 FL (ref 80–99)
MCV RBC AUTO: 87.4 FL (ref 80–99)
MCV RBC AUTO: 87.5 FL (ref 80–99)
MCV RBC AUTO: 87.7 FL (ref 80–99)
MCV RBC AUTO: 87.8 FL (ref 80–99)
MCV RBC AUTO: 87.8 FL (ref 80–99)
MCV RBC AUTO: 87.9 FL (ref 80–99)
MCV RBC AUTO: 88.2 FL (ref 80–99)
MCV RBC AUTO: 88.3 FL (ref 80–99)
MCV RBC AUTO: 88.6 FL (ref 80–99)
MCV RBC AUTO: 88.6 FL (ref 80–99)
MCV RBC AUTO: 88.7 FL (ref 80–99)
MCV RBC AUTO: 89.1 FL (ref 80–99)
MCV RBC AUTO: 89.9 FL (ref 80–99)
MCV RBC AUTO: 89.9 FL (ref 80–99)
MCV RBC AUTO: 90.7 FL (ref 80–99)
MONOCYTES # BLD: 0.1 K/UL (ref 0–1)
MONOCYTES # BLD: 0.4 K/UL (ref 0–1)
MONOCYTES # BLD: 0.5 K/UL (ref 0–1)
MONOCYTES # BLD: 0.5 K/UL (ref 0–1)
MONOCYTES # BLD: 0.6 K/UL (ref 0–1)
MONOCYTES # BLD: 0.7 K/UL (ref 0–1)
MONOCYTES # BLD: 0.8 K/UL (ref 0–1)
MONOCYTES # BLD: 0.8 K/UL (ref 0–1)
MONOCYTES # BLD: 0.9 K/UL (ref 0–1)
MONOCYTES NFR BLD: 10 % (ref 5–13)
MONOCYTES NFR BLD: 11 % (ref 5–13)
MONOCYTES NFR BLD: 2 % (ref 5–13)
MONOCYTES NFR BLD: 4 % (ref 5–13)
MONOCYTES NFR BLD: 4 % (ref 5–13)
MONOCYTES NFR BLD: 5 % (ref 5–13)
MONOCYTES NFR BLD: 5 % (ref 5–13)
MONOCYTES NFR BLD: 8 % (ref 5–13)
MONOCYTES NFR BLD: 8 % (ref 5–13)
MONOCYTES NFR BLD: 9 % (ref 5–13)
MYELOCYTES NFR BLD MANUAL: 1 %
MYELOCYTES NFR BLD MANUAL: 1 %
NEUTS BAND NFR BLD MANUAL: 2 % (ref 0–6)
NEUTS BAND NFR BLD MANUAL: 5 % (ref 0–6)
NEUTS SEG # BLD: 3 K/UL (ref 1.8–8)
NEUTS SEG # BLD: 5.3 K/UL (ref 1.8–8)
NEUTS SEG # BLD: 5.4 K/UL (ref 1.8–8)
NEUTS SEG # BLD: 5.4 K/UL (ref 1.8–8)
NEUTS SEG # BLD: 5.7 K/UL (ref 1.8–8)
NEUTS SEG # BLD: 5.8 K/UL (ref 1.8–8)
NEUTS SEG # BLD: 6.2 K/UL (ref 1.8–8)
NEUTS SEG # BLD: 6.6 K/UL (ref 1.8–8)
NEUTS SEG # BLD: 6.8 K/UL (ref 1.8–8)
NEUTS SEG # BLD: 8.1 K/UL (ref 1.8–8)
NEUTS SEG # BLD: 9.7 K/UL (ref 1.8–8)
NEUTS SEG NFR BLD: 56 % (ref 32–75)
NEUTS SEG NFR BLD: 64 % (ref 32–75)
NEUTS SEG NFR BLD: 64 % (ref 32–75)
NEUTS SEG NFR BLD: 65 % (ref 32–75)
NEUTS SEG NFR BLD: 66 % (ref 32–75)
NEUTS SEG NFR BLD: 67 % (ref 32–75)
NEUTS SEG NFR BLD: 67 % (ref 32–75)
NEUTS SEG NFR BLD: 69 % (ref 32–75)
NEUTS SEG NFR BLD: 70 % (ref 32–75)
NEUTS SEG NFR BLD: 78 % (ref 32–75)
NEUTS SEG NFR BLD: 81 % (ref 32–75)
NEUTS SEG NFR BLD: 88 % (ref 32–75)
NEUTS SEG NFR BLD: 90 % (ref 32–75)
NRBC # BLD: 0 K/UL (ref 0–0.01)
NRBC # BLD: 0.02 K/UL (ref 0–0.01)
NRBC # BLD: 0.03 K/UL (ref 0–0.01)
NRBC # BLD: 0.03 K/UL (ref 0–0.01)
NRBC # BLD: 0.05 K/UL (ref 0–0.01)
NRBC # BLD: 0.05 K/UL (ref 0–0.01)
NRBC # BLD: 0.11 K/UL (ref 0–0.01)
NRBC BLD-RTO: 0 PER 100 WBC
NRBC BLD-RTO: 0.2 PER 100 WBC
NRBC BLD-RTO: 0.3 PER 100 WBC
NRBC BLD-RTO: 0.4 PER 100 WBC
NRBC BLD-RTO: 0.5 PER 100 WBC
NRBC BLD-RTO: 0.8 PER 100 WBC
O2/TOTAL GAS SETTING VFR VENT: 0.32 %
O2/TOTAL GAS SETTING VFR VENT: 0.4 %
O2/TOTAL GAS SETTING VFR VENT: 0.5 %
O2/TOTAL GAS SETTING VFR VENT: 40 %
O2/TOTAL GAS SETTING VFR VENT: 44 %
O2/TOTAL GAS SETTING VFR VENT: 50 %
O2/TOTAL GAS SETTING VFR VENT: 70 %
P-R INTERVAL, ECG05: 112 MS
P-R INTERVAL, ECG05: 118 MS
P-R INTERVAL, ECG05: 118 MS
P-R INTERVAL, ECG05: 120 MS
P-R INTERVAL, ECG05: 130 MS
PCO2 BLD: 56.9 MMHG (ref 35–45)
PCO2 BLD: 59.2 MMHG (ref 35–45)
PCO2 BLD: 61.7 MMHG (ref 35–45)
PCO2 BLD: 62 MMHG (ref 35–45)
PCO2 BLD: 62.6 MMHG (ref 35–45)
PCO2 BLD: 64.9 MMHG (ref 35–45)
PCO2 BLD: 67.6 MMHG (ref 35–45)
PCO2 BLD: 67.9 MMHG (ref 35–45)
PCO2 BLD: 68.2 MMHG (ref 35–45)
PCO2 BLD: 79.1 MMHG (ref 35–45)
PCO2 BLD: 79.8 MMHG (ref 35–45)
PCO2 BLD: 80 MMHG (ref 35–45)
PCO2 BLD: >90 MMHG (ref 35–45)
PCO2 BLD: >90 MMHG (ref 35–45)
PEEP RESPIRATORY: 6 CMH2O
PH BLD: 7.24 [PH] (ref 7.35–7.45)
PH BLD: 7.28 [PH] (ref 7.35–7.45)
PH BLD: 7.29 [PH] (ref 7.35–7.45)
PH BLD: 7.33 [PH] (ref 7.35–7.45)
PH BLD: 7.34 [PH] (ref 7.35–7.45)
PH BLD: 7.36 [PH] (ref 7.35–7.45)
PH BLD: 7.36 [PH] (ref 7.35–7.45)
PH BLD: 7.38 [PH] (ref 7.35–7.45)
PH BLD: 7.38 [PH] (ref 7.35–7.45)
PH BLD: 7.39 [PH] (ref 7.35–7.45)
PH BLD: 7.4 [PH] (ref 7.35–7.45)
PH BLD: 7.4 [PH] (ref 7.35–7.45)
PH BLD: 7.41 [PH] (ref 7.35–7.45)
PH BLD: 7.45 [PH] (ref 7.35–7.45)
PHOSPHATE SERPL-MCNC: 2.5 MG/DL (ref 2.6–4.7)
PHOSPHATE SERPL-MCNC: 2.6 MG/DL (ref 2.6–4.7)
PHOSPHATE SERPL-MCNC: 2.9 MG/DL (ref 2.6–4.7)
PHOSPHATE SERPL-MCNC: 3.6 MG/DL (ref 2.6–4.7)
PHOSPHATE SERPL-MCNC: 3.6 MG/DL (ref 2.6–4.7)
PHOSPHATE SERPL-MCNC: 4 MG/DL (ref 2.6–4.7)
PHOSPHATE SERPL-MCNC: 4 MG/DL (ref 2.6–4.7)
PIP ISTAT,IPIP: 12
PIP ISTAT,IPIP: 12
PIP ISTAT,IPIP: 17
PIP ISTAT,IPIP: 17
PLATELET # BLD AUTO: 213 K/UL (ref 150–400)
PLATELET # BLD AUTO: 226 K/UL (ref 150–400)
PLATELET # BLD AUTO: 255 K/UL (ref 150–400)
PLATELET # BLD AUTO: 274 K/UL (ref 150–400)
PLATELET # BLD AUTO: 293 K/UL (ref 150–400)
PLATELET # BLD AUTO: 337 K/UL (ref 150–400)
PLATELET # BLD AUTO: 339 K/UL (ref 150–400)
PLATELET # BLD AUTO: 359 K/UL (ref 150–400)
PLATELET # BLD AUTO: 362 K/UL (ref 150–400)
PLATELET # BLD AUTO: 375 K/UL (ref 150–400)
PLATELET # BLD AUTO: 380 K/UL (ref 150–400)
PLATELET # BLD AUTO: 386 K/UL (ref 150–400)
PLATELET # BLD AUTO: 393 K/UL (ref 150–400)
PLATELET # BLD AUTO: 397 K/UL (ref 150–400)
PLATELET # BLD AUTO: 411 K/UL (ref 150–400)
PLATELET # BLD AUTO: 418 K/UL (ref 150–400)
PLATELET # BLD AUTO: 418 K/UL (ref 150–400)
PLATELET # BLD AUTO: 431 K/UL (ref 150–400)
PLATELET # BLD AUTO: 440 K/UL (ref 150–400)
PLATELET # BLD AUTO: 444 K/UL (ref 150–400)
PLATELET COMMENTS,PCOM: ABNORMAL
PLATELET COMMENTS,PCOM: ABNORMAL
PMV BLD AUTO: 10 FL (ref 8.9–12.9)
PMV BLD AUTO: 10.1 FL (ref 8.9–12.9)
PMV BLD AUTO: 10.1 FL (ref 8.9–12.9)
PMV BLD AUTO: 10.2 FL (ref 8.9–12.9)
PMV BLD AUTO: 10.3 FL (ref 8.9–12.9)
PMV BLD AUTO: 10.5 FL (ref 8.9–12.9)
PMV BLD AUTO: 9 FL (ref 8.9–12.9)
PMV BLD AUTO: 9.2 FL (ref 8.9–12.9)
PMV BLD AUTO: 9.6 FL (ref 8.9–12.9)
PMV BLD AUTO: 9.7 FL (ref 8.9–12.9)
PMV BLD AUTO: 9.9 FL (ref 8.9–12.9)
PO2 BLD: 112 MMHG (ref 80–100)
PO2 BLD: 35 MMHG (ref 80–100)
PO2 BLD: 46 MMHG (ref 80–100)
PO2 BLD: 53 MMHG (ref 80–100)
PO2 BLD: 57 MMHG (ref 80–100)
PO2 BLD: 59 MMHG (ref 80–100)
PO2 BLD: 62 MMHG (ref 80–100)
PO2 BLD: 64 MMHG (ref 80–100)
PO2 BLD: 66 MMHG (ref 80–100)
PO2 BLD: 71 MMHG (ref 80–100)
PO2 BLD: 74 MMHG (ref 80–100)
PO2 BLD: 86 MMHG (ref 80–100)
POTASSIUM SERPL-SCNC: 3.3 MMOL/L (ref 3.5–5.1)
POTASSIUM SERPL-SCNC: 3.3 MMOL/L (ref 3.5–5.1)
POTASSIUM SERPL-SCNC: 3.5 MMOL/L (ref 3.5–5.1)
POTASSIUM SERPL-SCNC: 3.6 MMOL/L (ref 3.5–5.1)
POTASSIUM SERPL-SCNC: 3.6 MMOL/L (ref 3.5–5.1)
POTASSIUM SERPL-SCNC: 3.7 MMOL/L (ref 3.5–5.1)
POTASSIUM SERPL-SCNC: 3.7 MMOL/L (ref 3.5–5.1)
POTASSIUM SERPL-SCNC: 3.8 MMOL/L (ref 3.5–5.1)
POTASSIUM SERPL-SCNC: 3.9 MMOL/L (ref 3.5–5.1)
POTASSIUM SERPL-SCNC: 4 MMOL/L (ref 3.5–5.1)
POTASSIUM SERPL-SCNC: 4 MMOL/L (ref 3.5–5.1)
POTASSIUM SERPL-SCNC: 4.1 MMOL/L (ref 3.5–5.1)
POTASSIUM SERPL-SCNC: 4.2 MMOL/L (ref 3.5–5.1)
POTASSIUM SERPL-SCNC: 4.7 MMOL/L (ref 3.5–5.1)
POTASSIUM SERPL-SCNC: 4.9 MMOL/L (ref 3.5–5.1)
POTASSIUM SERPL-SCNC: 5.1 MMOL/L (ref 3.5–5.1)
PROCALCITONIN SERPL-MCNC: <0.05 NG/ML
PROCALCITONIN SERPL-MCNC: <0.05 NG/ML
PROT SERPL-MCNC: 6.3 G/DL (ref 6.4–8.2)
PROT SERPL-MCNC: 6.3 G/DL (ref 6.4–8.2)
PROT SERPL-MCNC: 6.5 G/DL (ref 6.4–8.2)
PROT SERPL-MCNC: 6.8 G/DL (ref 6.4–8.2)
PROT SERPL-MCNC: 7 G/DL (ref 6.4–8.2)
PROT SERPL-MCNC: 7 G/DL (ref 6.4–8.2)
PROT SERPL-MCNC: 7.9 G/DL (ref 6.4–8.2)
PROTHROMBIN TIME: 11.4 SEC (ref 9–11.1)
Q-T INTERVAL, ECG07: 322 MS
Q-T INTERVAL, ECG07: 330 MS
Q-T INTERVAL, ECG07: 350 MS
Q-T INTERVAL, ECG07: 376 MS
Q-T INTERVAL, ECG07: 378 MS
QRS DURATION, ECG06: 76 MS
QRS DURATION, ECG06: 88 MS
QTC CALCULATION (BEZET), ECG08: 443 MS
QTC CALCULATION (BEZET), ECG08: 449 MS
QTC CALCULATION (BEZET), ECG08: 450 MS
QTC CALCULATION (BEZET), ECG08: 469 MS
QTC CALCULATION (BEZET), ECG08: 472 MS
RBC # BLD AUTO: 3.61 M/UL (ref 4.1–5.7)
RBC # BLD AUTO: 3.62 M/UL (ref 4.1–5.7)
RBC # BLD AUTO: 3.72 M/UL (ref 4.1–5.7)
RBC # BLD AUTO: 3.92 M/UL (ref 4.1–5.7)
RBC # BLD AUTO: 4.16 M/UL (ref 4.1–5.7)
RBC # BLD AUTO: 4.19 M/UL (ref 4.1–5.7)
RBC # BLD AUTO: 4.21 M/UL (ref 4.1–5.7)
RBC # BLD AUTO: 4.24 M/UL (ref 4.1–5.7)
RBC # BLD AUTO: 4.25 M/UL (ref 4.1–5.7)
RBC # BLD AUTO: 4.37 M/UL (ref 4.1–5.7)
RBC # BLD AUTO: 4.41 M/UL (ref 4.1–5.7)
RBC # BLD AUTO: 4.41 M/UL (ref 4.1–5.7)
RBC # BLD AUTO: 4.42 M/UL (ref 4.1–5.7)
RBC # BLD AUTO: 4.52 M/UL (ref 4.1–5.7)
RBC # BLD AUTO: 4.53 M/UL (ref 4.1–5.7)
RBC # BLD AUTO: 4.57 M/UL (ref 4.1–5.7)
RBC # BLD AUTO: 4.66 M/UL (ref 4.1–5.7)
RBC # BLD AUTO: 4.72 M/UL (ref 4.1–5.7)
RBC # BLD AUTO: 5.15 M/UL (ref 4.1–5.7)
RBC # BLD AUTO: 5.24 M/UL (ref 4.1–5.7)
RBC MORPH BLD: ABNORMAL
REPORTED DOSE,DOSE: NORMAL UNITS
REPORTED DOSE/TIME,TMG: NORMAL
RSV RNA SPEC QL NAA+PROBE: NOT DETECTED
RV+EV RNA SPEC QL NAA+PROBE: NOT DETECTED
SAMPLES BEING HELD,HOLD: NORMAL
SAMPLES BEING HELD,HOLD: NORMAL
SAO2 % BLD: 65 % (ref 92–97)
SAO2 % BLD: 81 % (ref 92–97)
SAO2 % BLD: 86 % (ref 92–97)
SAO2 % BLD: 86 % (ref 92–97)
SAO2 % BLD: 89 % (ref 92–97)
SAO2 % BLD: 90 % (ref 92–97)
SAO2 % BLD: 90 % (ref 92–97)
SAO2 % BLD: 91 % (ref 92–97)
SAO2 % BLD: 93 % (ref 92–97)
SAO2 % BLD: 97 % (ref 92–97)
SARS-COV-2, COV2: DETECTED
SERVICE CMNT-IMP: 40 L/MIN
SERVICE CMNT-IMP: ABNORMAL
SERVICE CMNT-IMP: NORMAL
SODIUM SERPL-SCNC: 134 MMOL/L (ref 136–145)
SODIUM SERPL-SCNC: 136 MMOL/L (ref 136–145)
SODIUM SERPL-SCNC: 137 MMOL/L (ref 136–145)
SODIUM SERPL-SCNC: 138 MMOL/L (ref 136–145)
SODIUM SERPL-SCNC: 139 MMOL/L (ref 136–145)
SODIUM SERPL-SCNC: 140 MMOL/L (ref 136–145)
SPECIMEN SOURCE, FCOV2M: ABNORMAL
SPECIMEN TYPE: ABNORMAL
SPONTANEOUS TIMED, IST: YES
THERAPEUTIC RANGE,PTTT: ABNORMAL SECS (ref 58–77)
TOTAL RESP. RATE, ITRR: 15
TOTAL RESP. RATE, ITRR: 16
TOTAL RESP. RATE, ITRR: 16
TOTAL RESP. RATE, ITRR: 18
TOTAL RESP. RATE, ITRR: 19
TOTAL RESP. RATE, ITRR: 20
TOTAL RESP. RATE, ITRR: 21
TOTAL RESP. RATE, ITRR: 28
TOTAL RESP. RATE, ITRR: 29
TOTAL RESP. RATE, ITRR: 30
TOTAL RESP. RATE, ITRR: 32
TROPONIN I SERPL-MCNC: <0.05 NG/ML
VANCOMYCIN TROUGH SERPL-MCNC: 9.6 UG/ML (ref 5–10)
VENTRICULAR RATE, ECG03: 108 BPM
VENTRICULAR RATE, ECG03: 112 BPM
VENTRICULAR RATE, ECG03: 114 BPM
VENTRICULAR RATE, ECG03: 86 BPM
VENTRICULAR RATE, ECG03: 94 BPM
WBC # BLD AUTO: 10 K/UL (ref 4.1–11.1)
WBC # BLD AUTO: 10.1 K/UL (ref 4.1–11.1)
WBC # BLD AUTO: 10.5 K/UL (ref 4.1–11.1)
WBC # BLD AUTO: 10.7 K/UL (ref 4.1–11.1)
WBC # BLD AUTO: 11 K/UL (ref 4.1–11.1)
WBC # BLD AUTO: 12 K/UL (ref 4.1–11.1)
WBC # BLD AUTO: 12.2 K/UL (ref 4.1–11.1)
WBC # BLD AUTO: 12.4 K/UL (ref 4.1–11.1)
WBC # BLD AUTO: 13.4 K/UL (ref 4.1–11.1)
WBC # BLD AUTO: 4.7 K/UL (ref 4.1–11.1)
WBC # BLD AUTO: 7.3 K/UL (ref 4.1–11.1)
WBC # BLD AUTO: 7.6 K/UL (ref 4.1–11.1)
WBC # BLD AUTO: 7.8 K/UL (ref 4.1–11.1)
WBC # BLD AUTO: 7.8 K/UL (ref 4.1–11.1)
WBC # BLD AUTO: 7.9 K/UL (ref 4.1–11.1)
WBC # BLD AUTO: 8.2 K/UL (ref 4.1–11.1)
WBC # BLD AUTO: 8.6 K/UL (ref 4.1–11.1)
WBC # BLD AUTO: 8.8 K/UL (ref 4.1–11.1)
WBC # BLD AUTO: 8.8 K/UL (ref 4.1–11.1)
WBC # BLD AUTO: 9.5 K/UL (ref 4.1–11.1)

## 2020-01-01 PROCEDURE — 85610 PROTHROMBIN TIME: CPT

## 2020-01-01 PROCEDURE — 92950 HEART/LUNG RESUSCITATION CPR: CPT

## 2020-01-01 PROCEDURE — 74011636637 HC RX REV CODE- 636/637: Performed by: HOSPITALIST

## 2020-01-01 PROCEDURE — 85025 COMPLETE CBC W/AUTO DIFF WBC: CPT

## 2020-01-01 PROCEDURE — 65660000001 HC RM ICU INTERMED STEPDOWN

## 2020-01-01 PROCEDURE — 94640 AIRWAY INHALATION TREATMENT: CPT

## 2020-01-01 PROCEDURE — 82803 BLOOD GASES ANY COMBINATION: CPT

## 2020-01-01 PROCEDURE — 83880 ASSAY OF NATRIURETIC PEPTIDE: CPT

## 2020-01-01 PROCEDURE — 82962 GLUCOSE BLOOD TEST: CPT

## 2020-01-01 PROCEDURE — 5A09357 ASSISTANCE WITH RESPIRATORY VENTILATION, LESS THAN 24 CONSECUTIVE HOURS, CONTINUOUS POSITIVE AIRWAY PRESSURE: ICD-10-PCS | Performed by: INTERNAL MEDICINE

## 2020-01-01 PROCEDURE — 36415 COLL VENOUS BLD VENIPUNCTURE: CPT

## 2020-01-01 PROCEDURE — 74011000258 HC RX REV CODE- 258: Performed by: PHYSICIAN ASSISTANT

## 2020-01-01 PROCEDURE — 74011000250 HC RX REV CODE- 250: Performed by: INTERNAL MEDICINE

## 2020-01-01 PROCEDURE — 97164 PT RE-EVAL EST PLAN CARE: CPT

## 2020-01-01 PROCEDURE — 74011250636 HC RX REV CODE- 250/636: Performed by: INTERNAL MEDICINE

## 2020-01-01 PROCEDURE — 74011250637 HC RX REV CODE- 250/637: Performed by: INTERNAL MEDICINE

## 2020-01-01 PROCEDURE — 83735 ASSAY OF MAGNESIUM: CPT

## 2020-01-01 PROCEDURE — 74011250637 HC RX REV CODE- 250/637: Performed by: HOSPITALIST

## 2020-01-01 PROCEDURE — 84100 ASSAY OF PHOSPHORUS: CPT

## 2020-01-01 PROCEDURE — 80053 COMPREHEN METABOLIC PANEL: CPT

## 2020-01-01 PROCEDURE — 65660000000 HC RM CCU STEPDOWN

## 2020-01-01 PROCEDURE — 85730 THROMBOPLASTIN TIME PARTIAL: CPT

## 2020-01-01 PROCEDURE — 80048 BASIC METABOLIC PNL TOTAL CA: CPT

## 2020-01-01 PROCEDURE — 76937 US GUIDE VASCULAR ACCESS: CPT

## 2020-01-01 PROCEDURE — 97530 THERAPEUTIC ACTIVITIES: CPT

## 2020-01-01 PROCEDURE — 74011250636 HC RX REV CODE- 250/636: Performed by: ANESTHESIOLOGY

## 2020-01-01 PROCEDURE — 77030013140 HC MSK NEB VYRM -A

## 2020-01-01 PROCEDURE — 97110 THERAPEUTIC EXERCISES: CPT

## 2020-01-01 PROCEDURE — 85027 COMPLETE CBC AUTOMATED: CPT

## 2020-01-01 PROCEDURE — 74011000250 HC RX REV CODE- 250: Performed by: EMERGENCY MEDICINE

## 2020-01-01 PROCEDURE — 74011636637 HC RX REV CODE- 636/637: Performed by: PHYSICIAN ASSISTANT

## 2020-01-01 PROCEDURE — 74011250637 HC RX REV CODE- 250/637: Performed by: EMERGENCY MEDICINE

## 2020-01-01 PROCEDURE — 93005 ELECTROCARDIOGRAM TRACING: CPT

## 2020-01-01 PROCEDURE — 77010033678 HC OXYGEN DAILY

## 2020-01-01 PROCEDURE — 71045 X-RAY EXAM CHEST 1 VIEW: CPT

## 2020-01-01 PROCEDURE — C1751 CATH, INF, PER/CENT/MIDLINE: HCPCS

## 2020-01-01 PROCEDURE — 97116 GAIT TRAINING THERAPY: CPT

## 2020-01-01 PROCEDURE — 74011636637 HC RX REV CODE- 636/637: Performed by: NURSE PRACTITIONER

## 2020-01-01 PROCEDURE — 94660 CPAP INITIATION&MGMT: CPT

## 2020-01-01 PROCEDURE — 36600 WITHDRAWAL OF ARTERIAL BLOOD: CPT

## 2020-01-01 PROCEDURE — 74011000258 HC RX REV CODE- 258: Performed by: RADIOLOGY

## 2020-01-01 PROCEDURE — 84484 ASSAY OF TROPONIN QUANT: CPT

## 2020-01-01 PROCEDURE — 83605 ASSAY OF LACTIC ACID: CPT

## 2020-01-01 PROCEDURE — 74011250637 HC RX REV CODE- 250/637: Performed by: PHYSICIAN ASSISTANT

## 2020-01-01 PROCEDURE — 74011250637 HC RX REV CODE- 250/637: Performed by: NURSE PRACTITIONER

## 2020-01-01 PROCEDURE — 65620000000 HC RM CCU GENERAL

## 2020-01-01 PROCEDURE — 74011000250 HC RX REV CODE- 250: Performed by: PHYSICIAN ASSISTANT

## 2020-01-01 PROCEDURE — 94664 DEMO&/EVAL PT USE INHALER: CPT

## 2020-01-01 PROCEDURE — 83615 LACTATE (LD) (LDH) ENZYME: CPT

## 2020-01-01 PROCEDURE — 02HV33Z INSERTION OF INFUSION DEVICE INTO SUPERIOR VENA CAVA, PERCUTANEOUS APPROACH: ICD-10-PCS | Performed by: INTERNAL MEDICINE

## 2020-01-01 PROCEDURE — 74011250636 HC RX REV CODE- 250/636: Performed by: EMERGENCY MEDICINE

## 2020-01-01 PROCEDURE — 74011000250 HC RX REV CODE- 250: Performed by: HOSPITALIST

## 2020-01-01 PROCEDURE — 84145 PROCALCITONIN (PCT): CPT

## 2020-01-01 PROCEDURE — 87077 CULTURE AEROBIC IDENTIFY: CPT

## 2020-01-01 PROCEDURE — 85379 FIBRIN DEGRADATION QUANT: CPT

## 2020-01-01 PROCEDURE — 97535 SELF CARE MNGMENT TRAINING: CPT

## 2020-01-01 PROCEDURE — 77030029684 HC NEB SM VOL KT MONA -A

## 2020-01-01 PROCEDURE — 74011250636 HC RX REV CODE- 250/636: Performed by: PHYSICIAN ASSISTANT

## 2020-01-01 PROCEDURE — 74011250636 HC RX REV CODE- 250/636: Performed by: SURGERY

## 2020-01-01 PROCEDURE — 74011636320 HC RX REV CODE- 636/320: Performed by: RADIOLOGY

## 2020-01-01 PROCEDURE — 0BH17EZ INSERTION OF ENDOTRACHEAL AIRWAY INTO TRACHEA, VIA NATURAL OR ARTIFICIAL OPENING: ICD-10-PCS | Performed by: SURGERY

## 2020-01-01 PROCEDURE — 0100U RESPIRATORY PANEL,PCR,NASOPHARYNGEAL: CPT

## 2020-01-01 PROCEDURE — 71275 CT ANGIOGRAPHY CHEST: CPT

## 2020-01-01 PROCEDURE — 99285 EMERGENCY DEPT VISIT HI MDM: CPT

## 2020-01-01 PROCEDURE — 74011636637 HC RX REV CODE- 636/637: Performed by: INTERNAL MEDICINE

## 2020-01-01 PROCEDURE — 74011250636 HC RX REV CODE- 250/636: Performed by: HOSPITALIST

## 2020-01-01 PROCEDURE — 5A12012 PERFORMANCE OF CARDIAC OUTPUT, SINGLE, MANUAL: ICD-10-PCS | Performed by: SURGERY

## 2020-01-01 PROCEDURE — 77030020365 HC SOL INJ SOD CL 0.9% 50ML

## 2020-01-01 PROCEDURE — 94762 N-INVAS EAR/PLS OXIMTRY CONT: CPT

## 2020-01-01 PROCEDURE — 97165 OT EVAL LOW COMPLEX 30 MIN: CPT

## 2020-01-01 PROCEDURE — 96360 HYDRATION IV INFUSION INIT: CPT

## 2020-01-01 PROCEDURE — 94760 N-INVAS EAR/PLS OXIMETRY 1: CPT

## 2020-01-01 PROCEDURE — 74011250636 HC RX REV CODE- 250/636: Performed by: NURSE PRACTITIONER

## 2020-01-01 PROCEDURE — 97168 OT RE-EVAL EST PLAN CARE: CPT

## 2020-01-01 PROCEDURE — 93970 EXTREMITY STUDY: CPT

## 2020-01-01 PROCEDURE — 83036 HEMOGLOBIN GLYCOSYLATED A1C: CPT

## 2020-01-01 PROCEDURE — 74011250636 HC RX REV CODE- 250/636

## 2020-01-01 PROCEDURE — 74011000250 HC RX REV CODE- 250: Performed by: SURGERY

## 2020-01-01 PROCEDURE — 86141 C-REACTIVE PROTEIN HS: CPT

## 2020-01-01 PROCEDURE — 36573 INSJ PICC RS&I 5 YR+: CPT | Performed by: INTERNAL MEDICINE

## 2020-01-01 PROCEDURE — 87186 SC STD MICRODIL/AGAR DIL: CPT

## 2020-01-01 PROCEDURE — 77010033711 HC HIGH FLOW OXYGEN

## 2020-01-01 PROCEDURE — 87070 CULTURE OTHR SPECIMN AEROBIC: CPT

## 2020-01-01 PROCEDURE — 87040 BLOOD CULTURE FOR BACTERIA: CPT

## 2020-01-01 PROCEDURE — 93306 TTE W/DOPPLER COMPLETE: CPT

## 2020-01-01 PROCEDURE — 82550 ASSAY OF CK (CPK): CPT

## 2020-01-01 PROCEDURE — 02HV33Z INSERTION OF INFUSION DEVICE INTO SUPERIOR VENA CAVA, PERCUTANEOUS APPROACH: ICD-10-PCS | Performed by: SURGERY

## 2020-01-01 PROCEDURE — 97161 PT EVAL LOW COMPLEX 20 MIN: CPT

## 2020-01-01 PROCEDURE — 80202 ASSAY OF VANCOMYCIN: CPT

## 2020-01-01 PROCEDURE — 87635 SARS-COV-2 COVID-19 AMP PRB: CPT

## 2020-01-01 PROCEDURE — 83520 IMMUNOASSAY QUANT NOS NONAB: CPT

## 2020-01-01 PROCEDURE — 76700 US EXAM ABDOM COMPLETE: CPT

## 2020-01-01 PROCEDURE — 36556 INSERT NON-TUNNEL CV CATH: CPT

## 2020-01-01 RX ORDER — PREDNISONE 10 MG/1
10 TABLET ORAL
Status: DISCONTINUED | OUTPATIENT
Start: 2020-01-01 | End: 2020-01-01

## 2020-01-01 RX ORDER — HYDROXYCHLOROQUINE SULFATE 200 MG/1
200 TABLET, FILM COATED ORAL 2 TIMES DAILY
Status: DISCONTINUED | OUTPATIENT
Start: 2020-01-01 | End: 2020-01-01 | Stop reason: HOSPADM

## 2020-01-01 RX ORDER — ENOXAPARIN SODIUM 100 MG/ML
1 INJECTION SUBCUTANEOUS EVERY 12 HOURS
Status: DISCONTINUED | OUTPATIENT
Start: 2020-01-01 | End: 2020-01-01

## 2020-01-01 RX ORDER — ATORVASTATIN CALCIUM 40 MG/1
40 TABLET, FILM COATED ORAL
COMMUNITY

## 2020-01-01 RX ORDER — EPINEPHRINE 0.1 MG/ML
INJECTION INTRACARDIAC; INTRAVENOUS
Status: COMPLETED | OUTPATIENT
Start: 2020-01-01 | End: 2020-01-01

## 2020-01-01 RX ORDER — PREDNISONE 20 MG/1
40 TABLET ORAL
Status: DISCONTINUED | OUTPATIENT
Start: 2020-01-01 | End: 2020-01-01 | Stop reason: HOSPADM

## 2020-01-01 RX ORDER — POTASSIUM CHLORIDE 750 MG/1
40 TABLET, FILM COATED, EXTENDED RELEASE ORAL
Status: COMPLETED | OUTPATIENT
Start: 2020-01-01 | End: 2020-01-01

## 2020-01-01 RX ORDER — LEVETIRACETAM 500 MG/1
TABLET ORAL
Qty: 60 TAB | Refills: 1 | OUTPATIENT
Start: 2020-01-01

## 2020-01-01 RX ORDER — DIPHENHYDRAMINE HCL 25 MG
25 CAPSULE ORAL
Status: DISCONTINUED | OUTPATIENT
Start: 2020-01-01 | End: 2020-01-01 | Stop reason: HOSPADM

## 2020-01-01 RX ORDER — PANTOPRAZOLE SODIUM 40 MG/1
40 TABLET, DELAYED RELEASE ORAL DAILY
COMMUNITY

## 2020-01-01 RX ORDER — ACETAZOLAMIDE 250 MG/1
125 TABLET ORAL 2 TIMES DAILY
Status: COMPLETED | OUTPATIENT
Start: 2020-01-01 | End: 2020-01-01

## 2020-01-01 RX ORDER — DEXTROSE MONOHYDRATE 50 MG/ML
50 INJECTION, SOLUTION INTRAVENOUS CONTINUOUS
Status: DISCONTINUED | OUTPATIENT
Start: 2020-01-01 | End: 2020-01-01

## 2020-01-01 RX ORDER — PREDNISONE 20 MG/1
20 TABLET ORAL
Status: DISCONTINUED | OUTPATIENT
Start: 2020-01-01 | End: 2020-01-01

## 2020-01-01 RX ORDER — ATORVASTATIN CALCIUM 40 MG/1
40 TABLET, FILM COATED ORAL
Status: DISCONTINUED | OUTPATIENT
Start: 2020-01-01 | End: 2020-01-01 | Stop reason: HOSPADM

## 2020-01-01 RX ORDER — ACETAMINOPHEN 325 MG/1
650 TABLET ORAL
Status: DISCONTINUED | OUTPATIENT
Start: 2020-01-01 | End: 2020-01-01 | Stop reason: SDUPTHER

## 2020-01-01 RX ORDER — GUAIFENESIN 100 MG/5ML
100 SOLUTION ORAL
Status: DISCONTINUED | OUTPATIENT
Start: 2020-01-01 | End: 2020-01-01 | Stop reason: HOSPADM

## 2020-01-01 RX ORDER — BISMUTH SUBSALICYLATE 262 MG
1 TABLET,CHEWABLE ORAL DAILY
COMMUNITY

## 2020-01-01 RX ORDER — SODIUM CHLORIDE 0.9 % (FLUSH) 0.9 %
5-40 SYRINGE (ML) INJECTION AS NEEDED
Status: DISCONTINUED | OUTPATIENT
Start: 2020-01-01 | End: 2020-01-01 | Stop reason: HOSPADM

## 2020-01-01 RX ORDER — ETOMIDATE 2 MG/ML
20 INJECTION INTRAVENOUS ONCE
Status: COMPLETED | OUTPATIENT
Start: 2020-01-01 | End: 2020-01-01

## 2020-01-01 RX ORDER — FLUTICASONE PROPIONATE 110 UG/1
2 AEROSOL, METERED RESPIRATORY (INHALATION)
Status: DISCONTINUED | OUTPATIENT
Start: 2020-01-01 | End: 2020-01-01

## 2020-01-01 RX ORDER — ACETAMINOPHEN 325 MG/1
650 TABLET ORAL
Status: DISCONTINUED | OUTPATIENT
Start: 2020-01-01 | End: 2020-01-01 | Stop reason: HOSPADM

## 2020-01-01 RX ORDER — POTASSIUM CHLORIDE 7.45 MG/ML
10 INJECTION INTRAVENOUS
Status: COMPLETED | OUTPATIENT
Start: 2020-01-01 | End: 2020-01-01

## 2020-01-01 RX ORDER — SUCCINYLCHOLINE CHLORIDE 20 MG/ML
150 INJECTION INTRAMUSCULAR; INTRAVENOUS
Status: COMPLETED | OUTPATIENT
Start: 2020-01-01 | End: 2020-01-01

## 2020-01-01 RX ORDER — PREDNISONE 10 MG/1
20 TABLET ORAL
COMMUNITY
End: 2020-01-01

## 2020-01-01 RX ORDER — DEXTROSE MONOHYDRATE 100 MG/ML
0-250 INJECTION, SOLUTION INTRAVENOUS AS NEEDED
Status: DISCONTINUED | OUTPATIENT
Start: 2020-01-01 | End: 2020-01-01 | Stop reason: HOSPADM

## 2020-01-01 RX ORDER — ARFORMOTEROL TARTRATE 15 UG/2ML
15 SOLUTION RESPIRATORY (INHALATION)
Status: DISCONTINUED | OUTPATIENT
Start: 2020-01-01 | End: 2020-01-01 | Stop reason: HOSPADM

## 2020-01-01 RX ORDER — DOCUSATE SODIUM 100 MG/1
100 CAPSULE, LIQUID FILLED ORAL 2 TIMES DAILY
COMMUNITY

## 2020-01-01 RX ORDER — PANTOPRAZOLE SODIUM 40 MG/1
40 TABLET, DELAYED RELEASE ORAL DAILY
Status: DISCONTINUED | OUTPATIENT
Start: 2020-01-01 | End: 2020-01-01 | Stop reason: HOSPADM

## 2020-01-01 RX ORDER — FLUTICASONE PROPIONATE 50 MCG
2 SPRAY, SUSPENSION (ML) NASAL DAILY
Status: DISCONTINUED | OUTPATIENT
Start: 2020-01-01 | End: 2020-01-01

## 2020-01-01 RX ORDER — FUROSEMIDE 10 MG/ML
40 INJECTION INTRAMUSCULAR; INTRAVENOUS 2 TIMES DAILY
Status: DISCONTINUED | OUTPATIENT
Start: 2020-01-01 | End: 2020-01-01

## 2020-01-01 RX ORDER — CLONAZEPAM 0.5 MG/1
0.5 TABLET ORAL ONCE
Status: COMPLETED | OUTPATIENT
Start: 2020-01-01 | End: 2020-01-01

## 2020-01-01 RX ORDER — IPRATROPIUM BROMIDE AND ALBUTEROL SULFATE 2.5; .5 MG/3ML; MG/3ML
3 SOLUTION RESPIRATORY (INHALATION)
Status: DISCONTINUED | OUTPATIENT
Start: 2020-01-01 | End: 2020-01-01

## 2020-01-01 RX ORDER — LEVETIRACETAM 500 MG/1
500 TABLET ORAL 2 TIMES DAILY
Status: DISCONTINUED | OUTPATIENT
Start: 2020-01-01 | End: 2020-01-01 | Stop reason: HOSPADM

## 2020-01-01 RX ORDER — CALCIUM CHLORIDE INJECTION 100 MG/ML
INJECTION, SOLUTION INTRAVENOUS
Status: COMPLETED | OUTPATIENT
Start: 2020-01-01 | End: 2020-01-01

## 2020-01-01 RX ORDER — FLUTICASONE PROPIONATE 50 MCG
2 SPRAY, SUSPENSION (ML) NASAL DAILY
COMMUNITY

## 2020-01-01 RX ORDER — LEVOFLOXACIN 750 MG/1
750 TABLET ORAL EVERY 24 HOURS
Status: COMPLETED | OUTPATIENT
Start: 2020-01-01 | End: 2020-01-01

## 2020-01-01 RX ORDER — LEVOFLOXACIN 5 MG/ML
750 INJECTION, SOLUTION INTRAVENOUS EVERY 24 HOURS
Status: DISCONTINUED | OUTPATIENT
Start: 2020-01-01 | End: 2020-01-01 | Stop reason: CLARIF

## 2020-01-01 RX ORDER — BUDESONIDE 0.25 MG/2ML
250 INHALANT ORAL
Status: DISCONTINUED | OUTPATIENT
Start: 2020-01-01 | End: 2020-01-01 | Stop reason: HOSPADM

## 2020-01-01 RX ORDER — GUAIFENESIN/DEXTROMETHORPHAN 100-10MG/5
10 SYRUP ORAL
COMMUNITY

## 2020-01-01 RX ORDER — SODIUM BICARBONATE 1 MEQ/ML
SYRINGE (ML) INTRAVENOUS
Status: COMPLETED | OUTPATIENT
Start: 2020-01-01 | End: 2020-01-01

## 2020-01-01 RX ORDER — CETIRIZINE HCL 10 MG
10 TABLET ORAL DAILY
Status: DISCONTINUED | OUTPATIENT
Start: 2020-01-01 | End: 2020-01-01 | Stop reason: HOSPADM

## 2020-01-01 RX ORDER — SODIUM CHLORIDE 0.9 % (FLUSH) 0.9 %
10 SYRINGE (ML) INJECTION
Status: COMPLETED | OUTPATIENT
Start: 2020-01-01 | End: 2020-01-01

## 2020-01-01 RX ORDER — FLUTICASONE PROPIONATE 50 MCG
2 SPRAY, SUSPENSION (ML) NASAL DAILY
Status: DISCONTINUED | OUTPATIENT
Start: 2020-04-13 | End: 2020-01-01 | Stop reason: HOSPADM

## 2020-01-01 RX ORDER — METFORMIN HYDROCHLORIDE 500 MG/1
500 TABLET ORAL
COMMUNITY

## 2020-01-01 RX ORDER — SERTRALINE HYDROCHLORIDE 50 MG/1
25 TABLET, FILM COATED ORAL DAILY
Status: DISCONTINUED | OUTPATIENT
Start: 2020-01-01 | End: 2020-01-01 | Stop reason: HOSPADM

## 2020-01-01 RX ORDER — POTASSIUM CHLORIDE 750 MG/1
20 TABLET, FILM COATED, EXTENDED RELEASE ORAL 2 TIMES DAILY
Status: COMPLETED | OUTPATIENT
Start: 2020-01-01 | End: 2020-01-01

## 2020-01-01 RX ORDER — LANOLIN ALCOHOL/MO/W.PET/CERES
325 CREAM (GRAM) TOPICAL
Status: DISCONTINUED | OUTPATIENT
Start: 2020-01-01 | End: 2020-01-01 | Stop reason: HOSPADM

## 2020-01-01 RX ORDER — ARFORMOTEROL TARTRATE 15 UG/2ML
15 SOLUTION RESPIRATORY (INHALATION) 2 TIMES DAILY
Status: DISCONTINUED | OUTPATIENT
Start: 2020-01-01 | End: 2020-01-01

## 2020-01-01 RX ORDER — FUROSEMIDE 10 MG/ML
INJECTION INTRAMUSCULAR; INTRAVENOUS
Status: COMPLETED
Start: 2020-01-01 | End: 2020-01-01

## 2020-01-01 RX ORDER — SERTRALINE HYDROCHLORIDE 25 MG/1
25 TABLET, FILM COATED ORAL DAILY
COMMUNITY

## 2020-01-01 RX ORDER — LEVALBUTEROL TARTRATE 45 UG/1
1 AEROSOL, METERED ORAL 2 TIMES DAILY
Status: DISCONTINUED | OUTPATIENT
Start: 2020-01-01 | End: 2020-01-01

## 2020-01-01 RX ORDER — INSULIN LISPRO 100 [IU]/ML
INJECTION, SOLUTION INTRAVENOUS; SUBCUTANEOUS
Status: DISCONTINUED | OUTPATIENT
Start: 2020-01-01 | End: 2020-01-01 | Stop reason: HOSPADM

## 2020-01-01 RX ORDER — IPRATROPIUM BROMIDE 0.5 MG/2.5ML
0.5 SOLUTION RESPIRATORY (INHALATION) EVERY 8 HOURS
COMMUNITY
End: 2020-01-01

## 2020-01-01 RX ORDER — PREDNISONE 10 MG/1
30 TABLET ORAL DAILY
COMMUNITY

## 2020-01-01 RX ORDER — MAGNESIUM SULFATE HEPTAHYDRATE 40 MG/ML
2 INJECTION, SOLUTION INTRAVENOUS ONCE
Status: COMPLETED | OUTPATIENT
Start: 2020-01-01 | End: 2020-01-01

## 2020-01-01 RX ORDER — FUROSEMIDE 10 MG/ML
20 INJECTION INTRAMUSCULAR; INTRAVENOUS ONCE
Status: COMPLETED | OUTPATIENT
Start: 2020-01-01 | End: 2020-01-01

## 2020-01-01 RX ORDER — MAGNESIUM SULFATE 100 %
4 CRYSTALS MISCELLANEOUS AS NEEDED
Status: DISCONTINUED | OUTPATIENT
Start: 2020-01-01 | End: 2020-01-01 | Stop reason: HOSPADM

## 2020-01-01 RX ORDER — LANOLIN ALCOHOL/MO/W.PET/CERES
325 CREAM (GRAM) TOPICAL
COMMUNITY

## 2020-01-01 RX ORDER — HEPARIN SODIUM,PORCINE/PF 10 UNIT/ML
SYRINGE (ML) INTRAVENOUS AS NEEDED
COMMUNITY

## 2020-01-01 RX ORDER — ENOXAPARIN SODIUM 100 MG/ML
40 INJECTION SUBCUTANEOUS EVERY 24 HOURS
Status: DISCONTINUED | OUTPATIENT
Start: 2020-01-01 | End: 2020-01-01

## 2020-01-01 RX ORDER — POLYETHYLENE GLYCOL 3350 17 G/17G
17 POWDER, FOR SOLUTION ORAL DAILY PRN
Status: DISCONTINUED | OUTPATIENT
Start: 2020-01-01 | End: 2020-01-01 | Stop reason: HOSPADM

## 2020-01-01 RX ORDER — ENOXAPARIN SODIUM 100 MG/ML
70 INJECTION SUBCUTANEOUS EVERY 12 HOURS
Status: DISCONTINUED | OUTPATIENT
Start: 2020-01-01 | End: 2020-01-01

## 2020-01-01 RX ORDER — IPRATROPIUM BROMIDE AND ALBUTEROL SULFATE 2.5; .5 MG/3ML; MG/3ML
3 SOLUTION RESPIRATORY (INHALATION)
Status: DISCONTINUED | OUTPATIENT
Start: 2020-01-01 | End: 2020-01-01 | Stop reason: HOSPADM

## 2020-01-01 RX ORDER — ENOXAPARIN SODIUM 100 MG/ML
1 INJECTION SUBCUTANEOUS EVERY 12 HOURS
Status: DISCONTINUED | OUTPATIENT
Start: 2020-01-01 | End: 2020-01-01 | Stop reason: DRUGHIGH

## 2020-01-01 RX ORDER — PANTOPRAZOLE SODIUM 40 MG/1
40 TABLET, DELAYED RELEASE ORAL
Status: DISCONTINUED | OUTPATIENT
Start: 2020-01-01 | End: 2020-01-01 | Stop reason: HOSPADM

## 2020-01-01 RX ORDER — MONTELUKAST SODIUM 10 MG/1
10 TABLET ORAL DAILY
COMMUNITY
End: 2020-01-01

## 2020-01-01 RX ORDER — LEVALBUTEROL TARTRATE 45 UG/1
1 AEROSOL, METERED ORAL 2 TIMES DAILY
COMMUNITY

## 2020-01-01 RX ORDER — ALBUTEROL SULFATE 0.83 MG/ML
2.5 SOLUTION RESPIRATORY (INHALATION) 4 TIMES DAILY
COMMUNITY

## 2020-01-01 RX ORDER — OXYMETAZOLINE HCL 0.05 %
2 SPRAY, NON-AEROSOL (ML) NASAL
Status: DISCONTINUED | OUTPATIENT
Start: 2020-01-01 | End: 2020-01-01 | Stop reason: HOSPADM

## 2020-01-01 RX ORDER — HYDROXYCHLOROQUINE SULFATE 200 MG/1
400 TABLET, FILM COATED ORAL 2 TIMES DAILY
Status: COMPLETED | OUTPATIENT
Start: 2020-01-01 | End: 2020-01-01

## 2020-01-01 RX ORDER — LORATADINE 10 MG/1
10 TABLET ORAL DAILY
COMMUNITY

## 2020-01-01 RX ORDER — VANCOMYCIN 1.75 GRAM/500 ML IN 0.9 % SODIUM CHLORIDE INTRAVENOUS
1750 ONCE
Status: COMPLETED | OUTPATIENT
Start: 2020-01-01 | End: 2020-01-01

## 2020-01-01 RX ORDER — CETIRIZINE HCL 10 MG
10 TABLET ORAL DAILY
COMMUNITY

## 2020-01-01 RX ORDER — HYDROCODONE BITARTRATE AND ACETAMINOPHEN 5; 325 MG/1; MG/1
1 TABLET ORAL
Status: DISCONTINUED | OUTPATIENT
Start: 2020-01-01 | End: 2020-01-01 | Stop reason: HOSPADM

## 2020-01-01 RX ORDER — FUROSEMIDE 10 MG/ML
40 INJECTION INTRAMUSCULAR; INTRAVENOUS DAILY
Status: DISCONTINUED | OUTPATIENT
Start: 2020-01-01 | End: 2020-01-01

## 2020-01-01 RX ORDER — ACETAZOLAMIDE 250 MG/1
250 TABLET ORAL ONCE
Status: COMPLETED | OUTPATIENT
Start: 2020-01-01 | End: 2020-01-01

## 2020-01-01 RX ORDER — ZINC SULFATE 50(220)MG
1 CAPSULE ORAL DAILY
Status: DISCONTINUED | OUTPATIENT
Start: 2020-01-01 | End: 2020-01-01 | Stop reason: HOSPADM

## 2020-01-01 RX ORDER — ARFORMOTEROL TARTRATE 15 UG/2ML
15 SOLUTION RESPIRATORY (INHALATION) 2 TIMES DAILY
COMMUNITY
End: 2020-01-01

## 2020-01-01 RX ORDER — MONTELUKAST SODIUM 10 MG/1
10 TABLET ORAL DAILY
Status: DISCONTINUED | OUTPATIENT
Start: 2020-01-01 | End: 2020-01-01 | Stop reason: HOSPADM

## 2020-01-01 RX ORDER — SODIUM CHLORIDE 0.9 % (FLUSH) 0.9 %
5-40 SYRINGE (ML) INJECTION EVERY 8 HOURS
Status: DISCONTINUED | OUTPATIENT
Start: 2020-01-01 | End: 2020-01-01 | Stop reason: HOSPADM

## 2020-01-01 RX ORDER — AZITHROMYCIN 250 MG/1
250 TABLET, FILM COATED ORAL DAILY
Status: DISCONTINUED | OUTPATIENT
Start: 2020-01-01 | End: 2020-01-01 | Stop reason: HOSPADM

## 2020-01-01 RX ORDER — ALBUTEROL SULFATE 90 UG/1
2 AEROSOL, METERED RESPIRATORY (INHALATION)
Status: DISCONTINUED | OUTPATIENT
Start: 2020-01-01 | End: 2020-01-01 | Stop reason: HOSPADM

## 2020-01-01 RX ORDER — HYDROXYZINE 25 MG/1
25 TABLET, FILM COATED ORAL 2 TIMES DAILY
COMMUNITY

## 2020-01-01 RX ORDER — ACETAMINOPHEN 650 MG/1
650 SUPPOSITORY RECTAL
Status: DISCONTINUED | OUTPATIENT
Start: 2020-01-01 | End: 2020-01-01 | Stop reason: HOSPADM

## 2020-01-01 RX ORDER — ACETAMINOPHEN 650 MG/1
650 SUPPOSITORY RECTAL
Status: DISCONTINUED | OUTPATIENT
Start: 2020-01-01 | End: 2020-01-01 | Stop reason: SDUPTHER

## 2020-01-01 RX ORDER — CALCIUM CARBONATE 200(500)MG
200 TABLET,CHEWABLE ORAL
Status: DISCONTINUED | OUTPATIENT
Start: 2020-01-01 | End: 2020-01-01 | Stop reason: HOSPADM

## 2020-01-01 RX ORDER — BUDESONIDE AND FORMOTEROL FUMARATE DIHYDRATE 160; 4.5 UG/1; UG/1
2 AEROSOL RESPIRATORY (INHALATION) 2 TIMES DAILY
COMMUNITY

## 2020-01-01 RX ORDER — SALINE NASAL SPRAY 1.5 OZ
1 SOLUTION NASAL
COMMUNITY

## 2020-01-01 RX ORDER — ALBUTEROL SULFATE 0.83 MG/ML
2.5 SOLUTION RESPIRATORY (INHALATION)
Status: DISCONTINUED | OUTPATIENT
Start: 2020-01-01 | End: 2020-01-01 | Stop reason: HOSPADM

## 2020-01-01 RX ORDER — AZITHROMYCIN 250 MG/1
500 TABLET, FILM COATED ORAL ONCE
Status: COMPLETED | OUTPATIENT
Start: 2020-01-01 | End: 2020-01-01

## 2020-01-01 RX ORDER — FLUTICASONE PROPIONATE 110 UG/1
2 AEROSOL, METERED RESPIRATORY (INHALATION)
Status: DISCONTINUED | OUTPATIENT
Start: 2020-01-01 | End: 2020-01-01 | Stop reason: HOSPADM

## 2020-01-01 RX ORDER — HYDROXYZINE 25 MG/1
25 TABLET, FILM COATED ORAL 2 TIMES DAILY
Status: DISCONTINUED | OUTPATIENT
Start: 2020-01-01 | End: 2020-01-01 | Stop reason: HOSPADM

## 2020-01-01 RX ORDER — ASCORBIC ACID 500 MG
500 TABLET ORAL 2 TIMES DAILY
Status: DISCONTINUED | OUTPATIENT
Start: 2020-01-01 | End: 2020-01-01 | Stop reason: HOSPADM

## 2020-01-01 RX ADMIN — APIXABAN 5 MG: 5 TABLET, FILM COATED ORAL at 20:49

## 2020-01-01 RX ADMIN — BUDESONIDE 250 MCG: 0.25 INHALANT RESPIRATORY (INHALATION) at 07:37

## 2020-01-01 RX ADMIN — LEVETIRACETAM 500 MG: 500 TABLET, FILM COATED ORAL at 18:21

## 2020-01-01 RX ADMIN — PANTOPRAZOLE SODIUM 40 MG: 40 TABLET, DELAYED RELEASE ORAL at 07:17

## 2020-01-01 RX ADMIN — INSULIN LISPRO 2 UNITS: 100 INJECTION, SOLUTION INTRAVENOUS; SUBCUTANEOUS at 12:42

## 2020-01-01 RX ADMIN — Medication 1 CAPSULE: at 10:13

## 2020-01-01 RX ADMIN — ALBUTEROL SULFATE 2 PUFF: 90 AEROSOL, METERED RESPIRATORY (INHALATION) at 17:40

## 2020-01-01 RX ADMIN — CETIRIZINE HYDROCHLORIDE 10 MG: 10 TABLET, FILM COATED ORAL at 10:13

## 2020-01-01 RX ADMIN — PANTOPRAZOLE SODIUM 40 MG: 40 TABLET, DELAYED RELEASE ORAL at 18:05

## 2020-01-01 RX ADMIN — INSULIN LISPRO 5 UNITS: 100 INJECTION, SOLUTION INTRAVENOUS; SUBCUTANEOUS at 17:50

## 2020-01-01 RX ADMIN — IPRATROPIUM BROMIDE AND ALBUTEROL SULFATE 3 ML: .5; 3 SOLUTION RESPIRATORY (INHALATION) at 07:47

## 2020-01-01 RX ADMIN — Medication 10 ML: at 06:00

## 2020-01-01 RX ADMIN — LEVETIRACETAM 500 MG: 500 TABLET, FILM COATED ORAL at 17:24

## 2020-01-01 RX ADMIN — Medication 10 ML: at 13:19

## 2020-01-01 RX ADMIN — FUROSEMIDE 40 MG: 10 INJECTION, SOLUTION INTRAMUSCULAR; INTRAVENOUS at 08:11

## 2020-01-01 RX ADMIN — APIXABAN 5 MG: 5 TABLET, FILM COATED ORAL at 08:59

## 2020-01-01 RX ADMIN — IPRATROPIUM BROMIDE AND ALBUTEROL SULFATE 3 ML: .5; 3 SOLUTION RESPIRATORY (INHALATION) at 13:48

## 2020-01-01 RX ADMIN — APIXABAN 10 MG: 5 TABLET, FILM COATED ORAL at 08:42

## 2020-01-01 RX ADMIN — LEVETIRACETAM 500 MG: 500 TABLET, FILM COATED ORAL at 09:14

## 2020-01-01 RX ADMIN — FERROUS SULFATE TAB 325 MG (65 MG ELEMENTAL FE) 325 MG: 325 (65 FE) TAB at 08:58

## 2020-01-01 RX ADMIN — HYDROXYZINE HYDROCHLORIDE 25 MG: 25 TABLET, FILM COATED ORAL at 19:37

## 2020-01-01 RX ADMIN — PREDNISONE 20 MG: 20 TABLET ORAL at 08:12

## 2020-01-01 RX ADMIN — PANTOPRAZOLE SODIUM 40 MG: 40 TABLET, DELAYED RELEASE ORAL at 16:36

## 2020-01-01 RX ADMIN — HYDROXYZINE HYDROCHLORIDE 25 MG: 25 TABLET, FILM COATED ORAL at 09:16

## 2020-01-01 RX ADMIN — LEVETIRACETAM 500 MG: 500 TABLET, FILM COATED ORAL at 11:07

## 2020-01-01 RX ADMIN — MONTELUKAST SODIUM 10 MG: 10 TABLET, FILM COATED ORAL at 08:41

## 2020-01-01 RX ADMIN — PIPERACILLIN AND TAZOBACTAM 3.38 G: 3; .375 INJECTION, POWDER, LYOPHILIZED, FOR SOLUTION INTRAVENOUS at 09:59

## 2020-01-01 RX ADMIN — HYDROXYZINE HYDROCHLORIDE 25 MG: 25 TABLET, FILM COATED ORAL at 08:42

## 2020-01-01 RX ADMIN — LEVETIRACETAM 500 MG: 500 TABLET ORAL at 08:58

## 2020-01-01 RX ADMIN — PIPERACILLIN AND TAZOBACTAM 3.38 G: 3; .375 INJECTION, POWDER, LYOPHILIZED, FOR SOLUTION INTRAVENOUS at 02:15

## 2020-01-01 RX ADMIN — MAGNESIUM SULFATE HEPTAHYDRATE 2 G: 40 INJECTION, SOLUTION INTRAVENOUS at 02:12

## 2020-01-01 RX ADMIN — Medication 10 ML: at 15:07

## 2020-01-01 RX ADMIN — Medication 10 ML: at 21:34

## 2020-01-01 RX ADMIN — APIXABAN 5 MG: 5 TABLET, FILM COATED ORAL at 17:31

## 2020-01-01 RX ADMIN — HYDROXYZINE HYDROCHLORIDE 25 MG: 25 TABLET, FILM COATED ORAL at 18:45

## 2020-01-01 RX ADMIN — ACETAMINOPHEN 650 MG: 325 TABLET, FILM COATED ORAL at 22:32

## 2020-01-01 RX ADMIN — BUDESONIDE 250 MCG: 0.25 INHALANT RESPIRATORY (INHALATION) at 19:54

## 2020-01-01 RX ADMIN — Medication 10 ML: at 09:07

## 2020-01-01 RX ADMIN — SERTRALINE HYDROCHLORIDE 25 MG: 50 TABLET ORAL at 09:16

## 2020-01-01 RX ADMIN — POLYETHYLENE GLYCOL 3350 17 G: 17 POWDER, FOR SOLUTION ORAL at 08:37

## 2020-01-01 RX ADMIN — BUDESONIDE 250 MCG: 0.25 INHALANT RESPIRATORY (INHALATION) at 18:01

## 2020-01-01 RX ADMIN — SODIUM CHLORIDE 100 ML: 900 INJECTION, SOLUTION INTRAVENOUS at 14:03

## 2020-01-01 RX ADMIN — INSULIN LISPRO 2 UNITS: 100 INJECTION, SOLUTION INTRAVENOUS; SUBCUTANEOUS at 09:09

## 2020-01-01 RX ADMIN — HYDROXYZINE HYDROCHLORIDE 25 MG: 25 TABLET, FILM COATED ORAL at 08:21

## 2020-01-01 RX ADMIN — SODIUM BICARBONATE 50 MEQ: 84 INJECTION, SOLUTION INTRAVENOUS at 14:42

## 2020-01-01 RX ADMIN — Medication 10 ML: at 21:48

## 2020-01-01 RX ADMIN — IPRATROPIUM BROMIDE AND ALBUTEROL SULFATE 3 ML: .5; 3 SOLUTION RESPIRATORY (INHALATION) at 08:08

## 2020-01-01 RX ADMIN — AZITHROMYCIN MONOHYDRATE 500 MG: 250 TABLET ORAL at 13:44

## 2020-01-01 RX ADMIN — Medication 10 ML: at 20:58

## 2020-01-01 RX ADMIN — MONTELUKAST SODIUM 10 MG: 10 TABLET, FILM COATED ORAL at 09:10

## 2020-01-01 RX ADMIN — LEVETIRACETAM 500 MG: 500 TABLET, FILM COATED ORAL at 08:29

## 2020-01-01 RX ADMIN — ARFORMOTEROL TARTRATE 15 MCG: 15 SOLUTION RESPIRATORY (INHALATION) at 20:28

## 2020-01-01 RX ADMIN — INSULIN LISPRO 2 UNITS: 100 INJECTION, SOLUTION INTRAVENOUS; SUBCUTANEOUS at 11:30

## 2020-01-01 RX ADMIN — Medication 500 MG: at 13:44

## 2020-01-01 RX ADMIN — ATORVASTATIN CALCIUM 40 MG: 40 TABLET, FILM COATED ORAL at 22:32

## 2020-01-01 RX ADMIN — PANTOPRAZOLE SODIUM 40 MG: 40 TABLET, DELAYED RELEASE ORAL at 06:47

## 2020-01-01 RX ADMIN — ACETAZOLAMIDE 125 MG: 250 TABLET ORAL at 11:01

## 2020-01-01 RX ADMIN — INSULIN LISPRO 2 UNITS: 100 INJECTION, SOLUTION INTRAVENOUS; SUBCUTANEOUS at 12:07

## 2020-01-01 RX ADMIN — APIXABAN 5 MG: 5 TABLET, FILM COATED ORAL at 08:41

## 2020-01-01 RX ADMIN — PIPERACILLIN AND TAZOBACTAM 3.38 G: 3; .375 INJECTION, POWDER, LYOPHILIZED, FOR SOLUTION INTRAVENOUS at 03:11

## 2020-01-01 RX ADMIN — BUDESONIDE 250 MCG: 0.25 INHALANT RESPIRATORY (INHALATION) at 19:23

## 2020-01-01 RX ADMIN — HYDROXYZINE HYDROCHLORIDE 25 MG: 25 TABLET, FILM COATED ORAL at 08:59

## 2020-01-01 RX ADMIN — INSULIN LISPRO 2 UNITS: 100 INJECTION, SOLUTION INTRAVENOUS; SUBCUTANEOUS at 11:56

## 2020-01-01 RX ADMIN — IPRATROPIUM BROMIDE AND ALBUTEROL SULFATE 3 ML: .5; 3 SOLUTION RESPIRATORY (INHALATION) at 13:28

## 2020-01-01 RX ADMIN — SUCCINYLCHOLINE CHLORIDE 150 MG: 20 INJECTION, SOLUTION INTRAMUSCULAR; INTRAVENOUS at 14:20

## 2020-01-01 RX ADMIN — LEVETIRACETAM 500 MG: 500 TABLET, FILM COATED ORAL at 18:05

## 2020-01-01 RX ADMIN — HYDROXYZINE HYDROCHLORIDE 25 MG: 25 TABLET, FILM COATED ORAL at 08:43

## 2020-01-01 RX ADMIN — ARFORMOTEROL TARTRATE 15 MCG: 15 SOLUTION RESPIRATORY (INHALATION) at 08:26

## 2020-01-01 RX ADMIN — FERROUS SULFATE TAB 325 MG (65 MG ELEMENTAL FE) 325 MG: 325 (65 FE) TAB at 06:41

## 2020-01-01 RX ADMIN — IPRATROPIUM BROMIDE AND ALBUTEROL SULFATE 3 ML: .5; 3 SOLUTION RESPIRATORY (INHALATION) at 07:17

## 2020-01-01 RX ADMIN — APIXABAN 5 MG: 5 TABLET, FILM COATED ORAL at 21:13

## 2020-01-01 RX ADMIN — FERROUS SULFATE TAB 325 MG (65 MG ELEMENTAL FE) 325 MG: 325 (65 FE) TAB at 07:15

## 2020-01-01 RX ADMIN — HYDROXYZINE HYDROCHLORIDE 25 MG: 25 TABLET, FILM COATED ORAL at 17:21

## 2020-01-01 RX ADMIN — Medication 10 ML: at 06:46

## 2020-01-01 RX ADMIN — ACETAMINOPHEN 650 MG: 325 TABLET, FILM COATED ORAL at 23:13

## 2020-01-01 RX ADMIN — Medication 10 ML: at 16:12

## 2020-01-01 RX ADMIN — FERROUS SULFATE TAB 325 MG (65 MG ELEMENTAL FE) 325 MG: 325 (65 FE) TAB at 08:08

## 2020-01-01 RX ADMIN — Medication 10 ML: at 15:02

## 2020-01-01 RX ADMIN — BUDESONIDE 250 MCG: 0.25 INHALANT RESPIRATORY (INHALATION) at 07:27

## 2020-01-01 RX ADMIN — ALBUTEROL SULFATE 2 PUFF: 90 AEROSOL, METERED RESPIRATORY (INHALATION) at 23:13

## 2020-01-01 RX ADMIN — APIXABAN 10 MG: 5 TABLET, FILM COATED ORAL at 08:12

## 2020-01-01 RX ADMIN — CETIRIZINE HYDROCHLORIDE 10 MG: 10 TABLET, FILM COATED ORAL at 08:58

## 2020-01-01 RX ADMIN — ARFORMOTEROL TARTRATE 15 MCG: 15 SOLUTION RESPIRATORY (INHALATION) at 19:23

## 2020-01-01 RX ADMIN — LEVETIRACETAM 500 MG: 500 TABLET ORAL at 19:06

## 2020-01-01 RX ADMIN — INSULIN LISPRO 2 UNITS: 100 INJECTION, SOLUTION INTRAVENOUS; SUBCUTANEOUS at 12:21

## 2020-01-01 RX ADMIN — APIXABAN 5 MG: 5 TABLET, FILM COATED ORAL at 20:51

## 2020-01-01 RX ADMIN — INSULIN LISPRO 2 UNITS: 100 INJECTION, SOLUTION INTRAVENOUS; SUBCUTANEOUS at 18:02

## 2020-01-01 RX ADMIN — LEVOFLOXACIN 750 MG: 750 TABLET, FILM COATED ORAL at 15:44

## 2020-01-01 RX ADMIN — BUDESONIDE 250 MCG: 0.25 INHALANT RESPIRATORY (INHALATION) at 07:47

## 2020-01-01 RX ADMIN — VANCOMYCIN HYDROCHLORIDE 1000 MG: 1 INJECTION, POWDER, LYOPHILIZED, FOR SOLUTION INTRAVENOUS at 23:05

## 2020-01-01 RX ADMIN — MONTELUKAST SODIUM 10 MG: 10 TABLET, FILM COATED ORAL at 08:42

## 2020-01-01 RX ADMIN — SERTRALINE HYDROCHLORIDE 25 MG: 50 TABLET ORAL at 08:21

## 2020-01-01 RX ADMIN — IPRATROPIUM BROMIDE AND ALBUTEROL SULFATE 3 ML: .5; 3 SOLUTION RESPIRATORY (INHALATION) at 13:15

## 2020-01-01 RX ADMIN — FUROSEMIDE 40 MG: 10 INJECTION, SOLUTION INTRAMUSCULAR; INTRAVENOUS at 09:09

## 2020-01-01 RX ADMIN — FERROUS SULFATE TAB 325 MG (65 MG ELEMENTAL FE) 325 MG: 325 (65 FE) TAB at 06:49

## 2020-01-01 RX ADMIN — HYDROXYZINE HYDROCHLORIDE 25 MG: 25 TABLET, FILM COATED ORAL at 17:01

## 2020-01-01 RX ADMIN — IPRATROPIUM BROMIDE AND ALBUTEROL SULFATE 3 ML: .5; 3 SOLUTION RESPIRATORY (INHALATION) at 11:34

## 2020-01-01 RX ADMIN — Medication 10 ML: at 13:47

## 2020-01-01 RX ADMIN — BUDESONIDE 250 MCG: 0.25 INHALANT RESPIRATORY (INHALATION) at 08:01

## 2020-01-01 RX ADMIN — LEVETIRACETAM 500 MG: 500 TABLET, FILM COATED ORAL at 09:16

## 2020-01-01 RX ADMIN — POLYETHYLENE GLYCOL 3350 17 G: 17 POWDER, FOR SOLUTION ORAL at 15:29

## 2020-01-01 RX ADMIN — CLONAZEPAM 0.5 MG: 0.5 TABLET ORAL at 11:39

## 2020-01-01 RX ADMIN — Medication 10 ML: at 13:09

## 2020-01-01 RX ADMIN — MONTELUKAST SODIUM 10 MG: 10 TABLET, FILM COATED ORAL at 08:29

## 2020-01-01 RX ADMIN — PANTOPRAZOLE SODIUM 40 MG: 40 TABLET, DELAYED RELEASE ORAL at 06:46

## 2020-01-01 RX ADMIN — Medication 10 ML: at 07:19

## 2020-01-01 RX ADMIN — PIPERACILLIN AND TAZOBACTAM 3.38 G: 3; .375 INJECTION, POWDER, LYOPHILIZED, FOR SOLUTION INTRAVENOUS at 09:19

## 2020-01-01 RX ADMIN — PANTOPRAZOLE SODIUM 40 MG: 40 TABLET, DELAYED RELEASE ORAL at 16:06

## 2020-01-01 RX ADMIN — LEVETIRACETAM 500 MG: 500 TABLET, FILM COATED ORAL at 09:00

## 2020-01-01 RX ADMIN — LEVETIRACETAM 500 MG: 500 TABLET, FILM COATED ORAL at 08:12

## 2020-01-01 RX ADMIN — Medication 10 ML: at 17:21

## 2020-01-01 RX ADMIN — PREDNISONE 40 MG: 20 TABLET ORAL at 08:41

## 2020-01-01 RX ADMIN — SERTRALINE HYDROCHLORIDE 25 MG: 50 TABLET ORAL at 08:52

## 2020-01-01 RX ADMIN — INSULIN LISPRO 3 UNITS: 100 INJECTION, SOLUTION INTRAVENOUS; SUBCUTANEOUS at 17:24

## 2020-01-01 RX ADMIN — PANTOPRAZOLE SODIUM 40 MG: 40 TABLET, DELAYED RELEASE ORAL at 08:09

## 2020-01-01 RX ADMIN — APIXABAN 5 MG: 5 TABLET, FILM COATED ORAL at 08:42

## 2020-01-01 RX ADMIN — LEVETIRACETAM 500 MG: 500 TABLET, FILM COATED ORAL at 09:11

## 2020-01-01 RX ADMIN — MONTELUKAST SODIUM 10 MG: 10 TABLET, FILM COATED ORAL at 08:52

## 2020-01-01 RX ADMIN — PREDNISONE 20 MG: 20 TABLET ORAL at 08:28

## 2020-01-01 RX ADMIN — BUDESONIDE 250 MCG: 0.25 INHALANT RESPIRATORY (INHALATION) at 19:56

## 2020-01-01 RX ADMIN — HYDROXYCHLOROQUINE SULFATE 400 MG: 200 TABLET, FILM COATED ORAL at 21:28

## 2020-01-01 RX ADMIN — DEXTROSE MONOHYDRATE 50 ML/HR: 5 INJECTION, SOLUTION INTRAVENOUS at 14:56

## 2020-01-01 RX ADMIN — PIPERACILLIN AND TAZOBACTAM 3.38 G: 3; .375 INJECTION, POWDER, LYOPHILIZED, FOR SOLUTION INTRAVENOUS at 17:21

## 2020-01-01 RX ADMIN — SERTRALINE HYDROCHLORIDE 25 MG: 50 TABLET ORAL at 09:10

## 2020-01-01 RX ADMIN — IPRATROPIUM BROMIDE AND ALBUTEROL SULFATE 3 ML: .5; 3 SOLUTION RESPIRATORY (INHALATION) at 11:30

## 2020-01-01 RX ADMIN — INSULIN LISPRO 2 UNITS: 100 INJECTION, SOLUTION INTRAVENOUS; SUBCUTANEOUS at 13:00

## 2020-01-01 RX ADMIN — BUDESONIDE 250 MCG: 0.25 INHALANT RESPIRATORY (INHALATION) at 08:26

## 2020-01-01 RX ADMIN — FUROSEMIDE 40 MG: 10 INJECTION, SOLUTION INTRAMUSCULAR; INTRAVENOUS at 08:28

## 2020-01-01 RX ADMIN — PIPERACILLIN AND TAZOBACTAM 3.38 G: 3; .375 INJECTION, POWDER, LYOPHILIZED, FOR SOLUTION INTRAVENOUS at 09:11

## 2020-01-01 RX ADMIN — BUDESONIDE 250 MCG: 0.25 INHALANT RESPIRATORY (INHALATION) at 20:27

## 2020-01-01 RX ADMIN — APIXABAN 10 MG: 5 TABLET, FILM COATED ORAL at 18:21

## 2020-01-01 RX ADMIN — PREDNISONE 10 MG: 10 TABLET ORAL at 08:41

## 2020-01-01 RX ADMIN — ALBUTEROL SULFATE 2.5 MG: 2.5 SOLUTION RESPIRATORY (INHALATION) at 12:26

## 2020-01-01 RX ADMIN — INSULIN LISPRO 2 UNITS: 100 INJECTION, SOLUTION INTRAVENOUS; SUBCUTANEOUS at 17:23

## 2020-01-01 RX ADMIN — APIXABAN 5 MG: 5 TABLET, FILM COATED ORAL at 08:21

## 2020-01-01 RX ADMIN — INSULIN LISPRO 2 UNITS: 100 INJECTION, SOLUTION INTRAVENOUS; SUBCUTANEOUS at 12:01

## 2020-01-01 RX ADMIN — SERTRALINE HYDROCHLORIDE 25 MG: 50 TABLET ORAL at 08:12

## 2020-01-01 RX ADMIN — MONTELUKAST SODIUM 10 MG: 10 TABLET, FILM COATED ORAL at 08:12

## 2020-01-01 RX ADMIN — IPRATROPIUM BROMIDE AND ALBUTEROL SULFATE 3 ML: .5; 3 SOLUTION RESPIRATORY (INHALATION) at 19:56

## 2020-01-01 RX ADMIN — INSULIN LISPRO 2 UNITS: 100 INJECTION, SOLUTION INTRAVENOUS; SUBCUTANEOUS at 12:52

## 2020-01-01 RX ADMIN — HYDROXYZINE HYDROCHLORIDE 25 MG: 25 TABLET, FILM COATED ORAL at 08:08

## 2020-01-01 RX ADMIN — BUDESONIDE 250 MCG: 0.25 INHALANT RESPIRATORY (INHALATION) at 07:12

## 2020-01-01 RX ADMIN — PANTOPRAZOLE SODIUM 40 MG: 40 TABLET, DELAYED RELEASE ORAL at 17:13

## 2020-01-01 RX ADMIN — SERTRALINE HYDROCHLORIDE 25 MG: 50 TABLET ORAL at 08:42

## 2020-01-01 RX ADMIN — HYDROXYZINE HYDROCHLORIDE 25 MG: 25 TABLET, FILM COATED ORAL at 09:11

## 2020-01-01 RX ADMIN — IOPAMIDOL 75 ML: 755 INJECTION, SOLUTION INTRAVENOUS at 17:28

## 2020-01-01 RX ADMIN — ALBUTEROL SULFATE 2 PUFF: 90 AEROSOL, METERED RESPIRATORY (INHALATION) at 10:39

## 2020-01-01 RX ADMIN — MONTELUKAST SODIUM 10 MG: 10 TABLET, FILM COATED ORAL at 09:14

## 2020-01-01 RX ADMIN — ALBUTEROL SULFATE 2 PUFF: 90 AEROSOL, METERED RESPIRATORY (INHALATION) at 11:13

## 2020-01-01 RX ADMIN — IPRATROPIUM BROMIDE AND ALBUTEROL SULFATE 3 ML: .5; 3 SOLUTION RESPIRATORY (INHALATION) at 20:55

## 2020-01-01 RX ADMIN — LEVOFLOXACIN 750 MG: 5 INJECTION, SOLUTION INTRAVENOUS at 15:38

## 2020-01-01 RX ADMIN — FLUTICASONE PROPIONATE 2 PUFF: 110 AEROSOL, METERED RESPIRATORY (INHALATION) at 21:42

## 2020-01-01 RX ADMIN — IPRATROPIUM BROMIDE AND ALBUTEROL SULFATE 3 ML: .5; 3 SOLUTION RESPIRATORY (INHALATION) at 19:45

## 2020-01-01 RX ADMIN — LEVETIRACETAM 500 MG: 500 TABLET, FILM COATED ORAL at 17:19

## 2020-01-01 RX ADMIN — HYDROXYZINE HYDROCHLORIDE 25 MG: 25 TABLET, FILM COATED ORAL at 08:12

## 2020-01-01 RX ADMIN — LEVETIRACETAM 500 MG: 500 TABLET, FILM COATED ORAL at 08:41

## 2020-01-01 RX ADMIN — FLUTICASONE PROPIONATE 2 PUFF: 110 AEROSOL, METERED RESPIRATORY (INHALATION) at 13:50

## 2020-01-01 RX ADMIN — INSULIN LISPRO 3 UNITS: 100 INJECTION, SOLUTION INTRAVENOUS; SUBCUTANEOUS at 12:45

## 2020-01-01 RX ADMIN — Medication 10 ML: at 21:16

## 2020-01-01 RX ADMIN — HYDROXYZINE HYDROCHLORIDE 25 MG: 25 TABLET, FILM COATED ORAL at 18:05

## 2020-01-01 RX ADMIN — IPRATROPIUM BROMIDE AND ALBUTEROL SULFATE 3 ML: .5; 3 SOLUTION RESPIRATORY (INHALATION) at 08:21

## 2020-01-01 RX ADMIN — INSULIN LISPRO 2 UNITS: 100 INJECTION, SOLUTION INTRAVENOUS; SUBCUTANEOUS at 21:38

## 2020-01-01 RX ADMIN — PIPERACILLIN AND TAZOBACTAM 3.38 G: 3; .375 INJECTION, POWDER, LYOPHILIZED, FOR SOLUTION INTRAVENOUS at 17:20

## 2020-01-01 RX ADMIN — CALCIUM CARBONATE (ANTACID) CHEW TAB 500 MG 200 MG: 500 CHEW TAB at 22:32

## 2020-01-01 RX ADMIN — LEVETIRACETAM 500 MG: 500 TABLET ORAL at 18:06

## 2020-01-01 RX ADMIN — INSULIN LISPRO 2 UNITS: 100 INJECTION, SOLUTION INTRAVENOUS; SUBCUTANEOUS at 17:46

## 2020-01-01 RX ADMIN — PANTOPRAZOLE SODIUM 40 MG: 40 TABLET, DELAYED RELEASE ORAL at 07:00

## 2020-01-01 RX ADMIN — PANTOPRAZOLE SODIUM 40 MG: 40 TABLET, DELAYED RELEASE ORAL at 16:49

## 2020-01-01 RX ADMIN — FERROUS SULFATE TAB 325 MG (65 MG ELEMENTAL FE) 325 MG: 325 (65 FE) TAB at 06:54

## 2020-01-01 RX ADMIN — LEVETIRACETAM 500 MG: 500 TABLET ORAL at 10:13

## 2020-01-01 RX ADMIN — BUDESONIDE 250 MCG: 0.25 INHALANT RESPIRATORY (INHALATION) at 08:38

## 2020-01-01 RX ADMIN — IPRATROPIUM BROMIDE AND ALBUTEROL SULFATE 3 ML: .5; 3 SOLUTION RESPIRATORY (INHALATION) at 07:12

## 2020-01-01 RX ADMIN — LEVOFLOXACIN 750 MG: 750 TABLET, FILM COATED ORAL at 15:27

## 2020-01-01 RX ADMIN — IPRATROPIUM BROMIDE AND ALBUTEROL SULFATE 3 ML: .5; 3 SOLUTION RESPIRATORY (INHALATION) at 16:35

## 2020-01-01 RX ADMIN — BUDESONIDE 250 MCG: 0.25 INHALANT RESPIRATORY (INHALATION) at 22:28

## 2020-01-01 RX ADMIN — LEVETIRACETAM 500 MG: 500 TABLET, FILM COATED ORAL at 08:46

## 2020-01-01 RX ADMIN — Medication 10 ML: at 22:00

## 2020-01-01 RX ADMIN — CETIRIZINE HYDROCHLORIDE 10 MG: 10 TABLET, FILM COATED ORAL at 08:47

## 2020-01-01 RX ADMIN — PREDNISONE 40 MG: 20 TABLET ORAL at 08:42

## 2020-01-01 RX ADMIN — INSULIN LISPRO 3 UNITS: 100 INJECTION, SOLUTION INTRAVENOUS; SUBCUTANEOUS at 18:20

## 2020-01-01 RX ADMIN — LEVETIRACETAM 500 MG: 500 TABLET, FILM COATED ORAL at 08:08

## 2020-01-01 RX ADMIN — MONTELUKAST SODIUM 10 MG: 10 TABLET, FILM COATED ORAL at 09:11

## 2020-01-01 RX ADMIN — Medication 10 ML: at 06:48

## 2020-01-01 RX ADMIN — AZITHROMYCIN MONOHYDRATE 250 MG: 250 TABLET ORAL at 08:47

## 2020-01-01 RX ADMIN — PANTOPRAZOLE SODIUM 40 MG: 40 TABLET, DELAYED RELEASE ORAL at 16:11

## 2020-01-01 RX ADMIN — Medication 10 ML: at 20:54

## 2020-01-01 RX ADMIN — GUAIFENESIN 100 MG: 200 SOLUTION ORAL at 19:05

## 2020-01-01 RX ADMIN — POTASSIUM CHLORIDE 40 MEQ: 750 TABLET, FILM COATED, EXTENDED RELEASE ORAL at 08:40

## 2020-01-01 RX ADMIN — LEVETIRACETAM 500 MG: 500 TABLET, FILM COATED ORAL at 18:03

## 2020-01-01 RX ADMIN — Medication 10 ML: at 23:14

## 2020-01-01 RX ADMIN — ALBUTEROL SULFATE 2 PUFF: 90 AEROSOL, METERED RESPIRATORY (INHALATION) at 08:58

## 2020-01-01 RX ADMIN — FERROUS SULFATE TAB 325 MG (65 MG ELEMENTAL FE) 325 MG: 325 (65 FE) TAB at 07:05

## 2020-01-01 RX ADMIN — BUDESONIDE 250 MCG: 0.25 INHALANT RESPIRATORY (INHALATION) at 08:36

## 2020-01-01 RX ADMIN — INSULIN LISPRO 2 UNITS: 100 INJECTION, SOLUTION INTRAVENOUS; SUBCUTANEOUS at 22:23

## 2020-01-01 RX ADMIN — ENOXAPARIN SODIUM 70 MG: 80 INJECTION SUBCUTANEOUS at 17:46

## 2020-01-01 RX ADMIN — HYDROXYZINE HYDROCHLORIDE 25 MG: 25 TABLET, FILM COATED ORAL at 07:14

## 2020-01-01 RX ADMIN — LEVETIRACETAM 500 MG: 500 TABLET, FILM COATED ORAL at 08:21

## 2020-01-01 RX ADMIN — INSULIN LISPRO 2 UNITS: 100 INJECTION, SOLUTION INTRAVENOUS; SUBCUTANEOUS at 12:12

## 2020-01-01 RX ADMIN — APIXABAN 5 MG: 5 TABLET, FILM COATED ORAL at 08:47

## 2020-01-01 RX ADMIN — IPRATROPIUM BROMIDE AND ALBUTEROL SULFATE 3 ML: .5; 3 SOLUTION RESPIRATORY (INHALATION) at 08:38

## 2020-01-01 RX ADMIN — IPRATROPIUM BROMIDE AND ALBUTEROL SULFATE 3 ML: .5; 3 SOLUTION RESPIRATORY (INHALATION) at 08:18

## 2020-01-01 RX ADMIN — FERROUS SULFATE TAB 325 MG (65 MG ELEMENTAL FE) 325 MG: 325 (65 FE) TAB at 08:40

## 2020-01-01 RX ADMIN — Medication 10 ML: at 07:00

## 2020-01-01 RX ADMIN — PANTOPRAZOLE SODIUM 40 MG: 40 TABLET, DELAYED RELEASE ORAL at 07:06

## 2020-01-01 RX ADMIN — PIPERACILLIN AND TAZOBACTAM 3.38 G: 3; .375 INJECTION, POWDER, LYOPHILIZED, FOR SOLUTION INTRAVENOUS at 09:32

## 2020-01-01 RX ADMIN — FERROUS SULFATE TAB 325 MG (65 MG ELEMENTAL FE) 325 MG: 325 (65 FE) TAB at 07:07

## 2020-01-01 RX ADMIN — IPRATROPIUM BROMIDE AND ALBUTEROL SULFATE 3 ML: .5; 3 SOLUTION RESPIRATORY (INHALATION) at 07:18

## 2020-01-01 RX ADMIN — IPRATROPIUM BROMIDE AND ALBUTEROL SULFATE 3 ML: .5; 3 SOLUTION RESPIRATORY (INHALATION) at 07:34

## 2020-01-01 RX ADMIN — INSULIN LISPRO 2 UNITS: 100 INJECTION, SOLUTION INTRAVENOUS; SUBCUTANEOUS at 12:57

## 2020-01-01 RX ADMIN — LEVETIRACETAM 500 MG: 500 TABLET, FILM COATED ORAL at 17:01

## 2020-01-01 RX ADMIN — PANTOPRAZOLE SODIUM 40 MG: 40 TABLET, DELAYED RELEASE ORAL at 16:21

## 2020-01-01 RX ADMIN — PREDNISONE 40 MG: 20 TABLET ORAL at 09:16

## 2020-01-01 RX ADMIN — FERROUS SULFATE TAB 325 MG (65 MG ELEMENTAL FE) 325 MG: 325 (65 FE) TAB at 07:17

## 2020-01-01 RX ADMIN — Medication 10 ML: at 07:05

## 2020-01-01 RX ADMIN — ARFORMOTEROL TARTRATE 15 MCG: 15 SOLUTION RESPIRATORY (INHALATION) at 08:35

## 2020-01-01 RX ADMIN — PANTOPRAZOLE SODIUM 40 MG: 40 TABLET, DELAYED RELEASE ORAL at 15:30

## 2020-01-01 RX ADMIN — APIXABAN 10 MG: 5 TABLET, FILM COATED ORAL at 17:50

## 2020-01-01 RX ADMIN — HYDROXYZINE HYDROCHLORIDE 25 MG: 25 TABLET, FILM COATED ORAL at 17:54

## 2020-01-01 RX ADMIN — ARFORMOTEROL TARTRATE 15 MCG: 15 SOLUTION RESPIRATORY (INHALATION) at 19:48

## 2020-01-01 RX ADMIN — POTASSIUM CHLORIDE 20 MEQ: 750 TABLET, FILM COATED, EXTENDED RELEASE ORAL at 18:03

## 2020-01-01 RX ADMIN — Medication 10 ML: at 13:03

## 2020-01-01 RX ADMIN — METHYLPREDNISOLONE SODIUM SUCCINATE 60 MG: 125 INJECTION, POWDER, FOR SOLUTION INTRAMUSCULAR; INTRAVENOUS at 08:09

## 2020-01-01 RX ADMIN — SODIUM CHLORIDE 1000 ML: 900 INJECTION, SOLUTION INTRAVENOUS at 15:03

## 2020-01-01 RX ADMIN — IPRATROPIUM BROMIDE AND ALBUTEROL SULFATE 3 ML: .5; 3 SOLUTION RESPIRATORY (INHALATION) at 01:28

## 2020-01-01 RX ADMIN — PREDNISONE 20 MG: 20 TABLET ORAL at 08:42

## 2020-01-01 RX ADMIN — PIPERACILLIN AND TAZOBACTAM 3.38 G: 3; .375 INJECTION, POWDER, LYOPHILIZED, FOR SOLUTION INTRAVENOUS at 11:05

## 2020-01-01 RX ADMIN — MONTELUKAST SODIUM 10 MG: 10 TABLET, FILM COATED ORAL at 11:08

## 2020-01-01 RX ADMIN — APIXABAN 10 MG: 5 TABLET, FILM COATED ORAL at 17:25

## 2020-01-01 RX ADMIN — INSULIN LISPRO 2 UNITS: 100 INJECTION, SOLUTION INTRAVENOUS; SUBCUTANEOUS at 21:16

## 2020-01-01 RX ADMIN — IPRATROPIUM BROMIDE AND ALBUTEROL SULFATE 3 ML: .5; 3 SOLUTION RESPIRATORY (INHALATION) at 14:13

## 2020-01-01 RX ADMIN — HYDROXYZINE HYDROCHLORIDE 25 MG: 25 TABLET, FILM COATED ORAL at 17:19

## 2020-01-01 RX ADMIN — INSULIN LISPRO 5 UNITS: 100 INJECTION, SOLUTION INTRAVENOUS; SUBCUTANEOUS at 17:12

## 2020-01-01 RX ADMIN — PANTOPRAZOLE SODIUM 40 MG: 40 TABLET, DELAYED RELEASE ORAL at 17:38

## 2020-01-01 RX ADMIN — BUDESONIDE 250 MCG: 0.25 INHALANT RESPIRATORY (INHALATION) at 19:36

## 2020-01-01 RX ADMIN — ACETAZOLAMIDE 125 MG: 250 TABLET ORAL at 17:54

## 2020-01-01 RX ADMIN — INSULIN LISPRO 5 UNITS: 100 INJECTION, SOLUTION INTRAVENOUS; SUBCUTANEOUS at 17:20

## 2020-01-01 RX ADMIN — Medication 10 ML: at 21:06

## 2020-01-01 RX ADMIN — BUDESONIDE 250 MCG: 0.25 INHALANT RESPIRATORY (INHALATION) at 21:23

## 2020-01-01 RX ADMIN — IPRATROPIUM BROMIDE AND ALBUTEROL SULFATE 3 ML: .5; 3 SOLUTION RESPIRATORY (INHALATION) at 09:02

## 2020-01-01 RX ADMIN — ACETAMINOPHEN 650 MG: 325 TABLET, FILM COATED ORAL at 12:52

## 2020-01-01 RX ADMIN — FLUTICASONE PROPIONATE 2 PUFF: 110 AEROSOL, METERED RESPIRATORY (INHALATION) at 19:48

## 2020-01-01 RX ADMIN — PREDNISONE 20 MG: 20 TABLET ORAL at 08:41

## 2020-01-01 RX ADMIN — ALBUTEROL SULFATE 2.5 MG: 2.5 SOLUTION RESPIRATORY (INHALATION) at 21:51

## 2020-01-01 RX ADMIN — HYDROXYZINE HYDROCHLORIDE 25 MG: 25 TABLET, FILM COATED ORAL at 17:13

## 2020-01-01 RX ADMIN — FERROUS SULFATE TAB 325 MG (65 MG ELEMENTAL FE) 325 MG: 325 (65 FE) TAB at 06:46

## 2020-01-01 RX ADMIN — MONTELUKAST SODIUM 10 MG: 10 TABLET, FILM COATED ORAL at 08:08

## 2020-01-01 RX ADMIN — PANTOPRAZOLE SODIUM 40 MG: 40 TABLET, DELAYED RELEASE ORAL at 15:44

## 2020-01-01 RX ADMIN — IPRATROPIUM BROMIDE AND ALBUTEROL SULFATE 3 ML: .5; 3 SOLUTION RESPIRATORY (INHALATION) at 15:13

## 2020-01-01 RX ADMIN — INSULIN LISPRO 2 UNITS: 100 INJECTION, SOLUTION INTRAVENOUS; SUBCUTANEOUS at 09:20

## 2020-01-01 RX ADMIN — PANTOPRAZOLE SODIUM 40 MG: 40 TABLET, DELAYED RELEASE ORAL at 06:35

## 2020-01-01 RX ADMIN — APIXABAN 10 MG: 5 TABLET, FILM COATED ORAL at 08:59

## 2020-01-01 RX ADMIN — APIXABAN 10 MG: 5 TABLET, FILM COATED ORAL at 08:40

## 2020-01-01 RX ADMIN — LEVETIRACETAM 500 MG: 500 TABLET, FILM COATED ORAL at 17:50

## 2020-01-01 RX ADMIN — Medication 10 ML: at 14:00

## 2020-01-01 RX ADMIN — OXYMETAZOLINE HYDROCHLORIDE 2 SPRAY: 0.05 SPRAY NASAL at 19:04

## 2020-01-01 RX ADMIN — IPRATROPIUM BROMIDE AND ALBUTEROL SULFATE 3 ML: .5; 3 SOLUTION RESPIRATORY (INHALATION) at 12:48

## 2020-01-01 RX ADMIN — APIXABAN 5 MG: 5 TABLET, FILM COATED ORAL at 08:58

## 2020-01-01 RX ADMIN — SERTRALINE HYDROCHLORIDE 25 MG: 50 TABLET ORAL at 09:00

## 2020-01-01 RX ADMIN — PANTOPRAZOLE SODIUM 40 MG: 40 TABLET, DELAYED RELEASE ORAL at 17:14

## 2020-01-01 RX ADMIN — INSULIN LISPRO 2 UNITS: 100 INJECTION, SOLUTION INTRAVENOUS; SUBCUTANEOUS at 17:37

## 2020-01-01 RX ADMIN — APIXABAN 5 MG: 5 TABLET, FILM COATED ORAL at 18:45

## 2020-01-01 RX ADMIN — APIXABAN 5 MG: 5 TABLET, FILM COATED ORAL at 18:06

## 2020-01-01 RX ADMIN — PANTOPRAZOLE SODIUM 40 MG: 40 TABLET, DELAYED RELEASE ORAL at 17:31

## 2020-01-01 RX ADMIN — INSULIN LISPRO 5 UNITS: 100 INJECTION, SOLUTION INTRAVENOUS; SUBCUTANEOUS at 17:22

## 2020-01-01 RX ADMIN — IPRATROPIUM BROMIDE AND ALBUTEROL SULFATE 3 ML: .5; 3 SOLUTION RESPIRATORY (INHALATION) at 07:23

## 2020-01-01 RX ADMIN — Medication 500 MG: at 10:13

## 2020-01-01 RX ADMIN — CALCIUM CHLORIDE 1 G: 100 INJECTION, SOLUTION INTRAVENOUS; INTRAVENTRICULAR at 14:39

## 2020-01-01 RX ADMIN — IPRATROPIUM BROMIDE AND ALBUTEROL SULFATE 3 ML: .5; 3 SOLUTION RESPIRATORY (INHALATION) at 20:24

## 2020-01-01 RX ADMIN — APIXABAN 5 MG: 5 TABLET, FILM COATED ORAL at 09:10

## 2020-01-01 RX ADMIN — LEVETIRACETAM 500 MG: 500 TABLET ORAL at 08:48

## 2020-01-01 RX ADMIN — PIPERACILLIN AND TAZOBACTAM 3.38 G: 3; .375 INJECTION, POWDER, LYOPHILIZED, FOR SOLUTION INTRAVENOUS at 01:47

## 2020-01-01 RX ADMIN — IPRATROPIUM BROMIDE AND ALBUTEROL SULFATE 3 ML: .5; 3 SOLUTION RESPIRATORY (INHALATION) at 16:10

## 2020-01-01 RX ADMIN — LEVETIRACETAM 500 MG: 500 TABLET, FILM COATED ORAL at 17:14

## 2020-01-01 RX ADMIN — HYDROXYZINE HYDROCHLORIDE 25 MG: 25 TABLET, FILM COATED ORAL at 19:06

## 2020-01-01 RX ADMIN — PANTOPRAZOLE SODIUM 40 MG: 40 TABLET, DELAYED RELEASE ORAL at 07:22

## 2020-01-01 RX ADMIN — APIXABAN 10 MG: 5 TABLET, FILM COATED ORAL at 18:05

## 2020-01-01 RX ADMIN — LEVETIRACETAM 500 MG: 500 TABLET, FILM COATED ORAL at 08:39

## 2020-01-01 RX ADMIN — IPRATROPIUM BROMIDE AND ALBUTEROL SULFATE 3 ML: .5; 3 SOLUTION RESPIRATORY (INHALATION) at 19:54

## 2020-01-01 RX ADMIN — VANCOMYCIN HYDROCHLORIDE 1000 MG: 1 INJECTION, POWDER, LYOPHILIZED, FOR SOLUTION INTRAVENOUS at 11:50

## 2020-01-01 RX ADMIN — PIPERACILLIN AND TAZOBACTAM 3.38 G: 3; .375 INJECTION, POWDER, LYOPHILIZED, FOR SOLUTION INTRAVENOUS at 18:16

## 2020-01-01 RX ADMIN — IPRATROPIUM BROMIDE AND ALBUTEROL SULFATE 3 ML: .5; 3 SOLUTION RESPIRATORY (INHALATION) at 07:26

## 2020-01-01 RX ADMIN — BUDESONIDE 250 MCG: 0.25 INHALANT RESPIRATORY (INHALATION) at 19:47

## 2020-01-01 RX ADMIN — HYDROXYZINE HYDROCHLORIDE 25 MG: 25 TABLET, FILM COATED ORAL at 17:50

## 2020-01-01 RX ADMIN — INSULIN LISPRO 5 UNITS: 100 INJECTION, SOLUTION INTRAVENOUS; SUBCUTANEOUS at 18:04

## 2020-01-01 RX ADMIN — ARFORMOTEROL TARTRATE 15 MCG: 15 SOLUTION RESPIRATORY (INHALATION) at 16:24

## 2020-01-01 RX ADMIN — INSULIN LISPRO 3 UNITS: 100 INJECTION, SOLUTION INTRAVENOUS; SUBCUTANEOUS at 12:32

## 2020-01-01 RX ADMIN — HYDROXYZINE HYDROCHLORIDE 25 MG: 25 TABLET, FILM COATED ORAL at 18:23

## 2020-01-01 RX ADMIN — PIPERACILLIN AND TAZOBACTAM 3.38 G: 3; .375 INJECTION, POWDER, LYOPHILIZED, FOR SOLUTION INTRAVENOUS at 01:56

## 2020-01-01 RX ADMIN — IPRATROPIUM BROMIDE AND ALBUTEROL SULFATE 3 ML: .5; 3 SOLUTION RESPIRATORY (INHALATION) at 13:29

## 2020-01-01 RX ADMIN — HYDROXYZINE HYDROCHLORIDE 25 MG: 25 TABLET, FILM COATED ORAL at 17:20

## 2020-01-01 RX ADMIN — BUDESONIDE 250 MCG: 0.25 INHALANT RESPIRATORY (INHALATION) at 21:51

## 2020-01-01 RX ADMIN — PREDNISONE 20 MG: 20 TABLET ORAL at 08:46

## 2020-01-01 RX ADMIN — ARFORMOTEROL TARTRATE 15 MCG: 15 SOLUTION RESPIRATORY (INHALATION) at 07:52

## 2020-01-01 RX ADMIN — PREDNISONE 40 MG: 20 TABLET ORAL at 09:12

## 2020-01-01 RX ADMIN — PIPERACILLIN AND TAZOBACTAM 3.38 G: 3; .375 INJECTION, POWDER, LYOPHILIZED, FOR SOLUTION INTRAVENOUS at 17:01

## 2020-01-01 RX ADMIN — HYDROXYZINE HYDROCHLORIDE 25 MG: 25 TABLET, FILM COATED ORAL at 23:25

## 2020-01-01 RX ADMIN — HYDROXYZINE HYDROCHLORIDE 25 MG: 25 TABLET, FILM COATED ORAL at 08:29

## 2020-01-01 RX ADMIN — APIXABAN 10 MG: 5 TABLET, FILM COATED ORAL at 17:13

## 2020-01-01 RX ADMIN — LEVETIRACETAM 500 MG: 500 TABLET, FILM COATED ORAL at 17:54

## 2020-01-01 RX ADMIN — HYDROXYZINE HYDROCHLORIDE 25 MG: 25 TABLET, FILM COATED ORAL at 17:31

## 2020-01-01 RX ADMIN — POTASSIUM CHLORIDE 10 MEQ: 7.46 INJECTION, SOLUTION INTRAVENOUS at 12:58

## 2020-01-01 RX ADMIN — PANTOPRAZOLE SODIUM 40 MG: 40 TABLET, DELAYED RELEASE ORAL at 16:05

## 2020-01-01 RX ADMIN — LEVETIRACETAM 500 MG: 500 TABLET, FILM COATED ORAL at 09:10

## 2020-01-01 RX ADMIN — PANTOPRAZOLE SODIUM 40 MG: 40 TABLET, DELAYED RELEASE ORAL at 17:01

## 2020-01-01 RX ADMIN — SERTRALINE HYDROCHLORIDE 25 MG: 50 TABLET ORAL at 08:41

## 2020-01-01 RX ADMIN — ALBUTEROL SULFATE 2 PUFF: 90 AEROSOL, METERED RESPIRATORY (INHALATION) at 03:23

## 2020-01-01 RX ADMIN — Medication 10 ML: at 14:03

## 2020-01-01 RX ADMIN — PREDNISONE 20 MG: 20 TABLET ORAL at 08:21

## 2020-01-01 RX ADMIN — APIXABAN 5 MG: 5 TABLET, FILM COATED ORAL at 08:09

## 2020-01-01 RX ADMIN — Medication 500 MG: at 18:45

## 2020-01-01 RX ADMIN — PANTOPRAZOLE SODIUM 40 MG: 40 TABLET, DELAYED RELEASE ORAL at 06:55

## 2020-01-01 RX ADMIN — SODIUM BICARBONATE 50 MEQ: 84 INJECTION, SOLUTION INTRAVENOUS at 14:33

## 2020-01-01 RX ADMIN — FUROSEMIDE 40 MG: 10 INJECTION, SOLUTION INTRAMUSCULAR; INTRAVENOUS at 23:30

## 2020-01-01 RX ADMIN — PREDNISONE 20 MG: 20 TABLET ORAL at 08:53

## 2020-01-01 RX ADMIN — ARFORMOTEROL TARTRATE 15 MCG: 15 SOLUTION RESPIRATORY (INHALATION) at 08:25

## 2020-01-01 RX ADMIN — ALBUTEROL SULFATE 2 PUFF: 90 AEROSOL, METERED RESPIRATORY (INHALATION) at 19:51

## 2020-01-01 RX ADMIN — HYDROXYZINE HYDROCHLORIDE 25 MG: 25 TABLET, FILM COATED ORAL at 11:08

## 2020-01-01 RX ADMIN — Medication 10 ML: at 22:23

## 2020-01-01 RX ADMIN — BUDESONIDE 250 MCG: 0.25 INHALANT RESPIRATORY (INHALATION) at 20:28

## 2020-01-01 RX ADMIN — BUDESONIDE 250 MCG: 0.25 INHALANT RESPIRATORY (INHALATION) at 09:00

## 2020-01-01 RX ADMIN — METHYLPREDNISOLONE SODIUM SUCCINATE 60 MG: 125 INJECTION, POWDER, FOR SOLUTION INTRAMUSCULAR; INTRAVENOUS at 09:01

## 2020-01-01 RX ADMIN — ALBUTEROL SULFATE 2 PUFF: 90 AEROSOL, METERED RESPIRATORY (INHALATION) at 13:10

## 2020-01-01 RX ADMIN — INSULIN LISPRO 2 UNITS: 100 INJECTION, SOLUTION INTRAVENOUS; SUBCUTANEOUS at 20:59

## 2020-01-01 RX ADMIN — ALBUTEROL SULFATE 2 PUFF: 90 AEROSOL, METERED RESPIRATORY (INHALATION) at 23:58

## 2020-01-01 RX ADMIN — Medication 10 ML: at 07:17

## 2020-01-01 RX ADMIN — ENOXAPARIN SODIUM 70 MG: 80 INJECTION SUBCUTANEOUS at 06:41

## 2020-01-01 RX ADMIN — APIXABAN 10 MG: 5 TABLET, FILM COATED ORAL at 08:46

## 2020-01-01 RX ADMIN — FERROUS SULFATE TAB 325 MG (65 MG ELEMENTAL FE) 325 MG: 325 (65 FE) TAB at 07:00

## 2020-01-01 RX ADMIN — Medication 10 ML: at 21:28

## 2020-01-01 RX ADMIN — LEVETIRACETAM 500 MG: 500 TABLET, FILM COATED ORAL at 18:00

## 2020-01-01 RX ADMIN — IPRATROPIUM BROMIDE AND ALBUTEROL SULFATE 3 ML: .5; 3 SOLUTION RESPIRATORY (INHALATION) at 14:10

## 2020-01-01 RX ADMIN — OXYMETAZOLINE HYDROCHLORIDE 2 SPRAY: 0.05 SPRAY NASAL at 18:38

## 2020-01-01 RX ADMIN — Medication 10 ML: at 06:36

## 2020-01-01 RX ADMIN — LEVOFLOXACIN 750 MG: 5 INJECTION, SOLUTION INTRAVENOUS at 15:10

## 2020-01-01 RX ADMIN — HYDROXYZINE HYDROCHLORIDE 25 MG: 25 TABLET, FILM COATED ORAL at 08:40

## 2020-01-01 RX ADMIN — ALBUTEROL SULFATE 2 PUFF: 90 AEROSOL, METERED RESPIRATORY (INHALATION) at 16:15

## 2020-01-01 RX ADMIN — MONTELUKAST SODIUM 10 MG: 10 TABLET, FILM COATED ORAL at 08:40

## 2020-01-01 RX ADMIN — PANTOPRAZOLE SODIUM 40 MG: 40 TABLET, DELAYED RELEASE ORAL at 07:15

## 2020-01-01 RX ADMIN — Medication 10 ML: at 21:47

## 2020-01-01 RX ADMIN — HYDROXYCHLOROQUINE SULFATE 200 MG: 200 TABLET, FILM COATED ORAL at 21:03

## 2020-01-01 RX ADMIN — HYDROXYZINE HYDROCHLORIDE 25 MG: 25 TABLET, FILM COATED ORAL at 09:00

## 2020-01-01 RX ADMIN — OXYMETAZOLINE HYDROCHLORIDE 2 SPRAY: 0.05 SPRAY NASAL at 06:58

## 2020-01-01 RX ADMIN — FUROSEMIDE 40 MG: 10 INJECTION INTRAMUSCULAR; INTRAVENOUS at 23:30

## 2020-01-01 RX ADMIN — METHYLPREDNISOLONE SODIUM SUCCINATE 40 MG: 40 INJECTION, POWDER, FOR SOLUTION INTRAMUSCULAR; INTRAVENOUS at 01:07

## 2020-01-01 RX ADMIN — ALBUTEROL SULFATE 2 PUFF: 90 AEROSOL, METERED RESPIRATORY (INHALATION) at 04:14

## 2020-01-01 RX ADMIN — Medication 10 ML: at 07:21

## 2020-01-01 RX ADMIN — IPRATROPIUM BROMIDE AND ALBUTEROL SULFATE 3 ML: .5; 3 SOLUTION RESPIRATORY (INHALATION) at 12:50

## 2020-01-01 RX ADMIN — BUDESONIDE 250 MCG: 0.25 INHALANT RESPIRATORY (INHALATION) at 08:18

## 2020-01-01 RX ADMIN — APIXABAN 5 MG: 5 TABLET, FILM COATED ORAL at 10:13

## 2020-01-01 RX ADMIN — FERROUS SULFATE TAB 325 MG (65 MG ELEMENTAL FE) 325 MG: 325 (65 FE) TAB at 07:10

## 2020-01-01 RX ADMIN — INSULIN LISPRO 2 UNITS: 100 INJECTION, SOLUTION INTRAVENOUS; SUBCUTANEOUS at 12:30

## 2020-01-01 RX ADMIN — IPRATROPIUM BROMIDE AND ALBUTEROL SULFATE 3 ML: .5; 3 SOLUTION RESPIRATORY (INHALATION) at 21:23

## 2020-01-01 RX ADMIN — Medication 10 ML: at 21:15

## 2020-01-01 RX ADMIN — PANTOPRAZOLE SODIUM 40 MG: 40 TABLET, DELAYED RELEASE ORAL at 07:13

## 2020-01-01 RX ADMIN — APIXABAN 5 MG: 5 TABLET, FILM COATED ORAL at 21:33

## 2020-01-01 RX ADMIN — HYDROXYZINE HYDROCHLORIDE 25 MG: 25 TABLET, FILM COATED ORAL at 17:47

## 2020-01-01 RX ADMIN — INSULIN LISPRO 2 UNITS: 100 INJECTION, SOLUTION INTRAVENOUS; SUBCUTANEOUS at 09:02

## 2020-01-01 RX ADMIN — Medication 10 ML: at 15:41

## 2020-01-01 RX ADMIN — PREDNISONE 20 MG: 20 TABLET ORAL at 09:10

## 2020-01-01 RX ADMIN — Medication 500 MG: at 08:47

## 2020-01-01 RX ADMIN — PANTOPRAZOLE SODIUM 40 MG: 40 TABLET, DELAYED RELEASE ORAL at 07:58

## 2020-01-01 RX ADMIN — Medication 10 ML: at 06:55

## 2020-01-01 RX ADMIN — PANTOPRAZOLE SODIUM 40 MG: 40 TABLET, DELAYED RELEASE ORAL at 08:40

## 2020-01-01 RX ADMIN — BUDESONIDE 250 MCG: 0.25 INHALANT RESPIRATORY (INHALATION) at 20:24

## 2020-01-01 RX ADMIN — APIXABAN 5 MG: 5 TABLET, FILM COATED ORAL at 09:15

## 2020-01-01 RX ADMIN — VANCOMYCIN HYDROCHLORIDE 1000 MG: 1 INJECTION, POWDER, LYOPHILIZED, FOR SOLUTION INTRAVENOUS at 23:30

## 2020-01-01 RX ADMIN — FLUTICASONE PROPIONATE 2 PUFF: 110 AEROSOL, METERED RESPIRATORY (INHALATION) at 08:58

## 2020-01-01 RX ADMIN — ENOXAPARIN SODIUM 80 MG: 80 INJECTION SUBCUTANEOUS at 06:35

## 2020-01-01 RX ADMIN — IPRATROPIUM BROMIDE AND ALBUTEROL SULFATE 3 ML: .5; 3 SOLUTION RESPIRATORY (INHALATION) at 17:40

## 2020-01-01 RX ADMIN — Medication 1 CAPSULE: at 13:44

## 2020-01-01 RX ADMIN — LEVETIRACETAM 500 MG: 500 TABLET, FILM COATED ORAL at 17:31

## 2020-01-01 RX ADMIN — Medication 10 ML: at 13:00

## 2020-01-01 RX ADMIN — HYDROXYZINE HYDROCHLORIDE 25 MG: 25 TABLET, FILM COATED ORAL at 17:25

## 2020-01-01 RX ADMIN — PIPERACILLIN AND TAZOBACTAM 3.38 G: 3; .375 INJECTION, POWDER, LYOPHILIZED, FOR SOLUTION INTRAVENOUS at 01:51

## 2020-01-01 RX ADMIN — IPRATROPIUM BROMIDE AND ALBUTEROL SULFATE 3 ML: .5; 3 SOLUTION RESPIRATORY (INHALATION) at 11:53

## 2020-01-01 RX ADMIN — Medication 10 ML: at 07:23

## 2020-01-01 RX ADMIN — FUROSEMIDE 40 MG: 10 INJECTION, SOLUTION INTRAMUSCULAR; INTRAVENOUS at 11:08

## 2020-01-01 RX ADMIN — Medication 10 ML: at 14:27

## 2020-01-01 RX ADMIN — FERROUS SULFATE TAB 325 MG (65 MG ELEMENTAL FE) 325 MG: 325 (65 FE) TAB at 06:58

## 2020-01-01 RX ADMIN — HYDROXYZINE HYDROCHLORIDE 25 MG: 25 TABLET, FILM COATED ORAL at 18:03

## 2020-01-01 RX ADMIN — IPRATROPIUM BROMIDE AND ALBUTEROL SULFATE 3 ML: .5; 3 SOLUTION RESPIRATORY (INHALATION) at 08:17

## 2020-01-01 RX ADMIN — INSULIN LISPRO 2 UNITS: 100 INJECTION, SOLUTION INTRAVENOUS; SUBCUTANEOUS at 11:00

## 2020-01-01 RX ADMIN — ACETAZOLAMIDE SODIUM 250 MG: 500 INJECTION, POWDER, LYOPHILIZED, FOR SOLUTION INTRAVENOUS at 18:35

## 2020-01-01 RX ADMIN — PANTOPRAZOLE SODIUM 40 MG: 40 TABLET, DELAYED RELEASE ORAL at 07:07

## 2020-01-01 RX ADMIN — PREDNISONE 10 MG: 10 TABLET ORAL at 09:15

## 2020-01-01 RX ADMIN — ALBUTEROL SULFATE 2 PUFF: 90 AEROSOL, METERED RESPIRATORY (INHALATION) at 04:09

## 2020-01-01 RX ADMIN — FERROUS SULFATE TAB 325 MG (65 MG ELEMENTAL FE) 325 MG: 325 (65 FE) TAB at 07:58

## 2020-01-01 RX ADMIN — SERTRALINE HYDROCHLORIDE 25 MG: 50 TABLET ORAL at 08:08

## 2020-01-01 RX ADMIN — SODIUM CHLORIDE 100 ML: 900 INJECTION, SOLUTION INTRAVENOUS at 17:28

## 2020-01-01 RX ADMIN — FERROUS SULFATE TAB 325 MG (65 MG ELEMENTAL FE) 325 MG: 325 (65 FE) TAB at 06:47

## 2020-01-01 RX ADMIN — INSULIN LISPRO 2 UNITS: 100 INJECTION, SOLUTION INTRAVENOUS; SUBCUTANEOUS at 21:05

## 2020-01-01 RX ADMIN — ALBUTEROL SULFATE 2 PUFF: 90 AEROSOL, METERED RESPIRATORY (INHALATION) at 08:00

## 2020-01-01 RX ADMIN — POTASSIUM CHLORIDE 20 MEQ: 750 TABLET, FILM COATED, EXTENDED RELEASE ORAL at 08:42

## 2020-01-01 RX ADMIN — FUROSEMIDE 40 MG: 10 INJECTION, SOLUTION INTRAMUSCULAR; INTRAVENOUS at 08:39

## 2020-01-01 RX ADMIN — BUDESONIDE 250 MCG: 0.25 INHALANT RESPIRATORY (INHALATION) at 07:54

## 2020-01-01 RX ADMIN — FUROSEMIDE 20 MG: 10 INJECTION, SOLUTION INTRAMUSCULAR; INTRAVENOUS at 17:37

## 2020-01-01 RX ADMIN — Medication 10 ML: at 13:50

## 2020-01-01 RX ADMIN — AZITHROMYCIN MONOHYDRATE 500 MG: 500 INJECTION, POWDER, LYOPHILIZED, FOR SOLUTION INTRAVENOUS at 21:19

## 2020-01-01 RX ADMIN — PANTOPRAZOLE SODIUM 40 MG: 40 TABLET, DELAYED RELEASE ORAL at 08:47

## 2020-01-01 RX ADMIN — PANTOPRAZOLE SODIUM 40 MG: 40 TABLET, DELAYED RELEASE ORAL at 06:48

## 2020-01-01 RX ADMIN — LEVETIRACETAM 500 MG: 500 TABLET, FILM COATED ORAL at 08:59

## 2020-01-01 RX ADMIN — PIPERACILLIN AND TAZOBACTAM 3.38 G: 3; .375 INJECTION, POWDER, LYOPHILIZED, FOR SOLUTION INTRAVENOUS at 17:19

## 2020-01-01 RX ADMIN — ACETAZOLAMIDE 250 MG: 250 TABLET ORAL at 09:10

## 2020-01-01 RX ADMIN — HYDROXYCHLOROQUINE SULFATE 400 MG: 200 TABLET, FILM COATED ORAL at 13:44

## 2020-01-01 RX ADMIN — OXYMETAZOLINE HYDROCHLORIDE 2 SPRAY: 0.05 SPRAY NASAL at 10:57

## 2020-01-01 RX ADMIN — Medication 10 ML: at 21:13

## 2020-01-01 RX ADMIN — OXYMETAZOLINE HYDROCHLORIDE 2 SPRAY: 0.05 SPRAY NASAL at 12:54

## 2020-01-01 RX ADMIN — INSULIN LISPRO 2 UNITS: 100 INJECTION, SOLUTION INTRAVENOUS; SUBCUTANEOUS at 17:50

## 2020-01-01 RX ADMIN — LEVETIRACETAM 500 MG: 500 TABLET, FILM COATED ORAL at 08:42

## 2020-01-01 RX ADMIN — HYDROXYZINE HYDROCHLORIDE 25 MG: 25 TABLET, FILM COATED ORAL at 09:15

## 2020-01-01 RX ADMIN — PREDNISONE 20 MG: 20 TABLET ORAL at 08:39

## 2020-01-01 RX ADMIN — Medication 10 ML: at 16:49

## 2020-01-01 RX ADMIN — FLUTICASONE PROPIONATE 2 SPRAY: 50 SPRAY, METERED NASAL at 13:48

## 2020-01-01 RX ADMIN — PANTOPRAZOLE SODIUM 40 MG: 40 TABLET, DELAYED RELEASE ORAL at 17:50

## 2020-01-01 RX ADMIN — MONTELUKAST SODIUM 10 MG: 10 TABLET, FILM COATED ORAL at 09:00

## 2020-01-01 RX ADMIN — Medication 10 ML: at 08:45

## 2020-01-01 RX ADMIN — METHYLPREDNISOLONE SODIUM SUCCINATE 60 MG: 125 INJECTION, POWDER, FOR SOLUTION INTRAMUSCULAR; INTRAVENOUS at 21:34

## 2020-01-01 RX ADMIN — GUAIFENESIN 100 MG: 200 SOLUTION ORAL at 10:53

## 2020-01-01 RX ADMIN — MONTELUKAST SODIUM 10 MG: 10 TABLET, FILM COATED ORAL at 09:16

## 2020-01-01 RX ADMIN — APIXABAN 10 MG: 5 TABLET, FILM COATED ORAL at 08:29

## 2020-01-01 RX ADMIN — PANTOPRAZOLE SODIUM 40 MG: 40 TABLET, DELAYED RELEASE ORAL at 06:41

## 2020-01-01 RX ADMIN — Medication 10 ML: at 23:26

## 2020-01-01 RX ADMIN — HYDROXYZINE HYDROCHLORIDE 25 MG: 25 TABLET, FILM COATED ORAL at 10:13

## 2020-01-01 RX ADMIN — ARFORMOTEROL TARTRATE 15 MCG: 15 SOLUTION RESPIRATORY (INHALATION) at 21:51

## 2020-01-01 RX ADMIN — Medication 1 CAPSULE: at 08:47

## 2020-01-01 RX ADMIN — HYDROXYZINE HYDROCHLORIDE 25 MG: 25 TABLET, FILM COATED ORAL at 17:38

## 2020-01-01 RX ADMIN — Medication 10 ML: at 06:58

## 2020-01-01 RX ADMIN — Medication 10 ML: at 21:20

## 2020-01-01 RX ADMIN — LEVETIRACETAM 500 MG: 500 TABLET ORAL at 18:45

## 2020-01-01 RX ADMIN — OXYMETAZOLINE HYDROCHLORIDE 2 SPRAY: 0.05 SPRAY NASAL at 08:01

## 2020-01-01 RX ADMIN — BUDESONIDE 250 MCG: 0.25 INHALANT RESPIRATORY (INHALATION) at 20:42

## 2020-01-01 RX ADMIN — ENOXAPARIN SODIUM 80 MG: 80 INJECTION SUBCUTANEOUS at 19:59

## 2020-01-01 RX ADMIN — IPRATROPIUM BROMIDE AND ALBUTEROL SULFATE 3 ML: .5; 3 SOLUTION RESPIRATORY (INHALATION) at 13:41

## 2020-01-01 RX ADMIN — HYDROXYCHLOROQUINE SULFATE 200 MG: 200 TABLET, FILM COATED ORAL at 10:13

## 2020-01-01 RX ADMIN — LEVETIRACETAM 500 MG: 500 TABLET, FILM COATED ORAL at 17:38

## 2020-01-01 RX ADMIN — BUDESONIDE 250 MCG: 0.25 INHALANT RESPIRATORY (INHALATION) at 19:59

## 2020-01-01 RX ADMIN — PANTOPRAZOLE SODIUM 40 MG: 40 TABLET, DELAYED RELEASE ORAL at 06:49

## 2020-01-01 RX ADMIN — ARFORMOTEROL TARTRATE 15 MCG: 15 SOLUTION RESPIRATORY (INHALATION) at 19:35

## 2020-01-01 RX ADMIN — LEVOFLOXACIN 750 MG: 5 INJECTION, SOLUTION INTRAVENOUS at 14:28

## 2020-01-01 RX ADMIN — HYDROXYZINE HYDROCHLORIDE 25 MG: 25 TABLET, FILM COATED ORAL at 09:10

## 2020-01-01 RX ADMIN — APIXABAN 10 MG: 5 TABLET, FILM COATED ORAL at 21:16

## 2020-01-01 RX ADMIN — FLUTICASONE PROPIONATE 2 PUFF: 110 AEROSOL, METERED RESPIRATORY (INHALATION) at 08:12

## 2020-01-01 RX ADMIN — BUDESONIDE 250 MCG: 0.25 INHALANT RESPIRATORY (INHALATION) at 22:35

## 2020-01-01 RX ADMIN — SERTRALINE HYDROCHLORIDE 25 MG: 50 TABLET ORAL at 08:29

## 2020-01-01 RX ADMIN — Medication 500 MG: at 18:06

## 2020-01-01 RX ADMIN — IPRATROPIUM BROMIDE AND ALBUTEROL SULFATE 3 ML: .5; 3 SOLUTION RESPIRATORY (INHALATION) at 20:16

## 2020-01-01 RX ADMIN — Medication 10 ML: at 21:03

## 2020-01-01 RX ADMIN — MONTELUKAST SODIUM 10 MG: 10 TABLET, FILM COATED ORAL at 08:21

## 2020-01-01 RX ADMIN — LEVETIRACETAM 500 MG: 500 TABLET, FILM COATED ORAL at 17:20

## 2020-01-01 RX ADMIN — FERROUS SULFATE TAB 325 MG (65 MG ELEMENTAL FE) 325 MG: 325 (65 FE) TAB at 06:35

## 2020-01-01 RX ADMIN — FERROUS SULFATE TAB 325 MG (65 MG ELEMENTAL FE) 325 MG: 325 (65 FE) TAB at 07:21

## 2020-01-01 RX ADMIN — BUDESONIDE 250 MCG: 0.25 INHALANT RESPIRATORY (INHALATION) at 08:08

## 2020-01-01 RX ADMIN — Medication 10 ML: at 13:45

## 2020-01-01 RX ADMIN — HYDROXYZINE HYDROCHLORIDE 25 MG: 25 TABLET, FILM COATED ORAL at 08:46

## 2020-01-01 RX ADMIN — Medication 10 ML: at 05:43

## 2020-01-01 RX ADMIN — ALBUTEROL SULFATE 2 PUFF: 90 AEROSOL, METERED RESPIRATORY (INHALATION) at 08:11

## 2020-01-01 RX ADMIN — LEVETIRACETAM 500 MG: 500 TABLET, FILM COATED ORAL at 17:13

## 2020-01-01 RX ADMIN — LEVETIRACETAM 500 MG: 500 TABLET, FILM COATED ORAL at 17:25

## 2020-01-01 RX ADMIN — PANTOPRAZOLE SODIUM 40 MG: 40 TABLET, DELAYED RELEASE ORAL at 10:13

## 2020-01-01 RX ADMIN — HYDROXYZINE HYDROCHLORIDE 25 MG: 25 TABLET, FILM COATED ORAL at 17:24

## 2020-01-01 RX ADMIN — FERROUS SULFATE TAB 325 MG (65 MG ELEMENTAL FE) 325 MG: 325 (65 FE) TAB at 06:48

## 2020-01-01 RX ADMIN — INSULIN LISPRO 2 UNITS: 100 INJECTION, SOLUTION INTRAVENOUS; SUBCUTANEOUS at 12:13

## 2020-01-01 RX ADMIN — INSULIN LISPRO 2 UNITS: 100 INJECTION, SOLUTION INTRAVENOUS; SUBCUTANEOUS at 09:15

## 2020-01-01 RX ADMIN — PIPERACILLIN AND TAZOBACTAM 3.38 G: 3; .375 INJECTION, POWDER, LYOPHILIZED, FOR SOLUTION INTRAVENOUS at 10:02

## 2020-01-01 RX ADMIN — SERTRALINE HYDROCHLORIDE 25 MG: 50 TABLET ORAL at 08:58

## 2020-01-01 RX ADMIN — ENOXAPARIN SODIUM 70 MG: 80 INJECTION SUBCUTANEOUS at 19:38

## 2020-01-01 RX ADMIN — SERTRALINE HYDROCHLORIDE 25 MG: 50 TABLET ORAL at 10:13

## 2020-01-01 RX ADMIN — SERTRALINE HYDROCHLORIDE 25 MG: 50 TABLET ORAL at 08:47

## 2020-01-01 RX ADMIN — HYDROXYCHLOROQUINE SULFATE 200 MG: 200 TABLET, FILM COATED ORAL at 08:47

## 2020-01-01 RX ADMIN — PREDNISONE 20 MG: 20 TABLET ORAL at 09:00

## 2020-01-01 RX ADMIN — ATORVASTATIN CALCIUM 40 MG: 40 TABLET, FILM COATED ORAL at 21:03

## 2020-01-01 RX ADMIN — HYDROXYZINE HYDROCHLORIDE 25 MG: 25 TABLET, FILM COATED ORAL at 08:41

## 2020-01-01 RX ADMIN — PANTOPRAZOLE SODIUM 40 MG: 40 TABLET, DELAYED RELEASE ORAL at 17:21

## 2020-01-01 RX ADMIN — PANTOPRAZOLE SODIUM 40 MG: 40 TABLET, DELAYED RELEASE ORAL at 07:10

## 2020-01-01 RX ADMIN — EPINEPHRINE 1 MG: 0.1 INJECTION INTRACARDIAC; INTRAVENOUS at 14:41

## 2020-01-01 RX ADMIN — POTASSIUM CHLORIDE 10 MEQ: 7.46 INJECTION, SOLUTION INTRAVENOUS at 11:04

## 2020-01-01 RX ADMIN — VANCOMYCIN HYDROCHLORIDE 1750 MG: 10 INJECTION, POWDER, LYOPHILIZED, FOR SOLUTION INTRAVENOUS at 11:05

## 2020-01-01 RX ADMIN — SERTRALINE HYDROCHLORIDE 25 MG: 50 TABLET ORAL at 09:12

## 2020-01-01 RX ADMIN — AZITHROMYCIN MONOHYDRATE 500 MG: 500 INJECTION, POWDER, LYOPHILIZED, FOR SOLUTION INTRAVENOUS at 22:30

## 2020-01-01 RX ADMIN — IPRATROPIUM BROMIDE AND ALBUTEROL SULFATE 3 ML: .5; 3 SOLUTION RESPIRATORY (INHALATION) at 14:04

## 2020-01-01 RX ADMIN — ETOMIDATE 20 MG: 2 INJECTION INTRAVENOUS at 14:20

## 2020-01-01 RX ADMIN — IPRATROPIUM BROMIDE AND ALBUTEROL SULFATE 3 ML: .5; 3 SOLUTION RESPIRATORY (INHALATION) at 07:27

## 2020-01-01 RX ADMIN — INSULIN LISPRO 5 UNITS: 100 INJECTION, SOLUTION INTRAVENOUS; SUBCUTANEOUS at 17:14

## 2020-01-01 RX ADMIN — APIXABAN 10 MG: 5 TABLET, FILM COATED ORAL at 18:03

## 2020-01-01 RX ADMIN — LEVETIRACETAM 500 MG: 500 TABLET, FILM COATED ORAL at 23:26

## 2020-01-01 RX ADMIN — LEVETIRACETAM 500 MG: 500 TABLET, FILM COATED ORAL at 17:47

## 2020-01-01 RX ADMIN — INSULIN LISPRO 3 UNITS: 100 INJECTION, SOLUTION INTRAVENOUS; SUBCUTANEOUS at 17:31

## 2020-01-01 RX ADMIN — ARFORMOTEROL TARTRATE 15 MCG: 15 SOLUTION RESPIRATORY (INHALATION) at 20:09

## 2020-01-01 RX ADMIN — INSULIN LISPRO 5 UNITS: 100 INJECTION, SOLUTION INTRAVENOUS; SUBCUTANEOUS at 17:00

## 2020-01-01 RX ADMIN — SERTRALINE HYDROCHLORIDE 25 MG: 50 TABLET ORAL at 08:46

## 2020-01-01 RX ADMIN — IPRATROPIUM BROMIDE AND ALBUTEROL SULFATE 3 ML: .5; 3 SOLUTION RESPIRATORY (INHALATION) at 23:33

## 2020-01-01 RX ADMIN — IPRATROPIUM BROMIDE AND ALBUTEROL SULFATE 3 ML: .5; 3 SOLUTION RESPIRATORY (INHALATION) at 18:00

## 2020-01-01 RX ADMIN — APIXABAN 5 MG: 5 TABLET, FILM COATED ORAL at 09:16

## 2020-01-01 RX ADMIN — APIXABAN 10 MG: 5 TABLET, FILM COATED ORAL at 09:09

## 2020-01-01 RX ADMIN — IPRATROPIUM BROMIDE AND ALBUTEROL SULFATE 3 ML: .5; 3 SOLUTION RESPIRATORY (INHALATION) at 14:24

## 2020-01-01 RX ADMIN — INSULIN LISPRO 2 UNITS: 100 INJECTION, SOLUTION INTRAVENOUS; SUBCUTANEOUS at 08:41

## 2020-01-01 RX ADMIN — VANCOMYCIN HYDROCHLORIDE 1000 MG: 1 INJECTION, POWDER, LYOPHILIZED, FOR SOLUTION INTRAVENOUS at 11:13

## 2020-01-01 RX ADMIN — EPINEPHRINE 1 MG: 0.1 INJECTION INTRACARDIAC; INTRAVENOUS at 14:33

## 2020-01-01 RX ADMIN — BUDESONIDE 250 MCG: 0.25 INHALANT RESPIRATORY (INHALATION) at 07:17

## 2020-01-01 RX ADMIN — BUDESONIDE 250 MCG: 0.25 INHALANT RESPIRATORY (INHALATION) at 20:55

## 2020-01-01 RX ADMIN — PREDNISONE 20 MG: 20 TABLET ORAL at 11:12

## 2020-01-01 RX ADMIN — DIPHENHYDRAMINE HYDROCHLORIDE 25 MG: 25 CAPSULE ORAL at 21:44

## 2020-01-01 RX ADMIN — HYDROXYZINE HYDROCHLORIDE 25 MG: 25 TABLET, FILM COATED ORAL at 07:47

## 2020-01-01 RX ADMIN — BUDESONIDE 250 MCG: 0.25 INHALANT RESPIRATORY (INHALATION) at 08:19

## 2020-01-01 RX ADMIN — EPINEPHRINE 1 MG: 0.1 INJECTION INTRACARDIAC; INTRAVENOUS at 14:27

## 2020-01-01 RX ADMIN — ACETAMINOPHEN 650 MG: 325 TABLET ORAL at 19:15

## 2020-01-01 RX ADMIN — ALBUTEROL SULFATE 2 PUFF: 90 AEROSOL, METERED RESPIRATORY (INHALATION) at 19:47

## 2020-01-01 RX ADMIN — Medication 10 ML: at 14:26

## 2020-01-01 RX ADMIN — ACETAZOLAMIDE SODIUM 250 MG: 500 INJECTION, POWDER, LYOPHILIZED, FOR SOLUTION INTRAVENOUS at 09:11

## 2020-01-01 RX ADMIN — PANTOPRAZOLE SODIUM 40 MG: 40 TABLET, DELAYED RELEASE ORAL at 06:58

## 2020-01-01 RX ADMIN — APIXABAN 5 MG: 5 TABLET, FILM COATED ORAL at 21:15

## 2020-01-01 RX ADMIN — ARFORMOTEROL TARTRATE 15 MCG: 15 SOLUTION RESPIRATORY (INHALATION) at 22:35

## 2020-01-01 RX ADMIN — FERROUS SULFATE TAB 325 MG (65 MG ELEMENTAL FE) 325 MG: 325 (65 FE) TAB at 06:55

## 2020-01-01 RX ADMIN — BUDESONIDE 250 MCG: 0.25 INHALANT RESPIRATORY (INHALATION) at 07:20

## 2020-01-01 RX ADMIN — IPRATROPIUM BROMIDE AND ALBUTEROL SULFATE 3 ML: .5; 3 SOLUTION RESPIRATORY (INHALATION) at 19:59

## 2020-01-01 RX ADMIN — APIXABAN 5 MG: 5 TABLET, FILM COATED ORAL at 19:05

## 2020-01-01 RX ADMIN — HYDROXYZINE HYDROCHLORIDE 25 MG: 25 TABLET, FILM COATED ORAL at 08:47

## 2020-01-01 RX ADMIN — APIXABAN 5 MG: 5 TABLET, FILM COATED ORAL at 20:59

## 2020-01-01 RX ADMIN — PANTOPRAZOLE SODIUM 40 MG: 40 TABLET, DELAYED RELEASE ORAL at 08:59

## 2020-01-01 RX ADMIN — Medication 10 ML: at 09:03

## 2020-01-01 RX ADMIN — EPINEPHRINE 1 MG: 0.1 INJECTION INTRACARDIAC; INTRAVENOUS at 14:30

## 2020-01-01 RX ADMIN — INSULIN LISPRO 2 UNITS: 100 INJECTION, SOLUTION INTRAVENOUS; SUBCUTANEOUS at 21:33

## 2020-01-01 RX ADMIN — Medication 10 ML: at 21:18

## 2020-01-01 RX ADMIN — AZITHROMYCIN MONOHYDRATE 250 MG: 250 TABLET ORAL at 10:13

## 2020-01-01 RX ADMIN — PANTOPRAZOLE SODIUM 40 MG: 40 TABLET, DELAYED RELEASE ORAL at 17:47

## 2020-01-01 RX ADMIN — INSULIN LISPRO 2 UNITS: 100 INJECTION, SOLUTION INTRAVENOUS; SUBCUTANEOUS at 17:54

## 2020-01-01 RX ADMIN — Medication 10 ML: at 13:01

## 2020-01-01 RX ADMIN — ENOXAPARIN SODIUM 70 MG: 80 INJECTION SUBCUTANEOUS at 08:39

## 2020-01-01 RX ADMIN — PIPERACILLIN AND TAZOBACTAM 3.38 G: 3; .375 INJECTION, POWDER, LYOPHILIZED, FOR SOLUTION INTRAVENOUS at 09:15

## 2020-01-01 RX ADMIN — ATORVASTATIN CALCIUM 40 MG: 40 TABLET, FILM COATED ORAL at 21:28

## 2020-01-01 RX ADMIN — IPRATROPIUM BROMIDE AND ALBUTEROL SULFATE 3 ML: .5; 3 SOLUTION RESPIRATORY (INHALATION) at 02:19

## 2020-01-01 RX ADMIN — IPRATROPIUM BROMIDE AND ALBUTEROL SULFATE 3 ML: .5; 3 SOLUTION RESPIRATORY (INHALATION) at 20:27

## 2020-01-01 RX ADMIN — APIXABAN 5 MG: 5 TABLET, FILM COATED ORAL at 17:14

## 2020-01-01 RX ADMIN — SERTRALINE HYDROCHLORIDE 25 MG: 50 TABLET ORAL at 09:14

## 2020-01-01 RX ADMIN — LEVETIRACETAM 500 MG: 500 TABLET, FILM COATED ORAL at 17:21

## 2020-01-01 RX ADMIN — MONTELUKAST SODIUM 10 MG: 10 TABLET, FILM COATED ORAL at 08:47

## 2020-01-01 RX ADMIN — INSULIN LISPRO 2 UNITS: 100 INJECTION, SOLUTION INTRAVENOUS; SUBCUTANEOUS at 08:29

## 2020-01-01 RX ADMIN — IPRATROPIUM BROMIDE AND ALBUTEROL SULFATE 3 ML: .5; 3 SOLUTION RESPIRATORY (INHALATION) at 20:42

## 2020-01-01 RX ADMIN — ARFORMOTEROL TARTRATE 15 MCG: 15 SOLUTION RESPIRATORY (INHALATION) at 08:01

## 2020-01-01 RX ADMIN — PIPERACILLIN AND TAZOBACTAM 3.38 G: 3; .375 INJECTION, POWDER, LYOPHILIZED, FOR SOLUTION INTRAVENOUS at 18:12

## 2020-01-01 RX ADMIN — INSULIN LISPRO 2 UNITS: 100 INJECTION, SOLUTION INTRAVENOUS; SUBCUTANEOUS at 09:12

## 2020-01-01 RX ADMIN — FERROUS SULFATE TAB 325 MG (65 MG ELEMENTAL FE) 325 MG: 325 (65 FE) TAB at 07:23

## 2020-01-01 RX ADMIN — LEVETIRACETAM 500 MG: 500 TABLET, FILM COATED ORAL at 08:52

## 2020-01-01 RX ADMIN — HYDROXYZINE HYDROCHLORIDE 25 MG: 25 TABLET, FILM COATED ORAL at 18:06

## 2020-01-01 RX ADMIN — Medication 10 ML: at 06:24

## 2020-01-01 RX ADMIN — GUAIFENESIN 100 MG: 200 SOLUTION ORAL at 10:55

## 2020-01-01 RX ADMIN — EPINEPHRINE 1 MG: 0.1 INJECTION INTRACARDIAC; INTRAVENOUS at 14:36

## 2020-01-01 RX ADMIN — EPINEPHRINE 1 MG: 0.1 INJECTION INTRACARDIAC; INTRAVENOUS at 14:39

## 2020-01-01 RX ADMIN — IOPAMIDOL 100 ML: 755 INJECTION, SOLUTION INTRAVENOUS at 14:03

## 2020-01-01 RX ADMIN — Medication 10 ML: at 14:04

## 2020-01-01 RX ADMIN — INSULIN LISPRO 2 UNITS: 100 INJECTION, SOLUTION INTRAVENOUS; SUBCUTANEOUS at 21:18

## 2020-01-01 RX ADMIN — BUDESONIDE 250 MCG: 0.25 INHALANT RESPIRATORY (INHALATION) at 09:06

## 2020-01-01 RX ADMIN — HYDROXYZINE HYDROCHLORIDE 25 MG: 25 TABLET, FILM COATED ORAL at 17:14

## 2020-01-01 RX ADMIN — FERROUS SULFATE TAB 325 MG (65 MG ELEMENTAL FE) 325 MG: 325 (65 FE) TAB at 07:13

## 2020-01-01 RX ADMIN — MONTELUKAST SODIUM 10 MG: 10 TABLET, FILM COATED ORAL at 08:59

## 2020-01-01 RX ADMIN — Medication 10 ML: at 17:28

## 2020-02-20 PROBLEM — I50.9 CHF (CONGESTIVE HEART FAILURE) (HCC): Status: ACTIVE | Noted: 2020-01-01

## 2020-02-20 PROBLEM — J96.01 ACUTE RESPIRATORY FAILURE WITH HYPOXIA (HCC): Status: ACTIVE | Noted: 2020-01-01

## 2020-02-20 NOTE — H&P
History & Physical 
 
Primary Care Provider: Paula Rebolledo MD 
Source of Information: Patient and sister History of Presenting Illness:  
Charlyne Lesch is a 69 y.o. male who presents with sob Charlyne Lesch is a 69 y.o.  with h/o COPD/ASTHMA, CAD and DM. Pt presents from [facility] for evaluation of shortness of breath. Patient prior was living at home with a family member, 2 or 3 weeks ago he developed shortness breath and then he was admitted to [facility], per sister he was treated with COPD exacerbation patient also was told that there were fluid in the chest but never have diagnosed with CHF. He was then discharged to [facility] for rehab 2 weeks ago was steroids and nebulizer. In SNF, Pt complains of increased shortness of breath with associated leg swelling, and chest pain for the past 3 days. Pt notes he had an oxygen saturation of 88%. Pt denies nausea, vomiting, fever, or abdominal pain. Patient said he can't fit his regular shoe anymore due to the leg and feet swelling. He also noticed increasing abdominal distention. Review of Systems: 
General: no fever, increasing weight HEENT: no headache, no vision changes, no nose discharge, no hearing changes RES: hpi, chronic dry cough CVS:hpi 
Muscular: no joint swelling, no muscle pain, hpi Skin: no rash, no itching GI: no vomiting, no diarrhea, increasing abd distendion : no dysuria, no hematuria Hemo: no gum bleeding, no petechial  
Neuro: no sensation changes, no focal weakness Endo: no polydipsia Psych: denied depression Past Medical History:  
Diagnosis Date  Asthma   
 hx of/has wheezing  Cancer [facility])   
 prostate - not treated yet  Cerebral artery occlusion with cerebral infarction ([facility]  Chronic obstructive pulmonary disease ([facility]  Epilepsy ([facility]  Ill-defined condition   
 shortness of breath - being evaluated  Psychiatric disorder mental disability Past Surgical History:  
Procedure Laterality Date  COLONOSCOPY Left 10/4/2017 COLONOSCOPY performed by Marla Velasquez MD at [address] [place] [address] ENDOSCOPY  
 HX INTRACRANIAL ANEURYSM REPAIR    
 HX OTHER SURGICAL    
 cyst removed from neck  HX PROSTATE SURGERY    
 HX SPLENECTOMY Prior to Admission medications Medication Sig Start Date End Date Taking? Authorizing Provider  
atorvastatin (LIPITOR) 40 mg tablet Take 40 mg by mouth daily. Yes Provider, Historical  
arformoteroL (BROVANA) 15 mcg/2 mL nebu neb solution 15 mcg by Nebulization route two (2) times a day. Yes Provider, Historical  
multivitamin (ONE A DAY) tablet Take 1 Tab by mouth daily. Yes Provider, Historical  
ferrous sulfate 325 mg (65 mg iron) tablet Take 325 mg by mouth Daily (before breakfast). Yes Provider, Historical  
fluticasone propionate (FLONASE ALLERGY RELIEF) 50 mcg/actuation nasal spray 2 Sprays by Both Nostrils route daily. Yes Provider, Historical  
hydroxyzine HCL (ATARAX) 25 mg tablet Take 25 mg by mouth two (2) times a day. Yes Provider, Historical  
ipratropium (ATROVENT) 0.02 % soln 0.5 mg by Other route every eight (8) hours. Oral inhalation   Yes Provider, Historical  
metFORMIN (GLUCOPHAGE) 500 mg tablet Take 500 mg by mouth daily (with breakfast). Yes Provider, Historical  
montelukast (SINGULAIR) 10 mg tablet Take 10 mg by mouth daily. Yes Provider, Historical  
predniSONE (DELTASONE) 10 mg tablet Take 20 mg by mouth daily (with breakfast). COPD exacerbation   Yes Provider, Historical  
budesonide-formoteroL (SYMBICORT) 160-4.5 mcg/actuation HFAA Take 2 Puffs by inhalation two (2) times a day. Yes Provider, Historical  
levalbuterol tartrate (XOPENEX) 45 mcg/actuation inhaler Take 1 Puff by inhalation every eight (8) hours. Yes Provider, Historical  
cetirizine (ZYRTEC) 10 mg tablet Take 10 mg by mouth daily.    Yes Provider, Historical  
 levETIRAcetam (KEPPRA) 500 mg tablet TAKE 1 TABLET BY MOUTH TWICE A DAY 11/8/19  Yes Nirali Howard DO  
pantoprazole (PROTONIX) 40 mg tablet Take 1 Tab by mouth Before breakfast and dinner. 10/6/17  Yes Parish Callejas MD  
umeclidinium (INCRUSE ELLIPTA) 62.5 mcg/actuation inhaler Take 1 Puff by inhalation daily. Indications: Rescue inhaler   Yes Provider, Historical  
 
No Known Allergies Family History Problem Relation Age of Onset  Colon Cancer Maternal Aunt  Colon Cancer Maternal Aunt  Colon Cancer Maternal Aunt SOCIAL HISTORY: 
Patient resides: 
Independently Assisted Living SNF x With family care Smoking history:  
None Former x Chronic Alcohol history:  
None Social x Chronic Ambulates:  
Independently   
w/cane   
w/walker x  
w/wc CODE STATUS: 
DNR Full x Other Objective:  
 
Physical Exam:  
 
Visit Vitals /70 (BP 1 Location: Right arm, BP Patient Position: At rest) Pulse 100 Temp 97.9 °F (36.6 °C) Resp 27 Ht 6' 2\" (1.88 m) Wt 77.1 kg (170 lb) SpO2 96% BMI 21.83 kg/m² O2 Flow Rate (L/min): 4 l/min O2 Device: Nasal cannula General:  Alert, cooperative, no distress, appears stated age. Head:  Normocephalic, without obvious abnormality, atraumatic. Eyes:  Conjunctivae/corneas clear. PERRL, EOMs intact. Nose: Nares normal. Septum midline. Mucosa normal. No drainage or sinus tenderness. Throat: Lips, mucosa, and tongue normal. Teeth and gums normal.  
Neck: Supple, symmetrical, trachea midline, no adenopathy, thyroid: no enlargement/tenderness/nodules, no carotid bruit and no JVD. Back:   Symmetric, no curvature. ROM normal. No CVA tenderness. Lungs: No wheezing, AE ok. Chest wall:  No tenderness or deformity. Heart:  Regular rate and tachy , S1, S2 normal, no murmur, click, rub or gallop.   
Abdomen:   Soft, distended,  shifting dulness,  Bowel sounds normal. No masses,  No organomegaly. Hernia Extremities: Extremities normal, atraumatic, 2+ edema. Pulses: 2+ and symmetric all extremities. Skin: Skin color, texture, turgor normal. No rashes or lesions Neurologic: CNII-XII intact. No focal   
 
 
 
 
 
 
Data Review:  
 
Recent Days: 
Recent Labs  
  02/20/20 
1333 WBC 7.3 HGB 13.3 HCT 46.3  Recent Labs  
  02/20/20 
1333   
K 4.7 CL 95* CO2 39* * BUN 10  
CREA 0.66* CA 10.5* ALB 3.4* SGOT 10* ALT 24 No results for input(s): PH, PCO2, PO2, HCO3, FIO2 in the last 72 hours. 24 Hour Results: 
Recent Results (from the past 24 hour(s)) CBC WITH AUTOMATED DIFF Collection Time: 02/20/20  1:33 PM  
Result Value Ref Range WBC 7.3 4.1 - 11.1 K/uL  
 RBC 5.15 4.10 - 5.70 M/uL  
 HGB 13.3 12.1 - 17.0 g/dL HCT 46.3 36.6 - 50.3 % MCV 89.9 80.0 - 99.0 FL  
 MCH 25.8 (L) 26.0 - 34.0 PG  
 MCHC 28.7 (L) 30.0 - 36.5 g/dL  
 RDW 19.5 (H) 11.5 - 14.5 % PLATELET 589 580 - 979 K/uL MPV 10.2 8.9 - 12.9 FL  
 NRBC 0.0 0  WBC ABSOLUTE NRBC 0.00 0.00 - 0.01 K/uL NEUTROPHILS 88 (H) 32 - 75 % BAND NEUTROPHILS 5 0 - 6 % LYMPHOCYTES 4 (L) 12 - 49 % MONOCYTES 2 (L) 5 - 13 % EOSINOPHILS 0 0 - 7 % BASOPHILS 0 0 - 1 % MYELOCYTES 1 (H) 0 % IMMATURE GRANULOCYTES 0 %  
 ABS. NEUTROPHILS 6.8 1.8 - 8.0 K/UL  
 ABS. LYMPHOCYTES 0.3 (L) 0.8 - 3.5 K/UL  
 ABS. MONOCYTES 0.1 0.0 - 1.0 K/UL  
 ABS. EOSINOPHILS 0.0 0.0 - 0.4 K/UL  
 ABS. BASOPHILS 0.0 0.0 - 0.1 K/UL  
 ABS. IMM. GRANS. 0.0 K/UL  
 DF MANUAL    
 RBC COMMENTS ANISOCYTOSIS 1+ 
    
 RBC COMMENTS POLYCHROMASIA PRESENT 
HYPOCHROMIA 1+ METABOLIC PANEL, COMPREHENSIVE Collection Time: 02/20/20  1:33 PM  
Result Value Ref Range Sodium 136 136 - 145 mmol/L Potassium 4.7 3.5 - 5.1 mmol/L Chloride 95 (L) 97 - 108 mmol/L  
 CO2 39 (H) 21 - 32 mmol/L Anion gap 2 (L) 5 - 15 mmol/L Glucose 170 (H) 65 - 100 mg/dL BUN 10 6 - 20 MG/DL Creatinine 0.66 (L) 0.70 - 1.30 MG/DL  
 BUN/Creatinine ratio 15 12 - 20 GFR est AA >60 >60 ml/min/1.73m2 GFR est non-AA >60 >60 ml/min/1.73m2 Calcium 10.5 (H) 8.5 - 10.1 MG/DL Bilirubin, total 0.4 0.2 - 1.0 MG/DL  
 ALT (SGPT) 24 12 - 78 U/L  
 AST (SGOT) 10 (L) 15 - 37 U/L Alk. phosphatase 88 45 - 117 U/L Protein, total 7.9 6.4 - 8.2 g/dL Albumin 3.4 (L) 3.5 - 5.0 g/dL Globulin 4.5 (H) 2.0 - 4.0 g/dL A-G Ratio 0.8 (L) 1.1 - 2.2 SAMPLES BEING HELD Collection Time: 02/20/20  1:33 PM  
Result Value Ref Range SAMPLES BEING HELD 1RED,1BLU,1BC(SILVER) COMMENT Add-on orders for these samples will be processed based on acceptable specimen integrity and analyte stability, which may vary by analyte. TROPONIN I Collection Time: 02/20/20  1:33 PM  
Result Value Ref Range Troponin-I, Qt. <0.05 <0.05 ng/mL NT-PRO BNP Collection Time: 02/20/20  1:33 PM  
Result Value Ref Range NT pro-BNP 58 <125 PG/ML  
EKG, 12 LEAD, INITIAL Collection Time: 02/20/20  1:34 PM  
Result Value Ref Range Ventricular Rate 108 BPM  
 Atrial Rate 108 BPM  
 P-R Interval 130 ms QRS Duration 76 ms  
 Q-T Interval 350 ms QTC Calculation (Bezet) 469 ms Calculated P Axis 53 degrees Calculated R Axis 79 degrees Calculated T Axis 34 degrees Diagnosis Sinus tachycardia with premature atrial complexes No previous ECGs available Baseline artifact Confirmed by Tino Smith MD, Citlaly Lou ([id number]) on 2/20/2020 4:37:55 PM 
  
 
 
 
Imaging: Xr Chest [facility] Result Date: 2/20/2020 IMPRESSION: Bibasilar atelectasis or scar with interstitial prominence. Correlate for developing interstitial edema or bibasilar inflammatory infiltrate in this patient with COPD and chronic asthma. .  . Assessment:  
 
Active Problems: 
  CHF (congestive heart failure) ([facility] (2/20/2020) Acute respiratory failure with hypoxia ([facility] (2/20/2020) Plan: 1. Acute on chronic resp failure  With hypoxia:  Pending chest CTA to look for PE and  further eval lung. BNP low, no chf history, but giving the CXR finding, leg edema. Will start IV lasix. Follow cr. Will order echo. 2. COPD: no wheezing, continue home nebs and previous dose of prednisone. 3. abd distention: ? Ascites, pending abd US, may need tap if have large ascites. 4. DM: SSI 5. H/o seizure: continue home meds Signed By: Yin Babin MD   
 February 20, 2020

## 2020-02-20 NOTE — ED NOTES
Marco Curtis TRANSFER - OUT REPORT: 
 
Verbal report given to Sagar Ledezma RN (name) on Betsy Sinks  being transferred to [facility],  (unit) for routine progression of care Report consisted of patients Situation, Background, Assessment and  
Recommendations(SBAR). Information from the following report(s) SBAR, ED Summary and MAR was reviewed with the receiving nurse. Lines:  
Peripheral IV 02/20/20 Left Antecubital (Active) Site Assessment Clean, dry, & intact 2/20/2020  1:29 PM  
Phlebitis Assessment 0 2/20/2020  1:29 PM  
Infiltration Assessment 0 2/20/2020  1:29 PM  
Dressing Status Clean, dry, & intact 2/20/2020  1:29 PM  
  
 
Opportunity for questions and clarification was provided. Patient transported with: 
 [employer]

## 2020-02-20 NOTE — PROGRESS NOTES
Admission Medication Reconciliation: 
 
Information obtained from:  [address] data available¹:  YES Comments/Recommendations: Updated PTA meds/reviewed patient's allergies. 1)  Reviewed order summary report faxed from [facility]. Noted patient has multiple medications ordered for management of his COPD. Patient is also on prednisone daily. 2)  Medication changes (since last review): Added - Arformoterol 15mcg/2mL nebulize 1 vial twice daily - Fluticasone 50mcg/actuation 2 sprays each nostril daily - Hydroxyzine 25mg 1tab po twice daily - Ipratropium 0.02% inhale 1 vial orally every 8 hours - Metformin 500mg 1tab po daily - Montelukast 10mg 1tab po daily - Budesonide-formoterol 160-4.5mcg/actuation 2 puffs inhalation twice daily - Levalbuterol HFA 45mcg/actuation 1 puff inhalation every 8 hours - Cetirizine 10mg 1tab po daily Removed - Albuterol-ipratropium 2.5-0.5mg/3mL nebulization every 4 hours as needed - Bisacodyl 10mg suppository insert rectally daily - Fluticasone-vilanterol 100-25mcg/dose inhaler 1 puff daily 
- Heparin 5000 units SC every 8 hours - Hydrocodone-acetaminophen 5-325mg 1tab po every 4 hours as needed 
- Senna-docusate 8.6-50mg 2tabs po nightly ¹RxQuery pharmacy benefit data reflects medications filled and processed through the patient's insurance, however  
this data does NOT capture whether the medication was picked up or is currently being taken by the patient. Allergies:  Patient has no known allergies. Significant PMH/Disease States:  
Past Medical History:  
Diagnosis Date Asthma   
 hx of/has wheezing Cancer [facility])   
 prostate - not treated yet Cerebral artery occlusion with cerebral infarction ([facility] [address] Chronic obstructive pulmonary disease (HCC) Epilepsy ([facility] [address] Ill-defined condition   
 shortness of breath - being evaluated Psychiatric disorder   
 mental disability Chief Complaint for this Admission: Chief Complaint Patient presents with Shortness of Breath Prior to Admission Medications:  
Prior to Admission Medications Prescriptions Last Dose Informant Taking?  
arformoteroL (BROVANA) 15 mcg/2 mL nebu neb solution   Yes Sig: 15 mcg by Nebulization route two (2) times a day. atorvastatin (LIPITOR) 40 mg tablet   Yes Sig: Take 40 mg by mouth daily. budesonide-formoteroL (SYMBICORT) 160-4.5 mcg/actuation HFAA   Yes Sig: Take 2 Puffs by inhalation two (2) times a day. cetirizine (ZYRTEC) 10 mg tablet   Yes Sig: Take 10 mg by mouth daily. ferrous sulfate 325 mg (65 mg iron) tablet   Yes Sig: Take 325 mg by mouth Daily (before breakfast). fluticasone propionate (FLONASE ALLERGY RELIEF) 50 mcg/actuation nasal spray   Yes Si Sprays by Both Nostrils route daily. hydroxyzine HCL (ATARAX) 25 mg tablet   Yes Sig: Take 25 mg by mouth two (2) times a day. ipratropium (ATROVENT) 0.02 % soln   Yes Si.5 mg by Other route every eight (8) hours. Oral inhalation  
levETIRAcetam (KEPPRA) 500 mg tablet   Yes Sig: TAKE 1 TABLET BY MOUTH TWICE A DAY  
levalbuterol tartrate (XOPENEX) 45 mcg/actuation inhaler   Yes Sig: Take 1 Puff by inhalation every eight (8) hours. metFORMIN (GLUCOPHAGE) 500 mg tablet   Yes Sig: Take 500 mg by mouth daily (with breakfast). montelukast (SINGULAIR) 10 mg tablet   Yes Sig: Take 10 mg by mouth daily. multivitamin (ONE A DAY) tablet   Yes Sig: Take 1 Tab by mouth daily. pantoprazole (PROTONIX) 40 mg tablet   Yes Sig: Take 1 Tab by mouth Before breakfast and dinner. predniSONE (DELTASONE) 10 mg tablet   Yes Sig: Take 20 mg by mouth daily (with breakfast). COPD exacerbation  
umeclidinium (INCRUSE ELLIPTA) 62.5 mcg/actuation inhaler   Yes Sig: Take 1 Puff by inhalation daily. Indications: Rescue inhaler Facility-Administered Medications: None Please contact the main inpatient pharmacy with any questions or concerns at [phone] and we will direct you to the clinical pharmacist covering this patient's care while in-house.   
CURTIS MejiaD

## 2020-02-20 NOTE — ED PROVIDER NOTES
Date of Service: 
2/20/2020 Patient: 
Oswaldo Goldmann Chief Complaint: 
Shortness of Breath HPI: 
Oswaldo Goldmann is a 69 y.o.  male who presents from [facility] for evaluation of shortness of breath. Pt complains of increased shortness of breath with associated leg swelling, and chest pain for the past 3 days. Pt notes he had an oxygen saturation of 88% Pt notes a hx of COPD. Pt denies hx of DVT. Pt denies nausea, vomiting, fever, or abdominal pain. Note written by Brenda Street. Indra Neighbours, as dictated by Regis Lu, DO 1:45 PM 
 
 
The history is provided by the patient and a relative. Past Medical History:  
Diagnosis Date  Asthma   
 hx of/has wheezing  Cancer [facility])   
 prostate - not treated yet  Cerebral artery occlusion with cerebral infarction ([facility] [address]  Chronic obstructive pulmonary disease ([facility] [address]  Epilepsy ([facility] [address]  Ill-defined condition   
 shortness of breath - being evaluated  Psychiatric disorder   
 mental disability Past Surgical History:  
Procedure Laterality Date  COLONOSCOPY Left 10/4/2017 COLONOSCOPY performed by Anais Horton MD at [facility] ENDOSCOPY  
 HX INTRACRANIAL ANEURYSM REPAIR    
 HX OTHER SURGICAL    
 cyst removed from neck  HX PROSTATE SURGERY    
 HX SPLENECTOMY Family History:  
Problem Relation Age of Onset  Colon Cancer Maternal Aunt  Colon Cancer Maternal Aunt  Colon Cancer Maternal Aunt Social History Socioeconomic History  Marital status: LEGALLY  Spouse name: Not on file  Number of children: Not on file  Years of education: Not on file  Highest education level: Not on file Occupational History  Not on file Social Needs  Financial resource strain: Not on file  Food insecurity:  
  Worry: Not on file Inability: Not on file  Transportation needs:  
  Medical: Not on file Non-medical: Not on file Tobacco Use  
  Smoking status: Current Every Day Smoker  Smokeless tobacco: Never Used  Tobacco comment: cigarettes Substance and Sexual Activity  Alcohol use: No  
 Drug use: No  
 Sexual activity: Not on file Lifestyle  Physical activity:  
  Days per week: Not on file Minutes per session: Not on file  Stress: Not on file Relationships  Social connections:  
  Talks on phone: Not on file Gets together: Not on file Attends Oriental orthodox service: Not on file Active member of club or organization: Not on file Attends meetings of clubs or organizations: Not on file Relationship status: Not on file  Intimate partner violence:  
  Fear of current or ex partner: Not on file Emotionally abused: Not on file Physically abused: Not on file Forced sexual activity: Not on file Other Topics Concern  Not on file Social History Narrative  Not on file ALLERGIES: Patient has no known allergies. Review of Systems Constitutional: Negative for fever. HENT: Negative for hearing loss. Eyes: Negative for visual disturbance. Respiratory: Positive for shortness of breath. Cardiovascular: Positive for chest pain and leg swelling. Gastrointestinal: Negative for abdominal pain, nausea and vomiting. Genitourinary: Negative for flank pain. Musculoskeletal: Negative for back pain. Skin: Negative for rash. Neurological: Negative for dizziness and light-headedness. Psychiatric/Behavioral: Negative for suicidal ideas. All other systems reviewed and are negative. Vitals:  
 02/20/20 1324 02/20/20 1327 02/20/20 1330 BP: 135/81  138/81 Pulse: (!) 110  (!) 110 Resp: 18  23 Temp: 97.9 °F (36.6 °C) SpO2: (!) 87% 90% Weight: 77.1 kg (170 lb) Height: 6' 2\" (1.88 m) Physical Exam 
Vitals signs and nursing note reviewed. Constitutional:   
   General: He is not in acute distress. Appearance: He is well-developed.   
HENT:  
 Head: Normocephalic and atraumatic. Eyes:  
   Conjunctiva/sclera: Conjunctivae normal.  
   Pupils: Pupils are equal, round, and reactive to light. Neck: Musculoskeletal: Neck supple. Vascular: No JVD. Trachea: No tracheal deviation. Cardiovascular:  
   Rate and Rhythm: Regular rhythm. Tachycardia present. Heart sounds: No murmur. No friction rub. Pulmonary:  
   Effort: Pulmonary effort is normal. Tachypnea present. No respiratory distress. Comments: Crackles at the bases. Abdominal:  
   General: There is no distension. Palpations: Abdomen is soft. Tenderness: There is no abdominal tenderness. Hernia: A hernia (Large.) is present. Musculoskeletal:     
   General: No tenderness or deformity. Comments: Peripheral edema. Skin: 
   General: Skin is warm and dry. Capillary Refill: Capillary refill takes less than 2 seconds. Findings: No rash. Neurological:  
   Mental Status: He is alert and oriented to person, place, and time. Psychiatric:     
   Behavior: Behavior normal.     
   Thought Content: Thought content normal.     
   Judgment: Judgment normal.  
 
 Note written by Magdiel Eldridge. Devin Burr, as dictated by Cindi Phoenix, DO 1:45 PM 
 
 
MDM Number of Diagnoses or Management Options Hypoxia:  
Peripheral edema:  
SOB (shortness of breath): VITAL SIGNS: 
Patient Vitals for the past 4 hrs: 
 Temp Pulse Resp BP SpO2  
02/20/20 1500  (!) 109 25 128/73 93 % 02/20/20 1445  (!) 104 22 120/80   
02/20/20 1430  (!) 103 18 115/69 93 % 02/20/20 1415  (!) 110 18 114/65   
02/20/20 1400  (!) 109 29 93/45   
02/20/20 1345  (!) 112 25 (!) 148/95   
02/20/20 1330  (!) 110 23 138/81   
02/20/20 1327     90 % 02/20/20 1324 97.9 °F (36.6 °C) (!) 110 18 135/81 (!) 87 % LABS: 
Recent Results (from the past 6 hour(s)) CBC WITH AUTOMATED DIFF  Collection Time: 02/20/20  1:33 PM  
 Result Value Ref Range WBC 7.3 4.1 - 11.1 K/uL  
 RBC 5.15 4.10 - 5.70 M/uL  
 HGB 13.3 12.1 - 17.0 g/dL HCT 46.3 36.6 - 50.3 % MCV 89.9 80.0 - 99.0 FL  
 MCH 25.8 (L) 26.0 - 34.0 PG  
 MCHC 28.7 (L) 30.0 - 36.5 g/dL  
 RDW 19.5 (H) 11.5 - 14.5 % PLATELET 526 395 - 828 K/uL MPV 10.2 8.9 - 12.9 FL  
 NRBC 0.0 0  WBC ABSOLUTE NRBC 0.00 0.00 - 0.01 K/uL NEUTROPHILS 88 (H) 32 - 75 % BAND NEUTROPHILS 5 0 - 6 % LYMPHOCYTES 4 (L) 12 - 49 % MONOCYTES 2 (L) 5 - 13 % EOSINOPHILS 0 0 - 7 % BASOPHILS 0 0 - 1 % MYELOCYTES 1 (H) 0 % IMMATURE GRANULOCYTES 0 %  
 ABS. NEUTROPHILS 6.8 1.8 - 8.0 K/UL  
 ABS. LYMPHOCYTES 0.3 (L) 0.8 - 3.5 K/UL  
 ABS. MONOCYTES 0.1 0.0 - 1.0 K/UL  
 ABS. EOSINOPHILS 0.0 0.0 - 0.4 K/UL  
 ABS. BASOPHILS 0.0 0.0 - 0.1 K/UL  
 ABS. IMM. GRANS. 0.0 K/UL  
 DF MANUAL    
 RBC COMMENTS ANISOCYTOSIS 1+ 
    
 RBC COMMENTS POLYCHROMASIA PRESENT 
HYPOCHROMIA 1+ METABOLIC PANEL, COMPREHENSIVE Collection Time: 02/20/20  1:33 PM  
Result Value Ref Range Sodium 136 136 - 145 mmol/L Potassium 4.7 3.5 - 5.1 mmol/L Chloride 95 (L) 97 - 108 mmol/L  
 CO2 39 (H) 21 - 32 mmol/L Anion gap 2 (L) 5 - 15 mmol/L Glucose 170 (H) 65 - 100 mg/dL BUN 10 6 - 20 MG/DL Creatinine 0.66 (L) 0.70 - 1.30 MG/DL  
 BUN/Creatinine ratio 15 12 - 20 GFR est AA >60 >60 ml/min/1.73m2 GFR est non-AA >60 >60 ml/min/1.73m2 Calcium 10.5 (H) 8.5 - 10.1 MG/DL Bilirubin, total 0.4 0.2 - 1.0 MG/DL  
 ALT (SGPT) 24 12 - 78 U/L  
 AST (SGOT) 10 (L) 15 - 37 U/L Alk. phosphatase 88 45 - 117 U/L Protein, total 7.9 6.4 - 8.2 g/dL Albumin 3.4 (L) 3.5 - 5.0 g/dL Globulin 4.5 (H) 2.0 - 4.0 g/dL A-G Ratio 0.8 (L) 1.1 - 2.2 SAMPLES BEING HELD Collection Time: 02/20/20  1:33 PM  
Result Value Ref Range SAMPLES BEING HELD 1RED,1BLU,1BC(SILVER) COMMENT   Add-on orders for these samples will be processed based on acceptable specimen integrity and analyte stability, which may vary by analyte. TROPONIN I Collection Time: 02/20/20  1:33 PM  
Result Value Ref Range Troponin-I, Qt. <0.05 <0.05 ng/mL NT-PRO BNP Collection Time: 02/20/20  1:33 PM  
Result Value Ref Range NT pro-BNP 58 <125 PG/ML  
EKG, 12 LEAD, INITIAL Collection Time: 02/20/20  1:34 PM  
Result Value Ref Range Ventricular Rate 108 BPM  
 Atrial Rate 108 BPM  
 P-R Interval 130 ms QRS Duration 76 ms  
 Q-T Interval 350 ms QTC Calculation (Bezet) 469 ms Calculated P Axis 53 degrees Calculated R Axis 79 degrees Calculated T Axis 34 degrees Diagnosis Sinus tachycardia with premature atrial complexes No previous ECGs available IMAGING: 
XR CHEST PORT Final Result IMPRESSION:  
Bibasilar atelectasis or scar with interstitial prominence. Correlate for  
developing interstitial edema or bibasilar inflammatory infiltrate in this  
patient with COPD and chronic asthma. .  . CTA CHEST W OR W WO CONT    (Results Pending) Medications During Visit: 
Medications - No data to display DECISION MAKING: 
Monty Smith is a 69 y.o. male who comes in as above. Labs and imaging as above. Because of hypoxia and increased work of breathing, will keep patient in the hospital. Patient stable here, agreeable to stay. IMPRESSION: 
1. SOB (shortness of breath) 2. Hypoxia 3. Peripheral edema DISPOSITION: 
Admitted Procedures ED EKG interpretation: 
Rhythm: sinus tachycardia; and regular . Rate (approx.): 108; Axis: normal; Intervals: Normal; ST/T wave: normal; Baseline artifact. No ischemic change. Note written by Mindy Baltazar. Mikie Kay, as dictated by Octavio Mata DO 1:48 PM 
 
Hospitalist Perfect Serve for Admission 3:27 PM 
 
ED Room Number: MM70/57 Patient Name and age:  Monty Smith 69 y.o.  male Working Diagnosis:  
1. SOB (shortness of breath) 2. Hypoxia 3. Peripheral edema Readmission: no 
Isolation Requirements:  no 
Recommended Level of Care:  med/surg Code Status:  Full Code Department:[facility] Adult ED - [phone] Other:  Probable CHF, new onset. SOB with minimal hypoxia requiring 02. Getting PE study, has not gone.

## 2020-02-20 NOTE — ED TRIAGE NOTES
Pt to ED via EMS from [facility] with cc of SOB for a couple of days with O2 sat of 88%.

## 2020-02-21 NOTE — CONSULTS
[facility] at [place] [address] [address], [address] W: [phone]  F: [phone] Reason for Visit:  
Maryuri Horne is a 69 y.o. male who is seen in consultation at the request of Dr. Karen Betancourt for evaluation of PE/DVT. History of Present Illness:  
Presented to ED from SNF for shortness of breath. CTA shows right upper lobe emboli. Venous dopplers of bilat LE shows acute DVT ro right popliteal vein. He was recently hospitalized for COPD/CHF exacerbation at an outside hospital and was discharged to rehab shortly after. Patient sitting up in bed with nasal cannula O2. He appears to have some dyspnea with speaking. He states he is on 2.5-3L O2 at home. Right leg  Denies history of clots in past. He states he previously lived at home independently and has a sister that lives nearby. He has some abdominal distension, no diarrhea/ constipation or bleeding. He has had no falls . Past Medical History:  
Diagnosis Date  Asthma   
 hx of/has wheezing  Cancer [facility])   
 prostate - not treated yet  Cerebral artery occlusion with cerebral infarction ([facility] [address]  Chronic obstructive pulmonary disease ([facility] [address]  Epilepsy ([facility] [address]  Ill-defined condition   
 shortness of breath - being evaluated  Psychiatric disorder   
 mental disability Past Surgical History:  
Procedure Laterality Date  COLONOSCOPY Left 10/4/2017 COLONOSCOPY performed by Darrius Bucio MD at [facility] ENDOSCOPY  
 HX INTRACRANIAL ANEURYSM REPAIR    
 HX OTHER SURGICAL    
 cyst removed from neck  HX PROSTATE SURGERY    
 HX SPLENECTOMY Social History Tobacco Use  Smoking status: Current Every Day Smoker  Smokeless tobacco: Never Used  Tobacco comment: cigarettes Substance Use Topics  Alcohol use: No  
  
Family History Problem Relation Age of Onset  Colon Cancer Maternal Aunt  Colon Cancer Maternal Aunt  Colon Cancer Maternal Aunt Current Facility-Administered Medications Medication Dose Route Frequency  albuterol-ipratropium (DUO-NEB) 2.5 MG-0.5 MG/3 ML  3 mL Nebulization QID RT  
 levETIRAcetam (KEPPRA) tablet 500 mg  500 mg Oral BID  sodium chloride (NS) flush 5-40 mL  5-40 mL IntraVENous Q8H  
 sodium chloride (NS) flush 5-40 mL  5-40 mL IntraVENous PRN  
 acetaminophen (TYLENOL) tablet 650 mg  650 mg Oral Q4H PRN  
 arformoteroL (BROVANA) neb solution 15 mcg  15 mcg Nebulization BID  ferrous sulfate tablet 325 mg  325 mg Oral ACB  hydroxyzine HCL (ATARAX) tablet 25 mg  25 mg Oral BID  montelukast (SINGULAIR) tablet 10 mg  10 mg Oral DAILY  pantoprazole (PROTONIX) tablet 40 mg  40 mg Oral ACB&D  
 budesonide (PULMICORT) 250 mcg/2ml nebulizer susp  250 mcg Nebulization BID RT  
 predniSONE (DELTASONE) tablet 20 mg  20 mg Oral DAILY WITH BREAKFAST  albuterol (PROVENTIL VENTOLIN) nebulizer solution 2.5 mg  2.5 mg Nebulization Q2H PRN  
 glucose chewable tablet 16 g  4 Tab Oral PRN  
 glucagon (GLUCAGEN) injection 1 mg  1 mg IntraMUSCular PRN  
 dextrose 10% infusion 0-250 mL  0-250 mL IntraVENous PRN  
 insulin lispro (HUMALOG) injection   SubCUTAneous AC&HS  enoxaparin (LOVENOX) injection 80 mg  1 mg/kg SubCUTAneous Q12H  furosemide (LASIX) injection 40 mg  40 mg IntraVENous DAILY No Known Allergies Review of Systems: A complete review of systems was obtained, negative except as described above. Physical Exam:  
 
Visit Vitals /77 Pulse (!) 117 Temp 98.3 °F (36.8 °C) Resp 23 Ht 6' 2\" (1.88 m) Wt 168 lb (76.2 kg) SpO2 90% BMI 21.57 kg/m² ECOG PS: 2 General: No acute distress, on O2 Eyes: PERRL, anicteric sclerae HENT: Atraumatic, OP clear Neck: Supple Respiratory: diminished, expiratory wheezing in the lower lobes, dyspnea with speaking CV: Normal rate, regular rhythm, 2+ peripheral edema - no redness GI: Soft, nontender, distended, no masses, no hepatomegaly, no splenomegaly MS: Moves all extremities. Digits without clubbing or cyanosis. Skin: No rashes, ecchymoses, or petechiae. Normal temperature, turgor, and texture. Psych: Alert, oriented, appropriate affect, normal judgment/insight Results:  
 
Lab Results Component Value Date/Time WBC 4.7 02/21/2020 02:45 AM  
 HGB 12.3 02/21/2020 02:45 AM  
 HCT 41.3 02/21/2020 02:45 AM  
 PLATELET 486 88/28/9625 02:45 AM  
 MCV 88.6 02/21/2020 02:45 AM  
 ABS. NEUTROPHILS 3.0 02/21/2020 02:45 AM  
 
Lab Results Component Value Date/Time Sodium 136 02/21/2020 02:45 AM  
 Potassium 4.2 02/21/2020 02:45 AM  
 Chloride 97 02/21/2020 02:45 AM  
 CO2 37 (H) 02/21/2020 02:45 AM  
 Glucose 106 (H) 02/21/2020 02:45 AM  
 BUN 10 02/21/2020 02:45 AM  
 Creatinine 0.51 (L) 02/21/2020 02:45 AM  
 GFR est AA >60 02/21/2020 02:45 AM  
 GFR est non-AA >60 02/21/2020 02:45 AM  
 Calcium 9.8 02/21/2020 02:45 AM  
 Glucose (POC) 130 (H) 02/21/2020 11:30 AM  
 
Lab Results Component Value Date/Time Bilirubin, total 0.3 02/21/2020 02:45 AM  
 ALT (SGPT) 19 02/21/2020 02:45 AM  
 AST (SGOT) 12 (L) 02/21/2020 02:45 AM  
 Alk. phosphatase 68 02/21/2020 02:45 AM  
 Protein, total 6.3 (L) 02/21/2020 02:45 AM  
 Albumin 2.6 (L) 02/21/2020 02:45 AM  
 Globulin 3.7 02/21/2020 02:45 AM  
 
CTA Chest 2/20/2020 IMPRESSION: Right upper lobe pulmonary emboli. Localized consolidation right 
lower lobe. Left basilar atelectasis. Venous Doppler 2/20/2020 Evidence of an acute deep vein thrombosis of the right popliteal vein. Incidental finding of prominent groin lymph node bilaterally. Abd US 2/20/2020 IMPRESSION:  
Large right renal cyst. Otherwise unremarkable abdominal ultrasound. Records reviewed and summarized above. Pathology report(s) reviewed above. Radiology report(s) reviewed above. Assessment:  
1) PE/DVT 2/20/2020 Right upper lobe embolism seen on CTA. Venous doppler shows acute DVT of right popliteal vein. Considered provoked due to recent hospitalization for COPD excaerbation Will need to be treated for 3-6 months. May transition to [address] Will follow up in outpatient clinic. 2)COPD Requiring oxygen supplementation Management per primary team.  
 
3)?CHF Received lasix during admission. ECHO completed today. Management per primary team. 4) Hx Seizures On keppra at home. Management per primary team. 
 
 
Plan: · Recommend transition from lovenox to Eliquis 10mg BID for 7 days, then 5mg BID x3-6 months. · Will schedule outpatient follow-up in clinic. I appreciate the opportunity to participate in Mr. Zamora Govind schafer. Seen in conjunction with Gayathri Jorge NP Signed By: King Layne MD

## 2020-02-21 NOTE — PROGRESS NOTES
Hospitalist Progress Note Babatunde Gomez MD 
Answering service: [phone] from in house phone Date of Service:  2020 NAME:  Neelima Blas :  1945 MRN:  [id number] PCP: Corwin Leigh MD 
 
Chief Complaint:  
Chief Complaint Patient presents with  Shortness of Breath Admission Summary:  
 
Neelima Blas is a 69 y.o. male who presented with As per initial admission summary Neelima Blas is a 69 y.o.  with h/o COPD/ASTHMA, CAD and DM. Pt presents from [facility] for evaluation of shortness of breath. Patient prior was living at home with a family member, 2 or 3 weeks ago he developed shortness breath and then he was admitted [facility], per sister he was treated with COPD exacerbation patient also was told that there were fluid in the chest but never have diagnosed with CHF. [facility] 2 weeks ago was steroids and nebulizer. In SNF, Pt complains of increased shortness of breath with associated leg swelling, and chest pain for the past 3 days. Pt notes he had an oxygen saturation of 88%. Pt denies nausea, vomiting, fever, or abdominal pain. Patient said he can't fit his regular shoe anymore due to the leg and feet swelling. He also noticed increasing abdominal distention. Interval history / Subjective:  
Patient seen for Follow up of PE Patient seen and examined by the bedside, Labs, images and notes reviewed Patient is comfortable, denies any chest pain, SOB, abdominal pain, fevers, chills. No N/V/D Discussed with nursing staff, no acute issues overnight, orders reviewed. Assessment & Plan: 1. Acute on chronic resp failure  With hypoxia:   
BNP low, no chf history, but giving the CXR finding, leg edema. Will start IV lasix. Follow cr. Will order echo. PE at per CT, started on anticoagulation · Hematologu consulted, as per hematology Recommend transition from lovenox to Eliquis 10mg BID for 7 days, then 5mg BID x3-6 months. · Will schedule outpatient follow-up in clinic. 
 will switch to eliquis tomorrow 2. COPD: no wheezing, continue home nebs and previous dose of prednisone. Pulmonology consulted 3. abd distention: ? Ascites, pending abd US, may need tap if have large ascites. 4. DM: SSI 5. H/o seizure: continue home meds Code status: Full Code DVT prophylaxis: on lovenox Care Plan discussed with: Patient/Family Disposition: SNF Hospital Problems  Date Reviewed: 3/16/2018 Codes Class Noted POA  
 [facility] ICD-10-CM: I50.9 ICD-9-CM: 428.0  2/20/2020 Unknown Acute respiratory failure with hypoxia [facility]) ICD-10-CM: J96.01 
ICD-9-CM: 518.81  2/20/2020 Unknown Review of Systems: A comprehensive review of systems was negative except for that written in the HPI. Physical Examination:  
 
Visit Vitals /77 (BP 1 Location: Right arm, BP Patient Position: At rest) Pulse (!) 113 Temp 98.7 °F (37.1 °C) Resp 26 Ht 6' 2\" (1.88 m) Wt 72.7 kg (160 lb 4.4 oz) SpO2 96% BMI 20.58 kg/m² General:  Alert, cooperative, no distress, appears stated age. Head:  Normocephalic, without obvious abnormality, atraumatic. Lungs:   Clear to auscultation bilaterally. Heart:  Regular rate and rhythm, S1, S2 normal, no murmur, click, rub or gallop. Abdomen:   Soft, non-tender. Bowel sounds normal. No masses,  No organomegaly. Extremities: Extremities normal, atraumatic, no cyanosis or edema. Pulses: 2+ and symmetric all extremities. Skin: Skin color, texture, turgor normal. No rashes or lesions Neurologic: CNII-XII intact. Normal strength, sensation and reflexes throughout. Vital Signs:  
 Last 24hrs VS reviewed since prior progress note. Most recent are: 
 
Visit Vitals /77 (BP 1 Location: Right arm, BP Patient Position: At rest) Pulse (!) 113 Temp 98.7 °F (37.1 °C) Resp 26 Ht 6' 2\" (1.88 m) Wt 72.7 kg (160 lb 4.4 oz) SpO2 96% BMI 20.58 kg/m² No intake or output data in the 24 hours ending 20 1751 Tmax:  Temp (24hrs), Av.1 °F (36.7 °C), Min:97.8 °F (36.6 °C), Max:98.7 °F (37.1 °C) Data Review:  
Data reviewed by myself: 
Cta Chest W Or W Wo Cont Result Date: 2020 EXAM:  CTA CHEST W OR W WO CONT INDICATION:   SOB COMPARISON: None. TECHNIQUE: Precontrast  images were obtained to localize the volume for acquisition. Multislice helical CT arteriography was performed from the diaphragm to the thoracic inlet during uneventful rapid bolus of 100 cc Isovue-370. Lung and soft tissue windows were generated. Coronal and sagittal images were generated and 3D post processing consisting of coronal maximum intensity images was performed. CT dose reduction was achieved through use of a standardized protocol tailored for this examination and automatic exposure control for dose modulation. FINDINGS: CHEST: THYROID: No nodule. MEDIASTINUM: No mass or lymphadenopathy. LEV: No mass or lymphadenopathy. THORACIC AORTA: Atherosclerotic without evidence of aneurysm. MAIN PULMONARY ARTERY: Pulmonary emboli right upper lobe branches. TRACHEA/BRONCHI: Patent. ESOPHAGUS: No wall thickening or dilatation. HEART: Normal in size. PLEURA: No effusion or pneumothorax. LUNGS: Emphysematous changes. Focal consolidation right lower lobe. Left basilar atelectasis. INCIDENTALLY IMAGED UPPER ABDOMEN: 7.7 cm right renal cyst. 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 Endograft is partially visualized. Marten Chino BONES: No destructive bone lesion. IMPRESSION: Right upper lobe pulmonary emboli. Localized consolidation right lower lobe. Left basilar atelectasis. The findings were called to the patient's nurse on 2020 at 6:39 PM by myself. [address] Us Abd Comp Result Date: 2020 INDICATION: Abdominal distention COMPARISON: None TECHNIQUE: Routine ultrasound images of the abdomen were obtained. FINDINGS: GALLBLADDER: No cholecystolithiasis, gallbladder wall thickening, or pericholecystic fluid. COMMON BILE DUCT: 3 mm in diameter. No evidence of choledocholithiasis. LIVER: Normal in size. Normal echogenicity. No focal hepatic mass or intrahepatic biliary dilatation. MAIN PORTAL VEIN: Patent. Hepatopetal flow direction. PANCREAS: Obscured by overlying bowel gas. RIGHT KIDNEY: 12.8 cm in length. 8.2 cm cyst. No hydronephrosis. No evidence of mass or calculus. LEFT KIDNEY: 11.5 cm in length. No hydronephrosis. No evidence of mass or calculus. SPLEEN: Obscured by overlying bowel gas. AORTA: Obscured by overlying bowel gas. INFERIOR VENA CAVA: Patent. OTHER: No ascites. IMPRESSION: Large right renal cyst. Otherwise unremarkable abdominal ultrasound. Xr Chest [facility] Result Date: 2/20/2020 INDICATION:  SOB. History of chronic asthma. Prostate cancer. COPD. EXAM: Chest single view. COMPARISON: 9/10/2017. FINDINGS: A single frontal view of the chest at 1419 hours shows bibasilar atelectasis or scar, with mild interstitial prominence increased since the prior study. .  The heart, mediastinum and pulmonary vasculature are stable . The bony thorax is unremarkable for age. . Port-A-Cath device on the right is stable. IMPRESSION: Bibasilar atelectasis or scar with interstitial prominence. Correlate for developing interstitial edema or bibasilar inflammatory infiltrate in this patient with COPD and chronic asthma. .  . No results found for: SDES No results found for: CULT All Micro Results None Labs: reviewed by myself. Recent Labs  
  02/21/20 
0245 02/20/20 
1333 WBC 4.7 7.3 HGB 12.3 13.3 HCT 41.3 46.3  337 Recent Labs  
  02/21/20 
0245 02/20/20 
1333  136  
K 4.2 4.7 CL 97 95* CO2 37* 39* BUN 10 10 CREA 0.51* 0.66* * 170* CA 9.8 10.5* MG 2.0 2.1 PHOS 3.6 4.0 Recent Labs  
  02/21/20 
0245 02/20/20 
1333 SGOT 12* 10* ALT 19 24 AP 68 88 TBILI 0.3 0.4 TP 6.3* 7.9 ALB 2.6* 3.4*  
GLOB 3.7 4.5* No results for input(s): INR, PTP, APTT, INREXT in the last 72 hours. No results for input(s): FE, TIBC, PSAT, FERR in the last 72 hours. No results found for: FOL, RBCF No results for input(s): PH, PCO2, PO2 in the last 72 hours. Recent Labs  
  02/20/20 
1333 TROIQ <0.05 Lab Results Component Value Date/Time Cholesterol, total 187 09/11/2017 05:53 AM  
 HDL Cholesterol 63 09/11/2017 05:53 AM  
 LDL, calculated 109 (H) 09/11/2017 05:53 AM  
 Triglyceride 75 09/11/2017 05:53 AM  
 CHOL/HDL Ratio 3.0 09/11/2017 05:53 AM  
 
Lab Results Component Value Date/Time Glucose (POC) 120 (H) 02/21/2020 04:40 PM  
 Glucose (POC) 130 (H) 02/21/2020 11:30 AM  
 Glucose (POC) 106 (H) 02/21/2020 07:47 AM  
 Glucose (POC) 128 (H) 02/20/2020 11:31 PM  
 Glucose (POC) 117 (H) 10/06/2017 11:11 AM  
 
Lab Results Component Value Date/Time Color YELLOW/STRAW 09/10/2017 10:09 PM  
 Appearance CLEAR 09/10/2017 10:09 PM  
 Specific gravity 1.010 09/10/2017 10:09 PM  
 pH (UA) 5.0 09/10/2017 10:09 PM  
 Protein NEGATIVE  09/10/2017 10:09 PM  
 Glucose NEGATIVE  09/10/2017 10:09 PM  
 Ketone NEGATIVE  09/10/2017 10:09 PM  
 Bilirubin NEGATIVE  09/10/2017 10:09 PM  
 Urobilinogen 0.2 09/10/2017 10:09 PM  
 Nitrites NEGATIVE  09/10/2017 10:09 PM  
 Leukocyte Esterase NEGATIVE  09/10/2017 10:09 PM  
 Epithelial cells MODERATE (A) 09/10/2017 10:09 PM  
 Bacteria NEGATIVE  09/10/2017 10:09 PM  
 WBC 5-10 09/10/2017 10:09 PM  
 RBC 0-5 09/10/2017 10:09 PM  
 
 
 
Medications Reviewed:  
 
Current Facility-Administered Medications Medication Dose Route Frequency  albuterol-ipratropium (DUO-NEB) 2.5 MG-0.5 MG/3 ML  3 mL Nebulization QID RT  
 enoxaparin (LOVENOX) injection 70 mg  1 mg/kg SubCUTAneous Q12H  levETIRAcetam (KEPPRA) tablet 500 mg  500 mg Oral BID  sodium chloride (NS) flush 5-40 mL  5-40 mL IntraVENous Q8H  
 sodium chloride (NS) flush 5-40 mL  5-40 mL IntraVENous PRN  
 acetaminophen (TYLENOL) tablet 650 mg  650 mg Oral Q4H PRN  
 arformoteroL (BROVANA) neb solution 15 mcg  15 mcg Nebulization BID  ferrous sulfate tablet 325 mg  325 mg Oral ACB  hydroxyzine HCL (ATARAX) tablet 25 mg  25 mg Oral BID  montelukast (SINGULAIR) tablet 10 mg  10 mg Oral DAILY  pantoprazole (PROTONIX) tablet 40 mg  40 mg Oral ACB&D  
 budesonide (PULMICORT) 250 mcg/2ml nebulizer susp  250 mcg Nebulization BID RT  
 predniSONE (DELTASONE) tablet 20 mg  20 mg Oral DAILY WITH BREAKFAST  albuterol (PROVENTIL VENTOLIN) nebulizer solution 2.5 mg  2.5 mg Nebulization Q2H PRN  
 glucose chewable tablet 16 g  4 Tab Oral PRN  
 glucagon (GLUCAGEN) injection 1 mg  1 mg IntraMUSCular PRN  
 dextrose 10% infusion 0-250 mL  0-250 mL IntraVENous PRN  
 insulin lispro (HUMALOG) injection   SubCUTAneous AC&HS  furosemide (LASIX) injection 40 mg  40 mg IntraVENous DAILY  
 
______________________________________________________________________ EXPECTED LENGTH OF STAY: - - - 
ACTUAL LENGTH OF STAY:          1 aSl Goodman MD  
 
Patient's emergency contacts: 
Extended Emergency Contact Information Primary Emergency Contact: Konstantin King Address: [address] [place] [address] Phone: [phone] Relation: Other Relative

## 2020-02-21 NOTE — ACP (ADVANCE CARE PLANNING)
Mr Maryann Hall and his sister stated that they were interested in the AMD information. However, sister stated that she had to leave in a few minutes and it was not a good time for her; they agreed to have the information left for them to review; provided them with a blank AMD and a copy of the booklet, \"Your Right to Decide\". Encouraged them to have  notified when they were ready to discuss the information. : . Emmanuel Gomez. Bijan Aguilar; [facility], to contact [phone] [address] call: [phone]

## 2020-02-21 NOTE — PROGRESS NOTES
Noted patient to be transferred to [facility]. Initial assessment interview has been completed, full note to follow.   
 
Wynema Rubinstein, MSW

## 2020-02-21 NOTE — PROGRESS NOTES
Transition of Care Plan ? Disposition: TBD pending therapy recommendation, likely SNF, came from [facility]- doesn't want to return to [facility], a SNF list provided to pick new facility if therapy recommends SNF again ? LTSS/UAI completed on 1/31/2020 by [facility]  
? Pastoral care paged for AMD  
? RUR- 13 % low risks ? Transport: BLS required at discharge ? Continue Medical Care ? Follow up with PCP/Specialist  
 
Reason for Admission:   SOB for a couple of days with sat of 88% RUR Score:    13 % low risks Plan for utilizing home health:    Open to [facility], anticipated to go to SNF Current Advanced Directive/Advance Care Plan: Patient is full code. CM educated on AMD, agreed, pastoral care paged and spoke with Stacie [place] [facility] on call) Transition of Care Plan:    Patient is anticipated to go to a skilled rehab facility. CM met patient at bedside, introduced role, verified demographics, and offered assistance. Patient came from [facility] while he was there for rehab, patient's sister/caregiver Viry Serrano doesn't want him to return back there. Patient was independent until few months ago, however, he is wheel chair bound now, needs assistance with ADLs. Patient has a cane. Patient is normally on 2 L NC. Patient normally lives with his sister in a bi-level house with 5 steps to enter. Patient uses [facility] on [address]/ [address].  
 
Patient's sister plans to take patient home post rehab. [address]  Screening Number: [id number] Status: Successfully Processed Assessment Date: 01/31/2020 Reyna Monte Information:  
Screener's Name: [facility] 
NPI: [id number] Provider Name: [facility] Care Management Interventions PCP Verified by CM: Yes(last seen 3 months ago ) Palliative Care Criteria Met (RRAT>21 & CHF Dx)?: No 
 Mode of Transport at Discharge: BLS(BLS transport required at discharge ) Transition of Care Consult (CM Consult): Discharge Planning, SNF MyChart Signup: No 
Discharge Durable Medical Equipment: No 
Health Maintenance Reviewed: Yes Physical Therapy Consult: No 
Occupational Therapy Consult: No 
Current Support Network: Relative's Home(lives with sister, currentlly admitted from [facility] [place] [facility] ) Confirm Follow Up Transport: Other (see comment)(BLS ) The Patient and/or Patient Representative was Provided with a Choice of Provider and Agrees with the Discharge Plan?: Yes Freedom of Choice List was Provided with Basic Dialogue that Supports the Patient's Individualized Plan of Care/Goals, Treatment Preferences and Shares the Quality Data Associated with the Providers?: Yes(SNF list provided to sister at bedside, asked her to follow up with CM in another floor ) [employer] Information Provided?: No 
Discharge Location Discharge Placement: Skilled nursing facility [facility]

## 2020-02-21 NOTE — PROGRESS NOTES
Spiritual Care Assessment/Progress Note [address] 
 
 
NAME: Oumou Hernandez      MRN: [id number] AGE: 69 y.o. SEX: male Mosque Affiliation: Lew Language: English  
 
2/21/2020 Spiritual Assessment begun in [facility] [place] [facility] I[facility] 2 through conversation with: 
  
    [x]Patient        [x] Family    [] Friend(s) Reason for Consult: Advance medical directive consult Spiritual beliefs: (Please include comment if needed) [x] Identifies with a meg tradition: [address] [place] [address]   
   [] Supported by a meg community:        
   [] Claims no spiritual orientation:       
   [] Seeking spiritual identity:            
   [] Adheres to an individual form of spirituality:       
   [] Not able to assess:                   
 
    
Identified resources for coping:  
   [x] Prayer                           
   [] Music                  [] Guided Imagery [x] Family/friends                 [] Pet visits [] Devotional reading                         [] Unknown 
   [] Other:                                          
 
 
Interventions offered during this visit: (See comments for more details) Patient Interventions: Advance medical directive consult, Affirmation of emotions/emotional suffering, Catharsis/review of pertinent events in supportive environment, Iconic (affirming the presence of God/Higher Power), Initial/Spiritual assessment, patient floor, Prayer (actual), Prayer (assurance of) Family/Friend(s): Affirmation of emotions/emotional suffering, Catharsis/review of pertinent events in supportive environment, Advance medical directive consult, Initial Assessment, Prayer (actual), Prayer (assurance of) Plan of Care: 
 
 [x] Support spiritual and/or cultural needs [x] Support AMD and/or advance care planning process    
 [] Support grieving process 
 [] Coordinate Rites and/or Rituals  
 [] Coordination with community clergy [] No spiritual needs identified at this time [] Detailed Plan of Care below (See Comments)  [] Make referral to Music Therapy 
[] Make referral to Pet Therapy    
[] Make referral to Addiction services 
[] Make referral to [facility] 
[] Make referral to Spiritual Care Partner 
[] No future visits requested       
[x] Follow up visits as needed Comments: Visited Mr Jason Canales in room 421 regarding Care Manager's referral for AMD information. Mr Jason Canales was lying quietly in bed and appeared in good spirits; patient's sister was at his bedside. Mr Jason Canales and his sister stated that they were interested in the AMD information. However, sister stated that she had to leave in a few minutes and it was not a good time for her; they agreed to have the information left for them to review; provided them with a blank AMD and a copy of the booklet, \"Your Right to Decide\". Encouraged them to have  notified when they were ready to discuss the information. Mr Jason Canales said that he was a member of a [address] [place] [address] St and he would appreciate  having a prayer for healing on his behalf. Had prayer as requested and assured patient and family of ongoing  availability for support. : Rev. Mehreen Carpio; [facility], to contact [phone] [address]acho call: [phone]

## 2020-02-21 NOTE — PROGRESS NOTES
Bedside and Verbal shift change report given to Maria M Ceja RN (oncoming nurse) by Sagar Ledezma RN (offgoing nurse). Report included the following information SBAR, Kardex, Intake/Output, MAR, Recent Results and Cardiac Rhythm NSR.

## 2020-02-21 NOTE — PROGRESS NOTES
Pt had 14 beat run of asad. Melinda at this time and VSS. Dr. Rebecca Nair notified, Rob Precise an phos added to lab work.

## 2020-02-21 NOTE — PROGRESS NOTES
Lovenox Monitoring Indication: PE 
Recent Labs  
  02/21/20 
0245 02/20/20 
1333 HGB 12.3 13.3  337 CREA 0.51* 0.66* Current Weight: 72.3 kg 
Est. CrCl = 95 ml/min Current Dose: 80 mg subcutaneously every 12 hours. Plan: Change to enoxparin 70 mg Q12H for weight 72 kg (confirmed with RN Yenifer Cobian that new weight bed weight)

## 2020-02-21 NOTE — PROGRESS NOTES
TRANSFER - OUT REPORT: 
 
Verbal report given to Fidelia Doyle RN(name) on Charlyne Lesch  being transferred to [facility] for change in patient condition(tachypenic) Report consisted of patients Situation, Background, Assessment and  
Recommendations(SBAR). Information from the following report(s) SBAR, Kardex, Intake/Output, MAR, Recent Results and Cardiac Rhythm Sinus tach was reviewed with the receiving nurse. Lines:  
Peripheral IV 02/20/20 Left Antecubital (Active) Site Assessment Clean, dry, & intact 2/21/2020 12:00 PM  
Phlebitis Assessment 0 2/21/2020 12:00 PM  
Infiltration Assessment 0 2/21/2020 12:00 PM  
Dressing Status Clean, dry, & intact 2/21/2020 12:00 PM  
Dressing Type Transparent;Tape 2/21/2020 12:00 PM  
Hub Color/Line Status Pink;Capped 2/21/2020 12:00 PM  
Action Taken Open ports on tubing capped 2/21/2020 12:00 PM  
Alcohol Cap Used Yes 2/21/2020 12:00 PM  
  
 
Opportunity for questions and clarification was provided. Patient transported with: 
 Monitor O2 @ 4 liters Registered Nurse

## 2020-02-21 NOTE — PROGRESS NOTES
Problem: Falls - Risk of 
Goal: *Absence of Falls Description Document Kenny Chen Fall Risk and appropriate interventions in the flowsheet. Outcome: Progressing Towards Goal 
Note: Fall Risk Interventions: 
Mobility Interventions: Bed/chair exit alarm, Communicate number of staff needed for ambulation/transfer, Mechanical lift, OT consult for ADLs, Patient to call before getting OOB, PT Consult for mobility concerns, PT Consult for assist device competence Medication Interventions: Bed/chair exit alarm, Evaluate medications/consider consulting pharmacy, Patient to call before getting OOB History of Falls Interventions: Bed/chair exit alarm, Evaluate medications/consider consulting pharmacy, Investigate reason for fall, Room close to nurse's station Problem: Patient Education: Go to Patient Education Activity Goal: Patient/Family Education Outcome: Progressing Towards Goal 
  
Problem: Pressure Injury - Risk of 
Goal: *Prevention of pressure injury Description Document Carlos Enrique Scale and appropriate interventions in the flowsheet. Outcome: Progressing Towards Goal 
Note: Pressure Injury Interventions: Activity Interventions: Increase time out of bed, Pressure redistribution bed/mattress(bed type), PT/OT evaluation Mobility Interventions: Float heels, HOB 30 degrees or less, PT/OT evaluation, Pressure redistribution bed/mattress (bed type) Nutrition Interventions: Document food/fluid/supplement intake Friction and Shear Interventions: HOB 30 degrees or less, Lift sheet, Lift team/patient mobility team, Minimize layers Problem: Pulmonary Embolism Care Plan (Adult) Goal: *Improvement of existing pulmonary embolism Outcome: Progressing Towards Goal 
  
Problem: Deep Venous Thrombosis - Risk of 
Goal: *Absence of bleeding Outcome: Progressing Towards Goal

## 2020-02-21 NOTE — PROGRESS NOTES
TRANSFER - IN REPORT: 
 
Verbal report received from Melissa Juan RN(name) on Thad Pérez  being received from 5E(unit) for change in patient condition(bipap need) Report consisted of patients Situation, Background, Assessment and  
Recommendations(SBAR). Information from the following report(s) SBAR, Kardex, Intake/Output, MAR, Recent Results and Cardiac Rhythm ST was reviewed with the receiving nurse. Opportunity for questions and clarification was provided. Assessment completed upon patients arrival to unit and care assumed. Primary Nurse Amor Mccurdy and Karan Silverman RN performed a dual skin assessment on this patient No impairment noted Carlos Enrique score is 16

## 2020-02-22 NOTE — PROGRESS NOTES
Problem: Heart Failure: Day 2 Goal: Respiratory Outcome: Progressing Towards Goal 
  
Pt heart rhythm NSR/ST, HR 80-110s. Pt rapid response from 5E for bipap need. 93% on 5L NC, bipap set up at bedside if needed. Education provided on oxygen therapy. Pt verbalized understanding. Bedside shift change report given to Miri Dc RN (oncoming nurse) by Shaggy Holbrook RN (offgoing nurse). Report included the following information SBAR, Kardex, Intake/Output, MAR, Recent Results, and Cardiac Rhythm NSR/ST .

## 2020-02-22 NOTE — PROGRESS NOTES
Problem: Heart Failure: Day 3 Goal: Respiratory Outcome: Progressing Towards Goal 
Goal: *Demonstrates progressive activity Outcome: Progressing Towards Goal 
  
Pt maintaining O2 SATs above 90% throughout night on 5L/NC. Pt denies any SOB or pain. Lung sounds diminished & clear bilaterally. Last 3 Recorded Weights in this Encounter 02/21/20 0932 02/21/20 1352 02/22/20 4598 Weight: 76.2 kg (168 lb) 72.7 kg (160 lb 4.4 oz) 75.7 kg (166 lb 14.4 oz) Notified oncoming RN of weight variance. Bedside shift change report given to Alexa Hu  (oncoming nurse) by Justus Moreno (offgoing nurse). Report included the following information SBAR, Kardex, Intake/Output, MAR, Accordion, Recent Results, Cardiac Rhythm ST/NSR and Quality Measures.

## 2020-02-22 NOTE — PROGRESS NOTES
Hospitalist Progress Note Verner Sias, MD 
Answering service: [phone] from in house phone Date of Service:  2020 NAME:  Itzel Zamora :  1945 MRN:  [id number] PCP: Gail Damon MD 
 
Chief Complaint:  
Chief Complaint Patient presents with  Shortness of Breath Admission Summary:  
 
Itzel Zamora is a 69 y.o. male who presented with As per initial admission summary Itzel Zamora is a 69 y.o.  with h/o COPD/ASTHMA, CAD and DM. Pt presents from [facility] for evaluation of shortness of breath. Patient prior was living at home with a family member, 2 or 3 weeks ago he developed shortness breath and then he was admitted to [facility], per sister he was treated with COPD exacerbation patient also was told that there were fluid in the chest but never have diagnosed with CHF. He was then discharged to [facility] for rehab 2 weeks ago was steroids and nebulizer. In SNF, Pt complains of increased shortness of breath with associated leg swelling, and chest pain for the past 3 days. Pt notes he had an oxygen saturation of 88%. Pt denies nausea, vomiting, fever, or abdominal pain. Patient said he can't fit his regular shoe anymore due to the leg and feet swelling. He also noticed increasing abdominal distention. Interval history / Subjective:  
Patient seen for Follow up of PE Patient seen and examined by the bedside, Labs, images and notes reviewed Patient is comfortable, denies any chest pain, SOB, abdominal pain, fevers, chills. No N/V/D. Looks better than yesterday Discussed with nursing staff, no acute issues overnight, orders reviewed. Assessment & Plan: 1. Acute on chronic resp failure  With hypoxia:   
BNP low, no chf history, but giving the CXR finding, leg edema. . Follow cr.  
· Hematologu consulted, as per hematology Recommend transition from lovenox to Eliquis 10mg BID for 7 days, then 5mg BID x3-6 months. · Will schedule outpatient follow-up in clinic. Switched to eliquis today 2. COPD: no wheezing, continue home nebs and previous dose of prednisone. Pulmonology consulted Wean bipap off Also started on Levaquin for 5 days for possible PNA 3. abd distention: No ascites Large right renal cyst 
Urology consulted 4. DM: SSI 5. H/o seizure: continue home meds Code status: Full Code DVT prophylaxis: on lovenox Care Plan discussed with: Patient/Family Disposition: SNF Hospital Problems  Date Reviewed: 3/16/2018 Codes Class Noted POA  
 CHF (congestive heart failure) ([facility] [address] ICD-10-CM: I50.9 ICD-9-CM: 428.0  2/20/2020 Unknown Acute respiratory failure with hypoxia [facility]) ICD-10-CM: J96.01 
ICD-9-CM: 518.81  2/20/2020 Unknown Review of Systems: A comprehensive review of systems was negative except for that written in the HPI. Physical Examination:  
 
Visit Vitals /72 (BP 1 Location: Right arm, BP Patient Position: At rest;Supine) Pulse (!) 109 Temp 97.7 °F (36.5 °C) Resp 21 Ht 6' 2\" (1.88 m) Wt 75.7 kg (166 lb 14.4 oz) SpO2 99% BMI 21.43 kg/m² General:  Alert, cooperative, no distress, appears stated age. Head:  Normocephalic, without obvious abnormality, atraumatic. Lungs:    decreased breath sounds Heart:  Regular rate and rhythm, S1, S2 normal, no murmur, click, rub or gallop. Abdomen:   Soft, non-tender. Bowel sounds normal. No masses,  No organomegaly. Extremities: Extremities normal, atraumatic, no cyanosis or edema. Pulses: 2+ and symmetric all extremities. Skin: Skin color, texture, turgor normal. No rashes or lesions Neurologic: CNII-XII intact. Normal strength, sensation and reflexes throughout. Vital Signs:  
 Last 24hrs VS reviewed since prior progress note. Most recent are: 
 
Visit Vitals /72 (BP 1 Location: Right arm, BP Patient Position: At rest;Supine) Pulse (!) 109 Temp 97.7 °F (36.5 °C) Resp 21 Ht 6' 2\" (1.88 m) Wt 75.7 kg (166 lb 14.4 oz) SpO2 99% BMI 21.43 kg/m² Intake/Output Summary (Last 24 hours) at 2020 1414 Last data filed at 2020 1228 Gross per 24 hour Intake  Output 950 ml Net -950 ml Tmax:  Temp (24hrs), Av.9 °F (36.6 °C), Min:97 °F (36.1 °C), Max:98.7 °F (37.1 °C) Data Review:  
Data reviewed by myself: 
Cta Chest W Or W Wo Cont Result Date: 2020 EXAM:  CTA CHEST W OR W WO CONT INDICATION:   SOB COMPARISON: None. TECHNIQUE: Precontrast  images were obtained to localize the volume for acquisition. Multislice helical CT arteriography was performed from the diaphragm to the thoracic inlet during uneventful rapid bolus of 100 cc Isovue-370. Lung and soft tissue windows were generated. Coronal and sagittal images were generated and 3D post processing consisting of coronal maximum intensity images was performed. CT dose reduction was achieved through use of a standardized protocol tailored for this examination and automatic exposure control for dose modulation. FINDINGS: CHEST: THYROID: No nodule. MEDIASTINUM: No mass or lymphadenopathy. LEV: No mass or lymphadenopathy. THORACIC AORTA: Atherosclerotic without evidence of aneurysm. MAIN PULMONARY ARTERY: Pulmonary emboli right upper lobe branches. TRACHEA/BRONCHI: Patent. ESOPHAGUS: No wall thickening or dilatation. HEART: Normal in size. PLEURA: No effusion or pneumothorax. LUNGS: Emphysematous changes. Focal consolidation right lower lobe. Left basilar atelectasis. INCIDENTALLY IMAGED UPPER ABDOMEN: 7.7 cm right renal cyst. 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 Endograft is partially visualized. [place] BONES: No destructive bone lesion. IMPRESSION: Right upper lobe pulmonary emboli. Localized consolidation right lower lobe. Left basilar atelectasis.  The findings were called to the patient's nurse on 2/20/2020 at 6:39 PM by myself. [address] Abd Comp Result Date: 2/21/2020 INDICATION: Abdominal distention COMPARISON: None TECHNIQUE: Routine ultrasound images of the abdomen were obtained. FINDINGS: GALLBLADDER: No cholecystolithiasis, gallbladder wall thickening, or pericholecystic fluid. COMMON BILE DUCT: 3 mm in diameter. No evidence of choledocholithiasis. LIVER: Normal in size. Normal echogenicity. No focal hepatic mass or intrahepatic biliary dilatation. MAIN PORTAL VEIN: Patent. Hepatopetal flow direction. PANCREAS: Obscured by overlying bowel gas. RIGHT KIDNEY: 12.8 cm in length. 8.2 cm cyst. No hydronephrosis. No evidence of mass or calculus. LEFT KIDNEY: 11.5 cm in length. No hydronephrosis. No evidence of mass or calculus. SPLEEN: Obscured by overlying bowel gas. AORTA: Obscured by overlying bowel gas. INFERIOR VENA CAVA: Patent. OTHER: No ascites. IMPRESSION: Large right renal cyst. Otherwise unremarkable abdominal ultrasound. Xr Chest [facility] Result Date: 2/20/2020 INDICATION:  SOB. History of chronic asthma. Prostate cancer. COPD. EXAM: Chest single view. COMPARISON: 9/10/2017. FINDINGS: A single frontal view of the chest at 1419 hours shows bibasilar atelectasis or scar, with mild interstitial prominence increased since the prior study. .  The heart, mediastinum and pulmonary vasculature are stable . The bony thorax is unremarkable for age. . Port-A-Cath device on the right is stable. IMPRESSION: Bibasilar atelectasis or scar with interstitial prominence. Correlate for developing interstitial edema or bibasilar inflammatory infiltrate in this patient with COPD and chronic asthma. .  . No results found for: SDES No results found for: CULT All Micro Results None Labs: reviewed by myself. Recent Labs  
  02/21/20 0245 02/20/20 
1333 WBC 4.7 7.3 HGB 12.3 13.3 HCT 41.3 46.3  337 Recent Labs  
  02/21/20 
0245 02/20/20 1333  
 136  
K 4.2 4.7 CL 97 95* CO2 37* 39* BUN 10 10 CREA 0.51* 0.66* * 170* CA 9.8 10.5* MG 2.0 2.1 PHOS 3.6 4.0 Recent Labs  
  02/21/20 
0245 02/20/20 
1333 SGOT 12* 10* ALT 19 24 AP 68 88 TBILI 0.3 0.4 TP 6.3* 7.9 ALB 2.6* 3.4*  
GLOB 3.7 4.5* No results for input(s): INR, PTP, APTT, INREXT, INREXT in the last 72 hours. No results for input(s): FE, TIBC, PSAT, FERR in the last 72 hours. No results found for: FOL, RBCF No results for input(s): PH, PCO2, PO2 in the last 72 hours. Recent Labs  
  02/20/20 
1333 TROIQ <0.05 Lab Results Component Value Date/Time Cholesterol, total 187 09/11/2017 05:53 AM  
 HDL Cholesterol 63 09/11/2017 05:53 AM  
 LDL, calculated 109 (H) 09/11/2017 05:53 AM  
 Triglyceride 75 09/11/2017 05:53 AM  
 CHOL/HDL Ratio 3.0 09/11/2017 05:53 AM  
 
Lab Results Component Value Date/Time Glucose (POC) 154 (H) 02/22/2020 11:55 AM  
 Glucose (POC) 107 (H) 02/22/2020 08:01 AM  
 Glucose (POC) 239 (H) 02/21/2020 09:30 PM  
 Glucose (POC) 120 (H) 02/21/2020 04:40 PM  
 Glucose (POC) 130 (H) 02/21/2020 11:30 AM  
 
Lab Results Component Value Date/Time Color YELLOW/STRAW 09/10/2017 10:09 PM  
 Appearance CLEAR 09/10/2017 10:09 PM  
 Specific gravity 1.010 09/10/2017 10:09 PM  
 pH (UA) 5.0 09/10/2017 10:09 PM  
 Protein NEGATIVE  09/10/2017 10:09 PM  
 Glucose NEGATIVE  09/10/2017 10:09 PM  
 Ketone NEGATIVE  09/10/2017 10:09 PM  
 Bilirubin NEGATIVE  09/10/2017 10:09 PM  
 Urobilinogen 0.2 09/10/2017 10:09 PM  
 Nitrites NEGATIVE  09/10/2017 10:09 PM  
 Leukocyte Esterase NEGATIVE  09/10/2017 10:09 PM  
 Epithelial cells MODERATE (A) 09/10/2017 10:09 PM  
 Bacteria NEGATIVE  09/10/2017 10:09 PM  
 WBC 5-10 09/10/2017 10:09 PM  
 RBC 0-5 09/10/2017 10:09 PM  
 
 
 
Medications Reviewed:  
 
Current Facility-Administered Medications Medication Dose Route Frequency  diphenhydrAMINE (BENADRYL) capsule 25 mg  25 mg Oral Q6H PRN  
 [START ON 2/23/2020] sertraline (ZOLOFT) tablet 25 mg  25 mg Oral DAILY  levoFLOXacin (LEVAQUIN) 750 mg in D5W IVPB  750 mg IntraVENous Q24H  
 albuterol-ipratropium (DUO-NEB) 2.5 MG-0.5 MG/3 ML  3 mL Nebulization QID RT  
 enoxaparin (LOVENOX) injection 70 mg  1 mg/kg SubCUTAneous Q12H  levETIRAcetam (KEPPRA) tablet 500 mg  500 mg Oral BID  sodium chloride (NS) flush 5-40 mL  5-40 mL IntraVENous Q8H  
 sodium chloride (NS) flush 5-40 mL  5-40 mL IntraVENous PRN  
 acetaminophen (TYLENOL) tablet 650 mg  650 mg Oral Q4H PRN  
 arformoteroL (BROVANA) neb solution 15 mcg  15 mcg Nebulization BID  ferrous sulfate tablet 325 mg  325 mg Oral ACB  hydroxyzine HCL (ATARAX) tablet 25 mg  25 mg Oral BID  montelukast (SINGULAIR) tablet 10 mg  10 mg Oral DAILY  pantoprazole (PROTONIX) tablet 40 mg  40 mg Oral ACB&D  
 budesonide (PULMICORT) 250 mcg/2ml nebulizer susp  250 mcg Nebulization BID RT  
 predniSONE (DELTASONE) tablet 20 mg  20 mg Oral DAILY WITH BREAKFAST  albuterol (PROVENTIL VENTOLIN) nebulizer solution 2.5 mg  2.5 mg Nebulization Q2H PRN  
 glucose chewable tablet 16 g  4 Tab Oral PRN  
 glucagon (GLUCAGEN) injection 1 mg  1 mg IntraMUSCular PRN  
 dextrose 10% infusion 0-250 mL  0-250 mL IntraVENous PRN  
 insulin lispro (HUMALOG) injection   SubCUTAneous AC&HS  furosemide (LASIX) injection 40 mg  40 mg IntraVENous DAILY  
 
______________________________________________________________________ EXPECTED LENGTH OF STAY: - - - 
ACTUAL LENGTH OF STAY:          2 Romario Lopez MD  
 
Patient's emergency contacts: 
Extended Emergency Contact Information Primary Emergency Contact: Brandin Profit Address: [address] [place] [address] Street Home Phone: [phone] Relation: Other Relative

## 2020-02-22 NOTE — PROGRESS NOTES
Problem: Falls - Risk of 
Goal: *Absence of Falls Description Document Tam Romeo Fall Risk and appropriate interventions in the flowsheet. Outcome: Progressing Towards Goal 
Note: Fall Risk Interventions: 
Mobility Interventions: Communicate number of staff needed for ambulation/transfer, Patient to call before getting OOB, Utilize walker, cane, or other assistive device, PT Consult for mobility concerns Mentation Interventions: Adequate sleep, hydration, pain control, Update white board, Toileting rounds, Reorient patient, More frequent rounding Medication Interventions: Evaluate medications/consider consulting pharmacy, Patient to call before getting OOB Elimination Interventions: Call light in reach, Patient to call for help with toileting needs, Toileting schedule/hourly rounds, Urinal in reach History of Falls Interventions: Vital signs minimum Q4HRs X 24 hrs (comment for end date), Assess for delayed presentation/identification of injury for 48 hrs (comment for end date), Room close to nurse's station, Door open when patient unattended Problem: Pressure Injury - Risk of 
Goal: *Prevention of pressure injury Description Document Carlos Enrique Scale and appropriate interventions in the flowsheet. Outcome: Progressing Towards Goal 
Note: Pressure Injury Interventions: 
Sensory Interventions: Assess changes in LOC, Assess need for specialty bed, Discuss PT/OT consult with provider, Keep linens dry and wrinkle-free, Minimize linen layers, Turn and reposition approx. every two hours (pillows and wedges if needed) Moisture Interventions: Absorbent underpads, Apply protective barrier, creams and emollients, Check for incontinence Q2 hours and as needed, Internal/External urinary devices, Maintain skin hydration (lotion/cream), Minimize layers Activity Interventions: Assess need for specialty bed, Pressure redistribution bed/mattress(bed type), PT/OT evaluation Mobility Interventions: Assess need for specialty bed, Pressure redistribution bed/mattress (bed type), PT/OT evaluation, Turn and reposition approx. every two hours(pillow and wedges) Nutrition Interventions: Document food/fluid/supplement intake Friction and Shear Interventions: Apply protective barrier, creams and emollients, Minimize layers Problem: Pulmonary Embolism Care Plan (Adult) Goal: *Improvement of existing pulmonary embolism Outcome: Progressing Towards Goal 
Note:  
Right upper lobe embolism noted on CTA. Patient initially treated with lovenox and will be switched to P.O. eliquis this evening per hemonc recommendations. Problem: Deep Venous Thrombosis - Risk of 
Goal: *Knowledge of prescribed medications Outcome: Progressing Towards Goal 
Note:  
Patient on lovenox and understands what it is used for. Patient to be switched to eliquis starting later today. Problem: Breathing Pattern - Ineffective Goal: *Use of effective breathing techniques Outcome: Progressing Towards Goal 
Note:  
Patient had labored breathing this morning and was tachypnic, he was on 5L at this time. An ABG was ordered and the results were pH 7.36, PCO2 79, PO2 57 and HCO3 44. Bipap was then placed on patient to reduce Co2 level and work of breathing.

## 2020-02-22 NOTE — ACP (ADVANCE CARE PLANNING)
Advance Care Planning Note Name: Goran Murdock YOB: 1945 MRN: [id number] Admission Date: 2/20/2020  1:12 PM 
 
Date of discussion: 2/22/2020 Active Diagnoses: 
 
Hospital Problems  Date Reviewed: 3/16/2018 Codes Class Noted POA  
 CHF (congestive heart failure) ([facility] [address] ICD-10-CM: I50.9 ICD-9-CM: 428.0  2/20/2020 Unknown Acute respiratory failure with hypoxia [facility]) ICD-10-CM: J96.01 
ICD-9-CM: 518.81  2/20/2020 Unknown These active diagnoses are of sufficient risk that focused discussion on advance care planning is indicated in order to allow the patient to thoughtfully consider personal goals of care, and if situations arise that prevent the ability to personally give input, to ensure appropriate representation of their personal desires for different levels and aggressiveness of care. Discussion:  
 
Persons present and participating in discussion: Goran MurdockSly MD, Discussion: CPR/ACLS/Cardioversion/Intubation Patient wants to continue with full code. Patient and the family will update the medical team in case they want to change code status. Full Code Time Spent:  
 
Total time spent face-to-face in education and discussion: 16 minutes. Sly Gordon MD 
2/22/2020 
10:23 AM 
 
Patient's emergency contacts: 
Extended Emergency Contact Information Primary Emergency Contact: Author Sanford Address: [address] [place] [address] Phone: [phone] Relation: Other Relative

## 2020-02-22 NOTE — CONSULTS
[facility] Pulmonary, Critical Care, and Sleep Medicine Initial Patient Consult Name: Luna Berrios MRN: [id number] : 1945 Hospital: [facility]  Date: 2020 IMPRESSION:  
· AECOPD- due to PE and probable RLL PNA · RUL PE 
· PH- ECHO  EF 60% no MR or AS PASP 66 RV nml · COPD · DM2 RECOMMENDATIONS:  
· PH may be secondary to severe COPD and now PE. However PASP is a bit high for both. · Pt will need ECHO in 6 months to reassess- if PASP still > 60 will need w/u for other causes PH ( group I or 2 etc) · Wean bipap off as tolerated · Lovenox>> eliquis on monday · Nebs · PT is acutely ill . · RLL infiltrate - if febrile or worsening WBC start levaquin Subjective: This patient has been seen and evaluated at the request of Dr. Dre Garcia for SOB. Patient is a 69 y.o. male H/o COPD on spiriva and follows with Dr. Shahzad Becker. Pt admitted with SOB and RUL PE. Started on eliquis. Pt alert on bipap and nebs Less SOb moves all ext equally no CP Past Medical History:  
Diagnosis Date  Asthma   
 hx of/has wheezing  Cancer [facility])   
 prostate - not treated yet  Cerebral artery occlusion with cerebral infarction ([facility] [address]  Chronic obstructive pulmonary disease ([facility] [address]  Epilepsy ([facility] [address]  Ill-defined condition   
 shortness of breath - being evaluated  Psychiatric disorder   
 mental disability Past Surgical History:  
Procedure Laterality Date  COLONOSCOPY Left 10/4/2017 COLONOSCOPY performed by Dannielle Tejada MD at [address] [place] [address] ENDOSCOPY  
 HX INTRACRANIAL ANEURYSM REPAIR    
 HX OTHER SURGICAL    
 cyst removed from neck  HX PROSTATE SURGERY    
 HX SPLENECTOMY Prior to Admission medications Medication Sig Start Date End Date Taking? Authorizing Provider  
sertraline (ZOLOFT) 25 mg tablet Take 25 mg by mouth daily. Yes Provider, Historical  
atorvastatin (LIPITOR) 40 mg tablet Take 40 mg by mouth daily.    Yes Provider, Historical  
arformoteroL (BROVANA) 15 mcg/2 mL nebu neb solution 15 mcg by Nebulization route two (2) times a day. Yes Provider, Historical  
multivitamin (ONE A DAY) tablet Take 1 Tab by mouth daily. Yes Provider, Historical  
ferrous sulfate 325 mg (65 mg iron) tablet Take 325 mg by mouth Daily (before breakfast). Yes Provider, Historical  
fluticasone propionate (FLONASE ALLERGY RELIEF) 50 mcg/actuation nasal spray 2 Sprays by Both Nostrils route daily. Yes Provider, Historical  
hydroxyzine HCL (ATARAX) 25 mg tablet Take 25 mg by mouth two (2) times a day. Yes Provider, Historical  
ipratropium (ATROVENT) 0.02 % soln 0.5 mg by Other route every eight (8) hours. Oral inhalation   Yes Provider, Historical  
metFORMIN (GLUCOPHAGE) 500 mg tablet Take 500 mg by mouth daily (with breakfast). Yes Provider, Historical  
montelukast (SINGULAIR) 10 mg tablet Take 10 mg by mouth daily. Yes Provider, Historical  
predniSONE (DELTASONE) 10 mg tablet Take 20 mg by mouth daily (with breakfast). COPD exacerbation   Yes Provider, Historical  
budesonide-formoteroL (SYMBICORT) 160-4.5 mcg/actuation HFAA Take 2 Puffs by inhalation two (2) times a day. Yes Provider, Historical  
levalbuterol tartrate (XOPENEX) 45 mcg/actuation inhaler Take 1 Puff by inhalation every eight (8) hours. Yes Provider, Historical  
cetirizine (ZYRTEC) 10 mg tablet Take 10 mg by mouth daily. Yes Provider, Historical  
levETIRAcetam (KEPPRA) 500 mg tablet TAKE 1 TABLET BY MOUTH TWICE A DAY 11/8/19  Yes Nirali Hoawrd DO  
pantoprazole (PROTONIX) 40 mg tablet Take 1 Tab by mouth Before breakfast and dinner. 10/6/17  Yes Navi Callejas MD  
umeclidinium (INCRUSE ELLIPTA) 62.5 mcg/actuation inhaler Take 1 Puff by inhalation daily. Indications: Rescue inhaler   Yes Provider, Historical  
 
No Known Allergies Social History Tobacco Use  Smoking status: Current Every Day Smoker  Smokeless tobacco: Never Used  Tobacco comment: cigarettes Substance Use Topics  Alcohol use: No  
  
Family History Problem Relation Age of Onset  Colon Cancer Maternal Aunt  Colon Cancer Maternal Aunt  Colon Cancer Maternal Aunt Current Facility-Administered Medications Medication Dose Route Frequency  albuterol-ipratropium (DUO-NEB) 2.5 MG-0.5 MG/3 ML  3 mL Nebulization QID RT  
 enoxaparin (LOVENOX) injection 70 mg  1 mg/kg SubCUTAneous Q12H  levETIRAcetam (KEPPRA) tablet 500 mg  500 mg Oral BID  sodium chloride (NS) flush 5-40 mL  5-40 mL IntraVENous Q8H  
 arformoteroL (BROVANA) neb solution 15 mcg  15 mcg Nebulization BID  ferrous sulfate tablet 325 mg  325 mg Oral ACB  hydroxyzine HCL (ATARAX) tablet 25 mg  25 mg Oral BID  montelukast (SINGULAIR) tablet 10 mg  10 mg Oral DAILY  pantoprazole (PROTONIX) tablet 40 mg  40 mg Oral ACB&D  
 budesonide (PULMICORT) 250 mcg/2ml nebulizer susp  250 mcg Nebulization BID RT  
 predniSONE (DELTASONE) tablet 20 mg  20 mg Oral DAILY WITH BREAKFAST  insulin lispro (HUMALOG) injection   SubCUTAneous AC&HS  furosemide (LASIX) injection 40 mg  40 mg IntraVENous DAILY Review of Systems: 
Review of systems not obtained due to patient factors. Objective:  
Vital Signs:   
Visit Vitals /79 (BP 1 Location: Right arm, BP Patient Position: At rest;Supine) Pulse (!) 117 Temp 98 °F (36.7 °C) Resp 20 Ht 6' 2\" (1.88 m) Wt 75.7 kg (166 lb 14.4 oz) SpO2 96% BMI 21.43 kg/m² O2 Device: BIPAP  
O2 Flow Rate (L/min): 3 l/min Temp (24hrs), Av °F (36.7 °C), Min:97 °F (36.1 °C), Max:98.7 °F (37.1 °C) Intake/Output:  
Last shift:      No intake/output data recorded. Last 3 shifts: No intake/output data recorded. No intake or output data in the 24 hours ending 20 1011 Physical Exam:  
General:  Alert, cooperative, no distress, appears stated age. Head:  Normocephalic, without obvious abnormality, atraumatic. Eyes:  Conjunctivae/corneas clear. Nose: Nares normal. Septum midline. Mucosa normal. No drainage or sinus tenderness. Lungs:   Clear to auscultation bilaterally. Heart:  Regular rate and rhythm, S1, S2 normal, no murmur, click, rub or gallop. Abdomen:   Soft, non-tender. Bowel sounds normal. No masses,  No organomegaly. Extremities: Extremities normal, atraumatic, no cyanosis or edema. Pulses: 2+ and symmetric all extremities. Skin: Skin color, texture, turgor normal. No rashes or lesions Data review:  
 
Recent Results (from the past 24 hour(s)) GLUCOSE, POC Collection Time: 02/21/20 11:30 AM  
Result Value Ref Range Glucose (POC) 130 (H) 65 - 100 mg/dL Performed by Inga Day GLUCOSE, POC Collection Time: 02/21/20  4:40 PM  
Result Value Ref Range Glucose (POC) 120 (H) 65 - 100 mg/dL Performed by Lenore Arias GLUCOSE, POC Collection Time: 02/21/20  9:30 PM  
Result Value Ref Range Glucose (POC) 239 (H) 65 - 100 mg/dL Performed by Flor Martinez GLUCOSE, POC Collection Time: 02/22/20  8:01 AM  
Result Value Ref Range Glucose (POC) 107 (H) 65 - 100 mg/dL Performed by Bill Andrew  PCT Imaging: 
I have personally reviewed the patients radiographs and have reviewed the reports: CTA RUL PE and RLL ASD Renea Diaz MD

## 2020-02-23 NOTE — PROGRESS NOTES
Problem: Falls - Risk of 
Goal: *Absence of Falls Description Document Nir Matson Fall Risk and appropriate interventions in the flowsheet. Outcome: Progressing Towards Goal 
Note: Fall Risk Interventions: 
Mobility Interventions: Communicate number of staff needed for ambulation/transfer, Patient to call before getting OOB, Strengthening exercises (ROM-active/passive) Mentation Interventions: Door open when patient unattended, Increase mobility, More frequent rounding, Room close to nurse's station, Toileting rounds, Update white board Medication Interventions: Patient to call before getting OOB, Evaluate medications/consider consulting pharmacy Elimination Interventions: Call light in reach, Toilet paper/wipes in reach, Toileting schedule/hourly rounds, Patient to call for help with toileting needs, Stay With Me (per policy) History of Falls Interventions: Door open when patient unattended, Room close to nurse's station, Vital signs minimum Q4HRs X 24 hrs (comment for end date), Assess for delayed presentation/identification of injury for 48 hrs (comment for end date) Problem: Pressure Injury - Risk of 
Goal: *Prevention of pressure injury Description Document Carlos Enrique Scale and appropriate interventions in the flowsheet. Outcome: Progressing Towards Goal 
Note: Pressure Injury Interventions: 
Sensory Interventions: Assess changes in LOC, Check visual cues for pain, Float heels, Keep linens dry and wrinkle-free, Maintain/enhance activity level, Minimize linen layers Moisture Interventions: Absorbent underpads, Apply protective barrier, creams and emollients, Internal/External urinary devices, Maintain skin hydration (lotion/cream), Minimize layers Activity Interventions: Pressure redistribution bed/mattress(bed type), PT/OT evaluation Mobility Interventions: Float heels, Pressure redistribution bed/mattress (bed type), PT/OT evaluation Nutrition Interventions: Document food/fluid/supplement intake, Offer support with meals,snacks and hydration Friction and Shear Interventions: Minimize layers Problem: Breathing Pattern - Ineffective Goal: *Absence of hypoxia Outcome: Progressing Towards Goal 
Goal: *Use of effective breathing techniques Outcome: Progressing Towards Goal 
 Pt able to tolerate BIPAP throughout shift. Last 3 Recorded Weights in this Encounter 02/21/20 1352 02/22/20 6442 02/23/20 5972 Weight: 72.7 kg (160 lb 4.4 oz) 75.7 kg (166 lb 14.4 oz) 75 kg (165 lb 6.4 oz) Bedside shift change report given to Layton Sanchez (oncoming nurse) by Elizabeth Yancey (offgoing nurse). Report included the following information SBAR, Kardex, ED Summary, Intake/Output, MAR, Accordion, Recent Results, Med Rec Status, Cardiac Rhythm NSR/ST and Quality Measures

## 2020-02-23 NOTE — PROGRESS NOTES
Hospitalist Progress Note Francesca Aguilar MD 
Answering service: [phone] OR [phone] from in house phone Date of Service:  2020 NAME:  Tc Banks :  1945 MRN:  [id number] PCP: Nellie Carter MD 
 
Chief Complaint:  
Chief Complaint Patient presents with  Shortness of Breath Admission Summary:  
 
Tc Banks is a 69 y.o. male who presented with As per initial admission summary Tc Banks is a 69 y.o.  with h/o COPD/ASTHMA, CAD and DM. Pt presents from [facility] for evaluation of shortness of breath. Patient prior was living at home with a family member, 2 or 3 weeks ago he developed shortness breath and then he was admitted to [facility] [place], per sister he was treated with COPD exacerbation patient also was told that there were fluid in the chest but never have diagnosed with CHF. He was then discharged to [facility] for rehab 2 weeks ago was steroids and nebulizer. In SNF, Pt complains of increased shortness of breath with associated leg swelling, and chest pain for the past 3 days. Pt notes he had an oxygen saturation of 88%. Pt denies nausea, vomiting, fever, or abdominal pain. Patient said he can't fit his regular shoe anymore due to the leg and feet swelling. He also noticed increasing abdominal distention. Interval history / Subjective:  
Patient seen for Follow up of PE Patient seen and examined by the bedside, Labs, images and notes reviewed Patient is comfortable, denies any chest pain, SOB, abdominal pain, fevers, chills. No N/V/D. Patient feeling ok today. He was on BIPAP overnight . Today bipap was removed in morning. As per nursing staff patient was desaturating so BIPAP was placed again. Discussed with nursing staff, no acute issues overnight, orders reviewed. Assessment & Plan: 1. Acute on chronic resp failure  With hypoxia:  persistent BNP low, no chf history, but giving the CXR finding, leg edema. . Follow cr. · Hematology consulted, as per hematology Recommend transition from lovenox to Eliquis 10mg BID for 7 days, then 5mg BID x3-6 months. · Will schedule outpatient follow-up in clinic. Switched to eliquis Still having issues with hypoxia, was desaturated in morning. Placed on bipap again 2. COPD: no wheezing, continue home nebs and previous dose of prednisone. Pulmonology consulted Also started on Levaquin for 5 days for possible PNA 3. abd distention: No ascites Large right renal cyst 
Urology consulted , pending evaluation 4. DM: SSI 5. H/o seizure: continue home meds Code status: Full Code DVT prophylaxis: on lovenox Care Plan discussed with: Patient/Family Disposition: SNF Hospital Problems  Date Reviewed: 3/16/2018 Codes Class Noted POA  
 CHF (congestive heart failure) ([facility] [address] ICD-10-CM: I50.9 ICD-9-CM: 428.0  2/20/2020 Unknown Acute respiratory failure with hypoxia [facility]) ICD-10-CM: J96.01 
ICD-9-CM: 518.81  2/20/2020 Unknown Review of Systems: A comprehensive review of systems was negative except for that written in the HPI. Physical Examination:  
 
Visit Vitals /69 (BP 1 Location: Right arm, BP Patient Position: At rest) Pulse (!) 116 Temp 99.8 °F (37.7 °C) Resp 18 Ht 6' 2\" (1.88 m) Wt 75 kg (165 lb 6.4 oz) SpO2 95% BMI 21.24 kg/m² General:  Alert, cooperative, no distress, appears stated age. Head:  Normocephalic, without obvious abnormality, atraumatic. Lungs:    decreased breath sounds Heart:  Regular rate and rhythm, S1, S2 normal, no murmur, click, rub or gallop. Abdomen:   Soft, non-tender. Bowel sounds normal. No masses,  No organomegaly. Extremities: Extremities normal, atraumatic, no cyanosis or edema. Pulses: 2+ and symmetric all extremities. Skin: Skin color, texture, turgor normal. No rashes or lesions Neurologic: CNII-XII intact. Normal strength, sensation and reflexes throughout. Vital Signs:  
 Last 24hrs VS reviewed since prior progress note. Most recent are: 
 
Visit Vitals /69 (BP 1 Location: Right arm, BP Patient Position: At rest) Pulse (!) 116 Temp 99.8 °F (37.7 °C) Resp 18 Ht 6' 2\" (1.88 m) Wt 75 kg (165 lb 6.4 oz) SpO2 95% BMI 21.24 kg/m² Intake/Output Summary (Last 24 hours) at 2020 1137 Last data filed at 2020 5512 Gross per 24 hour Intake  Output 1200 ml Net -1200 ml Tmax:  Temp (24hrs), Av.3 °F (36.8 °C), Min:97.6 °F (36.4 °C), Max:99.8 °F (37.7 °C) Data Review:  
Data reviewed by myself: 
Cta Chest W Or W Wo Cont Result Date: 2020 EXAM:  CTA CHEST W OR W WO CONT INDICATION:   SOB COMPARISON: None. TECHNIQUE: Precontrast  images were obtained to localize the volume for acquisition. Multislice helical CT arteriography was performed from the diaphragm to the thoracic inlet during uneventful rapid bolus of 100 cc Isovue-370. Lung and soft tissue windows were generated. Coronal and sagittal images were generated and 3D post processing consisting of coronal maximum intensity images was performed. CT dose reduction was achieved through use of a standardized protocol tailored for this examination and automatic exposure control for dose modulation. FINDINGS: CHEST: THYROID: No nodule. MEDIASTINUM: No mass or lymphadenopathy. LEV: No mass or lymphadenopathy. THORACIC AORTA: Atherosclerotic without evidence of aneurysm. MAIN PULMONARY ARTERY: Pulmonary emboli right upper lobe branches. TRACHEA/BRONCHI: Patent. ESOPHAGUS: No wall thickening or dilatation. HEART: Normal in size. PLEURA: No effusion or pneumothorax. LUNGS: Emphysematous changes. Focal consolidation right lower lobe. Left basilar atelectasis.  INCIDENTALLY IMAGED UPPER ABDOMEN: 7.7 cm right renal cyst. 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 Endograft is partially visualized. [place] BONES: No destructive bone lesion. IMPRESSION: Right upper lobe pulmonary emboli. Localized consolidation right lower lobe. Left basilar atelectasis. The findings were called to the patient's nurse on 2/20/2020 at 6:39 PM by myself. [address] Us Abd Comp Result Date: 2/21/2020 INDICATION: Abdominal distention COMPARISON: None TECHNIQUE: Routine ultrasound images of the abdomen were obtained. FINDINGS: GALLBLADDER: No cholecystolithiasis, gallbladder wall thickening, or pericholecystic fluid. COMMON BILE DUCT: 3 mm in diameter. No evidence of choledocholithiasis. LIVER: Normal in size. Normal echogenicity. No focal hepatic mass or intrahepatic biliary dilatation. MAIN PORTAL VEIN: Patent. Hepatopetal flow direction. PANCREAS: Obscured by overlying bowel gas. RIGHT KIDNEY: 12.8 cm in length. 8.2 cm cyst. No hydronephrosis. No evidence of mass or calculus. LEFT KIDNEY: 11.5 cm in length. No hydronephrosis. No evidence of mass or calculus. SPLEEN: Obscured by overlying bowel gas. AORTA: Obscured by overlying bowel gas. INFERIOR VENA CAVA: Patent. OTHER: No ascites. IMPRESSION: Large right renal cyst. Otherwise unremarkable abdominal ultrasound. Xr Chest [facility] Result Date: 2/20/2020 INDICATION:  SOB. History of chronic asthma. Prostate cancer. COPD. EXAM: Chest single view. COMPARISON: 9/10/2017. FINDINGS: A single frontal view of the chest at 1419 hours shows bibasilar atelectasis or scar, with mild interstitial prominence increased since the prior study. .  The heart, mediastinum and pulmonary vasculature are stable . The bony thorax is unremarkable for age. . Port-A-Cath device on the right is stable. IMPRESSION: Bibasilar atelectasis or scar with interstitial prominence.  Correlate for developing interstitial edema or bibasilar inflammatory infiltrate in this patient with COPD and chronic asthma. .  . No results found for: SDES No results found for: CULT All Micro Results None Labs: reviewed by myself. Recent Labs  
  02/23/20 
0853 02/21/20 
0245 WBC 9.5 4.7 HGB 11.8* 12.3 HCT 40.7 41.3  293 Recent Labs  
  02/23/20 
0853 02/21/20 
0245 02/20/20 
1333  136 136  
K 3.7 4.2 4.7 CL 91* 97 95* CO2 40* 37* 39* BUN 17 10 10 CREA 0.79 0.51* 0.66* * 106* 170* CA 9.5 9.8 10.5* MG  --  2.0 2.1 PHOS  --  3.6 4.0 Recent Labs  
  02/21/20 
0245 02/20/20 
1333 SGOT 12* 10* ALT 19 24 AP 68 88 TBILI 0.3 0.4 TP 6.3* 7.9 ALB 2.6* 3.4*  
GLOB 3.7 4.5* No results for input(s): INR, PTP, APTT, INREXT, INREXT in the last 72 hours. No results for input(s): FE, TIBC, PSAT, FERR in the last 72 hours. No results found for: FOL, RBCF No results for input(s): PH, PCO2, PO2 in the last 72 hours. Recent Labs  
  02/20/20 
1333 TROIQ <0.05 Lab Results Component Value Date/Time Cholesterol, total 187 09/11/2017 05:53 AM  
 HDL Cholesterol 63 09/11/2017 05:53 AM  
 LDL, calculated 109 (H) 09/11/2017 05:53 AM  
 Triglyceride 75 09/11/2017 05:53 AM  
 CHOL/HDL Ratio 3.0 09/11/2017 05:53 AM  
 
Lab Results Component Value Date/Time Glucose (POC) 137 (H) 02/23/2020 11:25 AM  
 Glucose (POC) 109 (H) 02/23/2020 07:58 AM  
 Glucose (POC) 221 (H) 02/22/2020 09:09 PM  
 Glucose (POC) 150 (H) 02/22/2020 04:31 PM  
 Glucose (POC) 154 (H) 02/22/2020 11:55 AM  
 
Lab Results Component Value Date/Time  Color YELLOW/STRAW 09/10/2017 10:09 PM  
 Appearance CLEAR 09/10/2017 10:09 PM  
 Specific gravity 1.010 09/10/2017 10:09 PM  
 pH (UA) 5.0 09/10/2017 10:09 PM  
 Protein NEGATIVE  09/10/2017 10:09 PM  
 Glucose NEGATIVE  09/10/2017 10:09 PM  
 Ketone NEGATIVE  09/10/2017 10:09 PM  
 Bilirubin NEGATIVE  09/10/2017 10:09 PM  
 Urobilinogen 0.2 09/10/2017 10:09 PM  
 Nitrites NEGATIVE  09/10/2017 10:09 PM  
 Leukocyte Esterase NEGATIVE  09/10/2017 10:09 PM  
 Epithelial cells MODERATE (A) 09/10/2017 10:09 PM  
 Bacteria NEGATIVE  09/10/2017 10:09 PM  
 WBC 5-10 09/10/2017 10:09 PM  
 RBC 0-5 09/10/2017 10:09 PM  
 
 
 
Medications Reviewed:  
 
Current Facility-Administered Medications Medication Dose Route Frequency  oxymetazoline (AFRIN) 0.05 % nasal spray 2 Spray  2 Spray Both Nostrils BID PRN  
 diphenhydrAMINE (BENADRYL) capsule 25 mg  25 mg Oral Q6H PRN  
 sertraline (ZOLOFT) tablet 25 mg  25 mg Oral DAILY  levoFLOXacin (LEVAQUIN) 750 mg in D5W IVPB  750 mg IntraVENous Q24H  
 apixaban (ELIQUIS) tablet 10 mg  10 mg Oral BID  albuterol-ipratropium (DUO-NEB) 2.5 MG-0.5 MG/3 ML  3 mL Nebulization QID RT  
 levETIRAcetam (KEPPRA) tablet 500 mg  500 mg Oral BID  sodium chloride (NS) flush 5-40 mL  5-40 mL IntraVENous Q8H  
 sodium chloride (NS) flush 5-40 mL  5-40 mL IntraVENous PRN  
 acetaminophen (TYLENOL) tablet 650 mg  650 mg Oral Q4H PRN  
 arformoteroL (BROVANA) neb solution 15 mcg  15 mcg Nebulization BID  ferrous sulfate tablet 325 mg  325 mg Oral ACB  hydroxyzine HCL (ATARAX) tablet 25 mg  25 mg Oral BID  montelukast (SINGULAIR) tablet 10 mg  10 mg Oral DAILY  pantoprazole (PROTONIX) tablet 40 mg  40 mg Oral ACB&D  
 budesonide (PULMICORT) 250 mcg/2ml nebulizer susp  250 mcg Nebulization BID RT  
 predniSONE (DELTASONE) tablet 20 mg  20 mg Oral DAILY WITH BREAKFAST  albuterol (PROVENTIL VENTOLIN) nebulizer solution 2.5 mg  2.5 mg Nebulization Q2H PRN  
 glucose chewable tablet 16 g  4 Tab Oral PRN  
 glucagon (GLUCAGEN) injection 1 mg  1 mg IntraMUSCular PRN  
 dextrose 10% infusion 0-250 mL  0-250 mL IntraVENous PRN  
 insulin lispro (HUMALOG) injection   SubCUTAneous AC&HS  furosemide (LASIX) injection 40 mg  40 mg IntraVENous DAILY  
 
______________________________________________________________________ EXPECTED LENGTH OF STAY: - - - 
ACTUAL LENGTH OF STAY:          3 Verdis Lefort, MD  
 
Patient's emergency contacts: 
Extended Emergency Contact Information Primary Emergency Contact: Rashad Camargo Address: [address] [place] [address] Phone: [phone] Relation: Other Relative

## 2020-02-23 NOTE — PROGRESS NOTES
Problem: Falls - Risk of 
Goal: *Absence of Falls Description Document Arik Jo Fall Risk and appropriate interventions in the flowsheet. Outcome: Progressing Towards Goal 
Note: Fall Risk Interventions: 
Mobility Interventions: Communicate number of staff needed for ambulation/transfer, Patient to call before getting OOB, Utilize walker, cane, or other assistive device, PT Consult for mobility concerns Mentation Interventions: Adequate sleep, hydration, pain control, Update white board, Toileting rounds, More frequent rounding Medication Interventions: Teach patient to arise slowly, Patient to call before getting OOB, Evaluate medications/consider consulting pharmacy Elimination Interventions: Call light in reach, Patient to call for help with toileting needs, Toileting schedule/hourly rounds, Urinal in reach History of Falls Interventions: Room close to nurse's station, Evaluate medications/consider consulting pharmacy, Door open when patient unattended Problem: Pressure Injury - Risk of 
Goal: *Prevention of pressure injury Description Document Carlos Enrique Scale and appropriate interventions in the flowsheet. Outcome: Progressing Towards Goal 
Note: Pressure Injury Interventions: 
Sensory Interventions: Assess changes in LOC, Assess need for specialty bed, Float heels, Keep linens dry and wrinkle-free, Minimize linen layers, Turn and reposition approx. every two hours (pillows and wedges if needed) Moisture Interventions: Absorbent underpads, Apply protective barrier, creams and emollients, Check for incontinence Q2 hours and as needed, Minimize layers, Maintain skin hydration (lotion/cream) Activity Interventions: Assess need for specialty bed, Pressure redistribution bed/mattress(bed type) Mobility Interventions: Assess need for specialty bed, Float heels, Pressure redistribution bed/mattress (bed type) Nutrition Interventions: Document food/fluid/supplement intake Friction and Shear Interventions: Apply protective barrier, creams and emollients, Minimize layers Problem: Pulmonary Embolism Care Plan (Adult) Goal: *Absence of bleeding 2/23/2020 1818 by Madison Nelson Outcome: Progressing Towards Goal 
Note:  
Patient tolerating transition of medical management well. Patient switched from lovenox to eliquis. Eliquis was given B.I.D. today, no signs or symptoms of bleeding. 2/23/2020 1811 by Madison Nelson Note:  
Patient tolerating transition of medical management from lovenox to eliquis for PE treatment. Eliquis administered B.I.D. today. No signs or symptoms of bleeding. Problem: Deep Venous Thrombosis - Risk of 
Goal: *Absence of bleeding 2/23/2020 1818 by Madison Nelson Outcome: Progressing Towards Goal 
Note:  
Patient tolerating medical management well. Switched lovenox to eliquis. Eliquis given B. I.D today, no signs and symptoms of bleeding. 2/23/2020 1811 by Madison Nelson Note:  
Patient tolerating transition of medical management well. Patient is now on eliquis for clot management. Patient received B. I.D today, no signs or symptoms of bleeding. Problem: Breathing Pattern - Ineffective Goal: *Use of effective breathing techniques 2/23/2020 1818 by Madison Nelson Outcome: Progressing Towards Goal 
Note:  
Patient remained on BIPAP today and O2 saturation reamined above 95%. 2/23/2020 1811 by Madison Nelson Note:  
Patient remained on BIPAP today with O2 saturation above 95%.

## 2020-02-23 NOTE — PROGRESS NOTES
Bedside and Verbal shift change report given to Huyen Manzanares RN (oncoming nurse) by Ivan Her RN (offgoing nurse). Report included the following information SBAR, Kardex, Intake/Output, MAR, Recent Results and Quality Measures.

## 2020-02-24 NOTE — NURSE NAVIGATOR
Chart reviewed by Heart Failure Nurse Navigator. Echo reviewed. Per communication with Dr. Abrahan Hogue patient's diagnosis is not heart failure. Problem list has been corrected. Heart Failure Teach back and discharge instructions removed from chart.

## 2020-02-24 NOTE — PROGRESS NOTES
[facility] [place] [facility], Critical Care, and Sleep Medicine Progress Note Name: Agustin Costa MRN: [id number] : 1945 Hospital: [facility] Date: 2020 IMPRESSION:  
· AECOPD- due to PE and probable RLL PNA · RUL PE 
· PH- ECHO  EF 60% no MR or AS PASP 66 RV nml · COPD · DM2 RECOMMENDATIONS:  
· PH may be secondary to severe COPD and now PE. However PASP is a bit high for both. · Pt will need ECHO in 6 months to reassess- if PASP still > 60 will need w/u for other causes PH ( group I or 2 etc) · Wean prednisone · eliquis · Nebs · On NIV  
· Continue levaquin · Follow up Chest xray in 4-6 wks with Dr Jeannette Moreno Subjective:  
 
 On NIV  
 
 This patient has been seen and evaluated at the request of Dr. Asim Ramon for SOB. Patient is a 69 y.o. male H/o COPD on spiriva and follows with Dr. Farida Carrasco. Pt admitted with SOB and RUL PE. Started on eliquis. Pt alert on bipap and nebs Less SOb moves all ext equally no CP Past Medical History:  
Diagnosis Date  Asthma   
 hx of/has wheezing  Cancer [facility])   
 prostate - not treated yet  Cerebral artery occlusion with cerebral infarction [address] [address]  Chronic obstructive pulmonary disease ([facility] [address]  Epilepsy ([facility] [address]  Ill-defined condition   
 shortness of breath - being evaluated  Psychiatric disorder   
 mental disability Past Surgical History:  
Procedure Laterality Date  COLONOSCOPY Left 10/4/2017 COLONOSCOPY performed by Naa Cervantes MD at [facility] ENDOSCOPY  
 HX INTRACRANIAL ANEURYSM REPAIR    
 HX OTHER SURGICAL    
 cyst removed from neck  HX PROSTATE SURGERY    
 HX SPLENECTOMY Prior to Admission medications Medication Sig Start Date End Date Taking? Authorizing Provider  
sertraline (ZOLOFT) 25 mg tablet Take 25 mg by mouth daily. Yes Provider, Historical  
atorvastatin (LIPITOR) 40 mg tablet Take 40 mg by mouth daily.    Yes Provider, Historical  
 arformoteroL (BROVANA) 15 mcg/2 mL nebu neb solution 15 mcg by Nebulization route two (2) times a day. Yes Provider, Historical  
multivitamin (ONE A DAY) tablet Take 1 Tab by mouth daily. Yes Provider, Historical  
ferrous sulfate 325 mg (65 mg iron) tablet Take 325 mg by mouth Daily (before breakfast). Yes Provider, Historical  
fluticasone propionate (FLONASE ALLERGY RELIEF) 50 mcg/actuation nasal spray 2 Sprays by Both Nostrils route daily. Yes Provider, Historical  
hydroxyzine HCL (ATARAX) 25 mg tablet Take 25 mg by mouth two (2) times a day. Yes Provider, Historical  
ipratropium (ATROVENT) 0.02 % soln 0.5 mg by Other route every eight (8) hours. Oral inhalation   Yes Provider, Historical  
metFORMIN (GLUCOPHAGE) 500 mg tablet Take 500 mg by mouth daily (with breakfast). Yes Provider, Historical  
montelukast (SINGULAIR) 10 mg tablet Take 10 mg by mouth daily. Yes Provider, Historical  
predniSONE (DELTASONE) 10 mg tablet Take 20 mg by mouth daily (with breakfast). COPD exacerbation   Yes Provider, Historical  
budesonide-formoteroL (SYMBICORT) 160-4.5 mcg/actuation HFAA Take 2 Puffs by inhalation two (2) times a day. Yes Provider, Historical  
levalbuterol tartrate (XOPENEX) 45 mcg/actuation inhaler Take 1 Puff by inhalation every eight (8) hours. Yes Provider, Historical  
cetirizine (ZYRTEC) 10 mg tablet Take 10 mg by mouth daily. Yes Provider, Historical  
levETIRAcetam (KEPPRA) 500 mg tablet TAKE 1 TABLET BY MOUTH TWICE A DAY 11/8/19  Yes Nirali Howard DO  
pantoprazole (PROTONIX) 40 mg tablet Take 1 Tab by mouth Before breakfast and dinner. 10/6/17  Yes Vani Callejas MD  
umeclidinium (INCRUSE ELLIPTA) 62.5 mcg/actuation inhaler Take 1 Puff by inhalation daily. Indications: Rescue inhaler   Yes Provider, Historical  
 
No Known Allergies Social History Tobacco Use  Smoking status: Current Every Day Smoker  Smokeless tobacco: Never Used  Tobacco comment: cigarettes Substance Use Topics  Alcohol use: No  
  
Family History Problem Relation Age of Onset  Colon Cancer Maternal Aunt  Colon Cancer Maternal Aunt  Colon Cancer Maternal Aunt Current Facility-Administered Medications Medication Dose Route Frequency  arformoteroL (BROVANA) neb solution 15 mcg  15 mcg Nebulization BID RT  
 sertraline (ZOLOFT) tablet 25 mg  25 mg Oral DAILY  levoFLOXacin (LEVAQUIN) 750 mg in D5W IVPB  750 mg IntraVENous Q24H  
 apixaban (ELIQUIS) tablet 10 mg  10 mg Oral BID  albuterol-ipratropium (DUO-NEB) 2.5 MG-0.5 MG/3 ML  3 mL Nebulization QID RT  
 levETIRAcetam (KEPPRA) tablet 500 mg  500 mg Oral BID  sodium chloride (NS) flush 5-40 mL  5-40 mL IntraVENous Q8H  
 ferrous sulfate tablet 325 mg  325 mg Oral ACB  hydroxyzine HCL (ATARAX) tablet 25 mg  25 mg Oral BID  montelukast (SINGULAIR) tablet 10 mg  10 mg Oral DAILY  pantoprazole (PROTONIX) tablet 40 mg  40 mg Oral ACB&D  
 budesonide (PULMICORT) 250 mcg/2ml nebulizer susp  250 mcg Nebulization BID RT  
 predniSONE (DELTASONE) tablet 20 mg  20 mg Oral DAILY WITH BREAKFAST  insulin lispro (HUMALOG) injection   SubCUTAneous AC&HS  furosemide (LASIX) injection 40 mg  40 mg IntraVENous DAILY Review of Systems: 
Review of systems not obtained due to patient factors. Objective:  
Vital Signs:   
Visit Vitals /72 (BP 1 Location: Right arm, BP Patient Position: At rest) Pulse 97 Temp 97.8 °F (36.6 °C) Resp 16 Ht 6' 2\" (1.88 m) Wt 71.5 kg (157 lb 10.1 oz) SpO2 97% BMI 20.24 kg/m² O2 Device: Nasal cannula O2 Flow Rate (L/min): 3 l/min Temp (24hrs), Av.1 °F (36.7 °C), Min:97.2 °F (36.2 °C), Max:99.8 °F (37.7 °C) Intake/Output:  
Last shift:      No intake/output data recorded. Last 3 shifts:  1901 -  0700 In: -  
Out: 550 [Urine:550] Intake/Output Summary (Last 24 hours) at 2020 2734 Last data filed at 2/23/2020 1157 Gross per 24 hour Intake  Output 300 ml Net -300 ml Physical Exam:  
General:  Alert, cooperative, no distress, appears stated age. Head:  Normocephalic, without obvious abnormality, atraumatic. Eyes:  Conjunctivae/corneas clear. Nose: Nares normal. Septum midline. Mucosa normal. No drainage or sinus tenderness. Lungs:   Clear to auscultation bilaterally. Heart:  Regular rate and rhythm, S1, S2 normal, no murmur, click, rub or gallop. Abdomen:   Soft, non-tender. Bowel sounds normal. No masses,  No organomegaly. Extremities: Extremities normal, atraumatic, no cyanosis or edema. Pulses: 2+ and symmetric all extremities. Skin: Skin color, texture, turgor normal. No rashes or lesions Data review:  
 
Recent Results (from the past 24 hour(s)) GLUCOSE, POC Collection Time: 02/23/20 11:25 AM  
Result Value Ref Range Glucose (POC) 137 (H) 65 - 100 mg/dL Performed by [employer] GLUCOSE, POC Collection Time: 02/23/20  4:43 PM  
Result Value Ref Range Glucose (POC) 255 (H) 65 - 100 mg/dL Performed by [employer] GLUCOSE, POC Collection Time: 02/23/20  9:22 PM  
Result Value Ref Range Glucose (POC) 165 (H) 65 - 100 mg/dL Performed by Re Duncan (CCT) CBC WITH AUTOMATED DIFF Collection Time: 02/24/20  3:21 AM  
Result Value Ref Range WBC 7.8 4.1 - 11.1 K/uL  
 RBC 4.37 4.10 - 5.70 M/uL  
 HGB 11.1 (L) 12.1 - 17.0 g/dL HCT 37.6 36.6 - 50.3 % MCV 86.0 80.0 - 99.0 FL  
 MCH 25.4 (L) 26.0 - 34.0 PG  
 MCHC 29.5 (L) 30.0 - 36.5 g/dL  
 RDW 17.7 (H) 11.5 - 14.5 % PLATELET 317 237 - 609 K/uL MPV 10.1 8.9 - 12.9 FL  
 NRBC 0.3 (H) 0  WBC ABSOLUTE NRBC 0.02 (H) 0.00 - 0.01 K/uL NEUTROPHILS 69 32 - 75 % LYMPHOCYTES 21 12 - 49 % MONOCYTES 9 5 - 13 % EOSINOPHILS 0 0 - 7 % BASOPHILS 0 0 - 1 % IMMATURE GRANULOCYTES 1 (H) 0.0 - 0.5 % ABS. NEUTROPHILS 5.4 1.8 - 8.0 K/UL  
 ABS. LYMPHOCYTES 1.6 0.8 - 3.5 K/UL  
 ABS. MONOCYTES 0.7 0.0 - 1.0 K/UL  
 ABS. EOSINOPHILS 0.0 0.0 - 0.4 K/UL  
 ABS. BASOPHILS 0.0 0.0 - 0.1 K/UL  
 ABS. IMM. GRANS. 0.1 (H) 0.00 - 0.04 K/UL  
 DF AUTOMATED METABOLIC PANEL, BASIC Collection Time: 02/24/20  3:21 AM  
Result Value Ref Range Sodium 136 136 - 145 mmol/L Potassium 3.5 3.5 - 5.1 mmol/L Chloride 92 (L) 97 - 108 mmol/L  
 CO2 39 (H) 21 - 32 mmol/L Anion gap 5 5 - 15 mmol/L Glucose 122 (H) 65 - 100 mg/dL BUN 19 6 - 20 MG/DL Creatinine 0.77 0.70 - 1.30 MG/DL  
 BUN/Creatinine ratio 25 (H) 12 - 20 GFR est AA >60 >60 ml/min/1.73m2 GFR est non-AA >60 >60 ml/min/1.73m2 Calcium 9.3 8.5 - 10.1 MG/DL MAGNESIUM Collection Time: 02/24/20  3:21 AM  
Result Value Ref Range Magnesium 2.0 1.6 - 2.4 mg/dL PHOSPHORUS Collection Time: 02/24/20  3:21 AM  
Result Value Ref Range Phosphorus 2.9 2.6 - 4.7 MG/DL  
GLUCOSE, POC Collection Time: 02/24/20  7:21 AM  
Result Value Ref Range Glucose (POC) 144 (H) 65 - 100 mg/dL Performed by Ashley Chou (CCT) Imaging: 
I have personally reviewed the patients radiographs and have reviewed the reports: CTA RUL PE and RLL ASD Lul Baxter PA-C

## 2020-02-24 NOTE — PROGRESS NOTES
visit for Advance Medical Directive (AMD) consult. Pt's sister Jael Kaur was at bedside and a friend was visiting.  explained AMD and answered questions. Pt completed paperwork naming Jael Kaur as his agent. Pt did not name a secondary agent.  entered information into pt's chart and a copy for paper chart to be scanned.  returned the original and copies to Jael Kaur as she was taking them home.  provided pastoral listening, support and assurance of prayer. Let them know of  support and availability.  follow up as needed. Xavier Merrill, MACE 
 [phone])

## 2020-02-24 NOTE — ACP (ADVANCE CARE PLANNING)
visit for Advance Medical Directive (AMD) consult. Pt's sister Donis Serrano was at bedside and a friend was visiting.  explained AMD and answered questions. Pt completed paperwork naming Donis Serrano as his agent. Pt did not name a secondary agent.  entered information into pt's chart and a copy for paper chart to be scanned.  returned the original and copies to Donis Serrano as she was taking them home.  provided pastoral listening, support and assurance of prayer. Let them know of  support and availability.  follow up as needed. Xavier Merrill, MACE 
 [phone])

## 2020-02-24 NOTE — PROGRESS NOTES
Clinical Pharmacy Note: Re: IV to PO Automatic Conversion - Antibiotic Please note: Christophe Adan medication- Levofloxacin has been changed from IV to PO based on the following criteria: 
 
The patient: 
1. Has received IV therapy for at least 48 hours 2. Has a functioning GI tract - Taking scheduled oral medications - Tolerating tube feeds at goal rate or a full liquid, soft, or regular diet 3. Is clinically stable - Temperature < 100.4F for at least 24 hours - WBC is trending down This IV to PO conversion is based on the [employer] approved automatic conversion policy for eligible patients. Please call with questions.

## 2020-02-24 NOTE — PROGRESS NOTES
ADULT PROTOCOL: JET AEROSOL ASSESSMENT Patient  Belinda Sweeney     69 y.o.   male     2/24/2020  10:47 AM 
 
Breath Sounds Pre Procedure: Right Breath Sounds: Diminished Left Breath Sounds: Diminished Breath Sounds Post Procedure: Right Breath Sounds: Diminished Left Breath Sounds: Diminished Breathing pattern: Pre procedure Breathing Pattern: Tachypneic Post procedure Breathing Pattern: Tachypneic Heart Rate: Pre procedure Pulse: 101 Post procedure Pulse: 105 Resp Rate: Pre procedure Respirations: 30 
         Post procedure 33 Cough: Pre procedure Cough: Non-productive Post procedure Suctioned: NO Sputum: Pre procedure Post procedure Oxygen: O2 Device: Nasal cannula   Flow rate (L/min) 3 Changed: NO SpO2: Pre procedure SpO2: 97 %   with oxygen Post procedure SpO2: 100 %  with oxygen Nebulizer Therapy: Current medications Aerosolized Medications: DuoNeb, Pulmicort Changed: YES -Q6 Duoneb Smoking History:  
Problem List:  
Patient Active Problem List  
Diagnosis Code  Slurred speech R47.81  
 Seizures ([address] R56.9  Altered mental status R41.82  
 Cerebrovascular accident (CVA) due to thrombosis of right posterior cerebral artery ([facility]) I63.331  
 CHF (congestive heart failure) ([facility]) I50.9  Acute respiratory failure with hypoxia ([facility]) J96.01 Respiratory Therapist: Connor Velazquez

## 2020-02-24 NOTE — PROGRESS NOTES
Problem: Falls - Risk of 
Goal: *Absence of Falls Description Document Kenny Chen Fall Risk and appropriate interventions in the flowsheet. Outcome: Progressing Towards Goal 
Note: Fall Risk Interventions: 
Mobility Interventions: Communicate number of staff needed for ambulation/transfer, Patient to call before getting OOB, Strengthening exercises (ROM-active/passive) Mentation Interventions: Adequate sleep, hydration, pain control, Door open when patient unattended, Room close to nurse's station, Toileting rounds, Update white board Medication Interventions: Patient to call before getting OOB, Teach patient to arise slowly Elimination Interventions: Call light in reach, Patient to call for help with toileting needs, Urinal in reach, Toileting schedule/hourly rounds, Stay With Me (per policy) History of Falls Interventions: Assess for delayed presentation/identification of injury for 48 hrs (comment for end date), Vital signs minimum Q4HRs X 24 hrs (comment for end date), Investigate reason for fall, Room close to nurse's station, Door open when patient unattended Problem: Breathing Pattern - Ineffective Goal: *Absence of hypoxia Outcome: Progressing Towards Goal 
Goal: *Use of effective breathing techniques Outcome: Progressing Towards Goal 
 Pt tolerated BIPAP use well throughout night. Pt used call bell when needed help with toileting.  
 
0300: Notified hospitalist Toy Picking of Pt's 4 runs of vtach while Pt was sleeping/asymptomatic. Received orders for add on labs of Mag and Phos. Obtained and found to be WNL. Will continue to monitor. Last 3 Recorded Weights in this Encounter 02/22/20 8530 02/23/20 1324 02/24/20 0309 Weight: 75.7 kg (166 lb 14.4 oz) 75 kg (165 lb 6.4 oz) 71.5 kg (157 lb 10.1 oz) Notified oncoming RN of weight varience.   
 
Bedside shift change report given to Layton Sanchez (oncoming nurse) by Parrish (offgoing nurse). Report included the following information SBAR, Kardex, ED Summary, Intake/Output, MAR, Accordion, Recent Results, Med Rec Status, Cardiac Rhythm NSR/PVCs and Quality Measures.

## 2020-02-24 NOTE — PROGRESS NOTES
Problem: Falls - Risk of 
Goal: *Absence of Falls Description Document Carmen Dubois Fall Risk and appropriate interventions in the flowsheet. 2/24/2020 1228 by Shlomo Wiley, RN Outcome: Progressing Towards Goal 
Note: Fall Risk Interventions: 
Mobility Interventions: Communicate number of staff needed for ambulation/transfer, Patient to call before getting OOB Mentation Interventions: Adequate sleep, hydration, pain control, Door open when patient unattended, Room close to nurse's station, Toileting rounds, Update white board Medication Interventions: Evaluate medications/consider consulting pharmacy, Teach patient to arise slowly Elimination Interventions: Call light in reach, Patient to call for help with toileting needs, Toileting schedule/hourly rounds, Urinal in reach, Toilet paper/wipes in reach History of Falls Interventions: Assess for delayed presentation/identification of injury for 48 hrs (comment for end date), Vital signs minimum Q4HRs X 24 hrs (comment for end date), Investigate reason for fall, Room close to nurse's station, Door open when patient unattended 2/24/2020 1134 by Shlomo Wiley, RN Outcome: Progressing Towards Goal 
Note: Fall Risk Interventions: 
Mobility Interventions: Communicate number of staff needed for ambulation/transfer, Patient to call before getting OOB Mentation Interventions: Adequate sleep, hydration, pain control, Door open when patient unattended, Room close to nurse's station, Toileting rounds, Update white board Medication Interventions: Evaluate medications/consider consulting pharmacy, Teach patient to arise slowly Elimination Interventions: Call light in reach, Patient to call for help with toileting needs, Toileting schedule/hourly rounds, Urinal in reach, Toilet paper/wipes in reach History of Falls Interventions: Assess for delayed presentation/identification of injury for 48 hrs (comment for end date), Vital signs minimum Q4HRs X 24 hrs (comment for end date), Investigate reason for fall, Room close to nurse's station, Door open when patient unattended Problem: Pressure Injury - Risk of 
Goal: *Prevention of pressure injury Description Document Carlos Enrique Scale and appropriate interventions in the flowsheet. 2/24/2020 1228 by Christine Herrera RN Outcome: Progressing Towards Goal 
Note: Pressure Injury Interventions: 
Sensory Interventions: Assess changes in LOC, Assess need for specialty bed, Check visual cues for pain, Float heels, Keep linens dry and wrinkle-free, Minimize linen layers, Turn and reposition approx. every two hours (pillows and wedges if needed) Moisture Interventions: Absorbent underpads, Apply protective barrier, creams and emollients, Assess need for specialty bed, Check for incontinence Q2 hours and as needed, Internal/External urinary devices, Moisture barrier, Minimize layers, Maintain skin hydration (lotion/cream) Activity Interventions: Assess need for specialty bed, Increase time out of bed Mobility Interventions: Assess need for specialty bed, Float heels, HOB 30 degrees or less, Turn and reposition approx. every two hours(pillow and wedges) Nutrition Interventions: Document food/fluid/supplement intake, Offer support with meals,snacks and hydration Friction and Shear Interventions: Apply protective barrier, creams and emollients, HOB 30 degrees or less, Lift sheet, Minimize layers 2/24/2020 1134 by Christine Herrera RN Outcome: Progressing Towards Goal 
Note: Pressure Injury Interventions: 
Sensory Interventions: Assess changes in LOC, Assess need for specialty bed, Check visual cues for pain, Float heels, Keep linens dry and wrinkle-free, Minimize linen layers, Turn and reposition approx. every two hours (pillows and wedges if needed) Moisture Interventions: Absorbent underpads, Apply protective barrier, creams and emollients, Assess need for specialty bed, Check for incontinence Q2 hours and as needed, Internal/External urinary devices, Moisture barrier, Minimize layers, Maintain skin hydration (lotion/cream) Activity Interventions: Assess need for specialty bed, Increase time out of bed Mobility Interventions: Assess need for specialty bed, Float heels, HOB 30 degrees or less, Turn and reposition approx. every two hours(pillow and wedges) Nutrition Interventions: Document food/fluid/supplement intake, Offer support with meals,snacks and hydration Friction and Shear Interventions: Apply protective barrier, creams and emollients, HOB 30 degrees or less, Lift sheet, Minimize layers Problem: Pulmonary Embolism Care Plan (Adult) Goal: *Improvement of existing pulmonary embolism Note:  
Pt continues to remain on Eliquis 10mg per Hemon[facility] recommendations for treatment of pulmonary embolism. Pt will continue with this therapy for 1 week and then transition to Eliquis 5mg for 3-6 months. Problem: Breathing Pattern - Ineffective Goal: *Absence of hypoxia Outcome: Progressing Towards Goal 
Note:  
Pt able to tolerate 3L NC thorughout shift today with O2 saturations remaining above 90%.   Will continue to monitor for need of PRN BiPAP or repeat ABG should pt become tachypneic or have O2 saturations dropping below goal.

## 2020-02-24 NOTE — PROGRESS NOTES
Hospitalist Progress Note Sandi Mack MD 
Answering service: [phone] from in house phone Date of Service:  2020 NAME:  Oswaldo Goldmann :  1945 MRN:  [id number] PCP: Sd Mcbride MD 
 
Chief Complaint:  
Chief Complaint Patient presents with  Shortness of Breath Admission Summary:  
 
Oswaldo Goldmann is a 69 y.o. male who presented with As per initial admission summary Oswaldo Goldmann is a 69 y.o.  with h/o COPD/ASTHMA, CAD and DM. Pt presents from [facility] for evaluation of shortness of breath. Patient prior was living at home with a family member, 2 or 3 weeks ago he developed shortness breath and then he was admitted to [facility][note 3], per sister he was treated with COPD exacerbation patient also was told that there were fluid in the chest but never have diagnosed with CHF. He was then discharged to [facility] for rehab 2 weeks ago was steroids and nebulizer. In SNF, Pt complains of increased shortness of breath with associated leg swelling, and chest pain for the past 3 days. Pt notes he had an oxygen saturation of 88%. Pt denies nausea, vomiting, fever, or abdominal pain. Patient said he can't fit his regular shoe anymore due to the leg and feet swelling. He also noticed increasing abdominal distention. Interval history / Subjective:  
Patient seen for Follow up of PE Patient seen and examined by the bedside, Labs, images and notes reviewed Patient is comfortable, denies any chest pain, SOB, abdominal pain, fevers, chills. No N/V/D. Patient was on bipap overnight. Currently off bipap. On nasal canula 2 L saturating OK Doing better than yesterday. No episode of desaturation Discussed with nursing staff, no acute issues overnight, orders reviewed. Assessment & Plan: 1. Acute on chronic resp failure  With hypoxia:  improving BNP low, no chf history, but giving the CXR finding, leg edema. . Follow cr. · Hematology consulted, as per hematology Recommend transition from lovenox to Eliquis 10mg BID for 7 days, then 5mg BID x3-6 months. · Will schedule outpatient follow-up in clinic. Switched to eliquis Still having issues with hypoxia, was desaturated in morning. BIPAP prn, currently on nasal cannula 2. COPD: no wheezing, continue home nebs and previous dose of prednisone. Pulmonology consulted Also started on Levaquin for 5 days for possible PNA 3. abd distention: No ascites Large right renal cyst 
Urology consulted , need outpatient follow up 4. DM: SSI 5. H/o seizure: continue home meds Code status: Full Code DVT prophylaxis: eliquis Care Plan discussed with: Patient/Family Disposition: SNF Hospital Problems  Date Reviewed: 3/16/2018 Codes Class Noted POA Acute respiratory failure with hypoxia [facility]) ICD-10-CM: J96.01 
ICD-9-CM: 518.81  2/20/2020 Unknown Review of Systems: A comprehensive review of systems was negative except for that written in the HPI. Physical Examination:  
 
Visit Vitals /68 (BP 1 Location: Right arm, BP Patient Position: At rest) Pulse (!) 101 Temp 98.1 °F (36.7 °C) Resp 28 Ht 6' 2\" (1.88 m) Wt 71.5 kg (157 lb 10.1 oz) SpO2 97% BMI 20.24 kg/m² General:  Alert, cooperative, no distress, appears stated age. Head:  Normocephalic, without obvious abnormality, atraumatic. Lungs:    decreased breath sounds Heart:  Regular rate and rhythm, S1, S2 normal, no murmur, click, rub or gallop. Abdomen:   Soft, non-tender. Bowel sounds normal. No masses,  No organomegaly. Extremities: Extremities normal, atraumatic, no cyanosis or edema Pulses: 2+ and symmetric all extremities. Skin: Skin color, texture, turgor normal. No rashes or lesions Neurologic: CNII-XII intact. Normal strength, sensation and reflexes throughout. Vital Signs: Last 24hrs VS reviewed since prior progress note. Most recent are: 
 
Visit Vitals /68 (BP 1 Location: Right arm, BP Patient Position: At rest) Pulse (!) 101 Temp 98.1 °F (36.7 °C) Resp 28 Ht 6' 2\" (1.88 m) Wt 71.5 kg (157 lb 10.1 oz) SpO2 97% BMI 20.24 kg/m² Intake/Output Summary (Last 24 hours) at 2020 1536 Last data filed at 2020 1428 Gross per 24 hour Intake  Output 500 ml Net -500 ml Tmax:  Temp (24hrs), Av.8 °F (36.6 °C), Min:97.2 °F (36.2 °C), Max:98.1 °F (36.7 °C) Data Review:  
Data reviewed by myself: 
Cta Chest W Or W Wo Cont Result Date: 2020 EXAM:  CTA CHEST W OR W WO CONT INDICATION:   SOB COMPARISON: None. TECHNIQUE: Precontrast  images were obtained to localize the volume for acquisition. Multislice helical CT arteriography was performed from the diaphragm to the thoracic inlet during uneventful rapid bolus of 100 cc Isovue-370. Lung and soft tissue windows were generated. Coronal and sagittal images were generated and 3D post processing consisting of coronal maximum intensity images was performed. CT dose reduction was achieved through use of a standardized protocol tailored for this examination and automatic exposure control for dose modulation. FINDINGS: CHEST: THYROID: No nodule. MEDIASTINUM: No mass or lymphadenopathy. LEV: No mass or lymphadenopathy. THORACIC AORTA: Atherosclerotic without evidence of aneurysm. MAIN PULMONARY ARTERY: Pulmonary emboli right upper lobe branches. TRACHEA/BRONCHI: Patent. ESOPHAGUS: No wall thickening or dilatation. HEART: Normal in size. PLEURA: No effusion or pneumothorax. LUNGS: Emphysematous changes. Focal consolidation right lower lobe. Left basilar atelectasis. INCIDENTALLY IMAGED UPPER ABDOMEN: 7.7 cm right renal cyst. 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 Endograft is partially visualized. Tiffanie Ajay BONES: No destructive bone lesion. IMPRESSION: Right upper lobe pulmonary emboli. Localized consolidation right lower lobe. Left basilar atelectasis. The findings were called to the patient's nurse on 2/20/2020 at 6:39 PM by myself. Krystle Renae Us Abd Comp Result Date: 2/21/2020 INDICATION: Abdominal distention COMPARISON: None TECHNIQUE: Routine ultrasound images of the abdomen were obtained. FINDINGS: GALLBLADDER: No cholecystolithiasis, gallbladder wall thickening, or pericholecystic fluid. COMMON BILE DUCT: 3 mm in diameter. No evidence of choledocholithiasis. LIVER: Normal in size. Normal echogenicity. No focal hepatic mass or intrahepatic biliary dilatation. MAIN PORTAL VEIN: Patent. Hepatopetal flow direction. PANCREAS: Obscured by overlying bowel gas. RIGHT KIDNEY: 12.8 cm in length. 8.2 cm cyst. No hydronephrosis. No evidence of mass or calculus. LEFT KIDNEY: 11.5 cm in length. No hydronephrosis. No evidence of mass or calculus. SPLEEN: Obscured by overlying bowel gas. AORTA: Obscured by overlying bowel gas. INFERIOR VENA CAVA: Patent. OTHER: No ascites. IMPRESSION: Large right renal cyst. Otherwise unremarkable abdominal ultrasound. Xr Chest [facility] Result Date: 2/20/2020 INDICATION:  SOB. History of chronic asthma. Prostate cancer. COPD. EXAM: Chest single view. COMPARISON: 9/10/2017. FINDINGS: A single frontal view of the chest at 1419 hours shows bibasilar atelectasis or scar, with mild interstitial prominence increased since the prior study. .  The heart, mediastinum and pulmonary vasculature are stable . The bony thorax is unremarkable for age. . Port-A-Cath device on the right is stable. IMPRESSION: Bibasilar atelectasis or scar with interstitial prominence. Correlate for developing interstitial edema or bibasilar inflammatory infiltrate in this patient with COPD and chronic asthma. .  . No results found for: SDES No results found for: CULT All Micro Results None Labs: reviewed by myself. Recent Labs  
  02/24/20 
0321 02/23/20 
2470 WBC 7.8 9.5 HGB 11.1* 11.8* HCT 37.6 40.7  386 Recent Labs  
  02/24/20 
0321 02/23/20 
6448  136  
K 3.5 3.7 CL 92* 91* CO2 39* 40* BUN 19 17 CREA 0.77 0.79 * 119* CA 9.3 9.5 MG 2.0  --   
PHOS 2.9  -- No results for input(s): SGOT, GPT, ALT, AP, TBIL, TBILI, TP, ALB, GLOB, GGT, AML, LPSE in the last 72 hours. No lab exists for component: AMYP, HLPSE No results for input(s): INR, PTP, APTT, INREXT, INREXT in the last 72 hours. No results for input(s): FE, TIBC, PSAT, FERR in the last 72 hours. No results found for: FOL, RBCF No results for input(s): PH, PCO2, PO2 in the last 72 hours. No results for input(s): CPK, CKNDX, TROIQ in the last 72 hours. No lab exists for component: CPKMB Lab Results Component Value Date/Time Cholesterol, total 187 09/11/2017 05:53 AM  
 HDL Cholesterol 63 09/11/2017 05:53 AM  
 LDL, calculated 109 (H) 09/11/2017 05:53 AM  
 Triglyceride 75 09/11/2017 05:53 AM  
 CHOL/HDL Ratio 3.0 09/11/2017 05:53 AM  
 
Lab Results Component Value Date/Time Glucose (POC) 163 (H) 02/24/2020 11:47 AM  
 Glucose (POC) 144 (H) 02/24/2020 07:21 AM  
 Glucose (POC) 165 (H) 02/23/2020 09:22 PM  
 Glucose (POC) 255 (H) 02/23/2020 04:43 PM  
 Glucose (POC) 137 (H) 02/23/2020 11:25 AM  
 
Lab Results Component Value Date/Time  Color YELLOW/STRAW 09/10/2017 10:09 PM  
 Appearance CLEAR 09/10/2017 10:09 PM  
 Specific gravity 1.010 09/10/2017 10:09 PM  
 pH (UA) 5.0 09/10/2017 10:09 PM  
 Protein NEGATIVE  09/10/2017 10:09 PM  
 Glucose NEGATIVE  09/10/2017 10:09 PM  
 Ketone NEGATIVE  09/10/2017 10:09 PM  
 Bilirubin NEGATIVE  09/10/2017 10:09 PM  
 Urobilinogen 0.2 09/10/2017 10:09 PM  
 Nitrites NEGATIVE  09/10/2017 10:09 PM  
 Leukocyte Esterase NEGATIVE  09/10/2017 10:09 PM  
 Epithelial cells MODERATE (A) 09/10/2017 10:09 PM  
 Bacteria NEGATIVE  09/10/2017 10:09 PM  
 WBC 5-10 09/10/2017 10:09 PM  
 RBC 0-5 09/10/2017 10:09 PM  
 
 
 
Medications Reviewed:  
 
Current Facility-Administered Medications Medication Dose Route Frequency  arformoteroL (BROVANA) neb solution 15 mcg  15 mcg Nebulization BID RT  
 albuterol-ipratropium (DUO-NEB) 2.5 MG-0.5 MG/3 ML  3 mL Nebulization Q6H RT  
 [START ON 2/25/2020] levoFLOXacin (LEVAQUIN) tablet 750 mg  750 mg Oral Q24H  
 oxymetazoline (AFRIN) 0.05 % nasal spray 2 Spray  2 Spray Both Nostrils BID PRN  polyethylene glycol (MIRALAX) packet 17 g  17 g Oral DAILY PRN  
 diphenhydrAMINE (BENADRYL) capsule 25 mg  25 mg Oral Q6H PRN  
 sertraline (ZOLOFT) tablet 25 mg  25 mg Oral DAILY  apixaban (ELIQUIS) tablet 10 mg  10 mg Oral BID  levETIRAcetam (KEPPRA) tablet 500 mg  500 mg Oral BID  sodium chloride (NS) flush 5-40 mL  5-40 mL IntraVENous Q8H  
 sodium chloride (NS) flush 5-40 mL  5-40 mL IntraVENous PRN  
 acetaminophen (TYLENOL) tablet 650 mg  650 mg Oral Q4H PRN  
 ferrous sulfate tablet 325 mg  325 mg Oral ACB  hydroxyzine HCL (ATARAX) tablet 25 mg  25 mg Oral BID  montelukast (SINGULAIR) tablet 10 mg  10 mg Oral DAILY  pantoprazole (PROTONIX) tablet 40 mg  40 mg Oral ACB&D  
 budesonide (PULMICORT) 250 mcg/2ml nebulizer susp  250 mcg Nebulization BID RT  
 predniSONE (DELTASONE) tablet 20 mg  20 mg Oral DAILY WITH BREAKFAST  albuterol (PROVENTIL VENTOLIN) nebulizer solution 2.5 mg  2.5 mg Nebulization Q2H PRN  
 glucose chewable tablet 16 g  4 Tab Oral PRN  
 glucagon (GLUCAGEN) injection 1 mg  1 mg IntraMUSCular PRN  
 dextrose 10% infusion 0-250 mL  0-250 mL IntraVENous PRN  
 insulin lispro (HUMALOG) injection   SubCUTAneous AC&HS  furosemide (LASIX) injection 40 mg  40 mg IntraVENous DAILY  
 
______________________________________________________________________ EXPECTED LENGTH OF STAY: - - - 
ACTUAL LENGTH OF STAY:          4 Artur Coppola MD  
 
 Patient's emergency contacts: 
Extended Emergency Contact Information Primary Emergency Contact: Aniya Ernandez Address: [address] [place] [address] Home Phone: [phone] Mobile Phone: [phone] Relation: Sister

## 2020-02-24 NOTE — PROGRESS NOTES
Bedside and Verbal shift change report given to Prasad Archer RN (oncoming nurse) by Lidia Crane RN (offgoing nurse). Report included the following information SBAR, Kardex, Intake/Output, MAR, Recent Results and Quality Measures.

## 2020-02-25 NOTE — PROGRESS NOTES
Bedside shift change report given to Francisca Costa RN (oncoming nurse) by Eugene Ardon RN (offgoing nurse). Report included the following information SBAR, Kardex, MAR and Cardiac Rhythm NSR.

## 2020-02-25 NOTE — ROUTINE PROCESS
Bedside shift change report given to JAGRUTI CHRISTIANSON (oncoming nurse) by Jeremie Ann RN (offgoing nurse). Report included the following information SBAR, Kardex, ED Summary, Intake/Output, MAR, Accordion, Recent Results and Cardiac Rhythm ST.

## 2020-02-25 NOTE — PROGRESS NOTES
Physical Therapy Screening: 
Services are indicated and therapy order is required. An InBasket screening referral was triggered for physical therapy based on results obtained during the nursing admission assessment. The patients chart was reviewed and the patient is appropriate for a skilled therapy evaluation. Please order a consult for physical therapy if you are in agreement and would like an evaluation to be completed. Thank you.  
 
Lorna Joyner, PT

## 2020-02-25 NOTE — PROGRESS NOTES
[facility] Care[facility] Progress Note Name: Goran Murdock MRN: [id number] : 1945 Hospital: [facility] Date: 2020 IMPRESSION:  
· AECOPD- due to PE and probable RLL PNA · Resp acidosis with a worsening metabolic alkaosis, likely secondary to ongoing diuretics on top of his chronic resp acidosis. Suspsect that this is driving hypoxemia and his WOB · RUL PE 
· PH- ECHO  EF 60% no MR or AS PASP 66 RV nml · COPD · DM2 RECOMMENDATIONS:  
· PH may be secondary to severe COPD and now PE. However PASP is a bit high for both. · Pt will need ECHO in 6 months to reassess- if PASP still > 60 will need w/u for other causes PH ( group I or 2 etc) · Give diamox. Repeat ABG following diamox tomorrow · NIV for WOB and for altered mental status. · On diuretics · Wean prednisone · eliquis · Nebs · On NIV qhs and prn during the day · Continue levaquin · Follow up Chest xray in 4-6 wks with Dr Severiano Gale Subjective:  
 
 A/o; talkative. On NIV for WOB  
 
 On NIV  
 
 This patient has been seen and evaluated at the request of Dr. Edilberto Branch for SOB. Patient is a 69 y.o. male H/o COPD on spiriva and follows with Dr. Britatnie Donahue. Pt admitted with SOB and RUL PE. Started on eliquis. Pt alert on bipap and nebs Less SOb moves all ext equally no CP Past Medical History:  
Diagnosis Date  Asthma   
 hx of/has wheezing  Cancer [facility])   
 prostate - not treated yet  Cerebral artery occlusion with cerebral infarction [facility] [address]  Chronic obstructive pulmonary disease ([facility] [address]  Epilepsy ([facility] [address]  Ill-defined condition   
 shortness of breath - being evaluated  Psychiatric disorder   
 mental disability Past Surgical History:  
Procedure Laterality Date  COLONOSCOPY Left 10/4/2017  COLONOSCOPY performed by Rocio Caceres MD at [address]    
  HX OTHER SURGICAL    
 cyst removed from neck  HX PROSTATE SURGERY    
 HX SPLENECTOMY Prior to Admission medications Medication Sig Start Date End Date Taking? Authorizing Provider  
sertraline (ZOLOFT) 25 mg tablet Take 25 mg by mouth daily. Yes Provider, Historical  
atorvastatin (LIPITOR) 40 mg tablet Take 40 mg by mouth daily. Yes Provider, Historical  
arformoteroL (BROVANA) 15 mcg/2 mL nebu neb solution 15 mcg by Nebulization route two (2) times a day. Yes Provider, Historical  
multivitamin (ONE A DAY) tablet Take 1 Tab by mouth daily. Yes Provider, Historical  
ferrous sulfate 325 mg (65 mg iron) tablet Take 325 mg by mouth Daily (before breakfast). Yes Provider, Historical  
fluticasone propionate (FLONASE ALLERGY RELIEF) 50 mcg/actuation nasal spray 2 Sprays by Both Nostrils route daily. Yes Provider, Historical  
hydroxyzine HCL (ATARAX) 25 mg tablet Take 25 mg by mouth two (2) times a day. Yes Provider, Historical  
ipratropium (ATROVENT) 0.02 % soln 0.5 mg by Other route every eight (8) hours. Oral inhalation   Yes Provider, Historical  
metFORMIN (GLUCOPHAGE) 500 mg tablet Take 500 mg by mouth daily (with breakfast). Yes Provider, Historical  
montelukast (SINGULAIR) 10 mg tablet Take 10 mg by mouth daily. Yes Provider, Historical  
predniSONE (DELTASONE) 10 mg tablet Take 20 mg by mouth daily (with breakfast). COPD exacerbation   Yes Provider, Historical  
budesonide-formoteroL (SYMBICORT) 160-4.5 mcg/actuation HFAA Take 2 Puffs by inhalation two (2) times a day. Yes Provider, Historical  
levalbuterol tartrate (XOPENEX) 45 mcg/actuation inhaler Take 1 Puff by inhalation every eight (8) hours. Yes Provider, Historical  
cetirizine (ZYRTEC) 10 mg tablet Take 10 mg by mouth daily.    Yes Provider, Historical  
levETIRAcetam (KEPPRA) 500 mg tablet TAKE 1 TABLET BY MOUTH TWICE A DAY 11/8/19  Yes Be Morris, DO  
 pantoprazole (PROTONIX) 40 mg tablet Take 1 Tab by mouth Before breakfast and dinner. 10/6/17  Yes Jessica Callejas MD  
umeclidinium (INCRUSE ELLIPTA) 62.5 mcg/actuation inhaler Take 1 Puff by inhalation daily. Indications: Rescue inhaler   Yes Provider, Historical  
 
No Known Allergies Social History Tobacco Use  Smoking status: Current Every Day Smoker  Smokeless tobacco: Never Used  Tobacco comment: cigarettes Substance Use Topics  Alcohol use: No  
  
Family History Problem Relation Age of Onset  Colon Cancer Maternal Aunt  Colon Cancer Maternal Aunt  Colon Cancer Maternal Aunt Current Facility-Administered Medications Medication Dose Route Frequency  acetaZOLAMIDE (DIAMOX) 250 mg in sterile water (preservative free) 2.5 mL injection  250 mg IntraVENous Q12H  
 arformoteroL (BROVANA) neb solution 15 mcg  15 mcg Nebulization BID RT  
 albuterol-ipratropium (DUO-NEB) 2.5 MG-0.5 MG/3 ML  3 mL Nebulization Q6H RT  
 levoFLOXacin (LEVAQUIN) tablet 750 mg  750 mg Oral Q24H  
 [START ON 2/29/2020] apixaban (ELIQUIS) tablet 5 mg  5 mg Oral BID  sertraline (ZOLOFT) tablet 25 mg  25 mg Oral DAILY  apixaban (ELIQUIS) tablet 10 mg  10 mg Oral BID  levETIRAcetam (KEPPRA) tablet 500 mg  500 mg Oral BID  sodium chloride (NS) flush 5-40 mL  5-40 mL IntraVENous Q8H  
 ferrous sulfate tablet 325 mg  325 mg Oral ACB  hydroxyzine HCL (ATARAX) tablet 25 mg  25 mg Oral BID  montelukast (SINGULAIR) tablet 10 mg  10 mg Oral DAILY  pantoprazole (PROTONIX) tablet 40 mg  40 mg Oral ACB&D  
 budesonide (PULMICORT) 250 mcg/2ml nebulizer susp  250 mcg Nebulization BID RT  
 predniSONE (DELTASONE) tablet 20 mg  20 mg Oral DAILY WITH BREAKFAST  insulin lispro (HUMALOG) injection   SubCUTAneous AC&HS  furosemide (LASIX) injection 40 mg  40 mg IntraVENous DAILY Review of Systems: 
Review of systems not obtained due to patient factors. Objective: Vital Signs:   
Visit Vitals /76 (BP 1 Location: Right arm, BP Patient Position: At rest) Pulse 100 Temp 97.5 °F (36.4 °C) Resp 19 Ht 6' 2\" (1.88 m) Wt 71.6 kg (157 lb 13.6 oz) SpO2 97% BMI 20.27 kg/m² O2 Device: BIPAP  
O2 Flow Rate (L/min): 3 l/min Temp (24hrs), Av.1 °F (36.7 °C), Min:97.5 °F (36.4 °C), Max:98.5 °F (36.9 °C) Intake/Output:  
Last shift:      No intake/output data recorded. Last 3 shifts:  1901 -  0700 In: -  
Out: 500 [Urine:500] Intake/Output Summary (Last 24 hours) at 2020 1018 Last data filed at 2020 2468 Gross per 24 hour Intake  Output 500 ml Net -500 ml Physical Exam:  
General:  Alert, cooperative, no distress, appears stated age. Head:  Normocephalic, without obvious abnormality, atraumatic. Eyes:  Conjunctivae/corneas clear. Nose: Nares normal. Septum midline. Mucosa normal. No drainage or sinus tenderness. Lungs:   Clear to auscultation bilaterally. Heart:  Regular rate and rhythm, S1, S2 normal, no murmur, click, rub or gallop. Abdomen:   Soft, non-tender. Bowel sounds normal. No masses,  No organomegaly. Extremities: Extremities normal, atraumatic, no cyanosis or edema. Pulses: 2+ and symmetric all extremities. Skin: Skin color, texture, turgor normal. No rashes or lesions Data review:  
 
Recent Results (from the past 24 hour(s)) GLUCOSE, POC Collection Time: 20 11:47 AM  
Result Value Ref Range Glucose (POC) 163 (H) 65 - 100 mg/dL Performed by Ricardo COOK   
GLUCOSE, POC Collection Time: 20  5:01 PM  
Result Value Ref Range Glucose (POC) 192 (H) 65 - 100 mg/dL Performed by Yee Block (CON) GLUCOSE, POC Collection Time: 20  9:22 PM  
Result Value Ref Range Glucose (POC) 162 (H) 65 - 100 mg/dL Performed by Jackquelyn Meckel CBC WITH AUTOMATED DIFF Collection Time: 20  4:34 AM  
Result Value Ref Range WBC 7.9 4.1 - 11.1 K/uL  
 RBC 4.24 4.10 - 5.70 M/uL  
 HGB 10.9 (L) 12.1 - 17.0 g/dL HCT 37.1 36.6 - 50.3 % MCV 87.5 80.0 - 99.0 FL  
 MCH 25.7 (L) 26.0 - 34.0 PG  
 MCHC 29.4 (L) 30.0 - 36.5 g/dL  
 RDW 18.0 (H) 11.5 - 14.5 % PLATELET 848 361 - 751 K/uL MPV 10.1 8.9 - 12.9 FL  
 NRBC 0.0 0  WBC ABSOLUTE NRBC 0.00 0.00 - 0.01 K/uL NEUTROPHILS 67 32 - 75 % LYMPHOCYTES 22 12 - 49 % MONOCYTES 9 5 - 13 % EOSINOPHILS 0 0 - 7 % BASOPHILS 1 0 - 1 % IMMATURE GRANULOCYTES 1 (H) 0.0 - 0.5 % ABS. NEUTROPHILS 5.3 1.8 - 8.0 K/UL  
 ABS. LYMPHOCYTES 1.7 0.8 - 3.5 K/UL  
 ABS. MONOCYTES 0.7 0.0 - 1.0 K/UL  
 ABS. EOSINOPHILS 0.0 0.0 - 0.4 K/UL  
 ABS. BASOPHILS 0.0 0.0 - 0.1 K/UL  
 ABS. IMM. GRANS. 0.1 (H) 0.00 - 0.04 K/UL  
 DF AUTOMATED METABOLIC PANEL, BASIC Collection Time: 02/25/20  4:34 AM  
Result Value Ref Range Sodium 138 136 - 145 mmol/L Potassium 3.3 (L) 3.5 - 5.1 mmol/L Chloride 95 (L) 97 - 108 mmol/L  
 CO2 41 (HH) 21 - 32 mmol/L Anion gap 2 (L) 5 - 15 mmol/L Glucose 154 (H) 65 - 100 mg/dL BUN 18 6 - 20 MG/DL Creatinine 0.63 (L) 0.70 - 1.30 MG/DL  
 BUN/Creatinine ratio 29 (H) 12 - 20 GFR est AA >60 >60 ml/min/1.73m2 GFR est non-AA >60 >60 ml/min/1.73m2 Calcium 9.3 8.5 - 10.1 MG/DL  
GLUCOSE, POC Collection Time: 02/25/20  8:56 AM  
Result Value Ref Range Glucose (POC) 160 (H) 65 - 100 mg/dL Performed by Modesta Harvey POC EG7 Collection Time: 02/25/20  9:25 AM  
Result Value Ref Range Calcium, ionized (POC) 1.28 1.12 - 1.32 mmol/L  
 pH (POC) 7.377 7.35 - 7.45    
 pCO2 (POC) 80.0 (H) 35.0 - 45.0 MMHG  
 pO2 (POC) 64 (L) 80 - 100 MMHG  
 HCO3 (POC) 46.9 (H) 22 - 26 MMOL/L Base excess (POC) 22 mmol/L  
 sO2 (POC) 90 (L) 92 - 97 % Site RIGHT RADIAL Device: NASAL CANNULA Flow rate (POC) 3 L/M Allens test (POC) YES Specimen type (POC) ARTERIAL Total resp. rate 29 Imaging: I have personally reviewed the patients radiographs and have reviewed the reports: CTA RUL PE and RLL ASD Lul Baxter PA-C

## 2020-02-25 NOTE — PROGRESS NOTES
Hospitalist Progress Note Graciela Cole MD 
Answering service: [phone] OR 36 from in house phone Date of Service:  2020 NAME:  Shane Castillo :  1945 MRN:  [id number] PCP: Alvaro Dodson MD 
 
Chief Complaint:  
Chief Complaint Patient presents with  Shortness of Breath Admission Summary:  
 
Shane Castillo is a 69 y.o. male who presented with As per initial admission summary Shane Castillo is a 69 y.o.  with h/o COPD/ASTHMA, CAD and DM. Pt presents from [facility] for evaluation of shortness of breath. Patient prior was living at home with a family member, 2 or 3 weeks ago he developed shortness breath and then he was admitted to [facility], per sister he was treated with COPD exacerbation patient also was told that there were fluid in the chest but never have diagnosed with CHF. He was then discharged to [facility] for rehab 2 weeks ago was steroids and nebulizer. In SNF, Pt complains of increased shortness of breath with associated leg swelling, and chest pain for the past 3 days. Pt notes he had an oxygen saturation of 88%. Pt denies nausea, vomiting, fever, or abdominal pain. Patient said he can't fit his regular shoe anymore due to the leg and feet swelling. He also noticed increasing abdominal distention. Interval history / Subjective:  
Patient seen for Follow up of PE Patient seen and examined by the bedside, Labs, images and notes reviewed Patient is comfortable, denies any chest pain, SOB, abdominal pain, fevers, chills. No N/V/D. Patient was on  Nasal cannula . Today morning ABG showed worsening co2. Started on BIPAP again. Patient was alert and oriented Discussed with nursing staff, no acute issues overnight, orders reviewed. Assessment & Plan: 1. Acute on chronic resp failure  With hypoxia:  improving BNP low, no chf history, but giving the CXR finding, leg edema. . Follow cr. · Hematology consulted, as per hematology Recommend transition from lovenox to Eliquis 10mg BID for 7 days, then 5mg BID x3-6 months. · Will schedule outpatient follow-up in clinic. Switched to eliquis Started on BIPAP Qhs and Prn during the day time I think he needs BIPAP specially at night during the night time . worsening co2. Without bipap pco2 80 today Pulmonology following 2. COPD: no wheezing, continue home nebs and previous dose of prednisone. Pulmonology consulted Also started on Levaquin for 5 days for possible PNA 3. abd distention: No ascites Large right renal cyst 
Urology consulted , need outpatient follow up 4. DM: SSI 5. H/o seizure: continue home meds Code status: Full Code DVT prophylaxis: eliquis Care Plan discussed with: Patient/Family Disposition: SNF Hospital Problems  Date Reviewed: 3/16/2018 Codes Class Noted POA Acute respiratory failure with hypoxia [facility]) ICD-10-CM: J96.01 
ICD-9-CM: 518.81  2/20/2020 Unknown Review of Systems: A comprehensive review of systems was negative except for that written in the HPI. Physical Examination:  
 
Visit Vitals /73 (BP 1 Location: Right arm, BP Patient Position: At rest) Pulse (!) 102 Temp 97.9 °F (36.6 °C) Resp 26 Ht 6' 2\" (1.88 m) Wt 71.6 kg (157 lb 13.6 oz) SpO2 96% BMI 20.27 kg/m² General:  Alert, cooperative, no distress, appears stated age. Head:  Normocephalic, without obvious abnormality, atraumatic. Lungs:    decreased breath sounds Heart:  Regular rate and rhythm, S1, S2 normal, no murmur, click, rub or gallop. Abdomen:   Soft, non-tender. Bowel sounds normal. No masses,  No organomegaly. Extremities: Extremities normal, atraumatic, no cyanosis or edema Pulses: 2+ and symmetric all extremities. Skin: Skin color, texture, turgor normal. No rashes or lesions Neurologic: CNII-XII intact. Normal strength, sensation and reflexes throughout. Vital Signs:  
 Last 24hrs VS reviewed since prior progress note. Most recent are: 
 
Visit Vitals /73 (BP 1 Location: Right arm, BP Patient Position: At rest) Pulse (!) 102 Temp 97.9 °F (36.6 °C) Resp 26 Ht 6' 2\" (1.88 m) Wt 71.6 kg (157 lb 13.6 oz) SpO2 96% BMI 20.27 kg/m² Intake/Output Summary (Last 24 hours) at 2020 1541 Last data filed at 2020 1537 Gross per 24 hour Intake 480 ml Output 675 ml Net -195 ml Tmax:  Temp (24hrs), Av.9 °F (36.6 °C), Min:97 °F (36.1 °C), Max:98.5 °F (36.9 °C) Data Review:  
Data reviewed by myself: 
Cta Chest W Or W Wo Cont Result Date: 2020 EXAM:  CTA CHEST W OR W WO CONT INDICATION:   SOB COMPARISON: None. TECHNIQUE: Precontrast  images were obtained to localize the volume for acquisition. Multislice helical CT arteriography was performed from the diaphragm to the thoracic inlet during uneventful rapid bolus of 100 cc Isovue-370. Lung and soft tissue windows were generated. Coronal and sagittal images were generated and 3D post processing consisting of coronal maximum intensity images was performed. CT dose reduction was achieved through use of a standardized protocol tailored for this examination and automatic exposure control for dose modulation. FINDINGS: CHEST: THYROID: No nodule. MEDIASTINUM: No mass or lymphadenopathy. LEV: No mass or lymphadenopathy. THORACIC AORTA: Atherosclerotic without evidence of aneurysm. MAIN PULMONARY ARTERY: Pulmonary emboli right upper lobe branches. TRACHEA/BRONCHI: Patent. ESOPHAGUS: No wall thickening or dilatation. HEART: Normal in size. PLEURA: No effusion or pneumothorax. LUNGS: Emphysematous changes. Focal consolidation right lower lobe. Left basilar atelectasis.  INCIDENTALLY IMAGED UPPER ABDOMEN: 7.7 cm right renal cyst. 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 Endograft is partially visualized. Marco Brinks BONES: No destructive bone lesion. IMPRESSION: Right upper lobe pulmonary emboli. Localized consolidation right lower lobe. Left basilar atelectasis. The findings were called to the patient's nurse on 2/20/2020 at 6:39 PM by myself. [address] Abd Comp Result Date: 2/21/2020 INDICATION: Abdominal distention COMPARISON: None TECHNIQUE: Routine ultrasound images of the abdomen were obtained. FINDINGS: GALLBLADDER: No cholecystolithiasis, gallbladder wall thickening, or pericholecystic fluid. COMMON BILE DUCT: 3 mm in diameter. No evidence of choledocholithiasis. LIVER: Normal in size. Normal echogenicity. No focal hepatic mass or intrahepatic biliary dilatation. MAIN PORTAL VEIN: Patent. Hepatopetal flow direction. PANCREAS: Obscured by overlying bowel gas. RIGHT KIDNEY: 12.8 cm in length. 8.2 cm cyst. No hydronephrosis. No evidence of mass or calculus. LEFT KIDNEY: 11.5 cm in length. No hydronephrosis. No evidence of mass or calculus. SPLEEN: Obscured by overlying bowel gas. AORTA: Obscured by overlying bowel gas. INFERIOR VENA CAVA: Patent. OTHER: No ascites. IMPRESSION: Large right renal cyst. Otherwise unremarkable abdominal ultrasound. Xr Chest [facility] Result Date: 2/20/2020 INDICATION:  SOB. History of chronic asthma. Prostate cancer. COPD. EXAM: Chest single view. COMPARISON: 9/10/2017. FINDINGS: A single frontal view of the chest at 1419 hours shows bibasilar atelectasis or scar, with mild interstitial prominence increased since the prior study. .  The heart, mediastinum and pulmonary vasculature are stable . The bony thorax is unremarkable for age. . Port-A-Cath device on the right is stable. IMPRESSION: Bibasilar atelectasis or scar with interstitial prominence.  Correlate for developing interstitial edema or bibasilar inflammatory infiltrate in this patient with COPD and chronic asthma. .  . No results found for: SDES No results found for: CULT All Micro Results None Labs: reviewed by myself. Recent Labs  
  02/25/20 0434 02/24/20 0321 WBC 7.9 7.8 HGB 10.9* 11.1*  
HCT 37.1 37.6  359 Recent Labs  
  02/25/20 0434 02/24/20 0321 02/23/20 
6815  136 136  
K 3.3* 3.5 3.7 CL 95* 92* 91* CO2 41* 39* 40* BUN 18 19 17 CREA 0.63* 0.77 0.79 * 122* 119* CA 9.3 9.3 9.5 MG  --  2.0  --   
PHOS  --  2.9  -- No results for input(s): SGOT, GPT, ALT, AP, TBIL, TBILI, TP, ALB, GLOB, GGT, AML, LPSE in the last 72 hours. No lab exists for component: AMYP, HLPSE No results for input(s): INR, PTP, APTT, INREXT, INREXT in the last 72 hours. No results for input(s): FE, TIBC, PSAT, FERR in the last 72 hours. No results found for: FOL, RBCF No results for input(s): PH, PCO2, PO2 in the last 72 hours. No results for input(s): CPK, CKNDX, TROIQ in the last 72 hours. No lab exists for component: CPKMB Lab Results Component Value Date/Time Cholesterol, total 187 09/11/2017 05:53 AM  
 HDL Cholesterol 63 09/11/2017 05:53 AM  
 LDL, calculated 109 (H) 09/11/2017 05:53 AM  
 Triglyceride 75 09/11/2017 05:53 AM  
 CHOL/HDL Ratio 3.0 09/11/2017 05:53 AM  
 
Lab Results Component Value Date/Time Glucose (POC) 172 (H) 02/25/2020 12:18 PM  
 Glucose (POC) 160 (H) 02/25/2020 08:56 AM  
 Glucose (POC) 162 (H) 02/24/2020 09:22 PM  
 Glucose (POC) 192 (H) 02/24/2020 05:01 PM  
 Glucose (POC) 163 (H) 02/24/2020 11:47 AM  
 
Lab Results Component Value Date/Time  Color YELLOW/STRAW 09/10/2017 10:09 PM  
 Appearance CLEAR 09/10/2017 10:09 PM  
 Specific gravity 1.010 09/10/2017 10:09 PM  
 pH (UA) 5.0 09/10/2017 10:09 PM  
 Protein NEGATIVE  09/10/2017 10:09 PM  
 Glucose NEGATIVE  09/10/2017 10:09 PM  
 Ketone NEGATIVE  09/10/2017 10:09 PM  
 Bilirubin NEGATIVE  09/10/2017 10:09 PM  
 Urobilinogen 0.2 09/10/2017 10:09 PM  
 Nitrites NEGATIVE  09/10/2017 10:09 PM  
 Leukocyte Esterase NEGATIVE  09/10/2017 10:09 PM  
 Epithelial cells MODERATE (A) 09/10/2017 10:09 PM  
 Bacteria NEGATIVE  09/10/2017 10:09 PM  
 WBC 5-10 09/10/2017 10:09 PM  
 RBC 0-5 09/10/2017 10:09 PM  
 
 
 
Medications Reviewed:  
 
Current Facility-Administered Medications Medication Dose Route Frequency  acetaZOLAMIDE (DIAMOX) 250 mg in sterile water (preservative free) 2.5 mL injection  250 mg IntraVENous Q12H  
 arformoteroL (BROVANA) neb solution 15 mcg  15 mcg Nebulization BID RT  
 albuterol-ipratropium (DUO-NEB) 2.5 MG-0.5 MG/3 ML  3 mL Nebulization Q6H RT  
 levoFLOXacin (LEVAQUIN) tablet 750 mg  750 mg Oral Q24H  
 [START ON 2/29/2020] apixaban (ELIQUIS) tablet 5 mg  5 mg Oral BID  
 oxymetazoline (AFRIN) 0.05 % nasal spray 2 Spray  2 Spray Both Nostrils BID PRN  polyethylene glycol (MIRALAX) packet 17 g  17 g Oral DAILY PRN  
 diphenhydrAMINE (BENADRYL) capsule 25 mg  25 mg Oral Q6H PRN  
 sertraline (ZOLOFT) tablet 25 mg  25 mg Oral DAILY  apixaban (ELIQUIS) tablet 10 mg  10 mg Oral BID  levETIRAcetam (KEPPRA) tablet 500 mg  500 mg Oral BID  sodium chloride (NS) flush 5-40 mL  5-40 mL IntraVENous Q8H  
 sodium chloride (NS) flush 5-40 mL  5-40 mL IntraVENous PRN  
 acetaminophen (TYLENOL) tablet 650 mg  650 mg Oral Q4H PRN  
 ferrous sulfate tablet 325 mg  325 mg Oral ACB  hydroxyzine HCL (ATARAX) tablet 25 mg  25 mg Oral BID  montelukast (SINGULAIR) tablet 10 mg  10 mg Oral DAILY  pantoprazole (PROTONIX) tablet 40 mg  40 mg Oral ACB&D  
 budesonide (PULMICORT) 250 mcg/2ml nebulizer susp  250 mcg Nebulization BID RT  
 predniSONE (DELTASONE) tablet 20 mg  20 mg Oral DAILY WITH BREAKFAST  albuterol (PROVENTIL VENTOLIN) nebulizer solution 2.5 mg  2.5 mg Nebulization Q2H PRN  
 glucose chewable tablet 16 g  4 Tab Oral PRN  
  glucagon (GLUCAGEN) injection 1 mg  1 mg IntraMUSCular PRN  
 dextrose 10% infusion 0-250 mL  0-250 mL IntraVENous PRN  
 insulin lispro (HUMALOG) injection   SubCUTAneous AC&HS  
 
______________________________________________________________________ EXPECTED LENGTH OF STAY: - - - 
ACTUAL LENGTH OF STAY:          5 Piedad Hitchcock MD  
 
Patient's emergency contacts: 
Extended Emergency Contact Information Primary Emergency Contact: Melissapablo Agustin Address: [address] [place] [address] Phone: [phone] Mobile Phone: [phone] Relation: Sister

## 2020-02-25 NOTE — PROGRESS NOTES
Pt on bipap for most of day- Respiratory unable to find midflow- ordered. Bedside shift change report given to Juaquin Barroso (oncoming nurse) by Jaz Joe (offgoing nurse). Report included the following information SBAR, Kardex, Intake/Output, MAR, Accordion and Recent Results. Problem: Falls - Risk of 
Goal: *Absence of Falls Description Document Ganesh Mi Fall Risk and appropriate interventions in the flowsheet. Outcome: Progressing Towards Goal 
Note: Fall Risk Interventions: 
Mobility Interventions: Communicate number of staff needed for ambulation/transfer Mentation Interventions: Adequate sleep, hydration, pain control, Door open when patient unattended, Evaluate medications/consider consulting pharmacy, Increase mobility, More frequent rounding, Reorient patient, Room close to nurse's station, Toileting rounds, Update white board Medication Interventions: Evaluate medications/consider consulting pharmacy, Patient to call before getting OOB, Teach patient to arise slowly, Utilize gait belt for transfers/ambulation Elimination Interventions: Call light in reach, Patient to call for help with toileting needs, Stay With Me (per policy), Toilet paper/wipes in reach, Toileting schedule/hourly rounds, Urinal in reach History of Falls Interventions: Consult care management for discharge planning, Door open when patient unattended, Evaluate medications/consider consulting pharmacy, Room close to nurse's station, Utilize gait belt for transfer/ambulation Problem: Patient Education: Go to Patient Education Activity Goal: Patient/Family Education Outcome: Progressing Towards Goal 
  
Problem: Pressure Injury - Risk of 
Goal: *Prevention of pressure injury Description Document Carlos Enrique Scale and appropriate interventions in the flowsheet. Outcome: Progressing Towards Goal 
Note: Pressure Injury Interventions: Sensory Interventions: Assess changes in LOC, Assess need for specialty bed, Avoid rigorous massage over bony prominences, Check visual cues for pain, Discuss PT/OT consult with provider, Float heels, Keep linens dry and wrinkle-free, Maintain/enhance activity level, Minimize linen layers, Monitor skin under medical devices, Pad between skin to skin, Turn and reposition approx. every two hours (pillows and wedges if needed) Moisture Interventions: Absorbent underpads, Apply protective barrier, creams and emollients, Assess need for specialty bed, Check for incontinence Q2 hours and as needed, Maintain skin hydration (lotion/cream), Minimize layers, Moisture barrier, Offer toileting Q_hr Activity Interventions: Assess need for specialty bed, Increase time out of bed, Pressure redistribution bed/mattress(bed type), PT/OT evaluation Mobility Interventions: Assess need for specialty bed, HOB 30 degrees or less, Pressure redistribution bed/mattress (bed type), PT/OT evaluation, Turn and reposition approx. every two hours(pillow and wedges) Nutrition Interventions: Document food/fluid/supplement intake, Discuss nutritional consult with provider, Offer support with meals,snacks and hydration Friction and Shear Interventions: Apply protective barrier, creams and emollients, Foam dressings/transparent film/skin sealants, HOB 30 degrees or less, Lift sheet, Lift team/patient mobility team, Minimize layers Problem: Patient Education: Go to Patient Education Activity Goal: Patient/Family Education Outcome: Progressing Towards Goal 
  
Problem: Breathing Pattern - Ineffective Goal: *PALLIATIVE CARE:  Alleviation of Dyspnea Outcome: Progressing Towards Goal 
  
Problem: Heart Failure: Day 5 Goal: Nutrition/Diet Outcome: Progressing Towards Goal 
Goal: Psychosocial 
Outcome: Progressing Towards Goal 
  
Problem: Chronic Obstructive Pulmonary Disease (COPD) Goal: *Optimize nutritional status Outcome: Progressing Towards Goal

## 2020-02-26 NOTE — PROGRESS NOTES
Orders received, chart reviewed and patient evaluated by physical therapy. Pending progression with skilled acute physical therapy, recommend: 
Therapy up to 5 days/week in SNF setting Recommend with nursing patient to complete as able in order to maintain strength, endurance and independence: OOB to chair 3x/day with 1 person KIRSTEN DUNBAR. Thank you for your assistance. Full evaluation to follow. 02/26/20 1040 02/26/20 1050 Vital Signs Pulse (Heart Rate) (!) 102 (!) 110 /81 153/60 MAP (Calculated) 98 91 BP 1 Location Right arm Right arm BP 1 Method Automatic Automatic  
BP Patient Position Pre-activity;Supine Post activity; Sitting Resp Rate  --  24  
O2 Sat (%) 98 % 99 % O2 Device Hi flow nasal cannula Hi flow nasal cannula O2 Flow Rate (L/min) 10 l/min 10 l/min

## 2020-02-26 NOTE — PROGRESS NOTES
Problem: Self Care Deficits Care Plan (Adult) Goal: *Acute Goals and Plan of Care (Insert Text) Description FUNCTIONAL STATUS PRIOR TO ADMISSION: Per chart review, patient was admitted from a SNF. However patient stated he was admitted from his 2-story house. Patient is a questionable historian at this, however states he owned a wheelchair for community mobility and a shower chair for bathing. HOME SUPPORT: The patient lived with his sister but did not require assist. 
 
Occupational Therapy Goals Initiated 2/26/2020 1. Patient will perform lower body dressing with supervision/set-up within 7 day(s). 2.  Patient will perform bathing with supervision/set-up within 7 day(s). 3.  Patient will perform grooming tasks standing with supervision/set-up within 7 day(s). 4.  Patient will perform toilet transfers with supervision/set-up within 7 day(s). 5.  Patient will perform all aspects of toileting with supervision/set-up within 7 day(s). 6.  Patient will participate in upper extremity therapeutic exercise/activities with supervision/set-up for 5 minutes within 7 day(s). 7.  Patient will utilize energy conservation techniques during functional activities with verbal cues within 7 day(s). Outcome: Progressing Towards Goal 
 OCCUPATIONAL THERAPY EVALUATION Patient: Neelima Blas (89 y.o. male) Date: 2/26/2020 Primary Diagnosis: Acute respiratory failure with hypoxia ([facility] [address] [J96.01] CHF (congestive heart failure) ([facility] [address] [I50.9] Precautions: Fall ASSESSMENT Based on the objective data described below, the patient presents with generalized weakness, decreased endurance (10 L HFO2), decreased activity tolerance, and decreased insight into deficits s/p admission for acute respiratory failure from [facility] SNF. Patient is a questionable historian at this time and unknown true PLOF, however patient reports he wore 2.5L O2 at rest at baseline and owned a wheelchair.  Patient also reported he wears \"pull-ups\" at baseline. Patient required largely MIN A for simulated ADL transfers and tasks at this time, however with noted RR in 20s, elevated HR, and on high level of O2. Recommend discharge to SNF rehab to maximize patient safety and independence with ADL transfers and tasks. Current Level of Function Impacting Discharge (ADLs/self-care): MIN A Functional Outcome Measure: The patient scored 60/100 on the Barthel Index outcome measure which is indicative of 40% ADL impairment. Other factors to consider for discharge: O2 needs Patient will benefit from skilled therapy intervention to address the above noted impairments. PLAN : 
Recommendations and Planned Interventions: self care training, functional mobility training, therapeutic exercise, balance training, therapeutic activities, endurance activities, patient education, home safety training, and family training/education Frequency/Duration: Patient will be followed by occupational therapy 5 times a week to address goals. Recommendation for discharge: (in order for the patient to meet his/her long term goals) Therapy up to 5 days/week in SNF setting This discharge recommendation: 
Has been made in collaboration with the attending provider and/or case management IF patient discharges home will need the following DME: TBD SUBJECTIVE:  
Patient stated I live with my sister.  OBJECTIVE DATA SUMMARY:  
HISTORY:  
Past Medical History:  
Diagnosis Date Asthma   
 hx of/has wheezing Cancer [facility])   
 prostate - not treated yet Cerebral artery occlusion with cerebral infarction ([facility] [address] Chronic obstructive pulmonary disease (HCC) Epilepsy ([facility] [address] Ill-defined condition   
 shortness of breath - being evaluated Psychiatric disorder   
 mental disability Past Surgical History:  
Procedure Laterality Date COLONOSCOPY Left 10/4/2017 COLONOSCOPY performed by Alvino Patel MD at [facility] ENDOSCOPY  
 [address]    
 HX OTHER SURGICAL    
 cyst removed from neck HX PROSTATE SURGERY HX SPLENECTOMY Expanded or extensive additional review of patient history:  
 
Home Situation Home Environment: Private residence # Steps to Enter: 6 Rails to Enter: Yes Hand Rails : Bilateral 
One/Two Story Residence: Two story # of Interior Steps: 12 Interior Rails: Both Living Alone: No 
Support Systems: Family member(s), Home care staff(sister, aide 1 day/wk for 1 hr) Patient Expects to be Discharged to[de-identified] Skilled nursing facility Current DME Used/Available at Home: Oxygen, portable, Shower chair, Wheelchair(2.L at rest) Tub or Shower Type: Tub/Shower combination EXAMINATION OF PERFORMANCE DEFICITS: 
Cognitive/Behavioral Status: 
Neurologic State: Alert Orientation Level: Oriented X4 Cognition: Appropriate for age attention/concentration Perception: Appears intact Perseveration: No perseveration noted Safety/Judgement: Decreased insight into deficits; Decreased awareness of need for safety;Decreased awareness of need for assistance Skin: exposed areas grossly intact Edema: none noted Hearing: Auditory Auditory Impairment: None Vision/Perceptual:   
    
    
    
  
    
    
Corrective Lenses: Reading glasses Range of Motion: BUE 
AROM: Generally decreased, functional 
PROM: Generally decreased, functional 
  
  
  
  
  
  
 
Strength: BUE Strength: Generally decreased, functional 
  
  
  
  
 
Coordination: 
  
Fine Motor Skills-Upper: Left Intact; Right Intact Gross Motor Skills-Upper: Left Intact; Right Intact Tone & Sensation: BUE [facility] Balance: 
Sitting: Impaired; Without support Sitting - Static: Good (unsupported) Sitting - Dynamic: Fair (occasional) Standing: Impaired; With support Standing - Static: Fair Standing - Dynamic : Fair;Constant support Functional Mobility and Transfers for ADLs: 
Bed Mobility: 
Supine to Sit: Contact guard assistance;Bed Modified Sit to Supine: Minimum assistance(A for RLE) Transfers: 
Sit to Stand: Minimum assistance; Additional time; Adaptive equipment;Assist x1 Stand to Sit: Minimum assistance; Adaptive equipment; Additional time;Assist x1 Bed to Chair: Minimum assistance; Adaptive equipment; Additional time;Assist x1 ADL Assessment: 
Feeding: Setup;Supervision(infer 2* generally decreased FM coordination) Oral Facial Hygiene/Grooming: Setup;Supervision(infer in bed 2* endurance) Bathing: Minimum assistance(infer A for posterior periarea) Upper Body Dressing: Setup;Supervision(infer 2* BUE ROM) Lower Body Dressing: Minimum assistance(infer 2* decreased functional reach) Toileting: Minimum assistance(infer A for management of briefs) ADL Intervention and task modifications: 
  
Patient performed supine <> sit transfers to complete LB dressing seated EOB (tailor sit method). Patient MIN A for sit <> stand transfer to side [facility]. Patient stated he had just returned to bed prior to OT arrival and requested to return to supine (x MIN A with A for RLE). Lower Body Dressing Assistance Socks: Minimum assistance Cognitive Retraining Safety/Judgement: Decreased insight into deficits; Decreased awareness of need for safety;Decreased awareness of need for assistance Functional Measure: 
Barthel Index: 
 
Bathin Bladder: 10 Bowels: 10 
Groomin Dressin Feeding: 10 Mobility: 10 Stairs: 0 Toilet Use: 5 Transfer (Bed to Chair and Back): 5 Total: 60/100 The Barthel ADL Index: Guidelines 1. The index should be used as a record of what a patient does, not as a record of what a patient could do. 2. The main aim is to establish degree of independence from any help, physical or verbal, however minor and for whatever reason. 3. The need for supervision renders the patient not independent. 4. A patient's performance should be established using the best available evidence. Asking the patient, friends/relatives and nurses are the usual sources, but direct observation and common sense are also important. However direct testing is not needed. 5. Usually the patient's performance over the preceding 24-48 hours is important, but occasionally longer periods will be relevant. 6. Middle categories imply that the patient supplies over 50 per cent of the effort. 7. Use of aids to be independent is allowed. Link Heir., Barthel, D.W. (0115). Functional evaluation: the Barthel Index. [address] [facility] (14)2. Aileen Simmons gabi ADILIA Cuevas, Bayron Arias., Ramonita Cano., Shaneka, [address] (1999). Measuring the change indisability after inpatient rehabilitation; comparison of the responsiveness of the Barthel Index and Functional [place] Measure. Journal of Neurology, Neurosurgery, and Psychiatry, 66(4), 792-774. Yesi Mistry, N.J.A, DIVINE Jamil, & Christi Tinajero MJUANJOSE. (2004.) Assessment of post-stroke quality of life in cost-effectiveness studies: The usefulness of the Barthel Index and the EuroQoL-5D. [facility], 13, 331-40 Occupational Therapy Evaluation Charge Determination History Examination Decision-Making LOW Complexity : Brief history review  MEDIUM Complexity : 3-5 performance deficits relating to physical, cognitive , or psychosocial skils that result in activity limitations and / or participation restrictions MEDIUM Complexity : Patient may present with comorbidities that affect occupational performnce. Miniml to moderate modification of tasks or assistance (eg, physical or verbal ) with assesment(s) is necessary to enable patient to complete evaluation Based on the above components, the patient evaluation is determined to be of the following complexity level: LOW Activity Tolerance: Fair and requires rest breaks Please refer to the flowsheet for vital signs taken during this treatment. After treatment patient left in no apparent distress:   
Supine in bed and Call bell within reach COMMUNICATION/EDUCATION:  
The patients plan of care was discussed with: Physical Therapist and Registered Nurse. Home safety education was provided and the patient/caregiver indicated understanding., Patient/family have participated as able in goal setting and plan of care. , and Patient/family agree to work toward stated goals and plan of care. This patients plan of care is appropriate for delegation to [facility]. Thank you for this referral. 
Adal Aldana Time Calculation: 15 mins

## 2020-02-26 NOTE — PROGRESS NOTES
[facility] Progress Note Name: Rosey Hernandez MRN: [id number] : 1945 [facility] Date: 2020 IMPRESSION:  
· AECOPD- due to PE and probable RLL PNA · Resp acidosis with  metabolic alkaosis, likely secondary to ongoing diuretics on top of chronic resp acidosis. Suspsect that this is driving hypoxemia and his WOB · RUL PE 
· PH- ECHO  EF 60% no MR or AS PASP 66 RV nml · COPD · DM2 RECOMMENDATIONS:  
· PH may be secondary to severe COPD and now PE. However PASP is a bit high for both. · Pt will need ECHO in 6 months to reassess- if PASP still > 60 will need w/u for other causes PH ( group I or 2 etc) ·  diamox given  and AM . Repeat ABG pending · NIV for WOB and for altered mental status. · On diuretics · Wean prednisone · eliquis · Nebs · On NIV qhs and prn during the day · Continue levaquin · Follow up Chest xray in 4-6 wks with Dr Frances Alfred Subjective:  
 Feeling a little better today. Used NIV overnight. He is mildly dyspneic but was able to eat his breakfast. Denies cough or hemoptysis or chest pain.  A/o; talkative. On NIV for WOB  
 
 On NIV  
 
 This patient has been seen and evaluated at the request of Dr. Carola Razo for SOB. Patient is a 69 y.o. male H/o COPD on spiriva and follows with Dr. Lucia Castellanos. Pt admitted with SOB and RUL PE. Started on eliquis. Pt alert on bipap and nebs Less SOb moves all ext equally no CP Past Medical History:  
Diagnosis Date  Asthma   
 hx of/has wheezing  Cancer [facility])   
 prostate - not treated yet  Cerebral artery occlusion with cerebral infarction ([facility] [address]  Chronic obstructive pulmonary disease ([facility] [address]  Epilepsy ([facility] [address]  Ill-defined condition   
 shortness of breath - being evaluated  Psychiatric disorder   
 mental disability Past Surgical History:  
Procedure Laterality Date  COLONOSCOPY Left 10/4/2017 COLONOSCOPY performed by Ora Licea MD at [facility] ENDOSCOPY  
 HX INTRACRANIAL ANEURYSM REPAIR    
 HX OTHER SURGICAL    
 cyst removed from neck  HX PROSTATE SURGERY    
 HX SPLENECTOMY Prior to Admission medications Medication Sig Start Date End Date Taking? Authorizing Provider  
sertraline (ZOLOFT) 25 mg tablet Take 25 mg by mouth daily. Yes Provider, Historical  
atorvastatin (LIPITOR) 40 mg tablet Take 40 mg by mouth daily. Yes Provider, Historical  
arformoteroL (BROVANA) 15 mcg/2 mL nebu neb solution 15 mcg by Nebulization route two (2) times a day. Yes Provider, Historical  
multivitamin (ONE A DAY) tablet Take 1 Tab by mouth daily. Yes Provider, Historical  
ferrous sulfate 325 mg (65 mg iron) tablet Take 325 mg by mouth Daily (before breakfast). Yes Provider, Historical  
fluticasone propionate (FLONASE ALLERGY RELIEF) 50 mcg/actuation nasal spray 2 Sprays by Both Nostrils route daily. Yes Provider, Historical  
hydroxyzine HCL (ATARAX) 25 mg tablet Take 25 mg by mouth two (2) times a day. Yes Provider, Historical  
ipratropium (ATROVENT) 0.02 % soln 0.5 mg by Other route every eight (8) hours. Oral inhalation   Yes Provider, Historical  
metFORMIN (GLUCOPHAGE) 500 mg tablet Take 500 mg by mouth daily (with breakfast). Yes Provider, Historical  
montelukast (SINGULAIR) 10 mg tablet Take 10 mg by mouth daily. Yes Provider, Historical  
predniSONE (DELTASONE) 10 mg tablet Take 20 mg by mouth daily (with breakfast). COPD exacerbation   Yes Provider, Historical  
budesonide-formoteroL (SYMBICORT) 160-4.5 mcg/actuation HFAA Take 2 Puffs by inhalation two (2) times a day. Yes Provider, Historical  
levalbuterol tartrate (XOPENEX) 45 mcg/actuation inhaler Take 1 Puff by inhalation every eight (8) hours. Yes Provider, Historical  
cetirizine (ZYRTEC) 10 mg tablet Take 10 mg by mouth daily.    Yes Provider, Historical  
 levETIRAcetam (KEPPRA) 500 mg tablet TAKE 1 TABLET BY MOUTH TWICE A DAY 11/8/19  Yes Nirali Howard DO  
pantoprazole (PROTONIX) 40 mg tablet Take 1 Tab by mouth Before breakfast and dinner. 10/6/17  Yes Chasity Callejas MD  
umeclidinium (INCRUSE ELLIPTA) 62.5 mcg/actuation inhaler Take 1 Puff by inhalation daily. Indications: Rescue inhaler   Yes Provider, Historical  
 
No Known Allergies Social History Tobacco Use  Smoking status: Current Every Day Smoker  Smokeless tobacco: Never Used  Tobacco comment: cigarettes Substance Use Topics  Alcohol use: No  
  
Family History Problem Relation Age of Onset  Colon Cancer Maternal Aunt  Colon Cancer Maternal Aunt  Colon Cancer Maternal Aunt Current Facility-Administered Medications Medication Dose Route Frequency  potassium chloride SR (KLOR-CON 10) tablet 20 mEq  20 mEq Oral BID  acetaZOLAMIDE (DIAMOX) 250 mg in sterile water (preservative free) 2.5 mL injection  250 mg IntraVENous Q12H  
 albuterol-ipratropium (DUO-NEB) 2.5 MG-0.5 MG/3 ML  3 mL Nebulization TID RT  
 arformoteroL (BROVANA) neb solution 15 mcg  15 mcg Nebulization BID RT  
 levoFLOXacin (LEVAQUIN) tablet 750 mg  750 mg Oral Q24H  
 [START ON 2/29/2020] apixaban (ELIQUIS) tablet 5 mg  5 mg Oral BID  sertraline (ZOLOFT) tablet 25 mg  25 mg Oral DAILY  apixaban (ELIQUIS) tablet 10 mg  10 mg Oral BID  levETIRAcetam (KEPPRA) tablet 500 mg  500 mg Oral BID  sodium chloride (NS) flush 5-40 mL  5-40 mL IntraVENous Q8H  
 ferrous sulfate tablet 325 mg  325 mg Oral ACB  hydroxyzine HCL (ATARAX) tablet 25 mg  25 mg Oral BID  montelukast (SINGULAIR) tablet 10 mg  10 mg Oral DAILY  pantoprazole (PROTONIX) tablet 40 mg  40 mg Oral ACB&D  
 budesonide (PULMICORT) 250 mcg/2ml nebulizer susp  250 mcg Nebulization BID RT  
 predniSONE (DELTASONE) tablet 20 mg  20 mg Oral DAILY WITH BREAKFAST  insulin lispro (HUMALOG) injection   SubCUTAneous AC&HS Review of Systems: 
 
 
Objective:  
Vital Signs:   
Visit Vitals /86 (BP 1 Location: Right arm, BP Patient Position: At rest) Pulse 92 Temp 97.9 °F (36.6 °C) Resp 28 Ht 6' 2\" (1.88 m) Wt 71.1 kg (156 lb 12.8 oz) SpO2 99% BMI 20.13 kg/m² O2 Device: BIPAP  
O2 Flow Rate (L/min): 6 l/min Temp (24hrs), Av.7 °F (36.5 °C), Min:97 °F (36.1 °C), Max:98.2 °F (36.8 °C) Intake/Output:  
Last shift:      No intake/output data recorded. Last 3 shifts:  1901 -  0700 In: 480 [P.O.:480] Out: 1025 [Urine:1025] Intake/Output Summary (Last 24 hours) at 2020 3827 Last data filed at 2020 Gross per 24 hour Intake 480 ml Output 825 ml Net -345 ml Physical Exam:  
General:  Alert, cooperative, no distress, appears stated age. Head:  Normocephalic, without obvious abnormality, atraumatic. Eyes:  Conjunctivae/corneas clear. Nose: Nares normal. Septum midline. Mucosa normal. No drainage or sinus tenderness. Lungs:   Clear to auscultation bilaterally. Mild tachypnea, no accessory muscle use. Heart:  Regular rate and rhythm, S1, S2 normal, no murmur, click, rub or gallop. Abdomen:   Protuberant / distended, non-tender. Extremities: Extremities normal, atraumatic, no cyanosis or edema. SCDs Pulses: 2+ and symmetric all extremities. Skin: Skin color, texture, turgor normal. No rashes or lesions Data review:  
 
Recent Results (from the past 24 hour(s)) POC EG7 Collection Time: 20  9:25 AM  
Result Value Ref Range Calcium, ionized (POC) 1.28 1.12 - 1.32 mmol/L  
 pH (POC) 7.377 7.35 - 7.45    
 pCO2 (POC) 80.0 (H) 35.0 - 45.0 MMHG  
 pO2 (POC) 64 (L) 80 - 100 MMHG  
 HCO3 (POC) 46.9 (H) 22 - 26 MMOL/L Base excess (POC) 22 mmol/L  
 sO2 (POC) 90 (L) 92 - 97 % Site RIGHT RADIAL Device: NASAL CANNULA  Flow rate (POC) 3 L/M  
 Allens test (POC) YES Specimen type (POC) ARTERIAL Total resp. rate 29 GLUCOSE, POC Collection Time: 02/25/20 12:18 PM  
Result Value Ref Range Glucose (POC) 172 (H) 65 - 100 mg/dL Performed by Anu Last GLUCOSE, POC Collection Time: 02/25/20  6:13 PM  
Result Value Ref Range Glucose (POC) 236 (H) 65 - 100 mg/dL Performed by Jessica Lou GLUCOSE, POC Collection Time: 02/25/20  9:18 PM  
Result Value Ref Range Glucose (POC) 182 (H) 65 - 100 mg/dL Performed by Ly LIN   
CBC WITH AUTOMATED DIFF Collection Time: 02/26/20  4:01 AM  
Result Value Ref Range WBC 7.6 4.1 - 11.1 K/uL  
 RBC 4.21 4.10 - 5.70 M/uL  
 HGB 11.0 (L) 12.1 - 17.0 g/dL HCT 37.3 36.6 - 50.3 % MCV 88.6 80.0 - 99.0 FL  
 MCH 26.1 26.0 - 34.0 PG  
 MCHC 29.5 (L) 30.0 - 36.5 g/dL  
 RDW 18.4 (H) 11.5 - 14.5 % PLATELET 911 261 - 013 K/uL MPV 9.9 8.9 - 12.9 FL  
 NRBC 0.0 0  WBC ABSOLUTE NRBC 0.00 0.00 - 0.01 K/uL NEUTROPHILS 70 32 - 75 % LYMPHOCYTES 21 12 - 49 % MONOCYTES 8 5 - 13 % EOSINOPHILS 0 0 - 7 % BASOPHILS 0 0 - 1 % IMMATURE GRANULOCYTES 1 (H) 0.0 - 0.5 % ABS. NEUTROPHILS 5.3 1.8 - 8.0 K/UL  
 ABS. LYMPHOCYTES 1.6 0.8 - 3.5 K/UL  
 ABS. MONOCYTES 0.6 0.0 - 1.0 K/UL  
 ABS. EOSINOPHILS 0.0 0.0 - 0.4 K/UL  
 ABS. BASOPHILS 0.0 0.0 - 0.1 K/UL  
 ABS. IMM. GRANS. 0.1 (H) 0.00 - 0.04 K/UL  
 DF AUTOMATED METABOLIC PANEL, BASIC Collection Time: 02/26/20  4:01 AM  
Result Value Ref Range Sodium 136 136 - 145 mmol/L Potassium 3.3 (L) 3.5 - 5.1 mmol/L Chloride 99 97 - 108 mmol/L  
 CO2 31 21 - 32 mmol/L Anion gap 6 5 - 15 mmol/L Glucose 163 (H) 65 - 100 mg/dL BUN 22 (H) 6 - 20 MG/DL Creatinine 0.74 0.70 - 1.30 MG/DL  
 BUN/Creatinine ratio 30 (H) 12 - 20 GFR est AA >60 >60 ml/min/1.73m2 GFR est non-AA >60 >60 ml/min/1.73m2  Calcium 9.4 8.5 - 10.1 MG/DL  
GLUCOSE, POC  
 Collection Time: 02/26/20  7:48 AM  
Result Value Ref Range Glucose (POC) 141 (H) 65 - 100 mg/dL Performed by Lyssa Peralta   PCT Imaging: 
 
 
  
GERARD Pelaez

## 2020-02-26 NOTE — PROGRESS NOTES
Hospitalist Progress Note Verner Sias, MD 
Answering service: [phone] from in house phone Date of Service:  2020 NAME:  Itzel Zamora :  1945 MRN:  [id number] PCP: Gail Damon MD 
 
Chief Complaint:  
Chief Complaint Patient presents with  Shortness of Breath Admission Summary:  
 
Itzel Zamora is a 69 y.o. male who presented with As per initial admission summary Itzel Zamora is a 69 y.o.  with h/o COPD/ASTHMA, CAD and DM. Pt presents from [facility] for evaluation of shortness of breath. Patient prior was living at home with a family member, 2 or 3 weeks ago he developed shortness breath and then he was admitted to [facility], per sister he was treated with COPD exacerbation patient also was told that there were fluid in the chest but never have diagnosed with CHF. He was then discharged to [facility] for rehab 2 weeks ago was steroids and nebulizer. In SNF, Pt complains of increased shortness of breath with associated leg swelling, and chest pain for the past 3 days. Pt notes he had an oxygen saturation of 88%. Pt denies nausea, vomiting, fever, or abdominal pain. Patient said he can't fit his regular shoe anymore due to the leg and feet swelling. He also noticed increasing abdominal distention. Interval history / Subjective:  
Patient seen for Follow up of PE Patient seen and examined by the bedside, Labs, images and notes reviewed Patient is comfortable, denies any chest pain, SOB, abdominal pain, fevers, chills. No N/V/D. 
bipap overnight , currently on nasal cannula. Discussed with nursing staff, no acute issues overnight, orders reviewed. Assessment & Plan: 1. Acute on chronic resp failure  With hypoxia:  improving BNP low, no chf history, but giving the CXR finding, leg edema.  . Follow cr.  
· Hematology consulted, as per hematology Recommend transition from lovenox to Eliquis 10mg BID for 7 days, then 5mg BID x3-6 months. · Will schedule outpatient follow-up in clinic. Switched to eliquis Started on BIPAP Qhs and Prn during the day time I think he needs BIPAP specially at night during the night time at facility . I am not sure if we can wean off bipap. Final recommendations as per pulmonology. CO2 better today as patient was on bipap all night Pulmonology following 2. COPD: no wheezing, continue home nebs and previous dose of prednisone. Pulmonology consulted Also started on Levaquin for 5 days for possible PNA 3. abd distention: No ascites Large right renal cyst 
Urology consulted , need outpatient follow up 4. DM: SSI 5. H/o seizure: continue home meds Code status: Full Code DVT prophylaxis: eliquis Care Plan discussed with: Patient/Family Disposition: SNF Hospital Problems  Date Reviewed: 3/16/2018 Codes Class Noted POA Acute respiratory failure with hypoxia [facility]) ICD-10-CM: J96.01 
ICD-9-CM: 518.81  2/20/2020 Unknown Review of Systems: A comprehensive review of systems was negative except for that written in the HPI. Physical Examination:  
 
Visit Vitals /71 (BP 1 Location: Right arm, BP Patient Position: At rest;Sitting) Pulse (!) 104 Temp 98.5 °F (36.9 °C) Resp 22 Ht 6' 2\" (1.88 m) Wt 71.1 kg (156 lb 12.8 oz) SpO2 97% BMI 20.13 kg/m² General:  Alert, cooperative, no distress, appears stated age. Head:  Normocephalic, without obvious abnormality, atraumatic. Lungs:    decreased breath sounds Heart:  Regular rate and rhythm, S1, S2 normal, no murmur, click, rub or gallop. Abdomen:   Soft, non-tender. Bowel sounds normal. No masses,  No organomegaly. Extremities: Extremities normal, atraumatic, no cyanosis or edema Pulses: 2+ and symmetric all extremities. Vital Signs: Last 24hrs VS reviewed since prior progress note. Most recent are: 
 
Visit Vitals /71 (BP 1 Location: Right arm, BP Patient Position: At rest;Sitting) Pulse (!) 104 Temp 98.5 °F (36.9 °C) Resp 22 Ht 6' 2\" (1.88 m) Wt 71.1 kg (156 lb 12.8 oz) SpO2 97% BMI 20.13 kg/m² Intake/Output Summary (Last 24 hours) at 2020 1254 Last data filed at 2020 4572 Gross per 24 hour Intake 480 ml Output 575 ml Net -95 ml Tmax:  Temp (24hrs), Av °F (36.7 °C), Min:97.4 °F (36.3 °C), Max:98.5 °F (36.9 °C) Data Review:  
Data reviewed by myself: 
Cta Chest W Or W Wo Cont Result Date: 2020 EXAM:  CTA CHEST W OR W WO CONT INDICATION:   SOB COMPARISON: None. TECHNIQUE: Precontrast  images were obtained to localize the volume for acquisition. Multislice helical CT arteriography was performed from the diaphragm to the thoracic inlet during uneventful rapid bolus of 100 cc Isovue-370. Lung and soft tissue windows were generated. Coronal and sagittal images were generated and 3D post processing consisting of coronal maximum intensity images was performed. CT dose reduction was achieved through use of a standardized protocol tailored for this examination and automatic exposure control for dose modulation. FINDINGS: CHEST: THYROID: No nodule. MEDIASTINUM: No mass or lymphadenopathy. LEV: No mass or lymphadenopathy. THORACIC AORTA: Atherosclerotic without evidence of aneurysm. MAIN PULMONARY ARTERY: Pulmonary emboli right upper lobe branches. TRACHEA/BRONCHI: Patent. ESOPHAGUS: No wall thickening or dilatation. HEART: Normal in size. PLEURA: No effusion or pneumothorax. LUNGS: Emphysematous changes. Focal consolidation right lower lobe. Left basilar atelectasis. INCIDENTALLY IMAGED UPPER ABDOMEN: 7.7 cm right renal cyst. 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 Endograft is partially visualized. [place] BONES: No destructive bone lesion. IMPRESSION: Right upper lobe pulmonary emboli. Localized consolidation right lower lobe. Left basilar atelectasis. The findings were called to the patient's nurse on 2/20/2020 at 6:39 PM by myself. Krystle Renae Us Abd Comp Result Date: 2/21/2020 INDICATION: Abdominal distention COMPARISON: None TECHNIQUE: Routine ultrasound images of the abdomen were obtained. FINDINGS: GALLBLADDER: No cholecystolithiasis, gallbladder wall thickening, or pericholecystic fluid. COMMON BILE DUCT: 3 mm in diameter. No evidence of choledocholithiasis. LIVER: Normal in size. Normal echogenicity. No focal hepatic mass or intrahepatic biliary dilatation. MAIN PORTAL VEIN: Patent. Hepatopetal flow direction. PANCREAS: Obscured by overlying bowel gas. RIGHT KIDNEY: 12.8 cm in length. 8.2 cm cyst. No hydronephrosis. No evidence of mass or calculus. LEFT KIDNEY: 11.5 cm in length. No hydronephrosis. No evidence of mass or calculus. SPLEEN: Obscured by overlying bowel gas. AORTA: Obscured by overlying bowel gas. INFERIOR VENA CAVA: Patent. OTHER: No ascites. IMPRESSION: Large right renal cyst. Otherwise unremarkable abdominal ultrasound. Xr Chest [facility] Result Date: 2/20/2020 INDICATION:  SOB. History of chronic asthma. Prostate cancer. COPD. EXAM: Chest single view. COMPARISON: 9/10/2017. FINDINGS: A single frontal view of the chest at 1419 hours shows bibasilar atelectasis or scar, with mild interstitial prominence increased since the prior study. .  The heart, mediastinum and pulmonary vasculature are stable . The bony thorax is unremarkable for age. . Port-A-Cath device on the right is stable. IMPRESSION: Bibasilar atelectasis or scar with interstitial prominence. Correlate for developing interstitial edema or bibasilar inflammatory infiltrate in this patient with COPD and chronic asthma. .  . No results found for: SDES No results found for: CULT All Micro Results None Labs: reviewed by myself. Recent Labs  
  02/26/20 
0401 02/25/20 
0434 WBC 7.6 7.9 HGB 11.0* 10.9* HCT 37.3 37.1  375 Recent Labs  
  02/26/20 
0401 02/25/20 
0434 02/24/20 
0321  138 136  
K 3.3* 3.3* 3.5 CL 99 95* 92* CO2 31 41* 39* BUN 22* 18 19 CREA 0.74 0.63* 0.77 * 154* 122* CA 9.4 9.3 9.3 MG  --   --  2.0 PHOS  --   --  2.9 No results for input(s): SGOT, GPT, ALT, AP, TBIL, TBILI, TP, ALB, GLOB, GGT, AML, LPSE in the last 72 hours. No lab exists for component: AMYP, HLPSE No results for input(s): INR, PTP, APTT, INREXT, INREXT in the last 72 hours. No results for input(s): FE, TIBC, PSAT, FERR in the last 72 hours. No results found for: FOL, RBCF No results for input(s): PH, PCO2, PO2 in the last 72 hours. No results for input(s): CPK, CKNDX, TROIQ in the last 72 hours. No lab exists for component: CPKMB Lab Results Component Value Date/Time Cholesterol, total 187 09/11/2017 05:53 AM  
 HDL Cholesterol 63 09/11/2017 05:53 AM  
 LDL, calculated 109 (H) 09/11/2017 05:53 AM  
 Triglyceride 75 09/11/2017 05:53 AM  
 CHOL/HDL Ratio 3.0 09/11/2017 05:53 AM  
 
Lab Results Component Value Date/Time Glucose (POC) 162 (H) 02/26/2020 11:29 AM  
 Glucose (POC) 141 (H) 02/26/2020 07:48 AM  
 Glucose (POC) 182 (H) 02/25/2020 09:18 PM  
 Glucose (POC) 236 (H) 02/25/2020 06:13 PM  
 Glucose (POC) 172 (H) 02/25/2020 12:18 PM  
 
Lab Results Component Value Date/Time  Color YELLOW/STRAW 09/10/2017 10:09 PM  
 Appearance CLEAR 09/10/2017 10:09 PM  
 Specific gravity 1.010 09/10/2017 10:09 PM  
 pH (UA) 5.0 09/10/2017 10:09 PM  
 Protein NEGATIVE  09/10/2017 10:09 PM  
 Glucose NEGATIVE  09/10/2017 10:09 PM  
 Ketone NEGATIVE  09/10/2017 10:09 PM  
 Bilirubin NEGATIVE  09/10/2017 10:09 PM  
 Urobilinogen 0.2 09/10/2017 10:09 PM  
 Nitrites NEGATIVE  09/10/2017 10:09 PM  
 Leukocyte Esterase NEGATIVE  09/10/2017 10:09 PM  
 Epithelial cells MODERATE (A) 09/10/2017 10:09 PM  
 Bacteria NEGATIVE  09/10/2017 10:09 PM  
 WBC 5-10 09/10/2017 10:09 PM  
 RBC 0-5 09/10/2017 10:09 PM  
 
 
 
Medications Reviewed:  
 
Current Facility-Administered Medications Medication Dose Route Frequency  potassium chloride SR (KLOR-CON 10) tablet 20 mEq  20 mEq Oral BID  albuterol-ipratropium (DUO-NEB) 2.5 MG-0.5 MG/3 ML  3 mL Nebulization TID RT  
 arformoteroL (BROVANA) neb solution 15 mcg  15 mcg Nebulization BID RT  
 levoFLOXacin (LEVAQUIN) tablet 750 mg  750 mg Oral Q24H  
 [START ON 2/29/2020] apixaban (ELIQUIS) tablet 5 mg  5 mg Oral BID  
 oxymetazoline (AFRIN) 0.05 % nasal spray 2 Spray  2 Spray Both Nostrils BID PRN  polyethylene glycol (MIRALAX) packet 17 g  17 g Oral DAILY PRN  
 diphenhydrAMINE (BENADRYL) capsule 25 mg  25 mg Oral Q6H PRN  
 sertraline (ZOLOFT) tablet 25 mg  25 mg Oral DAILY  apixaban (ELIQUIS) tablet 10 mg  10 mg Oral BID  levETIRAcetam (KEPPRA) tablet 500 mg  500 mg Oral BID  sodium chloride (NS) flush 5-40 mL  5-40 mL IntraVENous Q8H  
 sodium chloride (NS) flush 5-40 mL  5-40 mL IntraVENous PRN  
 acetaminophen (TYLENOL) tablet 650 mg  650 mg Oral Q4H PRN  
 ferrous sulfate tablet 325 mg  325 mg Oral ACB  hydroxyzine HCL (ATARAX) tablet 25 mg  25 mg Oral BID  montelukast (SINGULAIR) tablet 10 mg  10 mg Oral DAILY  pantoprazole (PROTONIX) tablet 40 mg  40 mg Oral ACB&D  
 budesonide (PULMICORT) 250 mcg/2ml nebulizer susp  250 mcg Nebulization BID RT  
 predniSONE (DELTASONE) tablet 20 mg  20 mg Oral DAILY WITH BREAKFAST  albuterol (PROVENTIL VENTOLIN) nebulizer solution 2.5 mg  2.5 mg Nebulization Q2H PRN  
 glucose chewable tablet 16 g  4 Tab Oral PRN  
 glucagon (GLUCAGEN) injection 1 mg  1 mg IntraMUSCular PRN  
 dextrose 10% infusion 0-250 mL  0-250 mL IntraVENous PRN  
 insulin lispro (HUMALOG) injection   SubCUTAneous AC&HS  
 
 ______________________________________________________________________ EXPECTED LENGTH OF STAY: - - - 
ACTUAL LENGTH OF STAY:          6 Paige Gee MD  
 
Patient's emergency contacts: 
Extended Emergency Contact Information Primary Emergency Contact: Jennifer Doty Address: [address] [place] [address] Home Phone: [phone] Mobile Phone: [phone] Relation: Sister

## 2020-02-26 NOTE — PROGRESS NOTES
Bedside and Verbal shift change report given to [facility] (oncoming nurse) by Brennon Rubio (offgoing nurse). Report included the following information SBAR, Kardex, Intake/Output, MAR, Accordion and Recent Results. Problem: Falls - Risk of 
Goal: *Absence of Falls Description Document Clide Failing Fall Risk and appropriate interventions in the flowsheet. Outcome: Progressing Towards Goal 
Note: Fall Risk Interventions: 
Mobility Interventions: Communicate number of staff needed for ambulation/transfer, OT consult for ADLs, Patient to call before getting OOB, PT Consult for mobility concerns, PT Consult for assist device competence, Strengthening exercises (ROM-active/passive), Utilize walker, cane, or other assistive device, Utilize gait belt for transfers/ambulation Mentation Interventions: Adequate sleep, hydration, pain control, Bed/chair exit alarm, Door open when patient unattended, Evaluate medications/consider consulting pharmacy, Gait belt with transfers/ambulation, Increase mobility, More frequent rounding, Reorient patient, Room close to nurse's station, Self-releasing belt, Toileting rounds, Update white board Medication Interventions: Evaluate medications/consider consulting pharmacy, Patient to call before getting OOB, Teach patient to arise slowly, Utilize gait belt for transfers/ambulation Elimination Interventions: Call light in reach, Patient to call for help with toileting needs, Stay With Me (per policy), Toilet paper/wipes in reach, Toileting schedule/hourly rounds History of Falls Interventions: Consult care management for discharge planning, Door open when patient unattended, Evaluate medications/consider consulting pharmacy, Investigate reason for fall, Room close to nurse's station, Utilize gait belt for transfer/ambulation Problem: Patient Education: Go to Patient Education Activity Goal: Patient/Family Education Outcome: Progressing Towards Goal 
  
 Problem: Pressure Injury - Risk of 
Goal: *Prevention of pressure injury Description Document Carlos Enrique Scale and appropriate interventions in the flowsheet. Outcome: Progressing Towards Goal 
Note: Pressure Injury Interventions: 
Sensory Interventions: Assess changes in LOC, Assess need for specialty bed, Avoid rigorous massage over bony prominences, Check visual cues for pain, Discuss PT/OT consult with provider, Float heels, Keep linens dry and wrinkle-free, Maintain/enhance activity level, Minimize linen layers, Monitor skin under medical devices, Pad between skin to skin, Turn and reposition approx. every two hours (pillows and wedges if needed) Moisture Interventions: Absorbent underpads, Apply protective barrier, creams and emollients, Assess need for specialty bed, Check for incontinence Q2 hours and as needed, Limit adult briefs, Maintain skin hydration (lotion/cream), Minimize layers, Moisture barrier, Offer toileting Q_hr Activity Interventions: Assess need for specialty bed, Increase time out of bed, PT/OT evaluation Mobility Interventions: Assess need for specialty bed, Float heels, HOB 30 degrees or less, PT/OT evaluation, Turn and reposition approx. every two hours(pillow and wedges) Nutrition Interventions: Document food/fluid/supplement intake, Discuss nutritional consult with provider, Offer support with meals,snacks and hydration Friction and Shear Interventions: Apply protective barrier, creams and emollients, Foam dressings/transparent film/skin sealants, HOB 30 degrees or less, Lift team/patient mobility team, Minimize layers Problem: Patient Education: Go to Patient Education Activity Goal: Patient/Family Education Outcome: Progressing Towards Goal 
  
Problem: Breathing Pattern - Ineffective Goal: *Absence of hypoxia Outcome: Progressing Towards Goal 
  
Problem: Heart Failure: Discharge Outcomes Goal: *Demonstrates ability to perform prescribed activity without shortness of breath or discomfort Outcome: Progressing Towards Goal 
  
Problem: Chronic Obstructive Pulmonary Disease (COPD) Goal: *Absence of hypoxia Outcome: Progressing Towards Goal 
Goal: *Optimize nutritional status Outcome: Progressing Towards Goal

## 2020-02-26 NOTE — PROGRESS NOTES
Results for Emi Monk (MRN [id number]) as of 2/26/2020 10:07 Ref. Range 2/26/2020 09:15  
pH (POC) Latest Ref Range: 7.35 - 7.45   7.341 (L)  
pCO2 (POC) Latest Ref Range: 35.0 - 45.0 MMHG 64.9 (H)  
pO2 (POC) Latest Ref Range: 80 - 100 MMHG 74 (L) HCO3 (POC) Latest Ref Range: 22 - 26 MMOL/L 35.1 (H)  
sO2 (POC) Latest Ref Range: 92 - 97 % 93 Base excess (POC) Latest Units: mmol/L 9 ABG done post BIPAP. Patient tachypneic at this time breathing in the 30s, O2 sats 92-94 and accessory muscle use noted. Placed on 10L midflow per MD order.

## 2020-02-26 NOTE — PROGRESS NOTES
Problem: Chronic Obstructive Pulmonary Disease (COPD) Goal: *Absence of hypoxia Outcome: Progressing Towards Goal 
Note:  
Patient's CO2 is >80. Patient is on bipap and his O2 saturations are remaining above 90% Goal: *Optimize nutritional status Outcome: Progressing Towards Goal 
Note:  
Patient is tolerating his diet and reports no nausea Problem: Heart Failure: Discharge Outcomes Goal: *Left ventricular function assessment completed prior to or during stay, or planned for post-discharge Outcome: Progressing Towards Goal 
Note:  
Patient had an echo on 02-21-20 showing an EF of 55-60% Bedside shift change report given to Refugio Gerber RN (oncoming nurse) by Shane Celestin RN (offgoing nurse). Report included the following information SBAR, Kardex, Intake/Output, MAR, Accordion, Recent Results and Cardiac Rhythm NSR.

## 2020-02-26 NOTE — PROGRESS NOTES
Problem: Mobility Impaired (Adult and Pediatric) Goal: *Acute Goals and Plan of Care (Insert Text) Description FUNCTIONAL STATUS PRIOR TO ADMISSION: Pt admitted from SNF and unclear of level of assistance requiring. Prior to rehab, pt was independent with functional mobility. HOME SUPPORT PRIOR TO ADMISSION: The patient lived with sister and brother-in-law. Pt's sister works during the day and brother-in-law assists pt with ADLs prior to leaving the house for a couple hours/day. Pt receives assistance with bathing from an aide 1x/wk for 1 hour. Physical Therapy Goals Initiated 2/26/2020 1. Patient will move from supine to sit and sit to supine , scoot up and down and roll side to side in bed with modified independence within 7 day(s). 2.  Patient will transfer from bed to chair and chair to bed with supervision/set-up using the least restrictive device within 7 day(s). 3.  Patient will perform sit to stand with supervision/set-up within 7 day(s). 4.  Patient will ambulate with supervision/set-up for 150 feet with the least restrictive device within 7 day(s). 5.  Patient will ascend/descend 2 stairs with bilateral handrail(s) with minimal assistance/contact guard assist within 7 day(s). Outcome: Progressing Towards Goal 
 PHYSICAL THERAPY EVALUATION Patient: Belinda Sweeney (80 y.o. male) Date: 2/26/2020 Primary Diagnosis: Acute respiratory failure with hypoxia ([facility] [address] [J96.01] CHF (congestive heart failure) ([facility] [address] [I50.9] Precautions:   Fall ASSESSMENT Based on the objective data described below, the patient presents with decreased activity tolerance, balance deficits, and generalized weakness. SpO2 stable on 10 L HFNC during mobility, yet noted increases in RR to 35 bpm, RN aware. Pt required increased time to ascend from EOB and ambulated to chair with fair steadiness.  Pt presented with fatigue after transfer and deferred further mobility to allow pt to rest. Educated pt on OOB mobility 3x/day, pt stated verbal understanding. Current Level of Function Impacting Discharge (mobility/balance): min A for transfers Functional Outcome Measure: The patient scored Total: 60/100 on the Barthel Index which is indicative of 40% impaired ability to care for basic self needs/dependency on others. Other factors to consider for discharge: admitted from rehab, O2 requirement, decreased activity tolerance, fall risk Patient will benefit from skilled therapy intervention to address the above noted impairments. PLAN : 
Recommendations and Planned Interventions: bed mobility training, transfer training, gait training, therapeutic exercises, neuromuscular re-education, patient and family training/education and therapeutic activities Frequency/Duration: Patient will be followed by physical therapy:  5 times a week to address goals. Recommendation for discharge: (in order for the patient to meet his/her long term goals) Therapy up to 5 days/week in SNF setting If pt were to d/c home, would require physical assistance for all transfers This discharge recommendation: 
Has not yet been discussed the attending provider and/or case management IF patient discharges home will need the following DME: rolling walker SUBJECTIVE:  
Patient stated It was hard to breath.  OBJECTIVE DATA SUMMARY:  
HISTORY:   
Past Medical History:  
Diagnosis Date  Asthma   
 hx of/has wheezing  Cancer [facility])   
 prostate - not treated yet  Cerebral artery occlusion with cerebral infarction ([facility] [address]  Chronic obstructive pulmonary disease ([facility] [address]  Epilepsy ([facility] [address]  Ill-defined condition   
 shortness of breath - being evaluated  Psychiatric disorder   
 mental disability Past Surgical History:  
Procedure Laterality Date  COLONOSCOPY Left 10/4/2017 COLONOSCOPY performed by Nelson Blake MD at [address] [place] [address] ENDOSCOPY  
 HX INTRACRANIAL ANEURYSM REPAIR    
 HX OTHER SURGICAL    
 cyst removed from neck  HX PROSTATE SURGERY    
 HX SPLENECTOMY Personal factors and/or comorbidities impacting plan of care: PMH Home Situation Home Environment: Private residence # Steps to Enter: 6 Rails to Enter: Yes Hand Rails : Bilateral 
One/Two Story Residence: Two story # of Interior Steps: 12 Interior Rails: Both Living Alone: No 
Support Systems: Family member(s), Home care staff(sister, aide 1 day/wk for 1 hr) Patient Expects to be Discharged to[de-identified] Assisted living Current DME Used/Available at Home: Oxygen, portable(2.L at rest) Tub or Shower Type: Tub/Shower combination EXAMINATION/PRESENTATION/DECISION MAKING:  
Critical Behavior: 
Neurologic State: Alert Orientation Level: Oriented X4 Cognition: Follows commands Hearing: Auditory Auditory Impairment: None Skin:  intact Edema: none noted Range Of Motion: 
AROM: Generally decreased, functional 
  
PROM: Generally decreased, functional 
  
Strength:   
Strength: Generally decreased, functional 
Tone & Sensation:  
  
  
Coordination: 
  
Vision:  
  
Functional Mobility: 
Bed Mobility: 
  
Supine to Sit: Contact guard assistance;Bed Modified Transfers: 
Sit to Stand: Minimum assistance; Additional time; Adaptive equipment;Assist x1 Stand to Sit: Minimum assistance; Adaptive equipment; Additional time;Assist x1 Bed to Chair: Minimum assistance; Adaptive equipment; Additional time;Assist x1 Balance:  
Sitting: Impaired; Without support Sitting - Static: Good (unsupported) Sitting - Dynamic: Fair (occasional) Standing: Impaired; With support Standing - Static: Fair Standing - Dynamic : Fair;Constant support Ambulation/Gait Training: 
Distance (ft): 3 Feet (ft) Assistive Device: Gait belt;Walker, rolling Ambulation - Level of Assistance: Minimal assistance; Adaptive equipment; Additional time;Assist x1 Gait Abnormalities: Decreased step clearance; Step to gait;Trunk sway increased Base of Support: Center of gravity altered Stance: Weight shift Speed/Sylvia: Pace decreased (<100 feet/min); Shuffled; Slow Step Length: Left shortened;Right shortened Swing Pattern: Right asymmetrical;Left asymmetrical 
  
 
 
Functional Measure: 
Barthel Index: 
 
Bathin Bladder: 10 Bowels: 10 
Groomin Dressin Feeding: 10 Mobility: 10 Stairs: 0 Toilet Use: 5 Transfer (Bed to Chair and Back): 5 Total: 60/100 The Barthel ADL Index: Guidelines 1. The index should be used as a record of what a patient does, not as a record of what a patient could do. 2. The main aim is to establish degree of independence from any help, physical or verbal, however minor and for whatever reason. 3. The need for supervision renders the patient not independent. 4. A patient's performance should be established using the best available evidence. Asking the patient, friends/relatives and nurses are the usual sources, but direct observation and common sense are also important. However direct testing is not needed. 5. Usually the patient's performance over the preceding 24-48 hours is important, but occasionally longer periods will be relevant. 6. Middle categories imply that the patient supplies over 50 per cent of the effort. 7. Use of aids to be independent is allowed. Karolina Timmons., Barthel, D.W. ([id number]). Functional evaluation: the Barthel Index. [address] [facility] (14)2. ADILIA Kim Ace, George Corrales., Alia Onofre.[address] (). Measuring the change indisability after inpatient rehabilitation; comparison of the responsiveness of the Barthel Index and Functional [place] Measure. Journal of Neurology, Neurosurgery, and Psychiatry, 66(4), 638-999.  
MARCELLO Palomo, DIVINE Jamil, Garfield Romeo MLizethA. (2004.) Assessment of post-stroke quality of life in cost-effectiveness studies: The usefulness of the Barthel Index and the EuroQoL-5D. [facility], 56, 005-76 Physical Therapy Evaluation Charge Determination History Examination Presentation Decision-Making MEDIUM  Complexity : 1-2 comorbidities / personal factors will impact the outcome/ POC  LOW Complexity : 1-2 Standardized tests and measures addressing body structure, function, activity limitation and / or participation in recreation  LOW Complexity : Stable, uncomplicated  LOW Complexity : FOTO score of  Based on the above components, the patient evaluation is determined to be of the following complexity level: LOW Activity Tolerance:  
Fair Please refer to the flowsheet for vital signs taken during this treatment. After treatment patient left in no apparent distress:  
Sitting in chair, Call bell within reach and Bed / chair alarm activated COMMUNICATION/EDUCATION:  
The patients plan of care was discussed with: Registered Nurse. Fall prevention education was provided and the patient/caregiver indicated understanding., Patient/family have participated as able in goal setting and plan of care. and Patient/family agree to work toward stated goals and plan of care. Thank you for this referral. 
Lynn Dobson, PT, DPT Time Calculation: 18 mins

## 2020-02-27 NOTE — PROGRESS NOTES
Spiritual Care Partner Volunteer visited patient in room 421 on 2.27.2020. Documented by: : Rev. Iggy Marquez; [facility], to contact [phone] [address] call: [phone]

## 2020-02-27 NOTE — PROGRESS NOTES
Transition Plan of Care Recommendations are for rehab at SNF level. CM met with patient yesterday and left a list of SNF. He would like to have his sister Parrish review list 
Will follow up today for choices Vivian Rdz RN CRM Ext [phone] Met with sister Parrish and patient at the bedside. They would like referrals sent to [facility] and [facility]. Sent via [employer].

## 2020-02-27 NOTE — PROGRESS NOTES
Problem: Chronic Obstructive Pulmonary Disease (COPD) Goal: *Absence of hypoxia Outcome: Progressing Towards Goal 
Note:  
Patient is on 10L midflow during his waking hours and is using bipap at night and PRN and his O2 saturations are remaining above 90% Problem: Heart Failure: Discharge Outcomes Goal: *Demonstrates ability to perform prescribed activity without shortness of breath or discomfort Outcome: Progressing Towards Goal 
Note:  
Patient is able to ambulate to the [facility] with less distress then he had yesterday Goal: *Left ventricular function assessment completed prior to or during stay, or planned for post-discharge Outcome: Progressing Towards Goal 
Note:  
Patient had an echo on 2/21/20 showing an EF 55-60% Bedside shift change report given to Gloria Benz RN (oncoming nurse) by Taurus Celestin RN (offgoing nurse). Report included the following information SBAR, Kardex, MAR, Accordion, Recent Results and Cardiac Rhythm NSR.

## 2020-02-27 NOTE — PROGRESS NOTES
Problem: Mobility Impaired (Adult and Pediatric) Goal: *Acute Goals and Plan of Care (Insert Text) Description FUNCTIONAL STATUS PRIOR TO ADMISSION: Pt admitted from SNF and unclear of level of assistance requiring. Prior to rehab, pt was independent with functional mobility. HOME SUPPORT PRIOR TO ADMISSION: The patient lived with sister and brother-in-law. Pt's sister works during the day and brother-in-law assists pt with ADLs prior to leaving the house for a couple hours/day. Pt receives assistance with bathing from an aide 1x/wk for 1 hour. Physical Therapy Goals Initiated 2/26/2020 1. Patient will move from supine to sit and sit to supine , scoot up and down and roll side to side in bed with modified independence within 7 day(s). 2.  Patient will transfer from bed to chair and chair to bed with supervision/set-up using the least restrictive device within 7 day(s). 3.  Patient will perform sit to stand with supervision/set-up within 7 day(s). 4.  Patient will ambulate with supervision/set-up for 150 feet with the least restrictive device within 7 day(s). 5.  Patient will ascend/descend 2 stairs with bilateral handrail(s) with minimal assistance/contact guard assist within 7 day(s). Outcome: Progressing Towards Goal 
 PHYSICAL THERAPY TREATMENT Patient: Rik Pepe (72 y.o. male) Date: 2/27/2020 Diagnosis: Acute respiratory failure with hypoxia ([facility] [address] [J96.01] CHF (congestive heart failure) ([facility] [address] [I50.9] <principal problem not specified> Precautions: Fall, chair alarm Chart, physical therapy assessment, plan of care and goals were reviewed. ASSESSMENT Patient continues with skilled PT services and is progressing towards goals. Today pt had been weaned to 7 liters of 02 and resting 02 sats at 98%. Opted for 8 liters on portable tank and pt was able to increased his distance of amb to 20 feet.  His 02 sats remained stable on the 8 liters of 02 but his HR briefly vu to 132 at end of amb and then recovered quickly to resting range ~ 105-107. Did attempt amb without the walker but pt was fairly reliant on it today. Post session he was able to remain up to the chair. Continue to recommend rehab. Shanice Estrada Current Level of Function Impacting Discharge (mobility/balance): impaired standing balance. With walker support provided contact guard. Other factors to consider for discharge: heart failure, DVT, PE 
    
 
PLAN : 
Patient continues to benefit from skilled intervention to address the above impairments. Continue treatment per established plan of care. to address goals. Recommendation for discharge: (in order for the patient to meet his/her long term goals) Therapy up to 5 days/week in SNF setting This discharge recommendation: A follow-up discussion with the attending provider and/or case management is planned IF patient discharges home will need the following DME: none SUBJECTIVE:  
Patient stated Agustin Englishles you so much for working with me.  OBJECTIVE DATA SUMMARY:  
Chart checked, pt cleared by nursing Critical Behavior: 
Neurologic State: Alert, Eyes open spontaneously Orientation Level: Oriented X4 Cognition: Follows commands Functional Mobility Training: 
Bed Mobility: 
  
Not assessed Transfers: 
Sit to Stand: Contact guard assistance Stand to Sit: Contact guard assistance Balance: 
Sitting: Intact; Without support Standing: Impaired Standing - Static: Constant support;Good Standing - Dynamic : Constant support; Sandy Car Ambulation/Gait Training: 
Distance (ft): 20 Feet (ft) Assistive Device: Gait belt;Walker, rolling Ambulation - Level of Assistance: Contact guard assistance Gait Abnormalities: Decreased step clearance Base of Support: Center of gravity altered Speed/Sylvia: Slow;Pace decreased (<100 feet/min) Step Length: Left shortened;Right shortened Stairs: Therapeutic Exercises:  
Pursed lip breathing, LAQs with ankle pumps and seated marching Pain Rating: 
None rated Activity Tolerance:  
Fair, requires rest breaks, and see assessment above Please refer to the flowsheet for vital signs taken during this treatment. After treatment patient left in no apparent distress:  
Sitting in chair, Call bell within reach, and Bed / chair alarm activated COMMUNICATION/COLLABORATION:  
The patients plan of care was discussed with: Occupational Therapist and Registered Nurse Mckinley Urbina Time Calculation: 26 mins

## 2020-02-27 NOTE — PROGRESS NOTES
Hospitalist Progress Note Jose Tucrios MD 
Answering service: [phone] OR [phone] from in house phone Date of Service:  2020 NAME:  Lucretia Cummings :  1945 MRN:  [id number] PCP: Dorcas Crook MD 
 
Chief Complaint:  
Chief Complaint Patient presents with  Shortness of Breath Admission Summary:  
 
Lucretia Cummings is a 69 y.o. male who presented with As per initial admission summary Lucretia Cummings is a 69 y.o.  with h/o COPD/ASTHMA, CAD and DM. Pt presents from [facility] for evaluation of shortness of breath. Patient prior was living at home with a family member, 2 or 3 weeks ago he developed shortness breath and then he was admitted to [facility], per sister he was treated with COPD exacerbation patient also was told that there were fluid in the chest but never have diagnosed with CHF. He was then discharged to [facility] [place] for rehab 2 weeks ago was steroids and nebulizer. In SNF, Pt complains of increased shortness of breath with associated leg swelling, and chest pain for the past 3 days. Pt notes he had an oxygen saturation of 88%. Pt denies nausea, vomiting, fever, or abdominal pain. Patient said he can't fit his regular shoe anymore due to the leg and feet swelling. He also noticed increasing abdominal distention. Interval history / Subjective:  
Patient seen for Follow up of PE Patient seen and examined by the bedside, Labs, images and notes reviewed Pt is sitting up in the chair after working with PT. Overnight needed BiPAP. ABG in AM noted. Denied any chest pain, SOB, palpitation. Discussed with nursing staff. No acute overnight issues. No concerns. Assessment & Plan: # Acute on chronic hypoxic and hypercarbic - mixed respiratory failure,  improving # Right UL PE # Localized Rigth LL consolidation # Severe COPD exacerbation # Pulmonary hypertension per Echo : EF 60% no MR or AS PASP 66 RV nml -Combined complex respiratory issues 
-Still very high O2 requirement 
-ABG 2/27 noted. ?baseline elevated hypercarbia. -Pulmonology on board. Trilogy discussed with patient and would depend on disposition 
-Hematology on board. Per recommendations, lovenox to Eliquis 10mg BID for 7 days, then 5mg BID x 3-6 months. 
-Outpatient hematology follow up in the clinic 
-Outpatient Pulmonology follow up in the clinic 
-Continue NIV-BIPAP QHS and PRN during the day time 
-Levaquin 5 days for possible PNA 
-Prednisone with slow taper 
-Duenebs and supportive care # Respiratory acidosis with metabolic alkalosis 
-Complex driving forces with diuretics, COPD and other pulmological etiologies -BiPAP as noted above # DM2, mild yperglycemia 
-No recent A1c 
-Likely steroid driven 
-SSI only # Abdominal distention 
-No ascites 
-Large right renal cyst 
-Urology consulted, need outpatient follow up # H/o seizure:  
-continue home meds Code status: Full code DDVT Px: Eliquis Care plan discussed with: Patient/ Family, Nurse Disposition: SNF Anticipated discharge: Once O2 requirement is improved, >48hrs Hospital Problems  Date Reviewed: 3/16/2018 Codes Class Noted POA Acute respiratory failure with hypoxia [facility]) ICD-10-CM: J96.01 
ICD-9-CM: 518.81  2/20/2020 Unknown Review of Systems: A comprehensive review of systems was negative except for that written in the HPI. Physical Examination:  
 
Visit Vitals /76 (BP 1 Location: Right arm, BP Patient Position: At rest) Pulse 92 Temp 97.8 °F (36.6 °C) Resp 13 Ht 6' 2\" (1.88 m) Wt 70.4 kg (155 lb 4.8 oz) SpO2 100% BMI 19.94 kg/m² General:  Alert, cooperative, no distress, appears stated age. Head:  Normocephalic, without obvious abnormality, atraumatic. Lungs:    Significantly tight AE with reduced breath sounds, no wheezing/ rhonchi/ rales Heart:  Regular rate and rhythm, S1, S2 normal, no murmur, click, rub or gallop. Abdomen:   Soft, non-tender. Bowel sounds normal. No masses,  No organomegaly. Extremities: Extremities normal, atraumatic, no cyanosis or edema Pulses: 2+ and symmetric all extremities. Vital Signs:  
 Last 24hrs VS reviewed since prior progress note. Most recent are: 
 
Visit Vitals /76 (BP 1 Location: Right arm, BP Patient Position: At rest) Pulse 92 Temp 97.8 °F (36.6 °C) Resp 13 Ht 6' 2\" (1.88 m) Wt 70.4 kg (155 lb 4.8 oz) SpO2 100% BMI 19.94 kg/m² Intake/Output Summary (Last 24 hours) at 2020 6787 Last data filed at 2020 1502 Gross per 24 hour Intake 480 ml Output  Net 480 ml Tmax:  Temp (24hrs), Av.1 °F (36.7 °C), Min:97.6 °F (36.4 °C), Max:98.5 °F (36.9 °C) Data Review:  
Data reviewed by myself: 
Cta Chest W Or W Wo Cont Result Date: 2020 EXAM:  CTA CHEST W OR W WO CONT INDICATION:   SOB COMPARISON: None. TECHNIQUE: Precontrast  images were obtained to localize the volume for acquisition. Multislice helical CT arteriography was performed from the diaphragm to the thoracic inlet during uneventful rapid bolus of 100 cc Isovue-370. Lung and soft tissue windows were generated. Coronal and sagittal images were generated and 3D post processing consisting of coronal maximum intensity images was performed. CT dose reduction was achieved through use of a standardized protocol tailored for this examination and automatic exposure control for dose modulation. FINDINGS: CHEST: THYROID: No nodule. MEDIASTINUM: No mass or lymphadenopathy. LEV: No mass or lymphadenopathy. THORACIC AORTA: Atherosclerotic without evidence of aneurysm. MAIN PULMONARY ARTERY: Pulmonary emboli right upper lobe branches. TRACHEA/BRONCHI: Patent. ESOPHAGUS: No wall thickening or dilatation. HEART: Normal in size. PLEURA: No effusion or pneumothorax. LUNGS: Emphysematous changes. Focal consolidation right lower lobe. Left basilar atelectasis. INCIDENTALLY IMAGED UPPER ABDOMEN: 7.7 cm right renal cyst. 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 Endograft is partially visualized. Tiffanie Ajay BONES: No destructive bone lesion. IMPRESSION: Right upper lobe pulmonary emboli. Localized consolidation right lower lobe. Left basilar atelectasis. The findings were called to the patient's nurse on 2/20/2020 at 6:39 PM by myself. [address] [facility] Result Date: 2/21/2020 INDICATION: Abdominal distention COMPARISON: None TECHNIQUE: Routine ultrasound images of the abdomen were obtained. FINDINGS: GALLBLADDER: No cholecystolithiasis, gallbladder wall thickening, or pericholecystic fluid. COMMON BILE DUCT: 3 mm in diameter. No evidence of choledocholithiasis. LIVER: Normal in size. Normal echogenicity. No focal hepatic mass or intrahepatic biliary dilatation. MAIN PORTAL VEIN: Patent. Hepatopetal flow direction. PANCREAS: Obscured by overlying bowel gas. RIGHT KIDNEY: 12.8 cm in length. 8.2 cm cyst. No hydronephrosis. No evidence of mass or calculus. LEFT KIDNEY: 11.5 cm in length. No hydronephrosis. No evidence of mass or calculus. SPLEEN: Obscured by overlying bowel gas. AORTA: Obscured by overlying bowel gas. INFERIOR VENA CAVA: Patent. OTHER: No ascites. IMPRESSION: Large right renal cyst. Otherwise unremarkable abdominal ultrasound. Xr Chest [facility] Result Date: 2/20/2020 INDICATION:  SOB. History of chronic asthma. Prostate cancer. COPD. EXAM: Chest single view. COMPARISON: 9/10/2017. FINDINGS: A single frontal view of the chest at 1419 hours shows bibasilar atelectasis or scar, with mild interstitial prominence increased since the prior study. .  The heart, mediastinum and pulmonary vasculature are stable . The bony thorax is unremarkable for age. . Port-A-Cath device on the right is stable. IMPRESSION: Bibasilar atelectasis or scar with interstitial prominence. Correlate for developing interstitial edema or bibasilar inflammatory infiltrate in this patient with COPD and chronic asthma. .  . No results found for: SDES No results found for: CULT All Micro Results None Labs: reviewed by myself. Recent Labs  
  02/27/20 
0338 02/26/20 
0401 WBC 8.2 7.6 HGB 11.3* 11.0*  
HCT 38.9 37.3 * 380 Recent Labs  
  02/27/20 
0352 02/26/20 
0401 02/25/20 
0434  136 138  
K 5.1 3.3* 3.3*  
 99 95* CO2 30 31 41*  
BUN 24* 22* 18  
CREA 0.73 0.74 0.63* * 163* 154* CA 9.8 9.4 9.3 MG 2.1  --   -- No results for input(s): SGOT, GPT, ALT, AP, TBIL, TBILI, TP, ALB, GLOB, GGT, AML, LPSE in the last 72 hours. No lab exists for component: AMYP, HLPSE No results for input(s): INR, PTP, APTT, INREXT, INREXT in the last 72 hours. No results for input(s): FE, TIBC, PSAT, FERR in the last 72 hours. No results found for: FOL, RBCF No results for input(s): PH, PCO2, PO2 in the last 72 hours. Recent Labs  
  02/27/20 
0524  TROIQ <0.05 Lab Results Component Value Date/Time Cholesterol, total 187 09/11/2017 05:53 AM  
 HDL Cholesterol 63 09/11/2017 05:53 AM  
 LDL, calculated 109 (H) 09/11/2017 05:53 AM  
 Triglyceride 75 09/11/2017 05:53 AM  
 CHOL/HDL Ratio 3.0 09/11/2017 05:53 AM  
 
Lab Results Component Value Date/Time Glucose (POC) 163 (H) 02/26/2020 09:23 PM  
 Glucose (POC) 174 (H) 02/26/2020 05:16 PM  
 Glucose (POC) 162 (H) 02/26/2020 11:29 AM  
 Glucose (POC) 141 (H) 02/26/2020 07:48 AM  
 Glucose (POC) 182 (H) 02/25/2020 09:18 PM  
 
Lab Results Component Value Date/Time  Color YELLOW/STRAW 09/10/2017 10:09 PM  
 Appearance CLEAR 09/10/2017 10:09 PM  
 Specific gravity 1.010 09/10/2017 10:09 PM  
 pH (UA) 5.0 09/10/2017 10:09 PM  
 Protein NEGATIVE  09/10/2017 10:09 PM  
 Glucose NEGATIVE  09/10/2017 10:09 PM  
 Ketone NEGATIVE  09/10/2017 10:09 PM  
 Bilirubin NEGATIVE  09/10/2017 10:09 PM  
 Urobilinogen 0.2 09/10/2017 10:09 PM  
 Nitrites NEGATIVE  09/10/2017 10:09 PM  
 Leukocyte Esterase NEGATIVE  09/10/2017 10:09 PM  
 Epithelial cells MODERATE (A) 09/10/2017 10:09 PM  
 Bacteria NEGATIVE  09/10/2017 10:09 PM  
 WBC 5-10 09/10/2017 10:09 PM  
 RBC 0-5 09/10/2017 10:09 PM  
 
 
 
Medications Reviewed:  
 
Current Facility-Administered Medications Medication Dose Route Frequency  albuterol-ipratropium (DUO-NEB) 2.5 MG-0.5 MG/3 ML  3 mL Nebulization TID RT  
 arformoteroL (BROVANA) neb solution 15 mcg  15 mcg Nebulization BID RT  
 [START ON 2/29/2020] apixaban (ELIQUIS) tablet 5 mg  5 mg Oral BID  
 oxymetazoline (AFRIN) 0.05 % nasal spray 2 Spray  2 Spray Both Nostrils BID PRN  polyethylene glycol (MIRALAX) packet 17 g  17 g Oral DAILY PRN  
 diphenhydrAMINE (BENADRYL) capsule 25 mg  25 mg Oral Q6H PRN  
 sertraline (ZOLOFT) tablet 25 mg  25 mg Oral DAILY  apixaban (ELIQUIS) tablet 10 mg  10 mg Oral BID  levETIRAcetam (KEPPRA) tablet 500 mg  500 mg Oral BID  sodium chloride (NS) flush 5-40 mL  5-40 mL IntraVENous Q8H  
 sodium chloride (NS) flush 5-40 mL  5-40 mL IntraVENous PRN  
 acetaminophen (TYLENOL) tablet 650 mg  650 mg Oral Q4H PRN  
 ferrous sulfate tablet 325 mg  325 mg Oral ACB  hydroxyzine HCL (ATARAX) tablet 25 mg  25 mg Oral BID  montelukast (SINGULAIR) tablet 10 mg  10 mg Oral DAILY  pantoprazole (PROTONIX) tablet 40 mg  40 mg Oral ACB&D  
 budesonide (PULMICORT) 250 mcg/2ml nebulizer susp  250 mcg Nebulization BID RT  
 predniSONE (DELTASONE) tablet 20 mg  20 mg Oral DAILY WITH BREAKFAST  albuterol (PROVENTIL VENTOLIN) nebulizer solution 2.5 mg  2.5 mg Nebulization Q2H PRN  
 glucose chewable tablet 16 g  4 Tab Oral PRN  
 glucagon (GLUCAGEN) injection 1 mg  1 mg IntraMUSCular PRN  
 dextrose 10% infusion 0-250 mL  0-250 mL IntraVENous PRN  
  insulin lispro (HUMALOG) injection   SubCUTAneous AC&HS  
 
______________________________________________________________________ EXPECTED LENGTH OF STAY: 3d 19h ACTUAL LENGTH OF STAY:          7 Zenon Lizama MD  
 
Patient's emergency contacts: 
Extended Emergency Contact Information Primary Emergency Contact: Amirah Le Address: [address] [place] [address] Home Phone: [phone] Mobile Phone: [phone] Relation: Sister

## 2020-02-27 NOTE — PROGRESS NOTES
[facility] Pulmonary, Critical Care, and Sleep Medicine Progress Note Name: Rosey Hernandez MRN: [id number] : 1945 Hospital: [facility]  Date: 2020 IMPRESSION:  
· AECOPD- due to PE and probable RLL PNA ( followed by Dr Lucia Castellanos as an outpatient basis) · Resp acidosis with  metabolic alkaosis, likely secondary to ongoing diuretics on top of chronic resp acidosis. Suspsect that this is driving hypoxemia and his WOB. No, pO2, pCO2 and bicarb are better because of diamox. · RUL PE 
· PH- ECHO  EF 60% no MR or AS PASP 66 RV nml · COPD · DM2 RECOMMENDATIONS:  
· PH may be secondary to severe COPD and now PE. · Pt will need ECHO in 6 months to reassess- if PASP still > 60 will need w/u for other causes PH (group I or 2 etc) · NIV qhs and for WOB/altered mental status. I have discussed trilogy with him; if he goes home he will need it. If he goes to facility he can be started on bi-level NIV. He wants to discuss with his sister first  
· On diuretics · Wean prednisone · eliquis · Nebs · on levaquin · Follow up Chest xray in 4-6 wks with Dr Luis Carlos Willett Subjective:  
 
 Clinical status is improving  Feeling a little better today. Used NIV overnight. He is mildly dyspneic but was able to eat his breakfast. Denies cough or hemoptysis or chest pain.  A/o; talkative. On NIV for WOB  
 
 On NIV  
 
 This patient has been seen and evaluated at the request of Dr. Carola Razo for SOB. Patient is a 69 y.o. male H/o COPD on spiriva and follows with Dr. Lucia Castellanos. Pt admitted with SOB and RUL PE. Started on eliquis. Pt alert on bipap and nebs Less SOb moves all ext equally no CP Past Medical History:  
Diagnosis Date  Asthma   
 hx of/has wheezing  Cancer [facility])   
 prostate - not treated yet  Cerebral artery occlusion with cerebral infarction ([facility] [address]  Chronic obstructive pulmonary disease ([facility] [address]  Epilepsy ([facility] [address]  Ill-defined condition   
 shortness of breath - being evaluated  Psychiatric disorder   
 mental disability Past Surgical History:  
Procedure Laterality Date  COLONOSCOPY Left 10/4/2017 COLONOSCOPY performed by John [facility]keon MD at [facility] ENDOSCOPY  
 HX INTRACRANIAL ANEURYSM REPAIR    
 HX OTHER SURGICAL    
 cyst removed from neck  HX PROSTATE SURGERY    
 HX SPLENECTOMY Prior to Admission medications Medication Sig Start Date End Date Taking? Authorizing Provider  
sertraline (ZOLOFT) 25 mg tablet Take 25 mg by mouth daily. Yes Provider, Historical  
atorvastatin (LIPITOR) 40 mg tablet Take 40 mg by mouth daily. Yes Provider, Historical  
arformoteroL (BROVANA) 15 mcg/2 mL nebu neb solution 15 mcg by Nebulization route two (2) times a day. Yes Provider, Historical  
multivitamin (ONE A DAY) tablet Take 1 Tab by mouth daily. Yes Provider, Historical  
ferrous sulfate 325 mg (65 mg iron) tablet Take 325 mg by mouth Daily (before breakfast). Yes Provider, Historical  
fluticasone propionate (FLONASE ALLERGY RELIEF) 50 mcg/actuation nasal spray 2 Sprays by Both Nostrils route daily. Yes Provider, Historical  
hydroxyzine HCL (ATARAX) 25 mg tablet Take 25 mg by mouth two (2) times a day. Yes Provider, Historical  
ipratropium (ATROVENT) 0.02 % soln 0.5 mg by Other route every eight (8) hours. Oral inhalation   Yes Provider, Historical  
metFORMIN (GLUCOPHAGE) 500 mg tablet Take 500 mg by mouth daily (with breakfast). Yes Provider, Historical  
montelukast (SINGULAIR) 10 mg tablet Take 10 mg by mouth daily. Yes Provider, Historical  
predniSONE (DELTASONE) 10 mg tablet Take 20 mg by mouth daily (with breakfast). COPD exacerbation   Yes Provider, Historical  
budesonide-formoteroL (SYMBICORT) 160-4.5 mcg/actuation HFAA Take 2 Puffs by inhalation two (2) times a day.    Yes Provider, Historical  
 levalbuterol tartrate (XOPENEX) 45 mcg/actuation inhaler Take 1 Puff by inhalation every eight (8) hours. Yes Provider, Historical  
cetirizine (ZYRTEC) 10 mg tablet Take 10 mg by mouth daily. Yes Provider, Historical  
levETIRAcetam (KEPPRA) 500 mg tablet TAKE 1 TABLET BY MOUTH TWICE A DAY 11/8/19  Yes Nirali Howard DO  
pantoprazole (PROTONIX) 40 mg tablet Take 1 Tab by mouth Before breakfast and dinner. 10/6/17  Yes Chantel Callejas MD  
umeclidinium (INCRUSE ELLIPTA) 62.5 mcg/actuation inhaler Take 1 Puff by inhalation daily. Indications: Rescue inhaler   Yes Provider, Historical  
 
No Known Allergies Social History Tobacco Use  Smoking status: Current Every Day Smoker  Smokeless tobacco: Never Used  Tobacco comment: cigarettes Substance Use Topics  Alcohol use: No  
  
Family History Problem Relation Age of Onset  Colon Cancer Maternal Aunt  Colon Cancer Maternal Aunt  Colon Cancer Maternal Aunt Current Facility-Administered Medications Medication Dose Route Frequency  albuterol-ipratropium (DUO-NEB) 2.5 MG-0.5 MG/3 ML  3 mL Nebulization TID RT  
 arformoteroL (BROVANA) neb solution 15 mcg  15 mcg Nebulization BID RT  
 [START ON 2/29/2020] apixaban (ELIQUIS) tablet 5 mg  5 mg Oral BID  sertraline (ZOLOFT) tablet 25 mg  25 mg Oral DAILY  apixaban (ELIQUIS) tablet 10 mg  10 mg Oral BID  levETIRAcetam (KEPPRA) tablet 500 mg  500 mg Oral BID  sodium chloride (NS) flush 5-40 mL  5-40 mL IntraVENous Q8H  
 ferrous sulfate tablet 325 mg  325 mg Oral ACB  hydroxyzine HCL (ATARAX) tablet 25 mg  25 mg Oral BID  montelukast (SINGULAIR) tablet 10 mg  10 mg Oral DAILY  pantoprazole (PROTONIX) tablet 40 mg  40 mg Oral ACB&D  
 budesonide (PULMICORT) 250 mcg/2ml nebulizer susp  250 mcg Nebulization BID RT  
 predniSONE (DELTASONE) tablet 20 mg  20 mg Oral DAILY WITH BREAKFAST  insulin lispro (HUMALOG) injection   SubCUTAneous AC&HS  
 
 Review of Systems: 
 
 
Objective:  
Vital Signs:   
Visit Vitals /76 (BP 1 Location: Right arm, BP Patient Position: At rest) Pulse 92 Temp 97.8 °F (36.6 °C) Resp 13 Ht 6' 2\" (1.88 m) Wt 70.4 kg (155 lb 4.8 oz) SpO2 100% BMI 19.94 kg/m² O2 Device: BIPAP  
O2 Flow Rate (L/min): 10 l/min Temp (24hrs), Av.1 °F (36.7 °C), Min:97.6 °F (36.4 °C), Max:98.5 °F (36.9 °C) Intake/Output:  
Last shift:      No intake/output data recorded. Last 3 shifts:  1901 -  0700 In: 480 [P.O.:480] Out: 200 [Urine:200] Intake/Output Summary (Last 24 hours) at 2020 5069 Last data filed at 2020 1502 Gross per 24 hour Intake 480 ml Output  Net 480 ml Physical Exam:  
General:  Alert, cooperative, no distress, appears stated age. Head:  Normocephalic, without obvious abnormality, atraumatic. Eyes:  Conjunctivae/corneas clear. Nose: Nares normal. Septum midline. Mucosa normal. No drainage or sinus tenderness. Lungs:   Clear to auscultation bilaterally. Mild tachypnea, no accessory muscle use. Heart:  Regular rate and rhythm, S1, S2 normal, no murmur, click, rub or gallop. Abdomen:   Protuberant / distended, non-tender. Extremities: Extremities normal, atraumatic, no cyanosis or edema. SCDs Pulses: 2+ and symmetric all extremities. Skin: Skin color, texture, turgor normal. No rashes or lesions Data review:  
 
Recent Results (from the past 24 hour(s)) POC EG7 Collection Time: 20  9:15 AM  
Result Value Ref Range Calcium, ionized (POC) 1.35 (H) 1.12 - 1.32 mmol/L  
 pH (POC) 7.341 (L) 7.35 - 7.45    
 pCO2 (POC) 64.9 (H) 35.0 - 45.0 MMHG  
 pO2 (POC) 74 (L) 80 - 100 MMHG  
 HCO3 (POC) 35.1 (H) 22 - 26 MMOL/L Base excess (POC) 9 mmol/L  
 sO2 (POC) 93 92 - 97 % Site RIGHT RADIAL Device: NASAL CANNULA Flow rate (POC) 5 L/M Allens test (POC) YES  Specimen type (POC) ARTERIAL    
 Total resp. rate 30 GLUCOSE, POC Collection Time: 02/26/20 11:29 AM  
Result Value Ref Range Glucose (POC) 162 (H) 65 - 100 mg/dL Performed by Jordana Truong GLUCOSE, POC Collection Time: 02/26/20  5:16 PM  
Result Value Ref Range Glucose (POC) 174 (H) 65 - 100 mg/dL Performed by Jordana Truong GLUCOSE, POC Collection Time: 02/26/20  9:23 PM  
Result Value Ref Range Glucose (POC) 163 (H) 65 - 100 mg/dL Performed by Chencho Dickinson (CCT) CBC WITH AUTOMATED DIFF Collection Time: 02/27/20  3:38 AM  
Result Value Ref Range WBC 8.2 4.1 - 11.1 K/uL  
 RBC 4.41 4.10 - 5.70 M/uL  
 HGB 11.3 (L) 12.1 - 17.0 g/dL HCT 38.9 36.6 - 50.3 % MCV 88.2 80.0 - 99.0 FL  
 MCH 25.6 (L) 26.0 - 34.0 PG  
 MCHC 29.0 (L) 30.0 - 36.5 g/dL  
 RDW 18.1 (H) 11.5 - 14.5 % PLATELET 634 (H) 944 - 400 K/uL MPV 10.2 8.9 - 12.9 FL  
 NRBC 0.0 0  WBC ABSOLUTE NRBC 0.00 0.00 - 0.01 K/uL NEUTROPHILS 66 32 - 75 % LYMPHOCYTES 23 12 - 49 % MONOCYTES 8 5 - 13 % EOSINOPHILS 0 0 - 7 % BASOPHILS 1 0 - 1 % IMMATURE GRANULOCYTES 2 (H) 0.0 - 0.5 % ABS. NEUTROPHILS 5.4 1.8 - 8.0 K/UL  
 ABS. LYMPHOCYTES 1.9 0.8 - 3.5 K/UL  
 ABS. MONOCYTES 0.7 0.0 - 1.0 K/UL  
 ABS. EOSINOPHILS 0.0 0.0 - 0.4 K/UL  
 ABS. BASOPHILS 0.0 0.0 - 0.1 K/UL  
 ABS. IMM. GRANS. 0.2 (H) 0.00 - 0.04 K/UL  
 DF AUTOMATED    
EKG, 12 LEAD, INITIAL Collection Time: 02/27/20  3:46 AM  
Result Value Ref Range Ventricular Rate 94 BPM  
 Atrial Rate 94 BPM  
 P-R Interval 118 ms QRS Duration 88 ms Q-T Interval 378 ms QTC Calculation (Bezet) 472 ms Calculated P Axis 89 degrees Calculated R Axis 90 degrees Calculated T Axis 82 degrees Diagnosis Sinus rhythm with Possible premature atrial complexes with aberrant  
conduction Right atrial enlargement Rightward axis Pulmonary disease pattern When compared with ECG of 20-FEB-2020 13:34, 
 Nonspecific T wave abnormality no longer evident in Inferior leads Nonspecific T wave abnormality no longer evident in Lateral leads Confirmed by Cody Warren MD, Marcy Deleon ([id number]) on 2/27/2020 7:02:32 AM 
  
MAGNESIUM Collection Time: 02/27/20  3:52 AM  
Result Value Ref Range Magnesium 2.1 1.6 - 2.4 mg/dL CK Collection Time: 02/27/20  3:52 AM  
Result Value Ref Range  39 - 308 U/L  
TROPONIN I Collection Time: 02/27/20  3:52 AM  
Result Value Ref Range Troponin-I, Qt. <0.05 <0.05 ng/mL METABOLIC PANEL, BASIC Collection Time: 02/27/20  3:52 AM  
Result Value Ref Range Sodium 136 136 - 145 mmol/L Potassium 5.1 3.5 - 5.1 mmol/L Chloride 103 97 - 108 mmol/L  
 CO2 30 21 - 32 mmol/L Anion gap 3 (L) 5 - 15 mmol/L Glucose 133 (H) 65 - 100 mg/dL BUN 24 (H) 6 - 20 MG/DL Creatinine 0.73 0.70 - 1.30 MG/DL  
 BUN/Creatinine ratio 33 (H) 12 - 20 GFR est AA >60 >60 ml/min/1.73m2 GFR est non-AA >60 >60 ml/min/1.73m2 Calcium 9.8 8.5 - 10.1 MG/DL Imaging: 
 
 
  
Joel Olivera PA-C

## 2020-02-27 NOTE — PROGRESS NOTES
Problem: Falls - Risk of 
Goal: *Absence of Falls Description Document Christy Vanessa Fall Risk and appropriate interventions in the flowsheet. Outcome: Progressing Towards Goal 
Note: Fall Risk Interventions: 
Mobility Interventions: Communicate number of staff needed for ambulation/transfer, Patient to call before getting OOB, PT Consult for mobility concerns Mentation Interventions: Adequate sleep, hydration, pain control, Door open when patient unattended, Evaluate medications/consider consulting pharmacy, More frequent rounding, Increase mobility, Room close to nurse's station, Toileting rounds, Update white board Medication Interventions: Evaluate medications/consider consulting pharmacy, Patient to call before getting OOB, Teach patient to arise slowly Elimination Interventions: Call light in reach, Patient to call for help with toileting needs, Stay With Me (per policy), Toilet paper/wipes in reach, Toileting schedule/hourly rounds, Urinal in reach History of Falls Interventions: Door open when patient unattended, Room close to nurse's station Problem: Pressure Injury - Risk of 
Goal: *Prevention of pressure injury Description Document Carlos Enrique Scale and appropriate interventions in the flowsheet. Outcome: Progressing Towards Goal 
Note: Pressure Injury Interventions: 
Sensory Interventions: Assess changes in LOC, Discuss PT/OT consult with provider, Check visual cues for pain, Float heels, Keep linens dry and wrinkle-free, Maintain/enhance activity level, Minimize linen layers, Monitor skin under medical devices, Pressure redistribution bed/mattress (bed type), Turn and reposition approx. every two hours (pillows and wedges if needed) Moisture Interventions: Absorbent underpads, Apply protective barrier, creams and emollients, Limit adult briefs, Maintain skin hydration (lotion/cream), Minimize layers Activity Interventions: Increase time out of bed, Pressure redistribution bed/mattress(bed type), PT/OT evaluation Mobility Interventions: HOB 30 degrees or less, Pressure redistribution bed/mattress (bed type), PT/OT evaluation, Float heels Nutrition Interventions: Document food/fluid/supplement intake Friction and Shear Interventions: HOB 30 degrees or less, Lift sheet, Lift team/patient mobility team, Minimize layers Problem: Pulmonary Embolism Care Plan (Adult) Goal: *Absence of bleeding Outcome: Progressing Towards Goal 
Goal: *Labs within defined limits Outcome: Progressing Towards Goal 
  
Problem: Deep Venous Thrombosis - Risk of 
Goal: *Knowledge of prescribed medications Outcome: Progressing Towards Goal 
  
Problem: Breathing Pattern - Ineffective Goal: *Absence of hypoxia Outcome: Progressing Towards Goal 
Goal: *Use of effective breathing techniques Outcome: Progressing Towards Goal 
  
Problem: Heart Failure: Day 5 Goal: Activity/Safety Outcome: Progressing Towards Goal 
Goal: Medications Outcome: Progressing Towards Goal 
Goal: Respiratory Outcome: Progressing Towards Goal

## 2020-02-28 NOTE — PROGRESS NOTES
Problem: Mobility Impaired (Adult and Pediatric) Goal: *Acute Goals and Plan of Care (Insert Text) Description FUNCTIONAL STATUS PRIOR TO ADMISSION: Pt admitted from SNF and unclear of level of assistance requiring. Prior to rehab, pt was independent with functional mobility. HOME SUPPORT PRIOR TO ADMISSION: The patient lived with sister and brother-in-law. Pt's sister works during the day and brother-in-law assists pt with ADLs prior to leaving the house for a couple hours/day. Pt receives assistance with bathing from an aide 1x/wk for 1 hour. Physical Therapy Goals Initiated 2/26/2020 1. Patient will move from supine to sit and sit to supine , scoot up and down and roll side to side in bed with modified independence within 7 day(s). 2.  Patient will transfer from bed to chair and chair to bed with supervision/set-up using the least restrictive device within 7 day(s). 3.  Patient will perform sit to stand with supervision/set-up within 7 day(s). 4.  Patient will ambulate with supervision/set-up for 150 feet with the least restrictive device within 7 day(s). 5.  Patient will ascend/descend 2 stairs with bilateral handrail(s) with minimal assistance/contact guard assist within 7 day(s). Outcome: Progressing Towards Goal 
 PHYSICAL THERAPY TREATMENT Patient: Monty Smith (80 y.o. male) Date: 2/28/2020 Diagnosis: Acute respiratory failure with hypoxia ([facility] [J96.01] CHF (congestive heart failure) ([facility] [I50.9] <principal problem not specified> Precautions: Fall Chart, physical therapy assessment, plan of care and goals were reviewed. ASSESSMENT Patient continues with skilled PT services and is slowly progressing towards goals. He ws received on 4 liters of 02 and at rest 02 sats at 91-92% and . Had to limit distance of amb today given MARVIN and had to increase 02 to 6 liters, pt desat to 85% with amb of 10 feet.  Post session was able to return him to 4 liters and 02 sats at 94%. Highest HR was 122. He was agreeable of all that was asked of him and his pursed lip breathing technique looks sound, but he was so MARVIN with increased work of breathing. Continue to recommend rehab. For the weekend, recommend patient to complete as able in order to maintain strength, endurance and independence with nursing: OOB to chair 3x/day with assist X 1 using a walker for support. For bathroom needs please use a urinal and a bedside commode. He easily desats and might have a difficult time amb back from the bathroom. PT to follow-up with patient after the weekend. .  
 
Current Level of Function Impacting Discharge (mobility/balance): min assist to contact guard. Other factors to consider for discharge: heart failure, DVT, PE 
    
 
PLAN : 
Patient continues to benefit from skilled intervention to address the above impairments. Continue treatment per established plan of care. to address goals. Recommendation for discharge: (in order for the patient to meet his/her long term goals) Therapy up to 5 days/week in SNF setting This discharge recommendation: A follow-up discussion with the attending provider and/or case management is planned IF patient discharges home will need the following DME: to be determined (TBD) SUBJECTIVE:  
Patient stated I'll try.  OBJECTIVE DATA SUMMARY:  
Chart reviewed, pt cleared by nursing Critical Behavior: 
Neurologic State: Alert Orientation Level: Oriented X4 Cognition: Follows commands, Appropriate decision making, Appropriate for age attention/concentration, Appropriate safety awareness Functional Mobility Training: 
Bed Mobility: 
  
Supine to Sit: Stand-by assistance Transfers: 
Sit to Stand: Contact guard assistance Stand to Sit: Contact guard assistance Balance: 
Sitting: Intact; Without support Standing: Impaired Standing - Static: Constant support;Good Standing - Dynamic : Constant support; Nicol Lovett Ambulation/Gait Training: 
Distance (ft): 10 Feet (ft) Assistive Device: Gait belt;Walker, rolling Ambulation - Level of Assistance: Contact guard assistance Gait Abnormalities: Decreased step clearance Base of Support: Widened Speed/Sylvia: Slow;Pace decreased (<100 feet/min) Step Length: Left shortened;Right shortened Stairs: Therapeutic Exercises:  
Pursed lip breathing, and LAQs combined with ankle pumps, only 2 reps bilaterally Pain Rating: 
None rated Activity Tolerance:  
Fair, requires frequent rest breaks, observed SOB with activity, and see assessment Please refer to the flowsheet for vital signs taken during this treatment. After treatment patient left in no apparent distress:  
Supine in bed, Call bell within reach, and Caregiver / family present COMMUNICATION/COLLABORATION:  
The patients plan of care was discussed with: Occupational Therapist and Registered Nurse Raymundo Rogers Time Calculation: 29 mins

## 2020-02-28 NOTE — PROGRESS NOTES
NUTRITION Pt seen for:   Length of stay RECOMMENDATIONS:  
Diabetic diet with options - 2200 kcal 
 
Insulin adjustments per MD 
 
SUBJECTIVE/OBJECTIVE: Information obtained from:   EHR, rounds Diet:  Consistent CHO Supplements: none Intake: good Patient Vitals for the past 100 hrs: 
 % Diet Eaten 02/28/20 1500 75 % 02/28/20 0800 100 % 02/26/20 1502 100 % 02/26/20 0839 90 % 02/25/20 1537 75 % 02/25/20 1104 50 % 69 y.o. male admitted with Acute respiratory failure with [facility] [J96.01] CHF (congestive heart failure) ([facility] [I50.9] Pt  has a past medical history of Asthma, Cancer ([facility] Cerebral artery occlusion with cerebral infarction [facility]), Chronic obstructive pulmonary disease ([facility] Epilepsy ([facility] Ill-defined condition, and Psychiatric disorder. He also has no past medical history of Unspecified adverse effect of anesthesia or Unspecified sleep apnea. Pt discussed in rounds. From [address]. 1st day without bipap. On 4L NC. Plan [facility]F with [facility]. Eats well. No issues noted by RN. Noted - trace LE edema. Skin intact. BM 2/27 loose. Elevated BG. Does have hx of DM per chart review. HbA1C = 7.8% today. On steroid taper. SSI coverage. Recent Labs  
  02/28/20 
0404 02/27/20 
0352 02/26/20 
0401 * 133* 163* BUN 20 24* 22* CREA 0.60* 0.73 0.74  136 136  
K 3.5 5.1 3.3*  
 103 99 CO2 33* 30 31 CA 9.7 9.8 9.4 MG 2.1 2.1  -- No results for input(s): SGOT, GPT, ALT, AP, TBIL, TBILI, TP, ALB, GLOB, GGT, AML, LPSE in the last 72 hours. No lab exists for component: AMYP, HLPSE No results for input(s): LAC in the last 72 hours. Recent Labs  
  02/28/20 
0404 02/27/20 
3382 WBC 8.6 8.2 HGB 10.9* 11.3* HCT 37.0 38.9 * 444* No results for input(s): PREALB in the last 72 hours. No results for input(s): TRIGL in the last 72 hours. Recent Labs  
  02/28/20 
1210 02/28/20 7222 02/27/20 2102 02/27/20 
1749 02/27/20 
1228 02/27/20 
8834 02/26/20 
2123 02/26/20 
1716 02/26/20 
1129 02/26/20 
0748 02/25/20 2118 02/25/20 
1813 GLUCPOC 191* 135* 203* 279* 201* 137* 163* 174* 162* 141* 182* 236* Lab Results Component Value Date/Time Hemoglobin A1c 7.8 (H) 02/28/2020 04:04 AM  
 
 
 
 
Estimated Nutrition Needs:  
Kcals/day: 2100 Kcals/day(or more) Protein: 85 g(or more using 1.2+ gm/kg) Fluid: 2100 ml(1 mL/kcal) Based On: Kcal/kg - specify (Comment)(30+ kcal/kg) Weight Used: Actual wt(70.4 kg) Weight Changes: Wt Readings from Last 10 Encounters:  
02/28/20 70.4 kg (155 lb 4.8 oz) 03/16/18 74.8 kg (165 lb) 12/04/17 75.3 kg (166 lb)  
11/17/17 77 kg (169 lb 12.1 oz) 10/04/17 79.8 kg (176 lb)  
09/13/17 75 kg (165 lb 5.5 oz)  
06/26/14 58.1 kg (128 lb) Nutrition Problems Identified: 
Nutrition Diagnosis: 1. Altered nutrition-related lab values related to endocrine dysfunction + steroids as evidenced by elevated BG; HbA1C = 7.8% PLAN:  
Adjust diet to better meet est needs Rescreen:  weekly Tory Fritz RD

## 2020-02-28 NOTE — PROGRESS NOTES
Bedside and Verbal shift change report given to Lester Peterson (oncoming nurse) by Thuan Tellez (offgoing nurse). Report included the following information SBAR, Kardex, Intake/Output, MAR, Accordion, Recent Results and Cardiac Rhythm ST/SR. Problem: Falls - Risk of 
Goal: *Absence of Falls Description Document Brandy Navarrete Fall Risk and appropriate interventions in the flowsheet. Outcome: Progressing Towards Goal 
Note: Fall Risk Interventions: 
Mobility Interventions: Communicate number of staff needed for ambulation/transfer, OT consult for ADLs, Patient to call before getting OOB, PT Consult for mobility concerns, PT Consult for assist device competence, Strengthening exercises (ROM-active/passive), Utilize walker, cane, or other assistive device, Utilize gait belt for transfers/ambulation Mentation Interventions: Adequate sleep, hydration, pain control, Bed/chair exit alarm, Door open when patient unattended, Evaluate medications/consider consulting pharmacy, Gait belt with transfers/ambulation, Increase mobility, More frequent rounding, Reorient patient, Room close to nurse's station, Toileting rounds, Update white board Medication Interventions: Bed/chair exit alarm, Evaluate medications/consider consulting pharmacy, Patient to call before getting OOB, Teach patient to arise slowly, Utilize gait belt for transfers/ambulation Elimination Interventions: Bed/chair exit alarm, Call light in reach, Patient to call for help with toileting needs, Stay With Me (per policy), Toilet paper/wipes in reach, Toileting schedule/hourly rounds, Urinal in reach History of Falls Interventions: Consult care management for discharge planning, Door open when patient unattended, Investigate reason for fall, Room close to nurse's station Problem: Patient Education: Go to Patient Education Activity Goal: Patient/Family Education Outcome: Progressing Towards Goal 
  
Problem: Pressure Injury - Risk of 
 Goal: *Prevention of pressure injury Description Document Carlos Enrique Scale and appropriate interventions in the flowsheet. Outcome: Progressing Towards Goal 
Note: Pressure Injury Interventions: 
Sensory Interventions: Assess changes in LOC, Assess need for specialty bed, Discuss PT/OT consult with provider, Float heels, Keep linens dry and wrinkle-free, Maintain/enhance activity level, Minimize linen layers, Monitor skin under medical devices, Pad between skin to skin, Pressure redistribution bed/mattress (bed type), Turn and reposition approx. every two hours (pillows and wedges if needed) Moisture Interventions: Assess need for specialty bed, Check for incontinence Q2 hours and as needed, Absorbent underpads, Apply protective barrier, creams and emollients, Limit adult briefs, Maintain skin hydration (lotion/cream), Minimize layers, Moisture barrier, Offer toileting Q_hr Activity Interventions: Assess need for specialty bed, Increase time out of bed, Pressure redistribution bed/mattress(bed type), PT/OT evaluation Mobility Interventions: Assess need for specialty bed, Float heels, HOB 30 degrees or less, PT/OT evaluation, Turn and reposition approx. every two hours(pillow and wedges) Nutrition Interventions: Document food/fluid/supplement intake, Discuss nutritional consult with provider, Offer support with meals,snacks and hydration Friction and Shear Interventions: Apply protective barrier, creams and emollients, HOB 30 degrees or less, Minimize layers, Lift team/patient mobility team 
 
  
 
 
 
  
Problem: Patient Education: Go to Patient Education Activity Goal: Patient/Family Education Outcome: Progressing Towards Goal 
  
Problem: Breathing Pattern - Ineffective Goal: *Absence of hypoxia Outcome: Progressing Towards Goal

## 2020-02-28 NOTE — PROGRESS NOTES
Transition Plan of Care Call from Bijal Miller in admissions at [facility] and they have accepted patient. Will need to verify with patient and sister as to their choice. If discharged tomorrow CM will need to call Bijal Miller [phone]. Kellyre July, RN CRM Ext [phone] Received consult to set up [facility] and order sent to [facility] via [employer]. Spoke with sister Bogdan Smyth and she is agreeable to discharge to [facility]. Spoke with clinical liaison Alex Jenkins from [facility] [phone] regarding the [facility] and she will follow patient over the weekend if he discharges to facility. Update- sister has decided that they would like to go to [place]. She was confused about the location. CM spoke with [facility] from [place] and they have accepted him when he is medically stable. Karma Roberts will met with patient and sister today.

## 2020-02-28 NOTE — PROGRESS NOTES
[facility] Progress Note Name: Ravi Rivera MRN: [id number] : 1945 Hospital: [facility] Date: 2020 IMPRESSION:  
· AE of COPD- due to PE and probable RLL PNA ( followed by Dr Jose Rose as an outpatient basis) · Acute on chronic Resp acidosis with  metabolic alkaosis, likely secondary to ongoing diuretics on top of chronic resp acidosis. Suspsect that this is driving hypoxemia and his WOB. No, pO2, pCO2 and bicarb are better because of diamox. Improving · RUL PE 
· PH- ECHO  EF 60% no MR or AS PASP 66 RV nml · COPD · DM2 RECOMMENDATIONS:  
· Pt will need ECHO in 6 months to reassess- if PASP still > 60 will need w/u for other causes PH (group I or 2 etc) · Wean prednisone · eliquis · Nebs, go to home inhalers at discharge · Going to rehab facility at discharge; set up for NIV while there because they cannot accept trilogy unit · Trilogy setup. Bipap (including s/t) and cpap will not adequately manage his hypercapnia for the long term. AVAPS mode in the form of trilogy will be the most appropriate tool. I have discussed with patient and sister and they are both in agreement. This is for the management of hypercapnia in the setting of COPD and he does not have any signs suggestive of YOVANY. · Follow up Chest xray in 4-6 wks with Dr Shlomo Friedman · PRN over the weekend Subjective:  
 
 Doing better overall today I called and discussed with sister  Clinical status is improving  Feeling a little better today. Used NIV overnight. He is mildly dyspneic but was able to eat his breakfast. Denies cough or hemoptysis or chest pain.  A/o; talkative. On NIV for WOB  
 
 On NIV  
 
 This patient has been seen and evaluated at the request of Dr. Romana Party for SOB. Patient is a 69 y.o. male H/o COPD on spiriva and follows with Dr. Jose Rose.  Pt admitted with SOB and RUL PE. Started on eliquis. Pt alert on bipap and nebs Less SOb moves all ext equally no CP Past Medical History:  
Diagnosis Date  Asthma   
 hx of/has wheezing  Cancer [facility])   
 prostate - not treated yet  Cerebral artery occlusion with cerebral infarction ([facility] [address]  Chronic obstructive pulmonary disease ([facility] [address]  Epilepsy ([facility] [address]  Ill-defined condition   
 shortness of breath - being evaluated  Psychiatric disorder   
 mental disability Past Surgical History:  
Procedure Laterality Date  COLONOSCOPY Left 10/4/2017 COLONOSCOPY performed by Nakul Martinez MD at [facility] ENDOSCOPY  
 HX INTRACRANIAL ANEURYSM REPAIR    
 HX OTHER SURGICAL    
 cyst removed from neck  HX PROSTATE SURGERY    
 HX SPLENECTOMY Prior to Admission medications Medication Sig Start Date End Date Taking? Authorizing Provider  
sertraline (ZOLOFT) 25 mg tablet Take 25 mg by mouth daily. Yes Provider, Historical  
atorvastatin (LIPITOR) 40 mg tablet Take 40 mg by mouth daily. Yes Provider, Historical  
arformoteroL (BROVANA) 15 mcg/2 mL nebu neb solution 15 mcg by Nebulization route two (2) times a day. Yes Provider, Historical  
multivitamin (ONE A DAY) tablet Take 1 Tab by mouth daily. Yes Provider, Historical  
ferrous sulfate 325 mg (65 mg iron) tablet Take 325 mg by mouth Daily (before breakfast). Yes Provider, Historical  
fluticasone propionate (FLONASE ALLERGY RELIEF) 50 mcg/actuation nasal spray 2 Sprays by Both Nostrils route daily. Yes Provider, Historical  
hydroxyzine HCL (ATARAX) 25 mg tablet Take 25 mg by mouth two (2) times a day. Yes Provider, Historical  
ipratropium (ATROVENT) 0.02 % soln 0.5 mg by Other route every eight (8) hours. Oral inhalation   Yes Provider, Historical  
metFORMIN (GLUCOPHAGE) 500 mg tablet Take 500 mg by mouth daily (with breakfast). Yes Provider, Historical  
montelukast (SINGULAIR) 10 mg tablet Take 10 mg by mouth daily.    Yes Provider, Historical  
predniSONE (DELTASONE) 10 mg tablet Take 20 mg by mouth daily (with breakfast). COPD exacerbation   Yes Provider, Historical  
budesonide-formoteroL (SYMBICORT) 160-4.5 mcg/actuation HFAA Take 2 Puffs by inhalation two (2) times a day. Yes Provider, Historical  
levalbuterol tartrate (XOPENEX) 45 mcg/actuation inhaler Take 1 Puff by inhalation every eight (8) hours. Yes Provider, Historical  
cetirizine (ZYRTEC) 10 mg tablet Take 10 mg by mouth daily. Yes Provider, Historical  
levETIRAcetam (KEPPRA) 500 mg tablet TAKE 1 TABLET BY MOUTH TWICE A DAY 11/8/19  Yes Nirali Howard DO  
pantoprazole (PROTONIX) 40 mg tablet Take 1 Tab by mouth Before breakfast and dinner. 10/6/17  Yes Soraya Callejas MD  
umeclidinium (INCRUSE ELLIPTA) 62.5 mcg/actuation inhaler Take 1 Puff by inhalation daily. Indications: Rescue inhaler   Yes Provider, Historical  
 
No Known Allergies Social History Tobacco Use  Smoking status: Current Every Day Smoker  Smokeless tobacco: Never Used  Tobacco comment: cigarettes Substance Use Topics  Alcohol use: No  
  
Family History Problem Relation Age of Onset  Colon Cancer Maternal Aunt  Colon Cancer Maternal Aunt  Colon Cancer Maternal Aunt Current Facility-Administered Medications Medication Dose Route Frequency  albuterol-ipratropium (DUO-NEB) 2.5 MG-0.5 MG/3 ML  3 mL Nebulization TID RT  
 arformoteroL (BROVANA) neb solution 15 mcg  15 mcg Nebulization BID RT  
 [START ON 2/29/2020] apixaban (ELIQUIS) tablet 5 mg  5 mg Oral BID  sertraline (ZOLOFT) tablet 25 mg  25 mg Oral DAILY  apixaban (ELIQUIS) tablet 10 mg  10 mg Oral BID  levETIRAcetam (KEPPRA) tablet 500 mg  500 mg Oral BID  sodium chloride (NS) flush 5-40 mL  5-40 mL IntraVENous Q8H  
 ferrous sulfate tablet 325 mg  325 mg Oral ACB  hydroxyzine HCL (ATARAX) tablet 25 mg  25 mg Oral BID  
  montelukast (SINGULAIR) tablet 10 mg  10 mg Oral DAILY  pantoprazole (PROTONIX) tablet 40 mg  40 mg Oral ACB&D  
 budesonide (PULMICORT) 250 mcg/2ml nebulizer susp  250 mcg Nebulization BID RT  
 predniSONE (DELTASONE) tablet 20 mg  20 mg Oral DAILY WITH BREAKFAST  insulin lispro (HUMALOG) injection   SubCUTAneous AC&HS Review of Systems: 
 
 
Objective:  
Vital Signs:   
Visit Vitals /73 Pulse 81 Temp 98.4 °F (36.9 °C) Resp 19 Ht 6' 2\" (1.88 m) Wt 70.4 kg (155 lb 4.8 oz) SpO2 100% BMI 19.94 kg/m² O2 Device: Nasal cannula O2 Flow Rate (L/min): 3 l/min Temp (24hrs), Av.9 °F (36.6 °C), Min:97.6 °F (36.4 °C), Max:98.4 °F (36.9 °C) Intake/Output:  
Last shift:      No intake/output data recorded. Last 3 shifts: No intake/output data recorded. No intake or output data in the 24 hours ending 20 0911 Physical Exam:  
General:  Alert, cooperative, no distress, appears stated age. Head:  Normocephalic, without obvious abnormality, atraumatic. Eyes:  Conjunctivae/corneas clear. Nose: Nares normal. Septum midline. Mucosa normal. No drainage or sinus tenderness. Lungs:   Clear to auscultation bilaterally. Mild tachypnea, no accessory muscle use. Heart:  Regular rate and rhythm, S1, S2 normal, no murmur, click, rub or gallop. Abdomen:   Protuberant / distended, non-tender. Extremities: Extremities normal, atraumatic, no cyanosis or edema. SCDs Pulses: 2+ and symmetric all extremities. Skin: Skin color, texture, turgor normal. No rashes or lesions Data review:  
 
Recent Results (from the past 24 hour(s)) POC EG7 Collection Time: 20  9:22 AM  
Result Value Ref Range Calcium, ionized (POC) 1.40 (H) 1.12 - 1.32 mmol/L  
 pH (POC) 7.278 (L) 7.35 - 7.45    
 pCO2 (POC) 68.2 (H) 35.0 - 45.0 MMHG  
 pO2 (POC) 112 (H) 80 - 100 MMHG  
 HCO3 (POC) 31.9 (H) 22 - 26 MMOL/L  Base excess (POC) 5 mmol/L  
 sO2 (POC) 97 92 - 97 % Site LEFT RADIAL Device: High Flow Nasal Cannula Flow rate (POC) 10 L/M Allens test (POC) YES Specimen type (POC) ARTERIAL Total resp. rate 28 GLUCOSE, POC Collection Time: 02/27/20 12:28 PM  
Result Value Ref Range Glucose (POC) 201 (H) 65 - 100 mg/dL Performed by Hay De La Cruz GLUCOSE, POC Collection Time: 02/27/20  5:49 PM  
Result Value Ref Range Glucose (POC) 279 (H) 65 - 100 mg/dL Performed by Alanna Tomlin GLUCOSE, POC Collection Time: 02/27/20  9:02 PM  
Result Value Ref Range Glucose (POC) 203 (H) 65 - 100 mg/dL Performed by Elli Landers CBC WITH AUTOMATED DIFF Collection Time: 02/28/20  4:04 AM  
Result Value Ref Range WBC 8.6 4.1 - 11.1 K/uL  
 RBC 4.19 4. 10 - 5.70 M/uL  
 HGB 10.9 (L) 12.1 - 17.0 g/dL HCT 37.0 36.6 - 50.3 % MCV 88.3 80.0 - 99.0 FL  
 MCH 26.0 26.0 - 34.0 PG  
 MCHC 29.5 (L) 30.0 - 36.5 g/dL  
 RDW 18.1 (H) 11.5 - 14.5 % PLATELET 151 (H) 657 - 400 K/uL MPV 10.0 8.9 - 12.9 FL  
 NRBC 0.0 0  WBC ABSOLUTE NRBC 0.00 0.00 - 0.01 K/uL NEUTROPHILS 64 32 - 75 % BAND NEUTROPHILS 2 0 - 6 % LYMPHOCYTES 21 12 - 49 % MONOCYTES 10 5 - 13 % EOSINOPHILS 1 0 - 7 % BASOPHILS 1 0 - 1 % MYELOCYTES 1 (H) 0 % IMMATURE GRANULOCYTES 0 %  
 ABS. NEUTROPHILS 5.7 1.8 - 8.0 K/UL  
 ABS. LYMPHOCYTES 1.8 0.8 - 3.5 K/UL  
 ABS. MONOCYTES 0.9 0.0 - 1.0 K/UL  
 ABS. EOSINOPHILS 0.1 0.0 - 0.4 K/UL  
 ABS. BASOPHILS 0.1 0.0 - 0.1 K/UL  
 ABS. IMM. GRANS. 0.0 K/UL  
 DF MANUAL PLATELET COMMENTS Large Platelets RBC COMMENTS ANISOCYTOSIS 1+ 
    
 RBC COMMENTS HYPOCHROMIA 1+ 
    
 RBC COMMENTS TARGET CELLS 1+ RBC COMMENTS MICROCYTOSIS 1+ 
    
 RBC COMMENTS MACROCYTOSIS 
1+ METABOLIC PANEL, BASIC Collection Time: 02/28/20  4:04 AM  
Result Value Ref Range Sodium 140 136 - 145 mmol/L Potassium 3.5 3.5 - 5.1 mmol/L  Chloride 102 97 - 108 mmol/L  
 CO2 33 (H) 21 - 32 mmol/L Anion gap 5 5 - 15 mmol/L Glucose 119 (H) 65 - 100 mg/dL BUN 20 6 - 20 MG/DL Creatinine 0.60 (L) 0.70 - 1.30 MG/DL  
 BUN/Creatinine ratio 33 (H) 12 - 20 GFR est AA >60 >60 ml/min/1.73m2 GFR est non-AA >60 >60 ml/min/1.73m2 Calcium 9.7 8.5 - 10.1 MG/DL MAGNESIUM Collection Time: 02/28/20  4:04 AM  
Result Value Ref Range Magnesium 2.1 1.6 - 2.4 mg/dL HEMOGLOBIN A1C WITH EAG Collection Time: 02/28/20  4:04 AM  
Result Value Ref Range Hemoglobin A1c 7.8 (H) 4.0 - 5.6 % Est. average glucose 177 mg/dL GLUCOSE, POC Collection Time: 02/28/20  8:31 AM  
Result Value Ref Range Glucose (POC) 135 (H) 65 - 100 mg/dL Performed by [facility]: 
 
 
  
Joel Rich PA-C

## 2020-02-28 NOTE — PROGRESS NOTES
Problem: Pulmonary Embolism Care Plan (Adult) Goal: *Absence of bleeding Outcome: Progressing Towards Goal 
Note:  
Patient has shown no signs of bleeding. HGB is steady at 11.3 and will continue to be assessed Problem: Breathing Pattern - Ineffective Goal: *Absence of hypoxia Outcome: Progressing Towards Goal 
Note:  
Patient has been weaned to 5L NC and is O2 saturations are remaining above 90% Problem: Pulmonary Embolism Care Plan (Adult) Goal: *Labs within defined limits Outcome: Progressing Towards Goal 
Note:  
Patient's labs are WDL except for his BUN Bedside shift change report given to Saintclair Mealy, RN (oncoming nurse) by Ezio Castelan RN (offgoing nurse). Report included the following information SBAR, Kardex, Intake/Output, MAR, Accordion, Recent Results and Cardiac Rhythm NSR.

## 2020-02-28 NOTE — PROGRESS NOTES
Problem: Self Care Deficits Care Plan (Adult) Goal: *Acute Goals and Plan of Care (Insert Text) Description FUNCTIONAL STATUS PRIOR TO ADMISSION: Per chart review, patient was admitted from a SNF. However patient stated he was admitted from his 2-story house. Patient is a questionable historian at this, however states he owned a wheelchair for community mobility and a shower chair for bathing. HOME SUPPORT: The patient lived with his sister but did not require assist. 
 
Occupational Therapy Goals Initiated 2/26/2020 1. Patient will perform lower body dressing with supervision/set-up within 7 day(s). 2.  Patient will perform bathing with supervision/set-up within 7 day(s). 3.  Patient will perform grooming tasks standing with supervision/set-up within 7 day(s). 4.  Patient will perform toilet transfers with supervision/set-up within 7 day(s). 5.  Patient will perform all aspects of toileting with supervision/set-up within 7 day(s). 6.  Patient will participate in upper extremity therapeutic exercise/activities with supervision/set-up for 5 minutes within 7 day(s). 7.  Patient will utilize energy conservation techniques during functional activities with verbal cues within 7 day(s). Outcome: Progressing Towards Goal 
 
OCCUPATIONAL THERAPY TREATMENT Patient: Lubna Anders (89 y.o. male) Date: 2/28/2020 Diagnosis: Acute respiratory failure with hypoxia ([facility] [address] [J96.01] CHF (congestive heart failure) ([facility] [address] [I50.9] <principal problem not specified> Precautions: Fall Chart, occupational therapy assessment, plan of care, and goals were reviewed. ASSESSMENT Patient continues with skilled OT services and is progressing towards goals. Patient now on 3 lpm. He agreed to get from bed to chair, but declined need for other ADL. Between 98% and 89% post activity with HR in 110's. Reviewed energy conservation and monitored vitals during and post activity. Still has impaired endurance, though is continuing to improve. Will benefit from subacute rehab. He is pleasant and motivated. Current Level of Function Impacting Discharge (ADLs): p to min A Other factors to consider for discharge: none PLAN : 
Patient continues to benefit from skilled intervention to address the above impairments. Continue treatment per established plan of care. to address goals. Recommend with staff: up to chair, bsc Recommend next OT session: continue POC Recommendation for discharge: (in order for the patient to meet his/her long term goals) Therapy up to 5 days/week in SNF setting This discharge recommendation: 
Has been made in collaboration with the attending provider and/or case management IF patient discharges home will need the following DME: patient owns DME required for discharge SUBJECTIVE:  
Patient stated i'll get up and sit in the chair.  OBJECTIVE DATA SUMMARY:  
Cognitive/Behavioral Status: 
Neurologic State: Alert Cognition: Follows commands; Appropriate decision making Functional Mobility and Transfers for ADLs: 
Bed Mobility: 
  
 
Transfers: 
Sit to Stand: Contact guard assistance Bed to Chair: Contact guard assistance Balance: 
Impaired standing, does okay with RW to transfer to chair ADL Intervention: Lower Body Dressing Assistance Socks: Compensatory technique training(Patient reports he has sock aid and uses it at home) Patient instructed and indicated understanding energy conservation techniques to increase independence and safety during ADLs. Cued on pursed lip breathing to compensate from exertion. Pain: 
None reported Activity Tolerance:  
Fair and desaturates with exertion and requires oxygen Please refer to the flowsheet for vital signs taken during this treatment. After treatment patient left in no apparent distress:  
Sitting in chair and Call bell within reach COMMUNICATION/COLLABORATION:  
The patients plan of care was discussed with: Registered Nurse Sirisha Dior Time Calculation: 17 mins

## 2020-02-29 NOTE — PROGRESS NOTES
Problem: Falls - Risk of 
Goal: *Absence of Falls Description Document Brandy Navarrete Fall Risk and appropriate interventions in the flowsheet. Outcome: Progressing Towards Goal 
Note: Fall Risk Interventions: 
Mobility Interventions: Patient to call before getting OOB Mentation Interventions: Adequate sleep, hydration, pain control Medication Interventions: Patient to call before getting OOB Elimination Interventions: Call light in reach, Urinal in reach History of Falls Interventions: Door open when patient unattended Problem: Patient Education: Go to Patient Education Activity Goal: Patient/Family Education Outcome: Progressing Towards Goal 
  
Problem: Pressure Injury - Risk of 
Goal: *Prevention of pressure injury Description Document Carlos Enrique Scale and appropriate interventions in the flowsheet. Outcome: Progressing Towards Goal 
Note: Pressure Injury Interventions: 
Sensory Interventions: Keep linens dry and wrinkle-free, Pad between skin to skin Moisture Interventions: Apply protective barrier, creams and emollients, Maintain skin hydration (lotion/cream), Moisture barrier Activity Interventions: Assess need for specialty bed Mobility Interventions: Assess need for specialty bed, HOB 30 degrees or less, Turn and reposition approx. every two hours(pillow and wedges) Nutrition Interventions: Document food/fluid/supplement intake Friction and Shear Interventions: HOB 30 degrees or less Problem: Patient Education: Go to Patient Education Activity Goal: Patient/Family Education Outcome: Progressing Towards Goal 
  
Problem: Pulmonary Embolism Care Plan (Adult) Goal: *Improvement of existing pulmonary embolism Outcome: Progressing Towards Goal 
Goal: *Absence of bleeding Outcome: Progressing Towards Goal 
Goal: *Labs within defined limits Outcome: Progressing Towards Goal 
  
Problem: Pulmonary Embolism Care Plan (Adult) Goal: *Improvement of existing pulmonary embolism Outcome: Progressing Towards Goal 
Goal: *Absence of bleeding Outcome: Progressing Towards Goal 
Goal: *Labs within defined limits Outcome: Progressing Towards Goal 
  
Problem: Deep Venous Thrombosis - Risk of 
Goal: *Absence of deep venous thrombosis signs and symptoms(Stroke Metric) Outcome: Progressing Towards Goal 
Goal: *Absence of impaired coagulation signs and symptoms Outcome: Progressing Towards Goal 
Goal: *Knowledge of prescribed medications Outcome: Progressing Towards Goal 
Goal: *Absence of bleeding Outcome: Progressing Towards Goal 
  
Problem: Discharge Planning Goal: *Discharge to safe environment Outcome: Progressing Towards Goal 
Goal: *Knowledge of medication management Outcome: Progressing Towards Goal 
Goal: *Knowledge of discharge instructions Outcome: Progressing Towards Goal 
  
Problem: Patient Education: Go to Patient Education Activity Goal: Patient/Family Education Outcome: Progressing Towards Goal 
  
Problem: Breathing Pattern - Ineffective Goal: *Absence of hypoxia Outcome: Progressing Towards Goal 
Goal: *Use of effective breathing techniques Outcome: Progressing Towards Goal 
Goal: *PALLIATIVE CARE:  Alleviation of Dyspnea Outcome: Progressing Towards Goal 
  
Problem: Heart Failure: Day 2 Goal: Off Pathway (Use only if patient is Off Pathway) Outcome: Progressing Towards Goal 
Goal: Activity/Safety Outcome: Progressing Towards Goal 
Goal: Consults, if ordered Outcome: Progressing Towards Goal 
Goal: Diagnostic Test/Procedures Outcome: Progressing Towards Goal 
Goal: Nutrition/Diet Outcome: Progressing Towards Goal 
Goal: Discharge Planning Outcome: Progressing Towards Goal 
Goal: Medications Outcome: Progressing Towards Goal 
Goal: Respiratory Outcome: Progressing Towards Goal 
Goal: Treatments/Interventions/Procedures Outcome: Progressing Towards Goal 
Goal: Psychosocial 
 Outcome: Progressing Towards Goal 
Goal: *Oxygen saturation within defined limits Outcome: Progressing Towards Goal 
Goal: *Hemodynamically stable Outcome: Progressing Towards Goal 
Goal: *Optimal pain control at patient's stated goal 
Outcome: Progressing Towards Goal 
Goal: *Anxiety reduced or absent Outcome: Progressing Towards Goal 
Goal: *Demonstrates progressive activity Outcome: Progressing Towards Goal 
  
Problem: Heart Failure: Day 3 Goal: Off Pathway (Use only if patient is Off Pathway) Outcome: Progressing Towards Goal 
Goal: Activity/Safety Outcome: Progressing Towards Goal 
Goal: Diagnostic Test/Procedures Outcome: Progressing Towards Goal 
Goal: Nutrition/Diet Outcome: Progressing Towards Goal 
Goal: Discharge Planning Outcome: Progressing Towards Goal 
Goal: Medications Outcome: Progressing Towards Goal 
Goal: Respiratory Outcome: Progressing Towards Goal 
Goal: Treatments/Interventions/Procedures Outcome: Progressing Towards Goal 
Goal: Psychosocial 
Outcome: Progressing Towards Goal 
Goal: *Oxygen saturation within defined limits Outcome: Progressing Towards Goal 
Goal: *Hemodynamically stable Outcome: Progressing Towards Goal 
Goal: *Optimal pain control at patient's stated goal 
Outcome: Progressing Towards Goal 
Goal: *Anxiety reduced or absent Outcome: Progressing Towards Goal 
Goal: *Demonstrates progressive activity Outcome: Progressing Towards Goal 
  
Problem: Heart Failure: Day 4 Goal: Off Pathway (Use only if patient is Off Pathway) Outcome: Progressing Towards Goal 
Goal: Activity/Safety Outcome: Progressing Towards Goal 
Goal: Diagnostic Test/Procedures Outcome: Progressing Towards Goal 
Goal: Nutrition/Diet Outcome: Progressing Towards Goal 
Goal: Discharge Planning Outcome: Progressing Towards Goal 
Goal: Medications Outcome: Progressing Towards Goal 
Goal: Respiratory Outcome: Progressing Towards Goal 
Goal: Treatments/Interventions/Procedures Outcome: Progressing Towards Goal 
Goal: Psychosocial 
Outcome: Progressing Towards Goal 
Goal: *Oxygen saturation within defined limits Outcome: Progressing Towards Goal 
Goal: *Hemodynamically stable Outcome: Progressing Towards Goal 
Goal: *Optimal pain control at patient's stated goal 
Outcome: Progressing Towards Goal 
Goal: *Anxiety reduced or absent Outcome: Progressing Towards Goal 
Goal: *Demonstrates progressive activity Outcome: Progressing Towards Goal 
  
Problem: Heart Failure: Day 5 Goal: Off Pathway (Use only if patient is Off Pathway) Outcome: Progressing Towards Goal 
Goal: Activity/Safety Outcome: Progressing Towards Goal 
Goal: Diagnostic Test/Procedures Outcome: Progressing Towards Goal 
Goal: Nutrition/Diet Outcome: Progressing Towards Goal 
Goal: Discharge Planning Outcome: Progressing Towards Goal 
Goal: Medications Outcome: Progressing Towards Goal 
Goal: Respiratory Outcome: Progressing Towards Goal 
Goal: Treatments/Interventions/Procedures Outcome: Progressing Towards Goal 
Goal: Psychosocial 
Outcome: Progressing Towards Goal 
  
Problem: Heart Failure: Discharge Outcomes Goal: *Demonstrates ability to perform prescribed activity without shortness of breath or discomfort Outcome: Progressing Towards Goal 
Goal: *Left ventricular function assessment completed prior to or during stay, or planned for post-discharge Outcome: Progressing Towards Goal 
Goal: *ACEI prescribed if LVEF less than 40% and no contraindications or ARB prescribed Outcome: Progressing Towards Goal 
Goal: *Verbalizes understanding and describes prescribed diet Outcome: Progressing Towards Goal 
Goal: *Verbalizes understanding/describes prescribed medications Outcome: Progressing Towards Goal 
Goal: *Describes available resources and support systems Description 
(eg: Home Health, Palliative Care, Advanced Medical Directive) Outcome: Progressing Towards Goal 
 Goal: *Describes smoking cessation resources Outcome: Progressing Towards Goal 
Goal: *Understands and describes signs and symptoms to report to providers(Stroke Metric) Outcome: Progressing Towards Goal 
Goal: *Describes/verbalizes understanding of follow-up/return appt Description 
(eg: to physicians, diabetes treatment coordinator, and other resources Outcome: Progressing Towards Goal 
Goal: *Describes importance of continuing daily weights and changes to report to physician Outcome: Progressing Towards Goal

## 2020-02-29 NOTE — PROGRESS NOTES
Hospitalist Progress Note Olivia Drew MD 
Answering service: [phone] OR [phone] from in house phone Date of Service:  2020 NAME:  Vernon Mchugh :  1945 MRN:  [id number] PCP: Johny Marquez MD 
 
Chief Complaint:  
Chief Complaint Patient presents with  Shortness of Breath Admission Summary:  
 
Vernon Mchugh is a 69 y.o. male who presented with As per initial admission summary Vernon Mchugh is a 69 y.o.  with h/o COPD/ASTHMA, CAD and DM. Pt presents from [facility] for evaluation of shortness of breath. Patient prior was living at home with a family member, 2 or 3 weeks ago he developed shortness breath and then he was admitted to [facility], per sister he was treated with COPD exacerbation patient also was told that there were fluid in the chest but never have diagnosed with CHF. He was then discharged to [facility] for rehab 2 weeks ago was steroids and nebulizer. In SNF, Pt complains of increased shortness of breath with associated leg swelling, and chest pain for the past 3 days. Pt notes he had an oxygen saturation of 88%. Pt denies nausea, vomiting, fever, or abdominal pain. Patient said he can't fit his regular shoe anymore due to the leg and feet swelling. He also noticed increasing abdominal distention. Interval history / Subjective:  
Patient seen for Follow up of PE Patient seen and examined by the bedside, Labs, images and notes reviewed Resting with BiPAP. Denied any shortness of breath, wheezing, chest discomfort or congestion. Still high O2 requirements. No overnight events. No other concerns. Assessment & Plan: # Acute on chronic hypoxic and hypercarbic - mixed respiratory failure,  improving # Right UL PE # Localized Rigth LL consolidation # Severe COPD exacerbation # Pulmonary hypertension per Echo : EF 60% no MR or AS PASP 66 RV nml 
-Combined complex respiratory issues -Still very high O2 requirement in later part of the day but better at rest, down to 4-6LPM 
-ABG 2/27 noted. ?baseline elevated hypercarbia. -Pulmonology on board. Trilogy discussed with patient and would depend on disposition 
-Hematology on board. Per recommendations, lovenox to Eliquis 10mg BID for 7 days, then 5mg BID x 3-6 months. 
-Outpatient hematology follow up in the clinic 
-Outpatient Pulmonology follow up in the clinic 
-Continue NIV-BIPAP QHS and PRN during the day time 
-Levaquin 5 days for possible PNA 
-Prednisone with slow taper 
-Duenebs and supportive care 
-Trilogy order on discharge to SNF and would need at home as well # Respiratory acidosis with metabolic alkalosis 
-Complex driving forces with diuretics, COPD and other pulmological etiologies -BiPAP as noted above # DM2, mild yperglycemia 
-No recent A1c 
-Likely steroid driven 
-SSI only # Abdominal distention 
-No ascites 
-Large right renal cyst 
-Urology consulted, need outpatient follow up # H/o seizure:  
-continue home meds Code status: Full code DDVT Px: Eliquis Care plan discussed with: Patient/ Family, Nurse Disposition: SNF Anticipated discharge: Once O2 requirement is improved, >48hrs Hospital Problems  Date Reviewed: 3/16/2018 Codes Class Noted POA Acute respiratory failure with hypoxia [facility]) ICD-10-CM: J96.01 
ICD-9-CM: 518.81  2/20/2020 Unknown Review of Systems: A comprehensive review of systems was negative except for that written in the HPI. Physical Examination:  
 
Visit Vitals /89 Pulse (!) 108 Temp 97.9 °F (36.6 °C) Resp 24 Ht 6' 2\" (1.88 m) Wt 73.9 kg (162 lb 14.4 oz) SpO2 95% BMI 20.92 kg/m² General:  Alert, cooperative, no distress, appears stated age. Head:  Normocephalic, without obvious abnormality, atraumatic.   
Lungs:    Significantly tight AE with reduced breath sounds, no wheezing/ rhonchi/ rales, not much changed today Heart:  Regular rate and rhythm, S1, S2 normal, no murmur, click, rub or gallop. Abdomen:   Soft, non-tender. Bowel sounds normal. No masses,  No organomegaly. Extremities: Extremities normal, atraumatic, no cyanosis or edema Pulses: 2+ and symmetric all extremities. Vital Signs:  
 Last 24hrs VS reviewed since prior progress note. Most recent are: 
 
Visit Vitals /89 Pulse (!) 108 Temp 97.9 °F (36.6 °C) Resp 24 Ht 6' 2\" (1.88 m) Wt 73.9 kg (162 lb 14.4 oz) SpO2 95% BMI 20.92 kg/m² Intake/Output Summary (Last 24 hours) at 2020 1055 Last data filed at 2020 1500 Gross per 24 hour Intake 240 ml Output  Net 240 ml Tmax:  Temp (24hrs), Av.9 °F (36.6 °C), Min:97.5 °F (36.4 °C), Max:98.2 °F (36.8 °C) Data Review:  
Data reviewed by myself: 
Cta Chest W Or W Wo Cont Result Date: 2020 EXAM:  CTA CHEST W OR W WO CONT INDICATION:   SOB COMPARISON: None. TECHNIQUE: Precontrast  images were obtained to localize the volume for acquisition. Multislice helical CT arteriography was performed from the diaphragm to the thoracic inlet during uneventful rapid bolus of 100 cc Isovue-370. Lung and soft tissue windows were generated. Coronal and sagittal images were generated and 3D post processing consisting of coronal maximum intensity images was performed. CT dose reduction was achieved through use of a standardized protocol tailored for this examination and automatic exposure control for dose modulation. FINDINGS: CHEST: THYROID: No nodule. MEDIASTINUM: No mass or lymphadenopathy. LEV: No mass or lymphadenopathy. THORACIC AORTA: Atherosclerotic without evidence of aneurysm. MAIN PULMONARY ARTERY: Pulmonary emboli right upper lobe branches. TRACHEA/BRONCHI: Patent. ESOPHAGUS: No wall thickening or dilatation. HEART: Normal in size. PLEURA: No effusion or pneumothorax. LUNGS: Emphysematous changes. Focal consolidation right lower lobe. Left basilar atelectasis. INCIDENTALLY IMAGED UPPER ABDOMEN: 7.7 cm right renal cyst. 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 Endograft is partially visualized. Mabeline Sink BONES: No destructive bone lesion. IMPRESSION: Right upper lobe pulmonary emboli. Localized consolidation right lower lobe. Left basilar atelectasis. The findings were called to the patient's nurse on 2/20/2020 at 6:39 PM by myself. [address] [facility] Result Date: 2/21/2020 INDICATION: Abdominal distention COMPARISON: None TECHNIQUE: Routine ultrasound images of the abdomen were obtained. FINDINGS: GALLBLADDER: No cholecystolithiasis, gallbladder wall thickening, or pericholecystic fluid. COMMON BILE DUCT: 3 mm in diameter. No evidence of choledocholithiasis. LIVER: Normal in size. Normal echogenicity. No focal hepatic mass or intrahepatic biliary dilatation. MAIN PORTAL VEIN: Patent. Hepatopetal flow direction. PANCREAS: Obscured by overlying bowel gas. RIGHT KIDNEY: 12.8 cm in length. 8.2 cm cyst. No hydronephrosis. No evidence of mass or calculus. LEFT KIDNEY: 11.5 cm in length. No hydronephrosis. No evidence of mass or calculus. SPLEEN: Obscured by overlying bowel gas. AORTA: Obscured by overlying bowel gas. INFERIOR VENA CAVA: Patent. OTHER: No ascites. IMPRESSION: Large right renal cyst. Otherwise unremarkable abdominal ultrasound. Xr Chest [facility] Result Date: 2/20/2020 INDICATION:  SOB. History of chronic asthma. Prostate cancer. COPD. EXAM: Chest single view. COMPARISON: 9/10/2017. FINDINGS: A single frontal view of the chest at 1419 hours shows bibasilar atelectasis or scar, with mild interstitial prominence increased since the prior study. .  The heart, mediastinum and pulmonary vasculature are stable . The bony thorax is unremarkable for age. . Port-A-Cath device on the right is stable. IMPRESSION: Bibasilar atelectasis or scar with interstitial prominence. Correlate for developing interstitial edema or bibasilar inflammatory infiltrate in this patient with COPD and chronic asthma. .  . No results found for: SDES No results found for: CULT All Micro Results None Labs: reviewed by myself. Recent Labs  
  02/29/20 
0435 02/28/20 
0404 WBC 8.8 8.6 HGB 10.7* 10.9* HCT 36.5* 37.0 * 418* Recent Labs  
  02/29/20 
0435 02/28/20 
0404 02/27/20 
8863  140 136  
K 3.8 3.5 5.1  102 103 CO2 34* 33* 30  
BUN 20 20 24* CREA 0.58* 0.60* 0.73 * 119* 133* CA 9.3 9.7 9.8 MG  --  2.1 2.1 No results for input(s): SGOT, GPT, ALT, AP, TBIL, TBILI, TP, ALB, GLOB, GGT, AML, LPSE in the last 72 hours. No lab exists for component: AMYP, HLPSE No results for input(s): INR, PTP, APTT, INREXT, INREXT in the last 72 hours. No results for input(s): FE, TIBC, PSAT, FERR in the last 72 hours. No results found for: FOL, RBCF No results for input(s): PH, PCO2, PO2 in the last 72 hours. Recent Labs  
  02/27/20 
9867  TROIQ <0.05 Lab Results Component Value Date/Time Cholesterol, total 187 09/11/2017 05:53 AM  
 HDL Cholesterol 63 09/11/2017 05:53 AM  
 LDL, calculated 109 (H) 09/11/2017 05:53 AM  
 Triglyceride 75 09/11/2017 05:53 AM  
 CHOL/HDL Ratio 3.0 09/11/2017 05:53 AM  
 
Lab Results Component Value Date/Time Glucose (POC) 127 (H) 02/29/2020 08:27 AM  
 Glucose (POC) 165 (H) 02/28/2020 10:03 PM  
 Glucose (POC) 240 (H) 02/28/2020 05:19 PM  
 Glucose (POC) 191 (H) 02/28/2020 12:10 PM  
 Glucose (POC) 135 (H) 02/28/2020 08:31 AM  
 
Lab Results Component Value Date/Time  Color YELLOW/STRAW 09/10/2017 10:09 PM  
 Appearance CLEAR 09/10/2017 10:09 PM  
 Specific gravity 1.010 09/10/2017 10:09 PM  
 pH (UA) 5.0 09/10/2017 10:09 PM  
 Protein NEGATIVE  09/10/2017 10:09 PM  
 Glucose NEGATIVE  09/10/2017 10:09 PM  
 Ketone NEGATIVE  09/10/2017 10:09 PM  
 Bilirubin NEGATIVE  09/10/2017 10:09 PM  
 Urobilinogen 0.2 09/10/2017 10:09 PM  
 Nitrites NEGATIVE  09/10/2017 10:09 PM  
 Leukocyte Esterase NEGATIVE  09/10/2017 10:09 PM  
 Epithelial cells MODERATE (A) 09/10/2017 10:09 PM  
 Bacteria NEGATIVE  09/10/2017 10:09 PM  
 WBC 5-10 09/10/2017 10:09 PM  
 RBC 0-5 09/10/2017 10:09 PM  
 
 
 
Medications Reviewed:  
 
Current Facility-Administered Medications Medication Dose Route Frequency  guaiFENesin (ROBITUSSIN) 100 mg/5 mL oral liquid 100 mg  100 mg Oral Q4H PRN  
 albuterol-ipratropium (DUO-NEB) 2.5 MG-0.5 MG/3 ML  3 mL Nebulization TID RT  
 arformoteroL (BROVANA) neb solution 15 mcg  15 mcg Nebulization BID RT  
 apixaban (ELIQUIS) tablet 5 mg  5 mg Oral BID  
 oxymetazoline (AFRIN) 0.05 % nasal spray 2 Spray  2 Spray Both Nostrils BID PRN  polyethylene glycol (MIRALAX) packet 17 g  17 g Oral DAILY PRN  
 diphenhydrAMINE (BENADRYL) capsule 25 mg  25 mg Oral Q6H PRN  
 sertraline (ZOLOFT) tablet 25 mg  25 mg Oral DAILY  levETIRAcetam (KEPPRA) tablet 500 mg  500 mg Oral BID  sodium chloride (NS) flush 5-40 mL  5-40 mL IntraVENous Q8H  
 sodium chloride (NS) flush 5-40 mL  5-40 mL IntraVENous PRN  
 acetaminophen (TYLENOL) tablet 650 mg  650 mg Oral Q4H PRN  
 ferrous sulfate tablet 325 mg  325 mg Oral ACB  hydroxyzine HCL (ATARAX) tablet 25 mg  25 mg Oral BID  montelukast (SINGULAIR) tablet 10 mg  10 mg Oral DAILY  pantoprazole (PROTONIX) tablet 40 mg  40 mg Oral ACB&D  
 budesonide (PULMICORT) 250 mcg/2ml nebulizer susp  250 mcg Nebulization BID RT  
 predniSONE (DELTASONE) tablet 20 mg  20 mg Oral DAILY WITH BREAKFAST  albuterol (PROVENTIL VENTOLIN) nebulizer solution 2.5 mg  2.5 mg Nebulization Q2H PRN  
 glucose chewable tablet 16 g  4 Tab Oral PRN  
 glucagon (GLUCAGEN) injection 1 mg  1 mg IntraMUSCular PRN  
 dextrose 10% infusion 0-250 mL  0-250 mL IntraVENous PRN  
  insulin lispro (HUMALOG) injection   SubCUTAneous AC&HS  
 
______________________________________________________________________ EXPECTED LENGTH OF STAY: 3d 19h ACTUAL LENGTH OF STAY:          9 Debbie Angel MD  
 
Patient's emergency contacts: 
Extended Emergency Contact Information Primary Emergency Contact: Melissa Agustin Address: [address] [place] [address] Phone: [phone] Mobile Phone: [phone] Relation: Sister

## 2020-02-29 NOTE — PROGRESS NOTES
Hospitalist Progress Note Patel Acosta MD 
Answering service: [phone] OR [phone] from in house phone Date of Service:  2020 NAME:  Betsy Moreno :  1945 MRN:  [id number] PCP: Charity Lucas MD 
 
Chief Complaint:  
Chief Complaint Patient presents with  Shortness of Breath Admission Summary:  
 
Betsy Moreno is a 69 y.o. male who presented with As per initial admission summary Betsy Moreno is a 69 y.o.  with h/o COPD/ASTHMA, CAD and DM. Pt presents from [facility] for evaluation of shortness of breath. Patient prior was living at home with a family member, 2 or 3 weeks ago he developed shortness breath and then he was admitted to [facility], per sister he was treated with COPD exacerbation patient also was told that there were fluid in the chest but never have diagnosed with CHF. He was then discharged to [facility] for rehab 2 weeks ago was steroids and nebulizer. In SNF, Pt complains of increased shortness of breath with associated leg swelling, and chest pain for the past 3 days. Pt notes he had an oxygen saturation of 88%. Pt denies nausea, vomiting, fever, or abdominal pain. Patient said he can't fit his regular shoe anymore due to the leg and feet swelling. He also noticed increasing abdominal distention. Interval history / Subjective:  
Patient seen for Follow up of PE Patient seen and examined by the bedside, Labs, images and notes reviewed Pt is sitting up in the chair after working with PT. Overnight needed BiPAP. ABG in AM noted. Denied any chest pain, SOB, palpitation. Gets easily SOB and desating in 80s on minimal exertion. O2 need down to 4LPM at the beginning of the day. Discussed with nursing staff. No acute overnight issues. No concerns. Assessment & Plan: # Acute on chronic hypoxic and hypercarbic - mixed respiratory failure,  improving # Right UL PE # Localized Rigth LL consolidation # Severe COPD exacerbation # Pulmonary hypertension per Echo 2/20: EF 60% no MR or AS PASP 66 RV nml 
-Combined complex respiratory issues 
-Still very high O2 requirement in later part of the day but better at rest, down to 4-6LPM 
-ABG 2/27 noted. ?baseline elevated hypercarbia. -Pulmonology on board. Trilogy discussed with patient and would depend on disposition 
-Hematology on board. Per recommendations, lovenox to Eliquis 10mg BID for 7 days, then 5mg BID x 3-6 months. 
-Outpatient hematology follow up in the clinic 
-Outpatient Pulmonology follow up in the clinic 
-Continue NIV-BIPAP QHS and PRN during the day time 
-Levaquin 5 days for possible PNA 
-Prednisone with slow taper 
-Duenebs and supportive care 
-Trilogy order on discharge to SNF and would need at home as well # Respiratory acidosis with metabolic alkalosis 
-Complex driving forces with diuretics, COPD and other pulmological etiologies -BiPAP as noted above # DM2, mild yperglycemia 
-No recent A1c 
-Likely steroid driven 
-SSI only # Abdominal distention 
-No ascites 
-Large right renal cyst 
-Urology consulted, need outpatient follow up # H/o seizure:  
-continue home meds Code status: Full code DDVT Px: Eliquis Care plan discussed with: Patient/ Family, Nurse Disposition: SNF Anticipated discharge: Once O2 requirement is improved, >48hrs Hospital Problems  Date Reviewed: 3/16/2018 Codes Class Noted POA Acute respiratory failure with hypoxia [facility]) ICD-10-CM: J96.01 
ICD-9-CM: 518.81  2/20/2020 Unknown Review of Systems: A comprehensive review of systems was negative except for that written in the HPI. Physical Examination:  
 
Visit Vitals /83 (BP 1 Location: Right arm, BP Patient Position: At rest;Sitting) Pulse 97 Temp 97.6 °F (36.4 °C) Resp 27 Ht 6' 2\" (1.88 m) Wt 70.4 kg (155 lb 4.8 oz) SpO2 95% BMI 19.94 kg/m² General:  Alert, cooperative, no distress, appears stated age. Head:  Normocephalic, without obvious abnormality, atraumatic. Lungs:    Significantly tight AE with reduced breath sounds, no wheezing/ rhonchi/ rales, not much changed today Heart:  Regular rate and rhythm, S1, S2 normal, no murmur, click, rub or gallop. Abdomen:   Soft, non-tender. Bowel sounds normal. No masses,  No organomegaly. Extremities: Extremities normal, atraumatic, no cyanosis or edema Pulses: 2+ and symmetric all extremities. Vital Signs:  
 Last 24hrs VS reviewed since prior progress note. Most recent are: 
 
Visit Vitals /83 (BP 1 Location: Right arm, BP Patient Position: At rest;Sitting) Pulse 97 Temp 97.6 °F (36.4 °C) Resp 27 Ht 6' 2\" (1.88 m) Wt 70.4 kg (155 lb 4.8 oz) SpO2 95% BMI 19.94 kg/m² Intake/Output Summary (Last 24 hours) at 2020 Last data filed at 2020 1500 Gross per 24 hour Intake 480 ml Output  Net 480 ml Tmax:  Temp (24hrs), Av.9 °F (36.6 °C), Min:97.6 °F (36.4 °C), Max:98.4 °F (36.9 °C) Data Review:  
Data reviewed by myself: 
Cta Chest W Or W Wo Cont Result Date: 2020 EXAM:  CTA CHEST W OR W WO CONT INDICATION:   SOB COMPARISON: None. TECHNIQUE: Precontrast  images were obtained to localize the volume for acquisition. Multislice helical CT arteriography was performed from the diaphragm to the thoracic inlet during uneventful rapid bolus of 100 cc Isovue-370. Lung and soft tissue windows were generated. Coronal and sagittal images were generated and 3D post processing consisting of coronal maximum intensity images was performed. CT dose reduction was achieved through use of a standardized protocol tailored for this examination and automatic exposure control for dose modulation. FINDINGS: CHEST: THYROID: No nodule. MEDIASTINUM: No mass or lymphadenopathy.  LEV: No mass or lymphadenopathy. THORACIC AORTA: Atherosclerotic without evidence of aneurysm. MAIN PULMONARY ARTERY: Pulmonary emboli right upper lobe branches. TRACHEA/BRONCHI: Patent. ESOPHAGUS: No wall thickening or dilatation. HEART: Normal in size. PLEURA: No effusion or pneumothorax. LUNGS: Emphysematous changes. Focal consolidation right lower lobe. Left basilar atelectasis. INCIDENTALLY IMAGED UPPER ABDOMEN: 7.7 cm right renal cyst. 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 Endograft is partially visualized. Marcus Plough BONES: No destructive bone lesion. IMPRESSION: Right upper lobe pulmonary emboli. Localized consolidation right lower lobe. Left basilar atelectasis. The findings were called to the patient's nurse on 2/20/2020 at 6:39 PM by myself. [place] Result Date: 2/21/2020 INDICATION: Abdominal distention COMPARISON: None TECHNIQUE: Routine ultrasound images of the abdomen were obtained. FINDINGS: GALLBLADDER: No cholecystolithiasis, gallbladder wall thickening, or pericholecystic fluid. COMMON BILE DUCT: 3 mm in diameter. No evidence of choledocholithiasis. LIVER: Normal in size. Normal echogenicity. No focal hepatic mass or intrahepatic biliary dilatation. MAIN PORTAL VEIN: Patent. Hepatopetal flow direction. PANCREAS: Obscured by overlying bowel gas. RIGHT KIDNEY: 12.8 cm in length. 8.2 cm cyst. No hydronephrosis. No evidence of mass or calculus. LEFT KIDNEY: 11.5 cm in length. No hydronephrosis. No evidence of mass or calculus. SPLEEN: Obscured by overlying bowel gas. AORTA: Obscured by overlying bowel gas. INFERIOR VENA CAVA: Patent. OTHER: No ascites. IMPRESSION: Large right renal cyst. Otherwise unremarkable abdominal ultrasound. Xr Chest [facility] Result Date: 2/20/2020 INDICATION:  SOB. History of chronic asthma. Prostate cancer. COPD. EXAM: Chest single view. COMPARISON: 9/10/2017.  FINDINGS: A single frontal view of the chest at 1419 hours shows bibasilar atelectasis or scar, with mild interstitial prominence increased since the prior study. .  The heart, mediastinum and pulmonary vasculature are stable . The bony thorax is unremarkable for age. . Port-A-Cath device on the right is stable. IMPRESSION: Bibasilar atelectasis or scar with interstitial prominence. Correlate for developing interstitial edema or bibasilar inflammatory infiltrate in this patient with COPD and chronic asthma. .  . No results found for: SDES No results found for: CULT All Micro Results None Labs: reviewed by myself. Recent Labs  
  02/28/20 
0404 02/27/20 
5333 WBC 8.6 8.2 HGB 10.9* 11.3* HCT 37.0 38.9 * 444* Recent Labs  
  02/28/20 
0404 02/27/20 
0352 02/26/20 
0401  136 136  
K 3.5 5.1 3.3*  
 103 99 CO2 33* 30 31 BUN 20 24* 22* CREA 0.60* 0.73 0.74 * 133* 163* CA 9.7 9.8 9.4 MG 2.1 2.1  -- No results for input(s): SGOT, GPT, ALT, AP, TBIL, TBILI, TP, ALB, GLOB, GGT, AML, LPSE in the last 72 hours. No lab exists for component: AMYP, HLPSE No results for input(s): INR, PTP, APTT, INREXT, INREXT in the last 72 hours. No results for input(s): FE, TIBC, PSAT, FERR in the last 72 hours. No results found for: FOL, RBCF No results for input(s): PH, PCO2, PO2 in the last 72 hours. Recent Labs  
  02/27/20 
6697  TROIQ <0.05 Lab Results Component Value Date/Time Cholesterol, total 187 09/11/2017 05:53 AM  
 HDL Cholesterol 63 09/11/2017 05:53 AM  
 LDL, calculated 109 (H) 09/11/2017 05:53 AM  
 Triglyceride 75 09/11/2017 05:53 AM  
 CHOL/HDL Ratio 3.0 09/11/2017 05:53 AM  
 
Lab Results Component Value Date/Time  Glucose (POC) 240 (H) 02/28/2020 05:19 PM  
 Glucose (POC) 191 (H) 02/28/2020 12:10 PM  
 Glucose (POC) 135 (H) 02/28/2020 08:31 AM  
 Glucose (POC) 203 (H) 02/27/2020 09:02 PM  
 Glucose (POC) 279 (H) 02/27/2020 05:49 PM  
 
 Lab Results Component Value Date/Time Color YELLOW/STRAW 09/10/2017 10:09 PM  
 Appearance CLEAR 09/10/2017 10:09 PM  
 Specific gravity 1.010 09/10/2017 10:09 PM  
 pH (UA) 5.0 09/10/2017 10:09 PM  
 Protein NEGATIVE  09/10/2017 10:09 PM  
 Glucose NEGATIVE  09/10/2017 10:09 PM  
 Ketone NEGATIVE  09/10/2017 10:09 PM  
 Bilirubin NEGATIVE  09/10/2017 10:09 PM  
 Urobilinogen 0.2 09/10/2017 10:09 PM  
 Nitrites NEGATIVE  09/10/2017 10:09 PM  
 Leukocyte Esterase NEGATIVE  09/10/2017 10:09 PM  
 Epithelial cells MODERATE (A) 09/10/2017 10:09 PM  
 Bacteria NEGATIVE  09/10/2017 10:09 PM  
 WBC 5-10 09/10/2017 10:09 PM  
 RBC 0-5 09/10/2017 10:09 PM  
 
 
 
Medications Reviewed:  
 
Current Facility-Administered Medications Medication Dose Route Frequency  albuterol-ipratropium (DUO-NEB) 2.5 MG-0.5 MG/3 ML  3 mL Nebulization TID RT  
 arformoteroL (BROVANA) neb solution 15 mcg  15 mcg Nebulization BID RT  
 [START ON 2/29/2020] apixaban (ELIQUIS) tablet 5 mg  5 mg Oral BID  
 oxymetazoline (AFRIN) 0.05 % nasal spray 2 Spray  2 Spray Both Nostrils BID PRN  polyethylene glycol (MIRALAX) packet 17 g  17 g Oral DAILY PRN  
 diphenhydrAMINE (BENADRYL) capsule 25 mg  25 mg Oral Q6H PRN  
 sertraline (ZOLOFT) tablet 25 mg  25 mg Oral DAILY  apixaban (ELIQUIS) tablet 10 mg  10 mg Oral BID  levETIRAcetam (KEPPRA) tablet 500 mg  500 mg Oral BID  sodium chloride (NS) flush 5-40 mL  5-40 mL IntraVENous Q8H  
 sodium chloride (NS) flush 5-40 mL  5-40 mL IntraVENous PRN  
 acetaminophen (TYLENOL) tablet 650 mg  650 mg Oral Q4H PRN  
 ferrous sulfate tablet 325 mg  325 mg Oral ACB  hydroxyzine HCL (ATARAX) tablet 25 mg  25 mg Oral BID  montelukast (SINGULAIR) tablet 10 mg  10 mg Oral DAILY  pantoprazole (PROTONIX) tablet 40 mg  40 mg Oral ACB&D  
 budesonide (PULMICORT) 250 mcg/2ml nebulizer susp  250 mcg Nebulization BID RT  
  predniSONE (DELTASONE) tablet 20 mg  20 mg Oral DAILY WITH BREAKFAST  albuterol (PROVENTIL VENTOLIN) nebulizer solution 2.5 mg  2.5 mg Nebulization Q2H PRN  
 glucose chewable tablet 16 g  4 Tab Oral PRN  
 glucagon (GLUCAGEN) injection 1 mg  1 mg IntraMUSCular PRN  
 dextrose 10% infusion 0-250 mL  0-250 mL IntraVENous PRN  
 insulin lispro (HUMALOG) injection   SubCUTAneous AC&HS  
 
______________________________________________________________________ EXPECTED LENGTH OF STAY: 3d 19h ACTUAL LENGTH OF STAY:          8 Alessandro Alberto MD  
 
Patient's emergency contacts: 
Extended Emergency Contact Information Primary Emergency Contact: Lonnie Romero Address: [address] [place] [address] Phone: [phone] Mobile Phone: [phone] Relation: Sister

## 2020-02-29 NOTE — PROGRESS NOTES
Problem: Falls - Risk of 
Goal: *Absence of Falls Description Document Mary Jhaveri Fall Risk and appropriate interventions in the flowsheet. Outcome: Progressing Towards Goal 
Note: Fall Risk Interventions: 
Mobility Interventions: Communicate number of staff needed for ambulation/transfer, Patient to call before getting OOB Mentation Interventions: Adequate sleep, hydration, pain control, Gait belt with transfers/ambulation Medication Interventions: Bed/chair exit alarm, Patient to call before getting OOB Elimination Interventions: Bed/chair exit alarm, Call light in reach, Patient to call for help with toileting needs History of Falls Interventions: Consult care management for discharge planning, Room close to nurse's station, Utilize gait belt for transfer/ambulation Problem: Heart Failure: Day 2 Goal: Medications Outcome: Progressing Towards Goal 
 
Pt placed on BiPaP overnight. No acute events at this time. Bedside and Verbal shift change report given to JUAN Dorado RN (oncoming nurse) by Lucho Arellano RN (offgoing nurse). Report included the following information SBAR, Kardex, Procedure Summary, Intake/Output, MAR, Recent Results and Med Rec Status.

## 2020-03-01 NOTE — PROGRESS NOTES
Hospitalist Progress Note Gina Millard MD 
Answering service: [phone] OR [phone] from in house phone Date of Service:  3/1/2020 NAME:  Sarah Bonner :  1945 MRN:  [id number] PCP: April Carlson MD 
 
Chief Complaint:  
Chief Complaint Patient presents with  Shortness of Breath Admission Summary:  
 
Sarah Bonner is a 69 y.o. male who presented with As per initial admission summary Sarah Bonner is a 69 y.o.  with h/o COPD/ASTHMA, CAD and DM. Pt presents from [facility] for evaluation of shortness of breath. Patient prior was living at home with a family member, 2 or 3 weeks ago he developed shortness breath and then he was admitted to [facility], per sister he was treated with COPD exacerbation patient also was told that there were fluid in the chest but never have diagnosed with CHF. He was then discharged to [facility] for rehab 2 weeks ago was steroids and nebulizer. In SNF, Pt complains of increased shortness of breath with associated leg swelling, and chest pain for the past 3 days. Pt notes he had an oxygen saturation of 88%. Pt denies nausea, vomiting, fever, or abdominal pain. Patient said he can't fit his regular shoe anymore due to the leg and feet swelling. He also noticed increasing abdominal distention. Interval history / Subjective:  
Patient seen for Follow up of PE Patient seen and examined by the bedside, Labs, images and notes reviewed Mild SOB with speaking but feeling better. Getting towards baseline. Thankful for our help in getting him better. Still high O2 requirements. No overnight events. No other concerns. Assessment & Plan: # Acute on chronic hypoxic and hypercarbic - mixed respiratory failure,  improving # Right UL PE # Localized Rigth LL consolidation # Severe COPD exacerbation # Pulmonary hypertension per Echo : EF 60% no MR or AS PASP 66 RV nml 
-Combined complex respiratory issues -Still very high O2 requirement in later part of the day but better at rest, down to 4-6LPM 
-ABG 2/27 noted. ?baseline elevated hypercarbia. -Pulmonology on board. Trilogy discussed with patient and would depend on disposition 
-Hematology on board. Per recommendations, lovenox to Eliquis 10mg BID for 7 days, then 5mg BID x 3-6 months. 
-Outpatient hematology follow up in the clinic 
-Outpatient Pulmonology follow up in the clinic 
-Continue NIV-BIPAP QHS and PRN during the day time 
-Levaquin 5 days for possible PNA, last 2/26 
-Prednisone with slow taper. Reduce Prednisone to 10mg daily 3/1/20. Further per pulm. -Duenebs and supportive care 
-Trilogy order on discharge to SNF and would need at home as well # Respiratory acidosis with metabolic alkalosis 
-Complex driving forces with diuretics, COPD and other pulmological etiologies -BiPAP as noted above # DM2, mild yperglycemia 
-No recent A1c 
-Likely steroid driven 
-SSI only # Abdominal distention 
-No ascites 
-Large right renal cyst 
-Urology consulted, need outpatient follow up # H/o seizure:  
-continue home meds Code status: Full code DDVT Px: Eliquis Care plan discussed with: Patient/ Family, Nurse Disposition: SNF Anticipated discharge: Once O2 requirement is improved, >48hrs Hospital Problems  Date Reviewed: 3/16/2018 Codes Class Noted POA Acute respiratory failure with hypoxia [facility]) ICD-10-CM: J96.01 
ICD-9-CM: 518.81  2/20/2020 Unknown Review of Systems: A comprehensive review of systems was negative except for that written in the HPI. Physical Examination:  
 
Visit Vitals /74 Pulse (!) 107 Temp 97.5 °F (36.4 °C) Resp 22 Ht 6' 2\" (1.88 m) Wt 71 kg (156 lb 8.4 oz) Comment: Weight discrepancy likely due to removing excess pillows SpO2 96% BMI 20.10 kg/m² General:  Alert, cooperative, no distress, appears stated age. Head:  Normocephalic, without obvious abnormality, atraumatic. Lungs:    Significantly tight AE with reduced breath sounds, no wheezing/ rhonchi/ rales, not much changed today Heart:  Regular rate and rhythm, S1, S2 normal, no murmur, click, rub or gallop. Abdomen:   Soft, non-tender. Bowel sounds normal. No masses,  No organomegaly. Extremities: Extremities normal, atraumatic, no cyanosis or edema Pulses: 2+ and symmetric all extremities. Vital Signs:  
 Last 24hrs VS reviewed since prior progress note. Most recent are: 
 
Visit Vitals /74 Pulse (!) 107 Temp 97.5 °F (36.4 °C) Resp 22 Ht 6' 2\" (1.88 m) Wt 71 kg (156 lb 8.4 oz) SpO2 96% BMI 20.10 kg/m² Intake/Output Summary (Last 24 hours) at 3/1/2020 1527 Last data filed at 3/1/2020 1230 Gross per 24 hour Intake 720 ml Output 900 ml Net -180 ml Tmax:  Temp (24hrs), Av.9 °F (36.6 °C), Min:96.7 °F (35.9 °C), Max:98.5 °F (36.9 °C) Data Review:  
Data reviewed by myself: 
Cta Chest W Or W Wo Cont Result Date: 2020 EXAM:  CTA CHEST W OR W WO CONT INDICATION:   SOB COMPARISON: None. TECHNIQUE: Precontrast  images were obtained to localize the volume for acquisition. Multislice helical CT arteriography was performed from the diaphragm to the thoracic inlet during uneventful rapid bolus of 100 cc Isovue-370. Lung and soft tissue windows were generated. Coronal and sagittal images were generated and 3D post processing consisting of coronal maximum intensity images was performed. CT dose reduction was achieved through use of a standardized protocol tailored for this examination and automatic exposure control for dose modulation. FINDINGS: CHEST: THYROID: No nodule. MEDIASTINUM: No mass or lymphadenopathy. LEV: No mass or lymphadenopathy. THORACIC AORTA: Atherosclerotic without evidence of aneurysm.  MAIN PULMONARY ARTERY: Pulmonary emboli right upper lobe branches. TRACHEA/BRONCHI: Patent. ESOPHAGUS: No wall thickening or dilatation. HEART: Normal in size. PLEURA: No effusion or pneumothorax. LUNGS: Emphysematous changes. Focal consolidation right lower lobe. Left basilar atelectasis. INCIDENTALLY IMAGED UPPER ABDOMEN: 7.7 cm right renal cyst. 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 Endograft is partially visualized. Ruddy Keel BONES: No destructive bone lesion. IMPRESSION: Right upper lobe pulmonary emboli. Localized consolidation right lower lobe. Left basilar atelectasis. The findings were called to the patient's nurse on 2/20/2020 at 6:39 PM by myself. [place] [address] Us Abd Comp Result Date: 2/21/2020 INDICATION: Abdominal distention COMPARISON: None TECHNIQUE: Routine ultrasound images of the abdomen were obtained. FINDINGS: GALLBLADDER: No cholecystolithiasis, gallbladder wall thickening, or pericholecystic fluid. COMMON BILE DUCT: 3 mm in diameter. No evidence of choledocholithiasis. LIVER: Normal in size. Normal echogenicity. No focal hepatic mass or intrahepatic biliary dilatation. MAIN PORTAL VEIN: Patent. Hepatopetal flow direction. PANCREAS: Obscured by overlying bowel gas. RIGHT KIDNEY: 12.8 cm in length. 8.2 cm cyst. No hydronephrosis. No evidence of mass or calculus. LEFT KIDNEY: 11.5 cm in length. No hydronephrosis. No evidence of mass or calculus. SPLEEN: Obscured by overlying bowel gas. AORTA: Obscured by overlying bowel gas. INFERIOR VENA CAVA: Patent. OTHER: No ascites. IMPRESSION: Large right renal cyst. Otherwise unremarkable abdominal ultrasound. Xr Chest [facility] Result Date: 2/20/2020 INDICATION:  SOB. History of chronic asthma. Prostate cancer. COPD. EXAM: Chest single view. COMPARISON: 9/10/2017. FINDINGS: A single frontal view of the chest at 1419 hours shows bibasilar atelectasis or scar, with mild interstitial prominence increased since the prior study. .  The heart, mediastinum and pulmonary vasculature are stable . The bony thorax is unremarkable for age. . Port-A-Cath device on the right is stable. IMPRESSION: Bibasilar atelectasis or scar with interstitial prominence. Correlate for developing interstitial edema or bibasilar inflammatory infiltrate in this patient with COPD and chronic asthma. .  . No results found for: SDES No results found for: CULT All Micro Results None Labs: reviewed by myself. Recent Labs 03/01/20 
6774 02/29/20 
2397 WBC 12.0* 8.8 HGB 12.2 10.7* HCT 41.5 36.5*  
* 418* Recent Labs 03/01/20 
7649 02/29/20 
6842 02/28/20 
0404  140 140  
K 3.6 3.8 3.5  104 102 CO2 37* 34* 33* BUN 16 20 20 CREA 0.61* 0.58* 0.60* * 152* 119* CA 10.3* 9.3 9.7 MG 2.1  --  2.1 No results for input(s): SGOT, GPT, ALT, AP, TBIL, TBILI, TP, ALB, GLOB, GGT, AML, LPSE in the last 72 hours. No lab exists for component: AMYP, HLPSE No results for input(s): INR, PTP, APTT, INREXT, INREXT in the last 72 hours. No results for input(s): FE, TIBC, PSAT, FERR in the last 72 hours. No results found for: FOL, RBCF No results for input(s): PH, PCO2, PO2 in the last 72 hours. No results for input(s): CPK, CKNDX, TROIQ in the last 72 hours. No lab exists for component: CPKMB Lab Results Component Value Date/Time Cholesterol, total 187 09/11/2017 05:53 AM  
 HDL Cholesterol 63 09/11/2017 05:53 AM  
 LDL, calculated 109 (H) 09/11/2017 05:53 AM  
 Triglyceride 75 09/11/2017 05:53 AM  
 CHOL/HDL Ratio 3.0 09/11/2017 05:53 AM  
 
Lab Results Component Value Date/Time Glucose (POC) 186 (H) 03/01/2020 11:54 AM  
 Glucose (POC) 113 (H) 03/01/2020 08:08 AM  
 Glucose (POC) 220 (H) 02/29/2020 08:48 PM  
 Glucose (POC) 155 (H) 02/29/2020 04:52 PM  
 Glucose (POC) 173 (H) 02/29/2020 12:30 PM  
 
Lab Results Component Value Date/Time  Color YELLOW/STRAW 09/10/2017 10:09 PM  
 Appearance CLEAR 09/10/2017 10:09 PM  
 Specific gravity 1.010 09/10/2017 10:09 PM  
 pH (UA) 5.0 09/10/2017 10:09 PM  
 Protein NEGATIVE  09/10/2017 10:09 PM  
 Glucose NEGATIVE  09/10/2017 10:09 PM  
 Ketone NEGATIVE  09/10/2017 10:09 PM  
 Bilirubin NEGATIVE  09/10/2017 10:09 PM  
 Urobilinogen 0.2 09/10/2017 10:09 PM  
 Nitrites NEGATIVE  09/10/2017 10:09 PM  
 Leukocyte Esterase NEGATIVE  09/10/2017 10:09 PM  
 Epithelial cells MODERATE (A) 09/10/2017 10:09 PM  
 Bacteria NEGATIVE  09/10/2017 10:09 PM  
 WBC 5-10 09/10/2017 10:09 PM  
 RBC 0-5 09/10/2017 10:09 PM  
 
 
 
Medications Reviewed:  
 
Current Facility-Administered Medications Medication Dose Route Frequency  guaiFENesin (ROBITUSSIN) 100 mg/5 mL oral liquid 100 mg  100 mg Oral Q4H PRN  
 albuterol-ipratropium (DUO-NEB) 2.5 MG-0.5 MG/3 ML  3 mL Nebulization TID RT  
 arformoteroL (BROVANA) neb solution 15 mcg  15 mcg Nebulization BID RT  
 apixaban (ELIQUIS) tablet 5 mg  5 mg Oral BID  
 oxymetazoline (AFRIN) 0.05 % nasal spray 2 Spray  2 Spray Both Nostrils BID PRN  polyethylene glycol (MIRALAX) packet 17 g  17 g Oral DAILY PRN  
 diphenhydrAMINE (BENADRYL) capsule 25 mg  25 mg Oral Q6H PRN  
 sertraline (ZOLOFT) tablet 25 mg  25 mg Oral DAILY  levETIRAcetam (KEPPRA) tablet 500 mg  500 mg Oral BID  sodium chloride (NS) flush 5-40 mL  5-40 mL IntraVENous Q8H  
 sodium chloride (NS) flush 5-40 mL  5-40 mL IntraVENous PRN  
 acetaminophen (TYLENOL) tablet 650 mg  650 mg Oral Q4H PRN  
 ferrous sulfate tablet 325 mg  325 mg Oral ACB  hydroxyzine HCL (ATARAX) tablet 25 mg  25 mg Oral BID  montelukast (SINGULAIR) tablet 10 mg  10 mg Oral DAILY  pantoprazole (PROTONIX) tablet 40 mg  40 mg Oral ACB&D  
 budesonide (PULMICORT) 250 mcg/2ml nebulizer susp  250 mcg Nebulization BID RT  
 predniSONE (DELTASONE) tablet 20 mg  20 mg Oral DAILY WITH BREAKFAST  albuterol (PROVENTIL VENTOLIN) nebulizer solution 2.5 mg  2.5 mg Nebulization Q2H PRN  
 glucose chewable tablet 16 g  4 Tab Oral PRN  
 glucagon (GLUCAGEN) injection 1 mg  1 mg IntraMUSCular PRN  
 dextrose 10% infusion 0-250 mL  0-250 mL IntraVENous PRN  
 insulin lispro (HUMALOG) injection   SubCUTAneous AC&HS  
 
______________________________________________________________________ EXPECTED LENGTH OF STAY: 3d 19h ACTUAL LENGTH OF STAY:          10 
 
            
Desire Davis MD  
 
Patient's emergency contacts: 
Extended Emergency Contact Information Primary Emergency Contact: Tressa Min Address: [address] [place] [address] Home Phone: [phone] Mobile Phone: [phone] Relation: Sister

## 2020-03-01 NOTE — PROGRESS NOTES
Problem: Falls - Risk of 
Goal: *Absence of Falls Description Document Clide Failing Fall Risk and appropriate interventions in the flowsheet. Outcome: Progressing Towards Goal 
Note: Fall Risk Interventions: 
Mobility Interventions: Patient to call before getting OOB Mentation Interventions: Door open when patient unattended Medication Interventions: Patient to call before getting OOB Elimination Interventions: Call light in reach History of Falls Interventions: Door open when patient unattended Problem: Patient Education: Go to Patient Education Activity Goal: Patient/Family Education Outcome: Progressing Towards Goal 
  
Problem: Pressure Injury - Risk of 
Goal: *Prevention of pressure injury Description Document Carlos Enrique Scale and appropriate interventions in the flowsheet. Outcome: Progressing Towards Goal 
Note: Pressure Injury Interventions: 
Sensory Interventions: Float heels Moisture Interventions: Apply protective barrier, creams and emollients Activity Interventions: Increase time out of bed Mobility Interventions: HOB 30 degrees or less Nutrition Interventions: Document food/fluid/supplement intake Friction and Shear Interventions: HOB 30 degrees or less Problem: Patient Education: Go to Patient Education Activity Goal: Patient/Family Education Outcome: Progressing Towards Goal 
  
Problem: Pulmonary Embolism Care Plan (Adult) Goal: *Improvement of existing pulmonary embolism Outcome: Progressing Towards Goal 
Goal: *Absence of bleeding Outcome: Progressing Towards Goal 
Goal: *Labs within defined limits Outcome: Progressing Towards Goal 
  
Problem: Pulmonary Embolism Care Plan (Adult) Goal: *Improvement of existing pulmonary embolism Outcome: Progressing Towards Goal 
Goal: *Absence of bleeding Outcome: Progressing Towards Goal 
Goal: *Labs within defined limits Outcome: Progressing Towards Goal 
  
Problem: Deep Venous Thrombosis - Risk of 
 Goal: *Absence of deep venous thrombosis signs and symptoms(Stroke Metric) Outcome: Progressing Towards Goal 
Goal: *Absence of impaired coagulation signs and symptoms Outcome: Progressing Towards Goal 
Goal: *Knowledge of prescribed medications Outcome: Progressing Towards Goal 
Goal: *Absence of bleeding Outcome: Progressing Towards Goal 
  
Problem: Discharge Planning Goal: *Discharge to safe environment Outcome: Progressing Towards Goal 
Goal: *Knowledge of medication management Outcome: Progressing Towards Goal 
Goal: *Knowledge of discharge instructions Outcome: Progressing Towards Goal 
  
Problem: Patient Education: Go to Patient Education Activity Goal: Patient/Family Education Outcome: Progressing Towards Goal 
  
Problem: Breathing Pattern - Ineffective Goal: *Absence of hypoxia Outcome: Progressing Towards Goal 
Goal: *Use of effective breathing techniques Outcome: Progressing Towards Goal 
Goal: *PALLIATIVE CARE:  Alleviation of Dyspnea Outcome: Progressing Towards Goal 
  
Problem: Heart Failure: Day 2 Goal: Off Pathway (Use only if patient is Off Pathway) Outcome: Progressing Towards Goal 
Goal: Activity/Safety Outcome: Progressing Towards Goal 
Goal: Consults, if ordered Outcome: Progressing Towards Goal 
Goal: Diagnostic Test/Procedures Outcome: Progressing Towards Goal 
Goal: Nutrition/Diet Outcome: Progressing Towards Goal 
Goal: Discharge Planning Outcome: Progressing Towards Goal 
Goal: Medications Outcome: Progressing Towards Goal 
Goal: Respiratory Outcome: Progressing Towards Goal 
Goal: Treatments/Interventions/Procedures Outcome: Progressing Towards Goal 
Goal: Psychosocial 
Outcome: Progressing Towards Goal 
Goal: *Oxygen saturation within defined limits Outcome: Progressing Towards Goal 
Goal: *Hemodynamically stable Outcome: Progressing Towards Goal 
Goal: *Optimal pain control at patient's stated goal 
Outcome: Progressing Towards Goal 
 Goal: *Anxiety reduced or absent Outcome: Progressing Towards Goal 
Goal: *Demonstrates progressive activity Outcome: Progressing Towards Goal 
  
Problem: Heart Failure: Day 3 Goal: Off Pathway (Use only if patient is Off Pathway) Outcome: Progressing Towards Goal 
Goal: Activity/Safety Outcome: Progressing Towards Goal 
Goal: Diagnostic Test/Procedures Outcome: Progressing Towards Goal 
Goal: Nutrition/Diet Outcome: Progressing Towards Goal 
Goal: Discharge Planning Outcome: Progressing Towards Goal 
Goal: Medications Outcome: Progressing Towards Goal 
Goal: Respiratory Outcome: Progressing Towards Goal 
Goal: Treatments/Interventions/Procedures Outcome: Progressing Towards Goal 
Goal: Psychosocial 
Outcome: Progressing Towards Goal 
Goal: *Oxygen saturation within defined limits Outcome: Progressing Towards Goal 
Goal: *Hemodynamically stable Outcome: Progressing Towards Goal 
Goal: *Optimal pain control at patient's stated goal 
Outcome: Progressing Towards Goal 
Goal: *Anxiety reduced or absent Outcome: Progressing Towards Goal 
Goal: *Demonstrates progressive activity Outcome: Progressing Towards Goal 
  
Problem: Heart Failure: Day 4 Goal: Off Pathway (Use only if patient is Off Pathway) Outcome: Progressing Towards Goal 
Goal: Activity/Safety Outcome: Progressing Towards Goal 
Goal: Diagnostic Test/Procedures Outcome: Progressing Towards Goal 
Goal: Nutrition/Diet Outcome: Progressing Towards Goal 
Goal: Discharge Planning Outcome: Progressing Towards Goal 
Goal: Medications Outcome: Progressing Towards Goal 
Goal: Respiratory Outcome: Progressing Towards Goal 
Goal: Treatments/Interventions/Procedures Outcome: Progressing Towards Goal 
Goal: Psychosocial 
Outcome: Progressing Towards Goal 
Goal: *Oxygen saturation within defined limits Outcome: Progressing Towards Goal 
Goal: *Hemodynamically stable Outcome: Progressing Towards Goal 
 Goal: *Optimal pain control at patient's stated goal 
Outcome: Progressing Towards Goal 
Goal: *Anxiety reduced or absent Outcome: Progressing Towards Goal 
Goal: *Demonstrates progressive activity Outcome: Progressing Towards Goal 
  
Problem: Heart Failure: Day 5 Goal: Off Pathway (Use only if patient is Off Pathway) Outcome: Progressing Towards Goal 
Goal: Activity/Safety Outcome: Progressing Towards Goal 
Goal: Diagnostic Test/Procedures Outcome: Progressing Towards Goal 
Goal: Nutrition/Diet Outcome: Progressing Towards Goal 
Goal: Discharge Planning Outcome: Progressing Towards Goal 
Goal: Medications Outcome: Progressing Towards Goal 
Goal: Respiratory Outcome: Progressing Towards Goal 
Goal: Treatments/Interventions/Procedures Outcome: Progressing Towards Goal 
Goal: Psychosocial 
Outcome: Progressing Towards Goal 
  
Problem: Heart Failure: Discharge Outcomes Goal: *Demonstrates ability to perform prescribed activity without shortness of breath or discomfort Outcome: Progressing Towards Goal 
Goal: *Left ventricular function assessment completed prior to or during stay, or planned for post-discharge Outcome: Progressing Towards Goal 
Goal: *ACEI prescribed if LVEF less than 40% and no contraindications or ARB prescribed Outcome: Progressing Towards Goal 
Goal: *Verbalizes understanding and describes prescribed diet Outcome: Progressing Towards Goal 
Goal: *Verbalizes understanding/describes prescribed medications Outcome: Progressing Towards Goal 
Goal: *Describes available resources and support systems Description 
(eg: Home Health, Palliative Care, [employer] Directive) Outcome: Progressing Towards Goal 
Goal: *Describes smoking cessation resources Outcome: Progressing Towards Goal 
Goal: *Understands and describes signs and symptoms to report to providers(Stroke Metric) Outcome: Progressing Towards Goal 
Goal: *Describes/verbalizes understanding of follow-up/return appt Description 
(eg: to physicians, diabetes treatment coordinator, and other resources Outcome: Progressing Towards Goal 
Goal: *Describes importance of continuing daily weights and changes to report to physician Outcome: Progressing Towards Goal

## 2020-03-01 NOTE — PROGRESS NOTES
Problem: Pulmonary Embolism Care Plan (Adult) Goal: *Improvement of existing pulmonary embolism Outcome: Progressing Towards Goal 
  
Problem: Deep Venous Thrombosis - Risk of 
Goal: *Absence of impaired coagulation signs and symptoms Outcome: Progressing Towards Goal 
 
Pt requested bipap overnight. Pt resting in bed. No acute events at this time. Bedside and Verbal shift change report given to [employer], Rn (oncoming nurse) by Jeni Gonsalez (offgoing nurse). Report included the following information SBAR, Kardex, Procedure Summary, Intake/Output, MAR and Recent Results.

## 2020-03-02 NOTE — PROGRESS NOTES
19:30 Bedside shift change report given to Daniel Painter RN (oncoming nurse) by Elizabeth Melendez, Student Nurse (offgoing nurse). Report included the following information SBAR, Intake/Output, MAR, Recent Results and Cardiac Rhythm Sinus tach. Problem: Falls - Risk of 
Goal: *Absence of Falls Description Document Delaney Cuellar Fall Risk and appropriate interventions in the flowsheet. Outcome: Progressing Towards Goal 
Note: Fall Risk Interventions: 
Mobility Interventions: Assess mobility with egress test, Communicate number of staff needed for ambulation/transfer, OT consult for ADLs, Patient to call before getting OOB, PT Consult for mobility concerns, PT Consult for assist device competence, Utilize walker, cane, or other assistive device Mentation Interventions: Door open when patient unattended Medication Interventions: Assess postural VS orthostatic hypotension, Patient to call before getting OOB, Evaluate medications/consider consulting pharmacy, Teach patient to arise slowly Elimination Interventions: Bed/chair exit alarm, Call light in reach, Patient to call for help with toileting needs, Stay With Me (per policy), Toilet paper/wipes in reach, Toileting schedule/hourly rounds, Urinal in reach History of Falls Interventions: Door open when patient unattended Problem: Pressure Injury - Risk of 
Goal: *Prevention of pressure injury Description Document Carlos Enrique Scale and appropriate interventions in the flowsheet. Outcome: Progressing Towards Goal 
Note: Pressure Injury Interventions: 
Sensory Interventions: Float heels Moisture Interventions: Apply protective barrier, creams and emollients Activity Interventions: Assess need for specialty bed, PT/OT evaluation, Pressure redistribution bed/mattress(bed type), Increase time out of bed Mobility Interventions: Turn and reposition approx.  every two hours(pillow and wedges), PT/OT evaluation, Pressure redistribution bed/mattress (bed type), HOB 30 degrees or less, Float heels, Assess need for specialty bed Nutrition Interventions: Document food/fluid/supplement intake, Offer support with meals,snacks and hydration Friction and Shear Interventions: HOB 30 degrees or less Problem: Breathing Pattern - Ineffective Goal: *Absence of hypoxia Outcome: Progressing Towards Goal 
Patient able to maintain resting O2 sat at 90 and above on 3L NC. Goal: *Use of effective breathing techniques Outcome: Progressing Towards Goal 
Patient taught to take breaks and deep breathe while eating in order to maintain O2 sats above 90. Problem: Nutrition Deficit Goal: *Optimize nutritional status Outcome: Progressing Towards Goal 
Patient progressed to eating 75%+ of meal tray each meal this shift.

## 2020-03-02 NOTE — PROGRESS NOTES
Bedside and Verbal shift change report given to Samir Valentine RN (oncoming nurse) by [facility], RN (offgoing nurse). Report included the following information SBAR, Kardex, ED Summary, Procedure Summary, Intake/Output, MAR and Recent Results.

## 2020-03-02 NOTE — PROGRESS NOTES
Problem: Mobility Impaired (Adult and Pediatric) Goal: *Acute Goals and Plan of Care (Insert Text) Description FUNCTIONAL STATUS PRIOR TO ADMISSION: Pt admitted from SNF and unclear of level of assistance requiring. Prior to rehab, pt was independent with functional mobility. HOME SUPPORT PRIOR TO ADMISSION: The patient lived with sister and brother-in-law. Pt's sister works during the day and brother-in-law assists pt with ADLs prior to leaving the house for a couple hours/day. Pt receives assistance with bathing from an aide 1x/wk for 1 hour. Physical Therapy Goals Initiated 2/26/2020 1. Patient will move from supine to sit and sit to supine , scoot up and down and roll side to side in bed with modified independence within 7 day(s). 2.  Patient will transfer from bed to chair and chair to bed with supervision/set-up using the least restrictive device within 7 day(s). 3.  Patient will perform sit to stand with supervision/set-up within 7 day(s). 4.  Patient will ambulate with supervision/set-up for 150 feet with the least restrictive device within 7 day(s). 5.  Patient will ascend/descend 2 stairs with bilateral handrail(s) with minimal assistance/contact guard assist within 7 day(s). Outcome: Progressing Towards Goal 
 PHYSICAL THERAPY TREATMENT Patient: Cara Gomez (60 y.o. male) Date: 3/2/2020 Diagnosis: Acute respiratory failure with hypoxia ([facility] [address] [J96.01] [facility] [I50.9] <principal problem not specified> Precautions: Fall Chart, physical therapy assessment, plan of care and goals were reviewed. ASSESSMENT Patient continues with skilled PT services and is progressing towards goals. Today he was able to increase his distance of amb, amb 50 feet.  Pt continues to be limited by decreased activity tolerance, during amb HR got as high as 143 (was 105-106 at rest) and then recovered with a seated rest break. He was on 02 at 6 liters and it was difficult to get accurate 02 sat reading during amb (pt has cold hands). Immediately post amb his 02 sats were in the upper 70s and into mid 80s on the monitor but the pleth did not look reliable. When a new finger probe was utilized his 02 sats quickly read 93% with a more reliable looking pleth. Post session he remained up to the chair. Above discussed with his nurse. Current Level of Function Impacting Discharge (mobility/balance): up to contact guard. Other factors to consider for discharge: far from his baseline, heart failure, DVT, PE 
    
 
PLAN : 
Patient continues to benefit from skilled intervention to address the above impairments. Continue treatment per established plan of care. to address goals. Recommendation for discharge: (in order for the patient to meet his/her long term goals) Therapy up to 5 days/week in SNF setting This discharge recommendation: A follow-up discussion with the attending provider and/or case management is planned IF patient discharges home will need the following DME: none if he returns to snf for rehab as planned SUBJECTIVE:  
Patient stated I think I'm doing a little bit better.  OBJECTIVE DATA SUMMARY:  
Chart checked, pt cleared by nursing Critical Behavior: 
Neurologic State: Alert Orientation Level: Oriented X4 Cognition: Follows commands, Appropriate decision making, Appropriate for age attention/concentration, Appropriate safety awareness Safety/Judgement: Decreased insight into deficits, Decreased awareness of need for safety, Decreased awareness of need for assistance Functional Mobility Training: 
Bed Mobility: 
  
Supine to Sit: Supervision Transfers: 
Sit to Stand: Stand-by assistance Stand to Sit: Stand-by assistance Balance: 
Sitting: Intact; Without support Standing: Impaired Standing - Static: Constant support;Good Standing - Dynamic : Constant support;Good Ambulation/Gait Training: 
Distance (ft): 50 Feet (ft) Assistive Device: Gait belt;Walker, rolling Ambulation - Level of Assistance: Contact guard assistance Gait Abnormalities: Decreased step clearance Base of Support: Widened Speed/Sylvia: Slow Step Length: Left shortened;Right shortened Stairs: Therapeutic Exercises:  
Pursed lip breathing Pain Rating: 
None reported Activity Tolerance:  
Fair, observed SOB with activity, and see assessment Please refer to the flowsheet for vital signs taken during this treatment. After treatment patient left in no apparent distress:  
Sitting in chair and Call bell within reach COMMUNICATION/COLLABORATION:  
The patients plan of care was discussed with: Registered Nurse Urbano Andersen Time Calculation: 31 mins

## 2020-03-02 NOTE — PROGRESS NOTES
1000: Notified Dr. Michael Healy that patients HR is increased this morning, consistently in the 120s. Orders received for EKG. 1230: Pt eating lunch, desaturated to 80% while on 3 LNC. Encouraged deep breathing for recovery and increased oxygen to 6 LNC. RT notified patient needs breathing treatment; audible expiratory wheezing.

## 2020-03-02 NOTE — PROGRESS NOTES
Transition plan of Care Accepted at [facility] for rehab confirmed with liaison Toy Lorenzo Discussed with attending and she thinks he will be ready tomorrow. Lori Trent RN CRM Ext [phone]

## 2020-03-02 NOTE — PROGRESS NOTES
[facility] Pulmonary, Critical Care, and Sleep Medicine Progress Note Name: Oz Phillips MRN: [id number] : 1945 Hospital: [facility] Date: 3/2/2020 IMPRESSION:  
· AE of COPD- due to PE and probable RLL PNA ( followed by Dr Vonnie Nichols as an outpatient basis) · Acute on chronic Resp acidosis with  metabolic alkaosis, likely secondary to ongoing diuretics on top of chronic resp acidosis. Suspsect that this is driving hypoxemia and his WOB. No, pO2, pCO2 and bicarb are better because of diamox. Improving · RUL PE 
· PH- ECHO  EF 60% no MR or AS PASP 66 RV nml · COPD · DM2 RECOMMENDATIONS:  
· Pt will need ECHO in 6 months to reassess- if PASP still > 60 will need w/u for other causes PH (group I or 2 etc) · Wean prednisone · eliquis · Nebs, go to home inhalers at discharge · Going to rehab facility at discharge; set up for NIV while there because they cannot accept trilogy unit · Trilogy setup. Bipap (including s/t) and cpap will not adequately manage his hypercapnia for the long term. AVAPS mode in the form of trilogy will be the most appropriate tool. I have discussed with patient and sister and they are both in agreement. This is for the management of hypercapnia in the setting of COPD and he does not have any signs suggestive of YOVANY. · Follow up Chest xray in 4-6 wks with Dr Elva Cadena · Watch serum CO2, starting to trend back up Subjective:  
 
3/2 He is feeling better  Doing better overall today I called and discussed with sister  Clinical status is improving  Feeling a little better today. Used NIV overnight. He is mildly dyspneic but was able to eat his breakfast. Denies cough or hemoptysis or chest pain.  A/o; talkative. On NIV for WOB  
 
 On NIV  
 
 This patient has been seen and evaluated at the request of Dr. Dami Reyes for SOB. Patient is a 69 y.o. male H/o COPD on spiriva and follows with Dr. Ester Yoder. Pt admitted with SOB and RUL PE. Started on eliquis. Pt alert on bipap and nebs Less SOb moves all ext equally no CP Past Medical History:  
Diagnosis Date  Asthma   
 hx of/has wheezing  Cancer [facility])   
 prostate - not treated yet  Cerebral artery occlusion with cerebral infarction ([facility] [address]  Chronic obstructive pulmonary disease ([facility] [address] [facility]  Epilepsy ([facility]  Ill-defined condition   
 shortness of breath - being evaluated  Psychiatric disorder   
 mental disability Past Surgical History:  
Procedure Laterality Date  COLONOSCOPY Left 10/4/2017 COLONOSCOPY performed by Vianca Santiago MD at [facility] ENDOSCOPY  
 HX INTRACRANIAL ANEURYSM REPAIR    
 HX OTHER SURGICAL    
 cyst removed from neck  HX PROSTATE SURGERY    
 HX SPLENECTOMY Prior to Admission medications Medication Sig Start Date End Date Taking? Authorizing Provider  
sertraline (ZOLOFT) 25 mg tablet Take 25 mg by mouth daily. Yes Provider, Historical  
atorvastatin (LIPITOR) 40 mg tablet Take 40 mg by mouth daily. Yes Provider, Historical  
arformoteroL (BROVANA) 15 mcg/2 mL nebu neb solution 15 mcg by Nebulization route two (2) times a day. Yes Provider, Historical  
multivitamin (ONE A DAY) tablet Take 1 Tab by mouth daily. Yes Provider, Historical  
ferrous sulfate 325 mg (65 mg iron) tablet Take 325 mg by mouth Daily (before breakfast). Yes Provider, Historical  
fluticasone propionate (FLONASE ALLERGY RELIEF) 50 mcg/actuation nasal spray 2 Sprays by Both Nostrils route daily. Yes Provider, Historical  
hydroxyzine HCL (ATARAX) 25 mg tablet Take 25 mg by mouth two (2) times a day. Yes Provider, Historical  
ipratropium (ATROVENT) 0.02 % soln 0.5 mg by Other route every eight (8) hours. Oral inhalation   Yes Provider, Historical  
metFORMIN (GLUCOPHAGE) 500 mg tablet Take 500 mg by mouth daily (with breakfast).    Yes Provider, Historical  
 montelukast (SINGULAIR) 10 mg tablet Take 10 mg by mouth daily. Yes Provider, Historical  
predniSONE (DELTASONE) 10 mg tablet Take 20 mg by mouth daily (with breakfast). COPD exacerbation   Yes Provider, Historical  
budesonide-formoteroL (SYMBICORT) 160-4.5 mcg/actuation HFAA Take 2 Puffs by inhalation two (2) times a day. Yes Provider, Historical  
levalbuterol tartrate (XOPENEX) 45 mcg/actuation inhaler Take 1 Puff by inhalation every eight (8) hours. Yes Provider, Historical  
cetirizine (ZYRTEC) 10 mg tablet Take 10 mg by mouth daily. Yes Provider, Historical  
levETIRAcetam (KEPPRA) 500 mg tablet TAKE 1 TABLET BY MOUTH TWICE A DAY 11/8/19  Yes Nirali Howard DO  
pantoprazole (PROTONIX) 40 mg tablet Take 1 Tab by mouth Before breakfast and dinner. 10/6/17  Yes Wilton Callejas MD  
umeclidinium (INCRUSE ELLIPTA) 62.5 mcg/actuation inhaler Take 1 Puff by inhalation daily. Indications: Rescue inhaler   Yes Provider, Historical  
 
No Known Allergies Social History Tobacco Use  Smoking status: Current Every Day Smoker  Smokeless tobacco: Never Used  Tobacco comment: cigarettes Substance Use Topics  Alcohol use: No  
  
Family History Problem Relation Age of Onset  Colon Cancer Maternal Aunt  Colon Cancer Maternal Aunt  Colon Cancer Maternal Aunt Current Facility-Administered Medications Medication Dose Route Frequency  predniSONE (DELTASONE) tablet 10 mg  10 mg Oral DAILY WITH BREAKFAST  albuterol-ipratropium (DUO-NEB) 2.5 MG-0.5 MG/3 ML  3 mL Nebulization TID RT  
 arformoteroL (BROVANA) neb solution 15 mcg  15 mcg Nebulization BID RT  
 apixaban (ELIQUIS) tablet 5 mg  5 mg Oral BID  sertraline (ZOLOFT) tablet 25 mg  25 mg Oral DAILY  levETIRAcetam (KEPPRA) tablet 500 mg  500 mg Oral BID  sodium chloride (NS) flush 5-40 mL  5-40 mL IntraVENous Q8H  
 ferrous sulfate tablet 325 mg  325 mg Oral ACB  hydroxyzine HCL (ATARAX) tablet 25 mg  25 mg Oral BID  montelukast (SINGULAIR) tablet 10 mg  10 mg Oral DAILY  pantoprazole (PROTONIX) tablet 40 mg  40 mg Oral ACB&D  
 budesonide (PULMICORT) 250 mcg/2ml nebulizer susp  250 mcg Nebulization BID RT  
 insulin lispro (HUMALOG) injection   SubCUTAneous AC&HS Review of Systems: 
 
 
Objective:  
Vital Signs:   
Visit Vitals /78 (BP 1 Location: Right arm, BP Patient Position: At rest) Pulse (!) 116 Temp 98.9 °F (37.2 °C) Resp 25 Ht 6' 2\" (1.88 m) Wt 67.5 kg (148 lb 12.8 oz) SpO2 94% BMI 19.10 kg/m² O2 Device: Nasal cannula O2 Flow Rate (L/min): 3 l/min Temp (24hrs), Av.6 °F (37 °C), Min:97.5 °F (36.4 °C), Max:99.6 °F (37.6 °C) Intake/Output:  
Last shift:       07 -  1900 In: 450 [P.O.:450] Out: - Last 3 shifts:  190 -  0700 In: 720 [P.O.:720] Out: 1100 [Urine:1100] Intake/Output Summary (Last 24 hours) at 3/2/2020 1109 Last data filed at 3/2/2020 1010 Gross per 24 hour Intake 930 ml Output 400 ml Net 530 ml Physical Exam:  
General:  Alert, cooperative, no distress, appears stated age. Head:  Normocephalic, without obvious abnormality, atraumatic. Eyes:  Conjunctivae/corneas clear. Nose: Nares normal. Septum midline. Mucosa normal. No drainage or sinus tenderness. Lungs:   Clear to auscultation bilaterally. Mild tachypnea, no accessory muscle use. Heart:  Regular rate and rhythm, S1, S2 normal, no murmur, click, rub or gallop. Abdomen:   Protuberant / distended, non-tender. Extremities: Extremities normal, atraumatic, no cyanosis or edema. SCDs Pulses: 2+ and symmetric all extremities. Skin: Skin color, texture, turgor normal. No rashes or lesions Data review:  
 
Recent Results (from the past 24 hour(s)) GLUCOSE, POC Collection Time: 20 11:54 AM  
Result Value Ref Range Glucose (POC) 186 (H) 65 - 100 mg/dL Performed by Kole Fowler. GLUCOSE, POC Collection Time: 03/01/20  5:07 PM  
Result Value Ref Range Glucose (POC) 221 (H) 65 - 100 mg/dL Performed by Slivio Carrizales GLUCOSE, POC Collection Time: 03/01/20  8:55 PM  
Result Value Ref Range Glucose (POC) 152 (H) 65 - 100 mg/dL Performed by Alissa Wilkinson CBC W/O DIFF Collection Time: 03/02/20  4:53 AM  
Result Value Ref Range WBC 12.2 (H) 4.1 - 11.1 K/uL  
 RBC 4.42 4.10 - 5.70 M/uL  
 HGB 11.5 (L) 12.1 - 17.0 g/dL HCT 38.8 36.6 - 50.3 % MCV 87.8 80.0 - 99.0 FL  
 MCH 26.0 26.0 - 34.0 PG  
 MCHC 29.6 (L) 30.0 - 36.5 g/dL  
 RDW 18.4 (H) 11.5 - 14.5 % PLATELET 457 700 - 927 K/uL MPV 9.7 8.9 - 12.9 FL  
 NRBC 0.4 (H) 0  WBC ABSOLUTE NRBC 0.05 (H) 0.00 - 0.01 K/uL METABOLIC PANEL, COMPREHENSIVE Collection Time: 03/02/20  4:53 AM  
Result Value Ref Range Sodium 139 136 - 145 mmol/L Potassium 3.7 3.5 - 5.1 mmol/L Chloride 99 97 - 108 mmol/L  
 CO2 38 (H) 21 - 32 mmol/L Anion gap 2 (L) 5 - 15 mmol/L Glucose 158 (H) 65 - 100 mg/dL BUN 17 6 - 20 MG/DL Creatinine 0.62 (L) 0.70 - 1.30 MG/DL  
 BUN/Creatinine ratio 27 (H) 12 - 20 GFR est AA >60 >60 ml/min/1.73m2 GFR est non-AA >60 >60 ml/min/1.73m2 Calcium 9.8 8.5 - 10.1 MG/DL Bilirubin, total 0.4 0.2 - 1.0 MG/DL  
 ALT (SGPT) 15 12 - 78 U/L  
 AST (SGOT) 10 (L) 15 - 37 U/L Alk. phosphatase 72 45 - 117 U/L Protein, total 7.0 6.4 - 8.2 g/dL Albumin 2.7 (L) 3.5 - 5.0 g/dL Globulin 4.3 (H) 2.0 - 4.0 g/dL A-G Ratio 0.6 (L) 1.1 - 2.2 MAGNESIUM Collection Time: 03/02/20  4:53 AM  
Result Value Ref Range Magnesium 2.0 1.6 - 2.4 mg/dL GLUCOSE, POC Collection Time: 03/02/20  8:51 AM  
Result Value Ref Range Glucose (POC) 148 (H) 65 - 100 mg/dL Performed by Saskia COOK   
EKG, 12 LEAD, INITIAL Collection Time: 03/02/20 10:20 AM  
Result Value Ref Range  Ventricular Rate 114 BPM  
 Atrial Rate 114 BPM  
 P-R Interval 120 ms QRS Duration 88 ms Q-T Interval 322 ms QTC Calculation (Bezet) 443 ms Calculated P Axis 75 degrees Calculated R Axis 77 degrees Calculated T Axis 53 degrees Diagnosis Sinus tachycardia with premature atrial complexes Possible Left atrial enlargement When compared with ECG of 27-FEB-2020 03:46, 
Nonspecific T wave abnormality now evident in Anterior leads Imaging: 
 
 
  
Joel Pemberton PA-C

## 2020-03-02 NOTE — PROGRESS NOTES
Hospitalist Progress Note Santino Rehman MD 
Answering service: [phone] OR [phone] from in house phone Date of Service:  3/2/2020 NAME:  Melinda Vanessa :  1945 MRN:  [id number] PCP: Marti Perez MD 
 
Chief Complaint:  
Chief Complaint Patient presents with  Shortness of Breath Admission Summary:  
 
Melinda Vanessa is a 69 y.o. male who presented with As per initial admission summary Melinda Vanessa is a 69 y.o.  with h/o COPD/ASTHMA, CAD and DM. Pt presents from [facility] for evaluation of shortness of breath. Patient prior was living at home with a family member, 2 or 3 weeks ago he developed shortness breath and then he was admitted to [facility], per sister he was treated with COPD exacerbation patient also was told that there were fluid in the chest but never have diagnosed with CHF. He was then discharged to [place] for rehab 2 weeks ago was steroids and nebulizer. In SNF, Pt complains of increased shortness of breath with associated leg swelling, and chest pain for the past 3 days. Pt notes he had an oxygen saturation of 88%. Pt denies nausea, vomiting, fever, or abdominal pain. Patient said he can't fit his regular shoe anymore due to the leg and feet swelling. He also noticed increasing abdominal distention. Interval history / Subjective:  
Patient seen for Follow up of PE Patient seen and examined by the bedside, Labs, images and notes reviewed Not feeling too well today. HR in 120s. Breathing is ok. D/w nurse and she expressed the same concern. Sats drop in 80s with speaking. Close monitoring needed. D/w nurse. No overnight events except this am event. No other concerns. Assessment & Plan: # Sinus tach, not feeling well, easy desaturation 
-Close monitoring 
-If continue to worsen, would get chest imaging 
-Hopefully stabilizes as day progress and be able to DC in AM on 3/3 
 
 # Acute on chronic hypoxic and hypercarbic - mixed respiratory failure,  improving # Right UL PE # Localized Rigth LL consolidation # Severe COPD exacerbation # Pulmonary hypertension per Echo 2/20: EF 60% no MR or AS PASP 66 RV nml 
-Combined complex respiratory issues 
-Still very high O2 requirement in later part of the day but better at rest, down to 4-6LPM 
-ABG 2/27 noted. ?baseline elevated hypercarbia. -Pulmonology on board. Trilogy discussed with patient and would depend on disposition 
-Hematology on board. Per recommendations, lovenox to Eliquis 10mg BID for 7 days, then 5mg BID x 3-6 months. 
-Outpatient hematology follow up in the clinic 
-Outpatient Pulmonology follow up in the clinic 
-Continue NIV-BIPAP QHS and PRN during the day time 
-Levaquin 5 days for possible PNA, last 2/26 
-Prednisone with slow taper. Reduce Prednisone to 10mg daily 3/1/20. Further per pulm. -Duenebs and supportive care 
-Trilogy order on discharge to SNF and would need at home as well # Respiratory acidosis with metabolic alkalosis 
-Complex driving forces with diuretics, COPD and other pulmological etiologies -BiPAP as noted above # DM2, mild yperglycemia 
-No recent A1c 
-Likely steroid driven 
-SSI only # Abdominal distention 
-No ascites 
-Large right renal cyst 
-Urology consulted, need outpatient follow up # H/o seizure:  
-continue home meds Code status: Full code DDVT Px: Eliquis Care plan discussed with: Patient/ Family, Nurse Disposition: OQD-Kcyjifvi-mmxgjzoy Anticipated discharge: Once O2 requirement. Tachycardia and feeling of wellbeing improves, 24-48hrs, likely 3/3 Hospital Problems  Date Reviewed: 3/16/2018 Codes Class Noted POA Acute respiratory failure with hypoxia [facility]) ICD-10-CM: J96.01 
ICD-9-CM: 518.81  2/20/2020 Unknown Review of Systems: A comprehensive review of systems was negative except for that written in the HPI. Physical Examination:  
 
Visit Vitals /69 (BP 1 Location: Right arm, BP Patient Position: Sitting) Pulse (!) 108 Temp 98.7 °F (37.1 °C) Resp 25 Ht 6' 2\" (1.88 m) Wt 67.5 kg (148 lb 12.8 oz) SpO2 93% BMI 19.10 kg/m² General:  Alert, cooperative, no distress, appears stated age. Head:  Normocephalic, without obvious abnormality, atraumatic. Lungs:    Significantly tight AE with reduced breath sounds, no wheezing/ rhonchi/ rales, not much changed today Heart:  Regular rate and rhythm, S1, S2 normal, no murmur, click, rub or gallop. Abdomen:   Soft, non-tender. Bowel sounds normal. No masses,  No organomegaly. Extremities: Extremities normal, atraumatic, no cyanosis or edema Pulses: 2+ and symmetric all extremities. Vital Signs:  
 Last 24hrs VS reviewed since prior progress note. Most recent are: 
 
Visit Vitals /69 (BP 1 Location: Right arm, BP Patient Position: Sitting) Pulse (!) 108 Temp 98.7 °F (37.1 °C) Resp 25 Ht 6' 2\" (1.88 m) Wt 67.5 kg (148 lb 12.8 oz) SpO2 93% BMI 19.10 kg/m² Intake/Output Summary (Last 24 hours) at 3/2/2020 1700 Last data filed at 3/2/2020 1010 Gross per 24 hour Intake 690 ml Output 200 ml Net 490 ml Tmax:  Temp (24hrs), Av.8 °F (37.1 °C), Min:98.3 °F (36.8 °C), Max:99.6 °F (37.6 °C) Data Review:  
Data reviewed by myself: 
Cta Chest W Or W Wo Cont Result Date: 2020 EXAM:  CTA CHEST W OR W WO CONT INDICATION:   SOB COMPARISON: None. TECHNIQUE: Precontrast  images were obtained to localize the volume for acquisition. Multislice helical CT arteriography was performed from the diaphragm to the thoracic inlet during uneventful rapid bolus of 100 cc Isovue-370. Lung and soft tissue windows were generated. Coronal and sagittal images were generated and 3D post processing consisting of coronal maximum intensity images was performed.    CT dose reduction was achieved through use of a standardized protocol tailored for this examination and automatic exposure control for dose modulation. FINDINGS: CHEST: THYROID: No nodule. MEDIASTINUM: No mass or lymphadenopathy. LEV: No mass or lymphadenopathy. THORACIC AORTA: Atherosclerotic without evidence of aneurysm. MAIN PULMONARY ARTERY: Pulmonary emboli right upper lobe branches. TRACHEA/BRONCHI: Patent. ESOPHAGUS: No wall thickening or dilatation. HEART: Normal in size. PLEURA: No effusion or pneumothorax. LUNGS: Emphysematous changes. Focal consolidation right lower lobe. Left basilar atelectasis. INCIDENTALLY IMAGED UPPER ABDOMEN: 7.7 cm right renal cyst. 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 Endograft is partially visualized. Marilynne Ruts BONES: No destructive bone lesion. IMPRESSION: Right upper lobe pulmonary emboli. Localized consolidation right lower lobe. Left basilar atelectasis. The findings were called to the patient's nurse on 2/20/2020 at 6:39 PM by myself. [address] Abd Comp Result Date: 2/21/2020 INDICATION: Abdominal distention COMPARISON: None TECHNIQUE: Routine ultrasound images of the abdomen were obtained. FINDINGS: GALLBLADDER: No cholecystolithiasis, gallbladder wall thickening, or pericholecystic fluid. COMMON BILE DUCT: 3 mm in diameter. No evidence of choledocholithiasis. LIVER: Normal in size. Normal echogenicity. No focal hepatic mass or intrahepatic biliary dilatation. MAIN PORTAL VEIN: Patent. Hepatopetal flow direction. PANCREAS: Obscured by overlying bowel gas. RIGHT KIDNEY: 12.8 cm in length. 8.2 cm cyst. No hydronephrosis. No evidence of mass or calculus. LEFT KIDNEY: 11.5 cm in length. No hydronephrosis. No evidence of mass or calculus. SPLEEN: Obscured by overlying bowel gas. AORTA: Obscured by overlying bowel gas. INFERIOR VENA CAVA: Patent. OTHER: No ascites. IMPRESSION: Large right renal cyst. Otherwise unremarkable abdominal ultrasound. Xr Chest [facility] Result Date: 2/20/2020 INDICATION:  SOB. History of chronic asthma. Prostate cancer. COPD. EXAM: Chest single view. COMPARISON: 9/10/2017. FINDINGS: A single frontal view of the chest at 1419 hours shows bibasilar atelectasis or scar, with mild interstitial prominence increased since the prior study. .  The heart, mediastinum and pulmonary vasculature are stable . The bony thorax is unremarkable for age. . Port-A-Cath device on the right is stable. IMPRESSION: Bibasilar atelectasis or scar with interstitial prominence. Correlate for developing interstitial edema or bibasilar inflammatory infiltrate in this patient with COPD and chronic asthma. .  . No results found for: SDES No results found for: CULT All Micro Results None Labs: reviewed by myself. Recent Labs 03/02/20 
5362 03/01/20 
3025 WBC 12.2* 12.0*  
HGB 11.5* 12.2 HCT 38.8 41.5  440* Recent Labs 03/02/20 
1534 03/01/20 
7563 02/29/20 
5599  139 140  
K 3.7 3.6 3.8 CL 99 100 104 CO2 38* 37* 34* BUN 17 16 20 CREA 0.62* 0.61* 0.58* * 135* 152* CA 9.8 10.3* 9.3 MG 2.0 2.1  --   
 
Recent Labs 03/02/20 
8692 SGOT 10* ALT 15  
AP 72 TBILI 0.4 TP 7.0 ALB 2.7*  
GLOB 4.3* No results for input(s): INR, PTP, APTT, INREXT, INREXT in the last 72 hours. No results for input(s): FE, TIBC, PSAT, FERR in the last 72 hours. No results found for: FOL, RBCF No results for input(s): PH, PCO2, PO2 in the last 72 hours. No results for input(s): CPK, CKNDX, TROIQ in the last 72 hours. No lab exists for component: CPKMB Lab Results Component Value Date/Time Cholesterol, total 187 09/11/2017 05:53 AM  
 HDL Cholesterol 63 09/11/2017 05:53 AM  
 LDL, calculated 109 (H) 09/11/2017 05:53 AM  
 Triglyceride 75 09/11/2017 05:53 AM  
 CHOL/HDL Ratio 3.0 09/11/2017 05:53 AM  
 
Lab Results Component Value Date/Time  Glucose (POC) 268 (H) 03/02/2020 04:44 PM  
 Glucose (POC) 159 (H) 03/02/2020 11:46 AM  
 Glucose (POC) 148 (H) 03/02/2020 08:51 AM  
 Glucose (POC) 152 (H) 03/01/2020 08:55 PM  
 Glucose (POC) 221 (H) 03/01/2020 05:07 PM  
 
Lab Results Component Value Date/Time Color YELLOW/STRAW 09/10/2017 10:09 PM  
 Appearance CLEAR 09/10/2017 10:09 PM  
 Specific gravity 1.010 09/10/2017 10:09 PM  
 pH (UA) 5.0 09/10/2017 10:09 PM  
 Protein NEGATIVE  09/10/2017 10:09 PM  
 Glucose NEGATIVE  09/10/2017 10:09 PM  
 Ketone NEGATIVE  09/10/2017 10:09 PM  
 Bilirubin NEGATIVE  09/10/2017 10:09 PM  
 Urobilinogen 0.2 09/10/2017 10:09 PM  
 Nitrites NEGATIVE  09/10/2017 10:09 PM  
 Leukocyte Esterase NEGATIVE  09/10/2017 10:09 PM  
 Epithelial cells MODERATE (A) 09/10/2017 10:09 PM  
 Bacteria NEGATIVE  09/10/2017 10:09 PM  
 WBC 5-10 09/10/2017 10:09 PM  
 RBC 0-5 09/10/2017 10:09 PM  
 
 
 
Medications Reviewed:  
 
Current Facility-Administered Medications Medication Dose Route Frequency  predniSONE (DELTASONE) tablet 10 mg  10 mg Oral DAILY WITH BREAKFAST  guaiFENesin (ROBITUSSIN) 100 mg/5 mL oral liquid 100 mg  100 mg Oral Q4H PRN  
 albuterol-ipratropium (DUO-NEB) 2.5 MG-0.5 MG/3 ML  3 mL Nebulization TID RT  
 arformoteroL (BROVANA) neb solution 15 mcg  15 mcg Nebulization BID RT  
 apixaban (ELIQUIS) tablet 5 mg  5 mg Oral BID  
 oxymetazoline (AFRIN) 0.05 % nasal spray 2 Spray  2 Spray Both Nostrils BID PRN  polyethylene glycol (MIRALAX) packet 17 g  17 g Oral DAILY PRN  
 diphenhydrAMINE (BENADRYL) capsule 25 mg  25 mg Oral Q6H PRN  
 sertraline (ZOLOFT) tablet 25 mg  25 mg Oral DAILY  levETIRAcetam (KEPPRA) tablet 500 mg  500 mg Oral BID  sodium chloride (NS) flush 5-40 mL  5-40 mL IntraVENous Q8H  
 sodium chloride (NS) flush 5-40 mL  5-40 mL IntraVENous PRN  
 acetaminophen (TYLENOL) tablet 650 mg  650 mg Oral Q4H PRN  
 ferrous sulfate tablet 325 mg  325 mg Oral ACB  hydroxyzine HCL (ATARAX) tablet 25 mg  25 mg Oral BID  
  montelukast (SINGULAIR) tablet 10 mg  10 mg Oral DAILY  pantoprazole (PROTONIX) tablet 40 mg  40 mg Oral ACB&D  
 budesonide (PULMICORT) 250 mcg/2ml nebulizer susp  250 mcg Nebulization BID RT  
 albuterol (PROVENTIL VENTOLIN) nebulizer solution 2.5 mg  2.5 mg Nebulization Q2H PRN  
 glucose chewable tablet 16 g  4 Tab Oral PRN  
 glucagon (GLUCAGEN) injection 1 mg  1 mg IntraMUSCular PRN  
 dextrose 10% infusion 0-250 mL  0-250 mL IntraVENous PRN  
 insulin lispro (HUMALOG) injection   SubCUTAneous AC&HS  
 
______________________________________________________________________ EXPECTED LENGTH OF STAY: 3d 19h ACTUAL LENGTH OF STAY:          11 Debbie Angel MD  
 
Patient's emergency contacts: 
Extended Emergency Contact Information Primary Emergency Contact: Melissa Agustin Address: [address] [place] [address] Phone: [phone] Mobile Phone: [phone] Relation: Sister

## 2020-03-03 NOTE — PROGRESS NOTES
Problem: Mobility Impaired (Adult and Pediatric) Goal: *Acute Goals and Plan of Care (Insert Text) Description FUNCTIONAL STATUS PRIOR TO ADMISSION: Pt admitted from SNF and unclear of level of assistance requiring. Prior to rehab, pt was independent with functional mobility. HOME SUPPORT PRIOR TO ADMISSION: The patient lived with sister and brother-in-law. Pt's sister works during the day and brother-in-law assists pt with ADLs prior to leaving the house for a couple hours/day. Pt receives assistance with bathing from an aide 1x/wk for 1 hour. Physical Therapy Goals Initiated 2/26/2020 1. Patient will move from supine to sit and sit to supine , scoot up and down and roll side to side in bed with modified independence within 7 day(s). 2.  Patient will transfer from bed to chair and chair to bed with supervision/set-up using the least restrictive device within 7 day(s). 3.  Patient will perform sit to stand with supervision/set-up within 7 day(s). 4.  Patient will ambulate with supervision/set-up for 150 feet with the least restrictive device within 7 day(s). 5.  Patient will ascend/descend 2 stairs with bilateral handrail(s) with minimal assistance/contact guard assist within 7 day(s). 3/3/2020 1032 by Miladis Sifuentes Outcome: Not Progressing Towards Goal 
 PHYSICAL THERAPY TREATMENT Patient: Maryuri Horne (45 y.o. male) Date: 3/3/2020 Diagnosis: Acute respiratory failure with hypoxia ([facility] [address] [J96.01] CHF (congestive heart failure) ([facility] [address] [I50.9] <principal problem not specified> Precautions: Fall Chart, physical therapy assessment, plan of care and goals were reviewed. ASSESSMENT Patient continues with skilled PT services and is not progressing towards goals. Today pt is not doing as well. He was received in 6 liters of 02 and his resting 02 sats were 87-90%. Sitting at the EOB his 02 sats were 87-88%.  After a transfer to the chair his 02 sats dropped to 83-84% and he required that his 02 get increased to 8 liters to bring his 02 sats up to 88%. Was then able to switch him back to 6 liters of 02 with 02 sats at 88%. Am concerned about potential discharge today given his reduced activity tolerance during our session, discussed with his nurse and sent a message to the care manager. Tiffanie Moss Current Level of Function Impacting Discharge (mobility/balance): contact guard at most. 
 
Other factors to consider for discharge: anxiety, far from his baseline, heart failure, DVT, PE 
    
 
PLAN : 
Patient continues to benefit from skilled intervention to address the above impairments. Continue treatment per established plan of care. to address goals. Recommendation for discharge: (in order for the patient to meet his/her long term goals) Therapy up to 5 days/week in SNF setting This discharge recommendation: A follow-up discussion with the attending provider and/or case management is planned IF patient discharges home will need the following DME: none SUBJECTIVE:  
Patient stated Ml Melendez had an anxiety attack today. Moris Gene OBJECTIVE DATA SUMMARY:  
Chart checked, pt cleared by nursing. Critical Behavior: 
Neurologic State: Alert Orientation Level: Oriented X4 Cognition: Appropriate for age attention/concentration, Appropriate decision making, Appropriate safety awareness, Follows commands Functional Mobility Training: 
Bed Mobility: 
Rolling: Supervision Supine to Sit: Supervision Transfers: 
Sit to Stand: Stand-by assistance Stand to Sit: Stand-by assistance Balance: 
Sitting: Intact; Without support Standing: Impaired Standing - Static: Constant support;Good Standing - Dynamic : Constant support;Good Ambulation/Gait Training: 
Distance (ft): 4 Feet (ft) Assistive Device: Gait belt;Walker, rolling Ambulation - Level of Assistance: Contact guard assistance Gait Abnormalities: Decreased step clearance Base of Support: Widened Speed/Sylvia: Slow Step Length: Left shortened;Right shortened Stairs: Therapeutic Exercises:  
Pursed lip breathing Pain Rating: 
None rated Activity Tolerance:  
Fair, requires rest breaks, and see assessment Please refer to the flowsheet for vital signs taken during this treatment. After treatment patient left in no apparent distress:  
Sitting in chair and Call bell within reach COMMUNICATION/COLLABORATION:  
The patients plan of care was discussed with: Registered nurse and Case management. Minh Massing Time Calculation: 26 mins

## 2020-03-03 NOTE — PROGRESS NOTES
[facility] Progress Note Name: Felipe Mandujano MRN: [id number] : 1945 Hospital[facility] Date: 3/3/2020 IMPRESSION:  
· AE of COPD- due to PE and probable RLL PNA ( followed by Dr Renata Granados as an outpatient basis) · Acute on chronic Resp acidosis with  metabolic alkaosis, likely secondary to ongoing diuretics on top of chronic resp acidosis. Suspsect that this is driving hypoxemia and his WOB. No, pO2, pCO2 and bicarb are better because of diamox. Improving · RUL PE 
· PH- ECHO  EF 60% no MR or AS PASP 66 RV nml · COPD · DM2 RECOMMENDATIONS:  
· Pt will need ECHO in 6 months to reassess- if PASP still > 60 will need w/u for other causes PH (group I or 2 etc) · Wean prednisone · eliquis · Nebs, go to home inhalers at discharge · Going to rehab facility at discharge; set up for NIV while there because they cannot accept trilogy unit · Trilogy setup. Bipap (including s/t) and cpap will not adequately manage his hypercapnia for the long term. AVAPS mode in the form of trilogy will be the most appropriate tool. I have discussed with patient and sister and they are both in agreement. This is for the management of hypercapnia in the setting of COPD and he does not have any signs suggestive of YOVANY. · Follow up Chest xray in 4-6 wks with Dr Carl Fulton · Watch serum CO2, starting to trend back up · We will be available again to see if needed Subjective:  
 
3/3 Working with PT Possibly leaving today Noted increased anxiety 3/2 He is feeling better  Doing better overall today I called and discussed with sister  Clinical status is improving  Feeling a little better today. Used NIV overnight. He is mildly dyspneic but was able to eat his breakfast. Denies cough or hemoptysis or chest pain.  A/o; talkative. On NIV for WOB  
 
 On NIV  
 
 This patient has been seen and evaluated at the request of Dr. Valentina Fernandez for SOB. Patient is a 69 y.o. male H/o COPD on spiriva and follows with Dr. Gerri Elise. Pt admitted with SOB and RUL PE. Started on eliquis. Pt alert on bipap and nebs Less SOb moves all ext equally no CP Past Medical History:  
Diagnosis Date  Asthma   
 hx of/has wheezing  Cancer [facility])   
 prostate - not treated yet  Cerebral artery occlusion with cerebral infarction ([facility] [address]  Chronic obstructive pulmonary disease ([facility] [address]  Epilepsy ([facility] [address]  Ill-defined condition   
 shortness of breath - being evaluated  Psychiatric disorder   
 mental disability Past Surgical History:  
Procedure Laterality Date  COLONOSCOPY Left 10/4/2017 COLONOSCOPY performed by Gregoria Mcdonnell MD at [address] [place] [address] ENDOSCOPY  
 HX INTRACRANIAL ANEURYSM REPAIR    
 HX OTHER SURGICAL    
 cyst removed from neck  HX PROSTATE SURGERY    
 HX SPLENECTOMY Prior to Admission medications Medication Sig Start Date End Date Taking? Authorizing Provider  
sertraline (ZOLOFT) 25 mg tablet Take 25 mg by mouth daily. Yes Provider, Historical  
atorvastatin (LIPITOR) 40 mg tablet Take 40 mg by mouth daily. Yes Provider, Historical  
arformoteroL (BROVANA) 15 mcg/2 mL nebu neb solution 15 mcg by Nebulization route two (2) times a day. Yes Provider, Historical  
multivitamin (ONE A DAY) tablet Take 1 Tab by mouth daily. Yes Provider, Historical  
ferrous sulfate 325 mg (65 mg iron) tablet Take 325 mg by mouth Daily (before breakfast). Yes Provider, Historical  
fluticasone propionate (FLONASE ALLERGY RELIEF) 50 mcg/actuation nasal spray 2 Sprays by Both Nostrils route daily. Yes Provider, Historical  
hydroxyzine HCL (ATARAX) 25 mg tablet Take 25 mg by mouth two (2) times a day. Yes Provider, Historical  
ipratropium (ATROVENT) 0.02 % soln 0.5 mg by Other route every eight (8) hours.  Oral inhalation   Yes Provider, Historical  
 metFORMIN (GLUCOPHAGE) 500 mg tablet Take 500 mg by mouth daily (with breakfast). Yes Provider, Historical  
montelukast (SINGULAIR) 10 mg tablet Take 10 mg by mouth daily. Yes Provider, Historical  
predniSONE (DELTASONE) 10 mg tablet Take 20 mg by mouth daily (with breakfast). COPD exacerbation   Yes Provider, Historical  
budesonide-formoteroL (SYMBICORT) 160-4.5 mcg/actuation HFAA Take 2 Puffs by inhalation two (2) times a day. Yes Provider, Historical  
levalbuterol tartrate (XOPENEX) 45 mcg/actuation inhaler Take 1 Puff by inhalation every eight (8) hours. Yes Provider, Historical  
cetirizine (ZYRTEC) 10 mg tablet Take 10 mg by mouth daily. Yes Provider, Historical  
levETIRAcetam (KEPPRA) 500 mg tablet TAKE 1 TABLET BY MOUTH TWICE A DAY 11/8/19  Yes Nirali Howard DO  
pantoprazole (PROTONIX) 40 mg tablet Take 1 Tab by mouth Before breakfast and dinner. 10/6/17  Yes Natty Callejas MD  
umeclidinium (INCRUSE ELLIPTA) 62.5 mcg/actuation inhaler Take 1 Puff by inhalation daily. Indications: Rescue inhaler   Yes Provider, Historical  
 
No Known Allergies Social History Tobacco Use  Smoking status: Current Every Day Smoker  Smokeless tobacco: Never Used  Tobacco comment: cigarettes Substance Use Topics  Alcohol use: No  
  
Family History Problem Relation Age of Onset  Colon Cancer Maternal Aunt  Colon Cancer Maternal Aunt  Colon Cancer Maternal Aunt Current Facility-Administered Medications Medication Dose Route Frequency  predniSONE (DELTASONE) tablet 10 mg  10 mg Oral DAILY WITH BREAKFAST  albuterol-ipratropium (DUO-NEB) 2.5 MG-0.5 MG/3 ML  3 mL Nebulization TID RT  
 arformoteroL (BROVANA) neb solution 15 mcg  15 mcg Nebulization BID RT  
 apixaban (ELIQUIS) tablet 5 mg  5 mg Oral BID  sertraline (ZOLOFT) tablet 25 mg  25 mg Oral DAILY  levETIRAcetam (KEPPRA) tablet 500 mg  500 mg Oral BID  
  sodium chloride (NS) flush 5-40 mL  5-40 mL IntraVENous Q8H  
 ferrous sulfate tablet 325 mg  325 mg Oral ACB  hydroxyzine HCL (ATARAX) tablet 25 mg  25 mg Oral BID  montelukast (SINGULAIR) tablet 10 mg  10 mg Oral DAILY  pantoprazole (PROTONIX) tablet 40 mg  40 mg Oral ACB&D  
 budesonide (PULMICORT) 250 mcg/2ml nebulizer susp  250 mcg Nebulization BID RT  
 insulin lispro (HUMALOG) injection   SubCUTAneous AC&HS Review of Systems: 
 
 
Objective:  
Vital Signs:   
Visit Vitals /86 (BP 1 Location: Right arm, BP Patient Position: At rest) Pulse 99 Temp 98.8 °F (37.1 °C) Resp 21 Ht 6' 2\" (1.88 m) Wt 71.6 kg (157 lb 13.6 oz) SpO2 90% BMI 20.27 kg/m² O2 Device: Nasal cannula O2 Flow Rate (L/min): 6 l/min Temp (24hrs), Av.4 °F (36.9 °C), Min:98.1 °F (36.7 °C), Max:98.8 °F (37.1 °C) Intake/Output:  
Last shift:      No intake/output data recorded. Last 3 shifts:  1901 -  0700 In: 450 [P.O.:450] Out: 200 [Urine:200] No intake or output data in the 24 hours ending 20 1046 Physical Exam:  
General:  Alert, cooperative, no distress, appears stated age. Head:  Normocephalic, without obvious abnormality, atraumatic. Eyes:  Conjunctivae/corneas clear. Nose: Nares normal. Septum midline. Mucosa normal. No drainage or sinus tenderness. Lungs:   Clear to auscultation bilaterally. Mild tachypnea, no accessory muscle use. Heart:  Regular rate and rhythm, S1, S2 normal, no murmur, click, rub or gallop. Abdomen:   Protuberant / distended, non-tender. Extremities: Extremities normal, atraumatic, no cyanosis or edema. SCDs Pulses: 2+ and symmetric all extremities. Skin: Skin color, texture, turgor normal. No rashes or lesions Data review:  
 
Recent Results (from the past 24 hour(s)) GLUCOSE, POC Collection Time: 20 11:46 AM  
Result Value Ref Range Glucose (POC) 159 (H) 65 - 100 mg/dL Performed by Olga Austin, POC Collection Time: 03/02/20  4:44 PM  
Result Value Ref Range Glucose (POC) 268 (H) 65 - 100 mg/dL Performed by Vinnie Duane, POC Collection Time: 03/02/20  9:18 PM  
Result Value Ref Range Glucose (POC) 202 (H) 65 - 100 mg/dL Performed by Gilberto PARKER, POC Collection Time: 03/03/20  8:02 AM  
Result Value Ref Range Glucose (POC) 127 (H) 65 - 100 mg/dL Performed by Jen Mendosa Imaging: 
 
 
  
Joel Perez PA-C

## 2020-03-03 NOTE — PROGRESS NOTES
Problem: Falls - Risk of 
Goal: *Absence of Falls Description Document Yogesh Banks Fall Risk and appropriate interventions in the flowsheet. Outcome: Progressing Towards Goal 
Note: Fall Risk Interventions: 
Mobility Interventions: Communicate number of staff needed for ambulation/transfer, Patient to call before getting OOB, PT Consult for mobility concerns, Utilize walker, cane, or other assistive device, Utilize gait belt for transfers/ambulation Mentation Interventions: Update white board, Toileting rounds, Reorient patient, More frequent rounding, Gait belt with transfers/ambulation, Evaluate medications/consider consulting pharmacy, Adequate sleep, hydration, pain control Medication Interventions: Evaluate medications/consider consulting pharmacy, Patient to call before getting OOB, Teach patient to arise slowly Elimination Interventions: Call light in reach, Urinal in reach, Toileting schedule/hourly rounds, Patient to call for help with toileting needs History of Falls Interventions: Utilize gait belt for transfer/ambulation, Evaluate medications/consider consulting pharmacy Problem: Pressure Injury - Risk of 
Goal: *Prevention of pressure injury Description Document Carlos Enrique Scale and appropriate interventions in the flowsheet. Outcome: Progressing Towards Goal 
Note: Pressure Injury Interventions: 
Sensory Interventions: Assess changes in LOC, Keep linens dry and wrinkle-free, Maintain/enhance activity level, Minimize linen layers, Turn and reposition approx. every two hours (pillows and wedges if needed) Moisture Interventions: Absorbent underpads, Offer toileting Q_hr, Minimize layers, Maintain skin hydration (lotion/cream), Check for incontinence Q2 hours and as needed Activity Interventions: Increase time out of bed, Pressure redistribution bed/mattress(bed type), PT/OT evaluation Mobility Interventions: Pressure redistribution bed/mattress (bed type), Turn and reposition approx. every two hours(pillow and wedges), PT/OT evaluation Nutrition Interventions: Document food/fluid/supplement intake Friction and Shear Interventions: Apply protective barrier, creams and emollients, Minimize layers Problem: Breathing Pattern - Ineffective Goal: *Use of effective breathing techniques Outcome: Progressing Towards Goal 
Note:  
Patient on 4L of oxygen and oxygen saturation is staying above 88%. Patient instructed on and practiced his pursed lip breathing for when feeling short of breath or work of breath. Patient encouraged to sit up in the chair to help with lung expansion. Problem: Chronic Obstructive Pulmonary Disease (COPD) Goal: *Able to remain out of bed as prescribed Outcome: Progressing Towards Goal 
Note:  
Patient sat up in the chair for a few hours this afternoon and oxygen remained above 88%.

## 2020-03-03 NOTE — PROGRESS NOTES
Problem: Falls - Risk of 
Goal: *Absence of Falls Description Document Tam Romeo Fall Risk and appropriate interventions in the flowsheet. Outcome: Progressing Towards Goal 
Note: Fall Risk Interventions: 
Mobility Interventions: Assess mobility with egress test, Communicate number of staff needed for ambulation/transfer, OT consult for ADLs, Patient to call before getting OOB, PT Consult for mobility concerns, PT Consult for assist device competence, Utilize walker, cane, or other assistive device Mentation Interventions: Door open when patient unattended Medication Interventions: Assess postural VS orthostatic hypotension, Patient to call before getting OOB, Evaluate medications/consider consulting pharmacy, Teach patient to arise slowly Elimination Interventions: Bed/chair exit alarm, Call light in reach, Patient to call for help with toileting needs, Stay With Me (per policy), Toilet paper/wipes in reach, Toileting schedule/hourly rounds, Urinal in reach History of Falls Interventions: Door open when patient unattended Problem: Heart Failure: Day 5 Goal: Psychosocial 
Outcome: Progressing Towards Goal 
  
 
Pt slept w/o BiPap overnight. Pt resting in bed. No acute events at this time. Bedside and Verbal shift change report given to Marleen Alvarez RN (oncoming nurse) by Eduardo Palomo RN (offgoing nurse). Report included the following information SBAR, Kardex, ED Summary, Intake/Output, MAR and Procedure Verification.

## 2020-03-04 PROBLEM — J44.1 COPD EXACERBATION (HCC): Status: ACTIVE | Noted: 2020-01-01

## 2020-03-04 NOTE — PROGRESS NOTES
Deferred visit: This morning the patient wanted to have his bath before getting up and this afternoon he is drowsy and nursing reports his blood levels are off and requests we defer. Will follow up tomorrow.

## 2020-03-04 NOTE — PROGRESS NOTES
Pharmacist Note - Vancomycin Dosing Consult provided for this 69 y.o. male for indication of HAP. Antibiotic regimen(s): Vanc + Zosyn Patient on vancomycin PTA? NO Recent Labs 20 
1030 20 
0453 WBC 13.4* 12.2*  
CREA 0.57* 0.62* BUN 13 17 Frequency of BMP: tomorrow AM 
Height: 188 cm Weight: 70.3 kg Est CrCl: ~ ml/min Temp (24hrs), Av.6 °F (37 °C), Min:98 °F (36.7 °C), Max:100.3 °F (37.9 °C) Cultures: 
3/4 Blood- in process 3/4 Sputum- in process Goal trough = 15 - 20 mcg/mL Therapy will be initiated with a loading dose of 1750 mg IV x 1 to be followed by a maintenance dose of 1000 mg IV every 12 hours. Pharmacy to follow patient daily and order levels / make dose adjustments as appropriate.  
 
Silke Bishop, PharmD, BCPS

## 2020-03-04 NOTE — PROGRESS NOTES
Occupational Therapy Chart reviewed in prep for weekly re-assessment, noted RN request to hold at this time, will follow up tomorrow.   
Jack Larios OTR/L

## 2020-03-04 NOTE — PROGRESS NOTES
Problem: Falls - Risk of 
Goal: *Absence of Falls Description Document Mary Jhaveri Fall Risk and appropriate interventions in the flowsheet. Outcome: Progressing Towards Goal 
Note: Fall Risk Interventions: 
Mobility Interventions: Communicate number of staff needed for ambulation/transfer, Patient to call before getting OOB, Utilize walker, cane, or other assistive device, Utilize gait belt for transfers/ambulation, PT Consult for mobility concerns Mentation Interventions: Adequate sleep, hydration, pain control, Update white board, Toileting rounds, More frequent rounding, Reorient patient, Evaluate medications/consider consulting pharmacy Medication Interventions: Evaluate medications/consider consulting pharmacy, Patient to call before getting OOB, Teach patient to arise slowly Elimination Interventions: Urinal in reach, Toileting schedule/hourly rounds, Patient to call for help with toileting needs, Call light in reach History of Falls Interventions: Door open when patient unattended, Utilize gait belt for transfer/ambulation Problem: Pressure Injury - Risk of 
Goal: *Prevention of pressure injury Description Document Carlos Enrique Scale and appropriate interventions in the flowsheet. Outcome: Progressing Towards Goal 
Note: Pressure Injury Interventions: 
Sensory Interventions: Assess changes in LOC, Assess need for specialty bed, Check visual cues for pain, Discuss PT/OT consult with provider, Float heels, Keep linens dry and wrinkle-free, Minimize linen layers, Maintain/enhance activity level Moisture Interventions: Absorbent underpads, Apply protective barrier, creams and emollients, Check for incontinence Q2 hours and as needed, Maintain skin hydration (lotion/cream), Minimize layers Activity Interventions: Increase time out of bed, Pressure redistribution bed/mattress(bed type), PT/OT evaluation Mobility Interventions: PT/OT evaluation, Pressure redistribution bed/mattress (bed type), Float heels Nutrition Interventions: Document food/fluid/supplement intake Friction and Shear Interventions: Minimize layers, Apply protective barrier, creams and emollients Problem: Breathing Pattern - Ineffective Goal: *Absence of hypoxia Outcome: Not Progressing Towards Goal 
Note:  
Patient requiring a continuous BIPAP to keep oxygen saturation above 90%. Goal: *Use of effective breathing techniques Outcome: Not Progressing Towards Goal 
Note:  
Patient started on 8L via nasal cannula on coming this shift, oxygen saturation not held above 88%. Patient switched to a midflow oxygen cannula, patients' oxygen saturation still fluctuating between mid 80s to low 90s. ABG completed and showed his Co2 levels >90. Patient then placed on continuous BIPAP to maintain his oxygen saturation above 90%. Patient is now satting in the mid 80s. Problem: Chronic Obstructive Pulmonary Disease (COPD) Goal: *Able to remain out of bed as prescribed Outcome: Not Progressing Towards Goal 
Note:  
Patient not able to work with PT today due to drowsiness and low oxygen saturation. Patient remaining on continuous BIPAP now.

## 2020-03-04 NOTE — PROGRESS NOTES
Problem: Falls - Risk of 
Goal: *Absence of Falls Description Document Tanvi Romano Fall Risk and appropriate interventions in the flowsheet. Outcome: Progressing Towards Goal 
Note: Fall Risk Interventions: 
Mobility Interventions: Communicate number of staff needed for ambulation/transfer, Patient to call before getting OOB Mentation Interventions: Adequate sleep, hydration, pain control, Door open when patient unattended, Increase mobility, Reorient patient, Room close to nurse's station Medication Interventions: Evaluate medications/consider consulting pharmacy, Patient to call before getting OOB Elimination Interventions: Bed/chair exit alarm, Call light in reach, Patient to call for help with toileting needs, Toilet paper/wipes in reach History of Falls Interventions: Bed/chair exit alarm, Door open when patient unattended, Room close to nurse's station Problem: Heart Failure: Day 2 Goal: Discharge Planning Outcome: Progressing Towards Goal 
  
Problem: Heart Failure: Day 2 Goal: Nutrition/Diet Outcome: Progressing Towards Goal 
 
Pt refusing BiPap overnight. Will attempt to wean down O2 as tolerated. Pt resting in bed. No acute events at this time. 5856 - Pt complains of two separate panic attacks. O2 @ 9L w/90% SPo2. Will reduce as tolerated. 715- Pt complains of another episode of anxiety. Pt was given scheduled anti anxiety medication early in effort to decrease respiration effort. Unable to wean PT O2 Pt on 8L/ 90% SpO2.

## 2020-03-04 NOTE — PROGRESS NOTES
Clinical Pharmacy Note - Extended Infusion Piperacillin/Tazobactam Dosing This patient has been ordered piperacillin/tazobactam at 3.375 g IV every 6 hours. P&T protocol allows automatic substitution to extended infusion piperacillin/tazobactam and dose adjustment based on indication and renal function (adjustment required if creatinine clearance < 20 mL/min). Indication: Possible HAP CrCl: ~ mL/min Per P&T protocol, the piperacillin/tazobactam dosing will be adjusted to 3.375 g IV every 8 hours (each dose will be infused over 4 hours). Please call pharmacy with any questions.

## 2020-03-04 NOTE — PROGRESS NOTES
[facility] Progress Note Name: Audelia Kaiser MRN: [id number] : 1945 Hospital: [facility] Date: 3/4/2020 IMPRESSION:  
· AE of COPD- due to PE and probable RLL PNA ( followed by Dr Anne Cavazos as an outpatient basis) · Acute on chronic Resp acidosis with  metabolic alkaosis, likely secondary to ongoing diuretics on top of chronic resp acidosis. Suspsect that this is driving hypoxemia and his WOB. No, pO2, pCO2 and bicarb are better because of diamox. Improving · RUL PE 
· PH- ECHO  EF 60% no MR or AS PASP 66 RV nml · COPD · DM2 RECOMMENDATIONS:  
· Pt will need ECHO in 6 months to reassess- if PASP still > 60 will need w/u for other causes PH (group I or 2 etc) · Wean prednisone · eliquis · Nebs, go to home inhalers at discharge · Going to rehab facility at discharge; set up for NIV while there because they cannot accept trilogy unit · Trilogy setup. Bipap (including s/t) and cpap will not adequately manage his hypercapnia for the long term. AVAPS mode in the form of trilogy will be the most appropriate tool. I have discussed with patient and sister and they are both in agreement. This is for the management of hypercapnia in the setting of COPD and he does not have any signs suggestive of YOVANY. · Follow up Chest xray in 4-6 wks with Dr Carvajal Solid · Wbc up, bibasilar asd worse; concerning of HAP. Discussed with attending and hospitalist. Add vanc and zosyn. Discussed with pharm Subjective:  
 
3/ Increased O2 requirement. + sputum production 3/3 Working with PT Possibly leaving today Noted increased anxiety 3/2 He is feeling better  Doing better overall today I called and discussed with sister  Clinical status is improving  Feeling a little better today. Used NIV overnight.  He is mildly dyspneic but was able to eat his breakfast. Denies cough or hemoptysis or chest pain. 2/25 A/o; talkative. On NIV for WOB  
 
2/24 On NIV  
 
2/22 This patient has been seen and evaluated at the request of Dr. Salvador Vieira for SOB. Patient is a 69 y.o. male H/o COPD on spiriva and follows with Dr. Byron Litten. Pt admitted with SOB and RUL PE. Started on eliquis. Pt alert on bipap and nebs Less SOb moves all ext equally no CP Past Medical History:  
Diagnosis Date  Asthma   
 hx of/has wheezing  Cancer [facility])   
 prostate - not treated yet  Cerebral artery occlusion with cerebral infarction [address] [address]  Chronic obstructive pulmonary disease ([facility] [address]  Epilepsy ([facility] [address]  Ill-defined condition   
 shortness of breath - being evaluated  Psychiatric disorder   
 mental disability Past Surgical History:  
Procedure Laterality Date  COLONOSCOPY Left 10/4/2017 COLONOSCOPY performed by [facility]ul Martinez MD at [facility] ENDOSCOPY  
 HX INTRACRANIAL ANEURYSM REPAIR    
 HX OTHER SURGICAL    
 cyst removed from neck  HX PROSTATE SURGERY    
 HX SPLENECTOMY Prior to Admission medications Medication Sig Start Date End Date Taking? Authorizing Provider  
sertraline (ZOLOFT) 25 mg tablet Take 25 mg by mouth daily. Yes Provider, Historical  
atorvastatin (LIPITOR) 40 mg tablet Take 40 mg by mouth daily. Yes Provider, Historical  
arformoteroL (BROVANA) 15 mcg/2 mL nebu neb solution 15 mcg by Nebulization route two (2) times a day. Yes Provider, Historical  
multivitamin (ONE A DAY) tablet Take 1 Tab by mouth daily. Yes Provider, Historical  
ferrous sulfate 325 mg (65 mg iron) tablet Take 325 mg by mouth Daily (before breakfast). Yes Provider, Historical  
fluticasone propionate (FLONASE ALLERGY RELIEF) 50 mcg/actuation nasal spray 2 Sprays by Both Nostrils route daily. Yes Provider, Historical  
hydroxyzine HCL (ATARAX) 25 mg tablet Take 25 mg by mouth two (2) times a day.    Yes Provider, Historical  
 ipratropium (ATROVENT) 0.02 % soln 0.5 mg by Other route every eight (8) hours. Oral inhalation   Yes Provider, Historical  
metFORMIN (GLUCOPHAGE) 500 mg tablet Take 500 mg by mouth daily (with breakfast). Yes Provider, Historical  
montelukast (SINGULAIR) 10 mg tablet Take 10 mg by mouth daily. Yes Provider, Historical  
predniSONE (DELTASONE) 10 mg tablet Take 20 mg by mouth daily (with breakfast). COPD exacerbation   Yes Provider, Historical  
budesonide-formoteroL (SYMBICORT) 160-4.5 mcg/actuation HFAA Take 2 Puffs by inhalation two (2) times a day. Yes Provider, Historical  
levalbuterol tartrate (XOPENEX) 45 mcg/actuation inhaler Take 1 Puff by inhalation every eight (8) hours. Yes Provider, Historical  
cetirizine (ZYRTEC) 10 mg tablet Take 10 mg by mouth daily. Yes Provider, Historical  
levETIRAcetam (KEPPRA) 500 mg tablet TAKE 1 TABLET BY MOUTH TWICE A DAY 11/8/19  Yes Nirali Howard DO  
pantoprazole (PROTONIX) 40 mg tablet Take 1 Tab by mouth Before breakfast and dinner. 10/6/17  Yes Santiago Callejas MD  
umeclidinium (INCRUSE ELLIPTA) 62.5 mcg/actuation inhaler Take 1 Puff by inhalation daily. Indications: Rescue inhaler   Yes Provider, Historical  
 
No Known Allergies Social History Tobacco Use  Smoking status: Current Every Day Smoker  Smokeless tobacco: Never Used  Tobacco comment: cigarettes Substance Use Topics  Alcohol use: No  
  
Family History Problem Relation Age of Onset  Colon Cancer Maternal Aunt  Colon Cancer Maternal Aunt  Colon Cancer Maternal Aunt Current Facility-Administered Medications Medication Dose Route Frequency  piperacillin-tazobactam (ZOSYN) 3.375 g in 0.9% sodium chloride (MBP/ADV) 100 mL  3.375 g IntraVENous Q8H  
 vancomycin (VANCOCIN) 1750 mg in  ml infusion  1,750 mg IntraVENous ONCE  
 apixaban (ELIQUIS) tablet 5 mg  5 mg Oral BID  clonazePAM (KlonoPIN) tablet 0.5 mg  0.5 mg Oral ONCE  
  predniSONE (DELTASONE) tablet 20 mg  20 mg Oral DAILY WITH BREAKFAST  albuterol-ipratropium (DUO-NEB) 2.5 MG-0.5 MG/3 ML  3 mL Nebulization TID RT  
 arformoteroL (BROVANA) neb solution 15 mcg  15 mcg Nebulization BID RT  
 sertraline (ZOLOFT) tablet 25 mg  25 mg Oral DAILY  levETIRAcetam (KEPPRA) tablet 500 mg  500 mg Oral BID  sodium chloride (NS) flush 5-40 mL  5-40 mL IntraVENous Q8H  
 ferrous sulfate tablet 325 mg  325 mg Oral ACB  hydroxyzine HCL (ATARAX) tablet 25 mg  25 mg Oral BID  montelukast (SINGULAIR) tablet 10 mg  10 mg Oral DAILY  pantoprazole (PROTONIX) tablet 40 mg  40 mg Oral ACB&D  
 budesonide (PULMICORT) 250 mcg/2ml nebulizer susp  250 mcg Nebulization BID RT  
 insulin lispro (HUMALOG) injection   SubCUTAneous AC&HS Review of Systems: 
 
 
Objective:  
Vital Signs:   
Visit Vitals /66 (BP 1 Location: Left arm, BP Patient Position: At rest) Pulse (!) 107 Temp 98 °F (36.7 °C) Resp 24 Ht 6' 2\" (1.88 m) Wt 70.3 kg (155 lb) SpO2 92% BMI 19.90 kg/m² O2 Device: Nasal cannula O2 Flow Rate (L/min): 8 l/min Temp (24hrs), Av.6 °F (37 °C), Min:98 °F (36.7 °C), Max:100.3 °F (37.9 °C) Intake/Output:  
Last shift:      No intake/output data recorded. Last 3 shifts: No intake/output data recorded. No intake or output data in the 24 hours ending 20 1040 Physical Exam:  
General:  Alert, cooperative, no distress, appears stated age. Head:  Normocephalic, without obvious abnormality, atraumatic. Eyes:  Conjunctivae/corneas clear. Nose: Nares normal. Septum midline. Mucosa normal. No drainage or sinus tenderness. Lungs:   Clear to auscultation bilaterally. Mild tachypnea, no accessory muscle use. Heart:  Regular rate and rhythm, S1, S2 normal, no murmur, click, rub or gallop. Abdomen:   Protuberant / distended, non-tender. Extremities: Extremities normal, atraumatic, no cyanosis or edema. SCDs Pulses: 2+ and symmetric all extremities. Skin: Skin color, texture, turgor normal. No rashes or lesions Data review:  
 
Recent Results (from the past 24 hour(s)) GLUCOSE, POC Collection Time: 03/03/20 12:54 PM  
Result Value Ref Range Glucose (POC) 176 (H) 65 - 100 mg/dL Performed by Alanna Tomlin GLUCOSE, POC Collection Time: 03/03/20  5:33 PM  
Result Value Ref Range Glucose (POC) 164 (H) 65 - 100 mg/dL Performed by General Shen GLUCOSE, POC Collection Time: 03/03/20  9:17 PM  
Result Value Ref Range Glucose (POC) 150 (H) 65 - 100 mg/dL Performed by Natalia Denny GLUCOSE, POC Collection Time: 03/04/20  8:13 AM  
Result Value Ref Range Glucose (POC) 129 (H) 65 - 100 mg/dL Performed by Alanna Tomlin Imaging: 
 
 
  
Joel Tavarez PA-C

## 2020-03-04 NOTE — PROGRESS NOTES
Discussed with nursing. Persistent severe hypercarbia and poor mental status despite BiPAP. To ask ICU team to evaluate for transfer.

## 2020-03-04 NOTE — PROGRESS NOTES
Hospitalist Progress Note Franklin Holt MD 
Answering service: [phone] OR [phone] from in house phone Date of Service:  3/4/2020 NAME:  Melonie Martin :  1945 MRN:  [id number] PCP: Galye Vuong MD 
 
Chief Complaint:  
Chief Complaint Patient presents with  Shortness of Breath Doing ok. Desating easy in 80s, on 6-8LPM overnight. HR still in 120s. CTA PE noted. No PE now as previous. Baseline SOB, CP. No other concerns. D/w pt about palliative care introduction. Assessment & Plan: # Sinus tach, high O2 need, easy desaturation, likely related to possible bibasilar infiltrate, but no PE 
-Close monitoring 
-CTA Chest PE protocol 3/3 noted 
-Can restart Eliquis, and DC Lovenox FD 
-Blood culture, sputum culture, MRSA screen 
-D/w Pulm. 
-Acapella for chest PT 
-Discuss Palliative care with Pt and his sister # Acute on chronic hypoxic and hypercarbic - mixed respiratory failure,  improving # Right UL PE # Localized Rigth LL consolidation # Severe COPD exacerbation # Pulmonary hypertension per Echo : EF 60% no MR or AS PASP 66 RV nml 
-Combined complex respiratory issues 
-Still very high O2 requirement 6-8LPM O2 
-ABG  s/o ? baseline elevated hypercarbia. -Pulmonology on board. Trilogy on disposition 
-Hematology recommendations, lovenox to Eliquis 10mg BID for 7 days, then 5mg BID x 3-6 months. 
-Outpatient hematology follow up in the clinic 
-Outpatient Pulmonology follow up in the clinic 
-Continue NIV-BIPAP QHS and PRN during the day time 
-Levaquin 5 days for possible PNA, last  
-Prednisone up to 20mg continue 
-Duenebs and supportive care 
-Trilogy/ BiPAP order on discharge to SNF and would need at home as well-Pulm arranged 
-Palliative care consult as seems like End Stage Lung Disease # Respiratory acidosis with metabolic alkalosis 
-Complex driving forces with diuretics, COPD and other pulmological etiologies -BiPAP as noted above # DM2, with hyperglycemia 
-No recent A1c 
-Likely steroid driven 
-SSI only # Abdominal distention 
-No ascites 
-Large right renal cyst 
-Urology consulted, need outpatient follow up # H/o seizure:  
-continue home meds Code status: Full code DDVT Px: Eliquis Care plan discussed with: Patient/ Family, Nurse Disposition: [id number] Anticipated discharge: Once O2 requirement. Tachycardia and feeling of wellbeing improves, 24-48hrs, likely 3/3 Hospital Problems  Date Reviewed: 3/16/2018 Codes Class Noted POA Acute respiratory failure with hypoxia [facility]) ICD-10-CM: J96.01 
ICD-9-CM: 518.81  2/20/2020 Unknown Review of Systems: A comprehensive review of systems was negative except for that written in the HPI. Physical Examination:  
 
Visit Vitals /66 (BP 1 Location: Left arm, BP Patient Position: At rest) Pulse (!) 107 Temp 98 °F (36.7 °C) Resp 24 Ht 6' 2\" (1.88 m) Wt 70.3 kg (155 lb) SpO2 92% BMI 19.90 kg/m² General:  Alert, cooperative, no distress, appears stated age. Head:  Normocephalic, without obvious abnormality, atraumatic. Lungs:    Significantly tight AE with reduced breath sounds, no wheezing/ rhonchi/ rales, not much changed today Heart:  Regular rate and rhythm, tachycardia +, S1, S2 normal, no murmur, click, rub or gallop. Abdomen:   Soft, non-tender. Bowel sounds normal. No masses,  No organomegaly. Extremities: Extremities normal, atraumatic, no cyanosis or edema Pulses: 2+ and symmetric all extremities. Vital Signs:  
 Last 24hrs VS reviewed since prior progress note. Most recent are: 
 
Visit Vitals /66 (BP 1 Location: Left arm, BP Patient Position: At rest) Pulse (!) 107 Temp 98 °F (36.7 °C) Resp 24 Ht 6' 2\" (1.88 m) Wt 70.3 kg (155 lb) SpO2 92% BMI 19.90 kg/m² No intake or output data in the 24 hours ending 03/04/20 0858 Tmax:  Temp (24hrs), Av.6 °F (37 °C), Min:98 °F (36.7 °C), Max:100.3 °F (37.9 °C) Data Review:  
Data reviewed by myself: 
Cta Chest W Or W Wo Cont Result Date: 2020 EXAM:  CTA CHEST W OR W WO CONT INDICATION:   SOB COMPARISON: None. TECHNIQUE: Precontrast  images were obtained to localize the volume for acquisition. Multislice helical CT arteriography was performed from the diaphragm to the thoracic inlet during uneventful rapid bolus of 100 cc Isovue-370. Lung and soft tissue windows were generated. Coronal and sagittal images were generated and 3D post processing consisting of coronal maximum intensity images was performed. CT dose reduction was achieved through use of a standardized protocol tailored for this examination and automatic exposure control for dose modulation. FINDINGS: CHEST: THYROID: No nodule. MEDIASTINUM: No mass or lymphadenopathy. LEV: No mass or lymphadenopathy. THORACIC AORTA: Atherosclerotic without evidence of aneurysm. MAIN PULMONARY ARTERY: Pulmonary emboli right upper lobe branches. TRACHEA/BRONCHI: Patent. ESOPHAGUS: No wall thickening or dilatation. HEART: Normal in size. PLEURA: No effusion or pneumothorax. LUNGS: Emphysematous changes. Focal consolidation right lower lobe. Left basilar atelectasis. INCIDENTALLY IMAGED UPPER ABDOMEN: 7.7 cm right renal cyst. 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 Endograft is partially visualized. Lerry [place] BONES: No destructive bone lesion. IMPRESSION: Right upper lobe pulmonary emboli. Localized consolidation right lower lobe. Left basilar atelectasis. The findings were called to the patient's nurse on 2020 at 6:39 PM by myself. [address] Us Abd Comp Result Date: 2020 INDICATION: Abdominal distention COMPARISON: None TECHNIQUE: Routine ultrasound images of the abdomen were obtained.  FINDINGS: GALLBLADDER: No cholecystolithiasis, gallbladder wall thickening, or pericholecystic fluid. COMMON BILE DUCT: 3 mm in diameter. No evidence of choledocholithiasis. LIVER: Normal in size. Normal echogenicity. No focal hepatic mass or intrahepatic biliary dilatation. MAIN PORTAL VEIN: Patent. Hepatopetal flow direction. PANCREAS: Obscured by overlying bowel gas. RIGHT KIDNEY: 12.8 cm in length. 8.2 cm cyst. No hydronephrosis. No evidence of mass or calculus. LEFT KIDNEY: 11.5 cm in length. No hydronephrosis. No evidence of mass or calculus. SPLEEN: Obscured by overlying bowel gas. AORTA: Obscured by overlying bowel gas. INFERIOR VENA CAVA: Patent. OTHER: No ascites. IMPRESSION: Large right renal cyst. Otherwise unremarkable abdominal ultrasound. Xr Chest Ольга Ti Result Date: 2/20/2020 INDICATION:  SOB. History of chronic asthma. Prostate cancer. COPD. EXAM: Chest single view. COMPARISON: 9/10/2017. FINDINGS: A single frontal view of the chest at 1419 hours shows bibasilar atelectasis or scar, with mild interstitial prominence increased since the prior study. .  The heart, mediastinum and pulmonary vasculature are stable . The bony thorax is unremarkable for age. . Port-A-Cath device on the right is stable. IMPRESSION: Bibasilar atelectasis or scar with interstitial prominence. Correlate for developing interstitial edema or bibasilar inflammatory infiltrate in this patient with COPD and chronic asthma. .  . No results found for: SDES No results found for: CULT All Micro Results None Cta Chest W Or W Wo Cont Result Date: 3/3/2020 IMPRESSION: 1. Bibasilar airspace disease 2. Severe emphysema 3. Presumed reactive adenopathy in the right infrahilar region 4. No acute pulmonary embolus Cta Chest W Or W Wo Cont Result Date: 2/20/2020 IMPRESSION: Right upper lobe pulmonary emboli. Localized consolidation right lower lobe. Left basilar atelectasis. The findings were called to the patient's nurse on 2/20/2020 at 6:39 PM by myself. [address] Us Abd Comp Result Date: 2/21/2020 IMPRESSION: Large right renal cyst. Otherwise unremarkable abdominal ultrasound. Xr Chest Ana Mohair Result Date: 2/27/2020 IMPRESSION: Stable mild bibasilar opacities. Xr Chest Ana Mohair Result Date: 2/23/2020 IMPRESSION: 1. No radiographic evidence of acute cardiopulmonary disease. Xr Chest Ana Mohair Result Date: 2/20/2020 IMPRESSION: Bibasilar atelectasis or scar with interstitial prominence. Correlate for developing interstitial edema or bibasilar inflammatory infiltrate in this patient with COPD and chronic asthma. .  . Labs: reviewed by myself. Recent Labs 03/02/20 
2111 WBC 12.2* HGB 11.5* HCT 38.8  Recent Labs 03/02/20 
2471   
K 3.7 CL 99  
CO2 38* BUN 17 CREA 0.62* * CA 9.8 MG 2.0 Recent Labs 03/02/20 
1486 SGOT 10* ALT 15  
AP 72 TBILI 0.4 TP 7.0 ALB 2.7*  
GLOB 4.3* No results for input(s): INR, PTP, APTT, INREXT, INREXT in the last 72 hours. No results for input(s): FE, TIBC, PSAT, FERR in the last 72 hours. No results found for: FOL, RBCF No results for input(s): PH, PCO2, PO2 in the last 72 hours. No results for input(s): CPK, CKNDX, TROIQ in the last 72 hours. No lab exists for component: CPKMB Lab Results Component Value Date/Time Cholesterol, total 187 09/11/2017 05:53 AM  
 HDL Cholesterol 63 09/11/2017 05:53 AM  
 LDL, calculated 109 (H) 09/11/2017 05:53 AM  
 Triglyceride 75 09/11/2017 05:53 AM  
 CHOL/HDL Ratio 3.0 09/11/2017 05:53 AM  
 
Lab Results Component Value Date/Time Glucose (POC) 129 (H) 03/04/2020 08:13 AM  
 Glucose (POC) 150 (H) 03/03/2020 09:17 PM  
 Glucose (POC) 164 (H) 03/03/2020 05:33 PM  
 Glucose (POC) 176 (H) 03/03/2020 12:54 PM  
 Glucose (POC) 127 (H) 03/03/2020 08:02 AM  
 
Lab Results Component Value Date/Time  Color YELLOW/STRAW 09/10/2017 10:09 PM  
 Appearance CLEAR 09/10/2017 10:09 PM  
 Specific gravity 1.010 09/10/2017 10:09 PM  
 pH (UA) 5.0 09/10/2017 10:09 PM  
 Protein NEGATIVE  09/10/2017 10:09 PM  
 Glucose NEGATIVE  09/10/2017 10:09 PM  
 Ketone NEGATIVE  09/10/2017 10:09 PM  
 Bilirubin NEGATIVE  09/10/2017 10:09 PM  
 Urobilinogen 0.2 09/10/2017 10:09 PM  
 Nitrites NEGATIVE  09/10/2017 10:09 PM  
 Leukocyte Esterase NEGATIVE  09/10/2017 10:09 PM  
 Epithelial cells MODERATE (A) 09/10/2017 10:09 PM  
 Bacteria NEGATIVE  09/10/2017 10:09 PM  
 WBC 5-10 09/10/2017 10:09 PM  
 RBC 0-5 09/10/2017 10:09 PM  
 
 
 
Medications Reviewed:  
 
Current Facility-Administered Medications Medication Dose Route Frequency  enoxaparin (LOVENOX) injection 70 mg  70 mg SubCUTAneous Q12H  predniSONE (DELTASONE) tablet 20 mg  20 mg Oral DAILY WITH BREAKFAST  guaiFENesin (ROBITUSSIN) 100 mg/5 mL oral liquid 100 mg  100 mg Oral Q4H PRN  
 albuterol-ipratropium (DUO-NEB) 2.5 MG-0.5 MG/3 ML  3 mL Nebulization TID RT  
 arformoteroL (BROVANA) neb solution 15 mcg  15 mcg Nebulization BID RT  
 oxymetazoline (AFRIN) 0.05 % nasal spray 2 Spray  2 Spray Both Nostrils BID PRN  polyethylene glycol (MIRALAX) packet 17 g  17 g Oral DAILY PRN  
 diphenhydrAMINE (BENADRYL) capsule 25 mg  25 mg Oral Q6H PRN  
 sertraline (ZOLOFT) tablet 25 mg  25 mg Oral DAILY  levETIRAcetam (KEPPRA) tablet 500 mg  500 mg Oral BID  sodium chloride (NS) flush 5-40 mL  5-40 mL IntraVENous Q8H  
 sodium chloride (NS) flush 5-40 mL  5-40 mL IntraVENous PRN  
 acetaminophen (TYLENOL) tablet 650 mg  650 mg Oral Q4H PRN  
 ferrous sulfate tablet 325 mg  325 mg Oral ACB  hydroxyzine HCL (ATARAX) tablet 25 mg  25 mg Oral BID  montelukast (SINGULAIR) tablet 10 mg  10 mg Oral DAILY  pantoprazole (PROTONIX) tablet 40 mg  40 mg Oral ACB&D  
 budesonide (PULMICORT) 250 mcg/2ml nebulizer susp  250 mcg Nebulization BID RT  
 albuterol (PROVENTIL VENTOLIN) nebulizer solution 2.5 mg  2.5 mg Nebulization Q2H PRN  
  glucose chewable tablet 16 g  4 Tab Oral PRN  
 glucagon (GLUCAGEN) injection 1 mg  1 mg IntraMUSCular PRN  
 dextrose 10% infusion 0-250 mL  0-250 mL IntraVENous PRN  
 insulin lispro (HUMALOG) injection   SubCUTAneous AC&HS  
 
______________________________________________________________________ EXPECTED LENGTH OF STAY: 3d 19h ACTUAL LENGTH OF STAY:          Sanju Ho MD  
 
Patient's emergency contacts: 
Extended Emergency Contact Information Primary Emergency Contact: Nilsa Ramey Address: [address] [place] [address] Phone: [phone] Mobile Phone: [phone] Relation: Sister

## 2020-03-04 NOTE — PROGRESS NOTES
Bedside and Verbal shift change report given to Qing Allen RN (oncoming nurse) by Kailey Macedo RN and Ana Sesay RN (offgoing nurse). Report included the following information SBAR, Kardex, Intake/Output, MAR, Recent Results, Cardiac Rhythm ST and Quality Measures.

## 2020-03-04 NOTE — PROGRESS NOTES
Hospitalist Progress Note Segun Crisostomo MD 
Answering service: [phone] OR [phone] from in house phone Date of Service:  3/3/2020 NAME:  Belinda Sweeney :  1945 MRN:  [id number] PCP: Shamir Arellano MD 
 
Chief Complaint:  
Chief Complaint Patient presents with  Shortness of Breath Admission Summary:  
 
Belinda Sweeney is a 69 y.o. male who presented with As per initial admission summary Belinda Sweeney is a 69 y.o.  with h/o COPD/ASTHMA, CAD and DM. Pt presents from [facility] for evaluation of shortness of breath. Patient prior was living at home with a family member, 2 or 3 weeks ago he developed shortness breath and then he was admitted to [facility], per sister he was treated with COPD exacerbation patient also was told that there were fluid in the chest but never have diagnosed with CHF. He was then discharged to [facility] for rehab 2 weeks ago was steroids and nebulizer. In SNF, Pt complains of increased shortness of breath with associated leg swelling, and chest pain for the past 3 days. Pt notes he had an oxygen saturation of 88%. Pt denies nausea, vomiting, fever, or abdominal pain. Patient said he can't fit his regular shoe anymore due to the leg and feet swelling. He also noticed increasing abdominal distention. Interval history / Subjective:  
Patient seen for Follow up of PE Patient seen and examined by the bedside, Labs, images and notes reviewed Sats in 80s, HR in 120s. Desats very easy. No fever, chills. No overnight events. No other concerns. Assessment & Plan: # Sinus tach, not feeling well, easy desaturation 
-Close monitoring 
-CTA Chest PE protocol 
-Change Eliquis to Lovenox full dose BID # Acute on chronic hypoxic and hypercarbic - mixed respiratory failure,  improving # Right UL PE # Localized Rigth LL consolidation # Severe COPD exacerbation # Pulmonary hypertension per Echo 2/20: EF 60% no MR or AS PASP 66 RV nml 
-Combined complex respiratory issues 
-Still very high O2 requirement in later part of the day but better at rest, down to 4-6LPM 
-ABG 2/27 noted. ?baseline elevated hypercarbia. -Pulmonology on board. Trilogy discussed with patient and would depend on disposition 
-Hematology on board. Per recommendations, lovenox to Eliquis 10mg BID for 7 days, then 5mg BID x 3-6 months. 
-Outpatient hematology follow up in the clinic 
-Outpatient Pulmonology follow up in the clinic 
-Continue NIV-BIPAP QHS and PRN during the day time 
-Levaquin 5 days for possible PNA, last 2/26 
-Prednisone up to 20mg again. Further per pulm. -Duenebs and supportive care 
-Trilogy/ BiPAP order on discharge to SNF and would need at home as well 
-Palliative care consult as seems like End Stage Lung Disease # Respiratory acidosis with metabolic alkalosis 
-Complex driving forces with diuretics, COPD and other pulmological etiologies -BiPAP as noted above # DM2, mild hyperglycemia 
-No recent A1c 
-Likely steroid driven 
-SSI only # Abdominal distention 
-No ascites 
-Large right renal cyst 
-Urology consulted, need outpatient follow up # H/o seizure:  
-continue home meds Code status: Full code DDVT Px: Eliquis Care plan discussed with: Patient/ Family, Nurse Disposition: [id number] Anticipated discharge: Once O2 requirement. Tachycardia and feeling of wellbeing improves, 24-48hrs, likely 3/3 Hospital Problems  Date Reviewed: 3/16/2018 Codes Class Noted POA Acute respiratory failure with hypoxia [facility]) ICD-10-CM: J96.01 
ICD-9-CM: 518.81  2/20/2020 Unknown Review of Systems: A comprehensive review of systems was negative except for that written in the HPI. Physical Examination:  
 
Visit Vitals /67 (BP 1 Location: Left arm, BP Patient Position: At rest) Pulse (!) 108 Temp 98.4 °F (36.9 °C) Resp 29 Ht 6' 2\" (1.88 m) Wt 71.6 kg (157 lb 13.6 oz) SpO2 93% BMI 20.27 kg/m² General:  Alert, cooperative, no distress, appears stated age. Head:  Normocephalic, without obvious abnormality, atraumatic. Lungs:    Significantly tight AE with reduced breath sounds, no wheezing/ rhonchi/ rales, not much changed today Heart:  Regular rate and rhythm, S1, S2 normal, no murmur, click, rub or gallop. Abdomen:   Soft, non-tender. Bowel sounds normal. No masses,  No organomegaly. Extremities: Extremities normal, atraumatic, no cyanosis or edema Pulses: 2+ and symmetric all extremities. Vital Signs:  
 Last 24hrs VS reviewed since prior progress note. Most recent are: 
 
Visit Vitals /67 (BP 1 Location: Left arm, BP Patient Position: At rest) Pulse (!) 108 Temp 98.4 °F (36.9 °C) Resp 29 Ht 6' 2\" (1.88 m) Wt 71.6 kg (157 lb 13.6 oz) SpO2 93% BMI 20.27 kg/m² No intake or output data in the 24 hours ending 20 2140 Tmax:  Temp (24hrs), Av.3 °F (36.8 °C), Min:98.1 °F (36.7 °C), Max:98.8 °F (37.1 °C) Data Review:  
Data reviewed by myself: 
Cta Chest W Or W Wo Cont Result Date: 2020 EXAM:  CTA CHEST W OR W WO CONT INDICATION:   SOB COMPARISON: None. TECHNIQUE: Precontrast  images were obtained to localize the volume for acquisition. Multislice helical CT arteriography was performed from the diaphragm to the thoracic inlet during uneventful rapid bolus of 100 cc Isovue-370. Lung and soft tissue windows were generated. Coronal and sagittal images were generated and 3D post processing consisting of coronal maximum intensity images was performed. CT dose reduction was achieved through use of a standardized protocol tailored for this examination and automatic exposure control for dose modulation. FINDINGS: CHEST: THYROID: No nodule. MEDIASTINUM: No mass or lymphadenopathy.  LEV: No mass or lymphadenopathy. THORACIC AORTA: Atherosclerotic without evidence of aneurysm. MAIN PULMONARY ARTERY: Pulmonary emboli right upper lobe branches. TRACHEA/BRONCHI: Patent. ESOPHAGUS: No wall thickening or dilatation. HEART: Normal in size. PLEURA: No effusion or pneumothorax. LUNGS: Emphysematous changes. Focal consolidation right lower lobe. Left basilar atelectasis. INCIDENTALLY IMAGED UPPER ABDOMEN: 7.7 cm right renal cyst. 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 Endograft is partially visualized. Jhon Shillings BONES: No destructive bone lesion. IMPRESSION: Right upper lobe pulmonary emboli. Localized consolidation right lower lobe. Left basilar atelectasis. The findings were called to the patient's nurse on 2/20/2020 at 6:39 PM by myself. 1  Abd Comp Result Date: 2/21/2020 INDICATION: Abdominal distention COMPARISON: None TECHNIQUE: Routine ultrasound images of the abdomen were obtained. FINDINGS: GALLBLADDER: No cholecystolithiasis, gallbladder wall thickening, or pericholecystic fluid. COMMON BILE DUCT: 3 mm in diameter. No evidence of choledocholithiasis. LIVER: Normal in size. Normal echogenicity. No focal hepatic mass or intrahepatic biliary dilatation. MAIN PORTAL VEIN: Patent. Hepatopetal flow direction. PANCREAS: Obscured by overlying bowel gas. RIGHT KIDNEY: 12.8 cm in length. 8.2 cm cyst. No hydronephrosis. No evidence of mass or calculus. LEFT KIDNEY: 11.5 cm in length. No hydronephrosis. No evidence of mass or calculus. SPLEEN: Obscured by overlying bowel gas. AORTA: Obscured by overlying bowel gas. INFERIOR VENA CAVA: Patent. OTHER: No ascites. IMPRESSION: Large right renal cyst. Otherwise unremarkable abdominal ultrasound. Xr Chest [facility] Result Date: 2/20/2020 INDICATION:  SOB. History of chronic asthma. Prostate cancer. COPD. EXAM: Chest single view. COMPARISON: 9/10/2017.  FINDINGS: A single frontal view of the chest at 1419 hours shows bibasilar atelectasis or scar, with mild interstitial prominence increased since the prior study. .  The heart, mediastinum and pulmonary vasculature are stable . The bony thorax is unremarkable for age. . Port-A-Cath device on the right is stable. IMPRESSION: Bibasilar atelectasis or scar with interstitial prominence. Correlate for developing interstitial edema or bibasilar inflammatory infiltrate in this patient with COPD and chronic asthma. .  . No results found for: SDES No results found for: CULT All Micro Results None Cta Chest W Or W Wo Cont Result Date: 3/3/2020 IMPRESSION: 1. Bibasilar airspace disease 2. Severe emphysema 3. Presumed reactive adenopathy in the right infrahilar region 4. No acute pulmonary embolus Cta Chest W Or W Wo Cont Result Date: 2/20/2020 IMPRESSION: Right upper lobe pulmonary emboli. Localized consolidation right lower lobe. Left basilar atelectasis. The findings were called to the patient's nurse on 2/20/2020 at 6:39 PM by myself. [address] Abd Comp Result Date: 2/21/2020 IMPRESSION: Large right renal cyst. Otherwise unremarkable abdominal ultrasound. Xr Chest [facility] Result Date: 2/27/2020 IMPRESSION: Stable mild bibasilar opacities. Xr Chest [facility] Result Date: 2/23/2020 IMPRESSION: 1. No radiographic evidence of acute cardiopulmonary disease. Xr Chest [facility] Result Date: 2/20/2020 IMPRESSION: Bibasilar atelectasis or scar with interstitial prominence. Correlate for developing interstitial edema or bibasilar inflammatory infiltrate in this patient with COPD and chronic asthma. .  . Labs: reviewed by myself. Recent Labs 03/02/20 
6521 03/01/20 
1315 WBC 12.2* 12.0*  
HGB 11.5* 12.2 HCT 38.8 41.5  440* Recent Labs 03/02/20 
3511 03/01/20 2012  139  
K 3.7 3.6 CL 99 100 CO2 38* 37* BUN 17 16 CREA 0.62* 0.61* * 135* CA 9.8 10.3*  
 MG 2.0 2.1 Recent Labs 03/02/20 
2302 SGOT 10* ALT 15  
AP 72 TBILI 0.4 TP 7.0 ALB 2.7*  
GLOB 4.3* No results for input(s): INR, PTP, APTT, INREXT, INREXT in the last 72 hours. No results for input(s): FE, TIBC, PSAT, FERR in the last 72 hours. No results found for: FOL, RBCF No results for input(s): PH, PCO2, PO2 in the last 72 hours. No results for input(s): CPK, CKNDX, TROIQ in the last 72 hours. No lab exists for component: CPKMB Lab Results Component Value Date/Time Cholesterol, total 187 09/11/2017 05:53 AM  
 HDL Cholesterol 63 09/11/2017 05:53 AM  
 LDL, calculated 109 (H) 09/11/2017 05:53 AM  
 Triglyceride 75 09/11/2017 05:53 AM  
 CHOL/HDL Ratio 3.0 09/11/2017 05:53 AM  
 
Lab Results Component Value Date/Time Glucose (POC) 150 (H) 03/03/2020 09:17 PM  
 Glucose (POC) 164 (H) 03/03/2020 05:33 PM  
 Glucose (POC) 176 (H) 03/03/2020 12:54 PM  
 Glucose (POC) 127 (H) 03/03/2020 08:02 AM  
 Glucose (POC) 202 (H) 03/02/2020 09:18 PM  
 
Lab Results Component Value Date/Time Color YELLOW/STRAW 09/10/2017 10:09 PM  
 Appearance CLEAR 09/10/2017 10:09 PM  
 Specific gravity 1.010 09/10/2017 10:09 PM  
 pH (UA) 5.0 09/10/2017 10:09 PM  
 Protein NEGATIVE  09/10/2017 10:09 PM  
 Glucose NEGATIVE  09/10/2017 10:09 PM  
 Ketone NEGATIVE  09/10/2017 10:09 PM  
 Bilirubin NEGATIVE  09/10/2017 10:09 PM  
 Urobilinogen 0.2 09/10/2017 10:09 PM  
 Nitrites NEGATIVE  09/10/2017 10:09 PM  
 Leukocyte Esterase NEGATIVE  09/10/2017 10:09 PM  
 Epithelial cells MODERATE (A) 09/10/2017 10:09 PM  
 Bacteria NEGATIVE  09/10/2017 10:09 PM  
 WBC 5-10 09/10/2017 10:09 PM  
 RBC 0-5 09/10/2017 10:09 PM  
 
 
 
Medications Reviewed:  
 
Current Facility-Administered Medications Medication Dose Route Frequency  enoxaparin (LOVENOX) injection 70 mg  70 mg SubCUTAneous Q12H  predniSONE (DELTASONE) tablet 10 mg  10 mg Oral DAILY WITH BREAKFAST  guaiFENesin (ROBITUSSIN) 100 mg/5 mL oral liquid 100 mg  100 mg Oral Q4H PRN  
 albuterol-ipratropium (DUO-NEB) 2.5 MG-0.5 MG/3 ML  3 mL Nebulization TID RT  
 arformoteroL (BROVANA) neb solution 15 mcg  15 mcg Nebulization BID RT  
 oxymetazoline (AFRIN) 0.05 % nasal spray 2 Spray  2 Spray Both Nostrils BID PRN  polyethylene glycol (MIRALAX) packet 17 g  17 g Oral DAILY PRN  
 diphenhydrAMINE (BENADRYL) capsule 25 mg  25 mg Oral Q6H PRN  
 sertraline (ZOLOFT) tablet 25 mg  25 mg Oral DAILY  levETIRAcetam (KEPPRA) tablet 500 mg  500 mg Oral BID  sodium chloride (NS) flush 5-40 mL  5-40 mL IntraVENous Q8H  
 sodium chloride (NS) flush 5-40 mL  5-40 mL IntraVENous PRN  
 acetaminophen (TYLENOL) tablet 650 mg  650 mg Oral Q4H PRN  
 ferrous sulfate tablet 325 mg  325 mg Oral ACB  hydroxyzine HCL (ATARAX) tablet 25 mg  25 mg Oral BID  montelukast (SINGULAIR) tablet 10 mg  10 mg Oral DAILY  pantoprazole (PROTONIX) tablet 40 mg  40 mg Oral ACB&D  
 budesonide (PULMICORT) 250 mcg/2ml nebulizer susp  250 mcg Nebulization BID RT  
 albuterol (PROVENTIL VENTOLIN) nebulizer solution 2.5 mg  2.5 mg Nebulization Q2H PRN  
 glucose chewable tablet 16 g  4 Tab Oral PRN  
 glucagon (GLUCAGEN) injection 1 mg  1 mg IntraMUSCular PRN  
 dextrose 10% infusion 0-250 mL  0-250 mL IntraVENous PRN  
 insulin lispro (HUMALOG) injection   SubCUTAneous AC&HS  
 
______________________________________________________________________ EXPECTED LENGTH OF STAY: 3d 19h ACTUAL LENGTH OF STAY:          12 Zenon Lizama MD  
 
Patient's emergency contacts: 
Extended Emergency Contact Information Primary Emergency Contact: Amirah Le Address: [address] [place] [address] Home Phone: [phone] Mobile Phone: [phone] Relation: Sister

## 2020-03-05 NOTE — CONSULTS
Palliative Medicine Consult Luca: [phone]) Patient Name: Melinda Vanessa YOB: 1945 Date of Initial Consult: 3/5/20 Reason for Consult: Care decisions Requesting Provider: Lucille Loya Primary Care Physician: Marti Perez MD 
 
 SUMMARY:  
Melinda Vanessa is a 77 y. o. with a past history of severe COPD on 2.5-3L home O2 who was admitted on 2/20/2020 from [facility] where he was after recent hospital stay at [facility] for COPD exacerbation with SOB  and LE swelling. Found to have RUL PE, R PNA, LE DVT, and pulm HTN. Sees Dr Saima Riley as outpatient. Started on Eliquis for PE and Trilogy for COPD. Tx to ICU 3/4/20- did not require intubation. Current medical issues leading to Palliative Medicine involvement include:care decisions. Social: Lives with sister Patricio Mckinney who he named as mPOA this admission - pt helped raise her and now she helps him. Other siblings who are involved as well. Could do ADLs a few months ago but now needs assistance and uses a cane and w/c. Member of [employer] and is very involved. PALLIATIVE DIAGNOSES:  
1. SOB 2. Generalized weakness that has worsened recently 3. Confusion when CO2 elevated, improved 4. Goals of care PLAN:  
1. Pt alert and oriented now, and with many visitors incl sisters Prasad Archer and Bethanie Guerrero and friends from [employer]. 2. Visit later with pt and Prasad [facility])- he is understanding that his COPD is severe and recently has been requiring more assistance and has worsening SOB and now PE, severe pulm HTN. 3. Discussion yest held w/ Prasad Archer when pt tx to ICU and might require intubation. She was taken aback by this and admits that they haven't had conversation about it. She wanted all efforts, and is glad that she now has ability to talk to patient.   
4. 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 His family and friends, and his ability to interact w/ them is very important, as is his meg. 5. Would be okay w/ short term intubation as a bridge to recovery and for now is okay w/ CPR/shock but would not want prolonged life support if medical team felt that there were was little chance for meaningful recovery (to be able to interact w/ his loved ones). 6. Encourage ongoing conversations, chalino w/ PCP and Dr Antonino Covington- because given COPD I tell him that even now it would likely be difficult for him to come off the vent. 7. Given recent decline and this being his 2nd recent hospital stay, concern that pt will be high risk for readmission and debility. Family open to these conversations and appreciative of all info. 8. Initial consult note routed to primary continuity provider and/or primary health care team members 9. Communicated plan of care with: Palliative [facility] Team incl Dr Jeaneth Nagy GOALS OF CARE / TREATMENT PREFERENCES:  
 
GOALS OF CARE: 
Patient/Health Care Proxy Stated Goals: Prolong life TREATMENT PREFERENCES:  
Code Status: Full Code Advance Care Planning: 
[x] [facility] Interdisciplinary Team has updated the ACP Navigator with [place] and Patient Capacity Primary Decision Maker: Amirah Santana - [phone] Advance Care Planning 3/5/2020 Patient's Healthcare Decision Maker is: Named in scanned ACP document Primary Decision Maker Name -  
Confirm Advance Directive Yes, on file Patient Would Like to Complete Advance Directive - Medical Interventions: Full interventions Other: As far as possible, the palliative care team has discussed with patient / health care proxy about goals of care / treatment preferences for patient. HISTORY:  
 
History obtained from: Pt, chart, staff, family CHIEF COMPLAINT: \"I feel better\" HPI/SUBJECTIVE: The patient is:  
[x] Verbal and participatory [] Non-participatory due to:  
 
Pt off BIPAP and back on NC, requires pursed lip breathing and although denies SOB is using some accessory muscles to breathe. Alert and pleasant. No complaints. Clinical Pain Assessment (nonverbal scale for severity on nonverbal patients):  
Clinical Pain Assessment Severity: 0 Duration: for how long has pt been experiencing pain (e.g., 2 days, 1 month, years) Frequency: how often pain is an issue (e.g., several times per day, once every few days, constant) FUNCTIONAL ASSESSMENT:  
 
Palliative Performance Scale (PPS): PPS: 40 PSYCHOSOCIAL/SPIRITUAL SCREENING:  
 
Palliative IDT has assessed this patient for cultural preferences / practices and a referral made as appropriate to needs (Cultural Services, Patient Advocacy, Ethics, etc.) Any spiritual / Voodoo concerns: 
[] Yes /  [x] No 
 
Caregiver Burnout: 
[] Yes /  [x] No /  [] No Caregiver Present Anticipatory grief assessment:  
[x] Normal  / [] Maladaptive ESAS Anxiety: Anxiety: 0 
 
ESAS Depression: Depression: 0 REVIEW OF SYSTEMS:  
 
Positive and pertinent negative findings in ROS are noted above in HPI. The following systems were [x] reviewed / [] unable to be reviewed as noted in HPI Other findings are noted below. Systems: constitutional, ears/nose/mouth/throat, respiratory, gastrointestinal, genitourinary, musculoskeletal, integumentary, neurologic, psychiatric, endocrine. Positive findings noted below. Modified ESAS Completed by: provider Fatigue: 5 Drowsiness: 0 Depression: 0 Pain: 0 Anxiety: 0 Nausea: 0 Anorexia: 0 Dyspnea: 3 Stool Occurrence(s): 1 PHYSICAL EXAM:  
 
From RN flowsheet: 
Wt Readings from Last 3 Encounters:  
03/05/20 160 lb 7.9 oz (72.8 kg) 03/16/18 165 lb (74.8 kg) 12/04/17 166 lb (75.3 kg) Blood pressure 99/75, pulse 84, temperature (!) 93 °F (33.9 °C), resp. rate 19, height 6' 2\" (1.88 m), weight 160 lb 7.9 oz (72.8 kg), SpO2 93 %. Pain Scale 1: Numeric (0 - 10) Pain Intensity 1: 0 Pain Location 1: Rib cage Pain Description 1: Russella Shonto Pain Intervention(s) 1: Rest 
Last bowel movement, if known:  
 
Constitutional: awake, alert, oriented on BIPAP Eyes: pupils equal, anicteric ENMT: no nasal discharge, moist mucous membranes Respiratory: breathing not labored Musculoskeletal: no deformity, no tenderness to palpation Skin: warm, dry Neurologic: following commands, moving all extremities Psychiatric: full affect HISTORY:  
 
Active Problems: 
  Acute respiratory failure with hypoxia ([address] (2/20/2020) COPD exacerbation ([address] (3/4/2020) Past Medical History:  
Diagnosis Date  Asthma   
 hx of/has wheezing  Cancer [facility])   
 prostate - not treated yet  Cerebral artery occlusion with cerebral infarction ([address]  Chronic obstructive pulmonary disease ([address]  Epilepsy ([address]  Ill-defined condition   
 shortness of breath - being evaluated  Psychiatric disorder   
 mental disability Past Surgical History:  
Procedure Laterality Date  COLONOSCOPY Left 10/4/2017 COLONOSCOPY performed by Refugio Gowers, [facility] at [facility] ENDOSCOPY  
 HX INTRACRANIAL ANEURYSM REPAIR    
 HX OTHER SURGICAL    
 cyst removed from neck  HX PROSTATE SURGERY    
 HX SPLENECTOMY Family History Problem Relation Age of Onset  Colon Cancer Maternal Aunt  Colon Cancer Maternal Aunt  Colon Cancer Maternal Aunt History reviewed, no pertinent family history. Social History Tobacco Use  Smoking status: Current Every Day Smoker  Smokeless tobacco: Never Used  Tobacco comment: cigarettes Substance Use Topics  Alcohol use: No  
 
No Known Allergies Current Facility-Administered Medications Medication Dose Route Frequency  piperacillin-tazobactam (ZOSYN) 3.375 g in 0.9% sodium chloride (MBP/ADV) 100 mL  3.375 g IntraVENous Q8H  
 apixaban (ELIQUIS) tablet 5 mg  5 mg Oral BID  Vancomycin- Pharmacy Dosing   Other Rx Dosing/Monitoring  vancomycin (VANCOCIN) 1,000 mg in 0.9% sodium chloride (MBP/ADV) 250 mL  1,000 mg IntraVENous Q12H  
 methylPREDNISolone (PF) (Solu-MEDROL) injection 60 mg  60 mg IntraVENous DAILY  albuterol-ipratropium (DUO-NEB) 2.5 MG-0.5 MG/3 ML  3 mL Nebulization Q4H RT  
 azithromycin (ZITHROMAX) 500 mg in 0.9% sodium chloride (MBP/ADV) 250 mL  500 mg IntraVENous Q24H  
 guaiFENesin (ROBITUSSIN) 100 mg/5 mL oral liquid 100 mg  100 mg Oral Q4H PRN  
 arformoteroL (BROVANA) neb solution 15 mcg  15 mcg Nebulization BID RT  
 oxymetazoline (AFRIN) 0.05 % nasal spray 2 Spray  2 Spray Both Nostrils BID PRN  polyethylene glycol (MIRALAX) packet 17 g  17 g Oral DAILY PRN  
 diphenhydrAMINE (BENADRYL) capsule 25 mg  25 mg Oral Q6H PRN  
 sertraline (ZOLOFT) tablet 25 mg  25 mg Oral DAILY  levETIRAcetam (KEPPRA) tablet 500 mg  500 mg Oral BID  sodium chloride (NS) flush 5-40 mL  5-40 mL IntraVENous Q8H  
 sodium chloride (NS) flush 5-40 mL  5-40 mL IntraVENous PRN  
 acetaminophen (TYLENOL) tablet 650 mg  650 mg Oral Q4H PRN  
 ferrous sulfate tablet 325 mg  325 mg Oral ACB  hydroxyzine HCL (ATARAX) tablet 25 mg  25 mg Oral BID  montelukast (SINGULAIR) tablet 10 mg  10 mg Oral DAILY  pantoprazole (PROTONIX) tablet 40 mg  40 mg Oral ACB&D  
 budesonide (PULMICORT) 250 mcg/2ml nebulizer susp  250 mcg Nebulization BID RT  
 albuterol (PROVENTIL VENTOLIN) nebulizer solution 2.5 mg  2.5 mg Nebulization Q2H PRN  
 glucose chewable tablet 16 g  4 Tab Oral PRN  
 glucagon (GLUCAGEN) injection 1 mg  1 mg IntraMUSCular PRN  
 dextrose 10% infusion 0-250 mL  0-250 mL IntraVENous PRN  
 insulin lispro (HUMALOG) injection   SubCUTAneous AC&HS  
 
 
 
 LAB AND IMAGING FINDINGS:  
 
Lab Results Component Value Date/Time  WBC 11.0 03/05/2020 04:14 AM  
 HGB 9.4 (L) 03/05/2020 04:14 AM  
 PLATELET 331 00/67/2420 04:14 AM  
 
Lab Results Component Value Date/Time Sodium 139 03/05/2020 04:14 AM  
 Potassium 4.1 03/05/2020 04:14 AM  
 Chloride 98 03/05/2020 04:14 AM  
 CO2 36 (H) 03/05/2020 04:14 AM  
 BUN 15 03/05/2020 04:14 AM  
 Creatinine 0.43 (L) 03/05/2020 04:14 AM  
 Calcium 9.7 03/05/2020 04:14 AM  
 Magnesium 2.0 03/02/2020 04:53 AM  
 Phosphorus 2.9 02/24/2020 03:21 AM  
  
Lab Results Component Value Date/Time AST (SGOT) 10 (L) 03/02/2020 04:53 AM  
 Alk. phosphatase 72 03/02/2020 04:53 AM  
 Protein, total 7.0 03/02/2020 04:53 AM  
 Albumin 2.7 (L) 03/02/2020 04:53 AM  
 Globulin 4.3 (H) 03/02/2020 04:53 AM  
 
Lab Results Component Value Date/Time INR 1.1 09/29/2017 01:07 PM  
 Prothrombin time 11.4 (H) 09/29/2017 01:07 PM  
 aPTT 24.2 09/29/2017 01:07 PM  
  
No results found for: IRON, FE, TIBC, IBCT, PSAT, FERR No results found for: PH, PCO2, PO2 No components found for: [place] Lab Results Component Value Date/Time  02/27/2020 03:52 AM  
  
 
 
   
 
Total time: 70 min Counseling / coordination time, spent as noted above: 50 min  
> 50% counseling / coordination?: yes Prolonged service was provided for  []30 min   []75 min in face to face time in the presence of the patient, spent as noted above. Time Start:  
Time End:  
Note: this can only be billed with 17882 (initial) or 29960 (follow up). If multiple start / stop times, list each separately.

## 2020-03-05 NOTE — PROGRESS NOTES
[facility] 
 
ICU TEAM Progress Note Name: Oz Phillips : 1945 MRN: [id number] Date: 3/5/2020 Subjective:  
Progress Note: 3/5/2020 Reason for ICU Admission: Acute hypoxic hypercapnic respiratory failure Overnight Events: Continued on BiPAP, PCO2 improving this AM able to answer questions, awake and alert. POD:* No surgery found * S/P:  
 
Active Problem List:  
 
Problem List  Date Reviewed: 3/16/2018 Codes Class COPD exacerbation ([facility] ICD-10-CM: J44.1 ICD-9-CM: 491.21 Acute respiratory failure with hypoxia (HCC) ICD-10-CM: J96.01 
ICD-9-CM: 518.81 Slurred speech ICD-10-CM: R47.81 ICD-9-CM: 784.59 Seizures ([facility] ICD-10-CM: R56.9 ICD-9-CM: 780.39 Altered mental status ICD-10-CM: R41.82 
ICD-9-CM: 780.97 Cerebrovascular accident (CVA) due to thrombosis of right posterior cerebral artery ([facility] ICD-10-CM: I42.046 ICD-9-CM: 434.01 Past Medical History:  
 
 has a past medical history of Asthma, Cancer ([facility] Cerebral artery occlusion with cerebral infarction [facility]), Chronic obstructive pulmonary disease ([facility] Epilepsy ([facility] Ill-defined condition, and Psychiatric disorder. He also has no past medical history of Unspecified adverse effect of anesthesia or Unspecified sleep apnea. Past Surgical History:  
 
 has a past surgical history that includes hx other surgical; colonoscopy (Left, 10/4/2017); hx prostate surgery; hx splenectomy; and hx intracranial aneurysm repair. Home Medications:  
 
Prior to Admission medications Medication Sig Start Date End Date Taking? Authorizing Provider  
sertraline (ZOLOFT) 25 mg tablet Take 25 mg by mouth daily. Yes Provider, Historical  
atorvastatin (LIPITOR) 40 mg tablet Take 40 mg by mouth daily.    Yes Provider, Historical  
arformoteroL (BROVANA) 15 mcg/2 mL nebu neb solution 15 mcg by Nebulization route two (2) times a day. Yes Provider, Historical  
multivitamin (ONE A DAY) tablet Take 1 Tab by mouth daily. Yes Provider, Historical  
ferrous sulfate 325 mg (65 mg iron) tablet Take 325 mg by mouth Daily (before breakfast). Yes Provider, Historical  
fluticasone propionate (FLONASE ALLERGY RELIEF) 50 mcg/actuation nasal spray 2 Sprays by Both Nostrils route daily. Yes Provider, Historical  
hydroxyzine HCL (ATARAX) 25 mg tablet Take 25 mg by mouth two (2) times a day. Yes Provider, Historical  
ipratropium (ATROVENT) 0.02 % soln 0.5 mg by Other route every eight (8) hours. Oral inhalation   Yes Provider, Historical  
metFORMIN (GLUCOPHAGE) 500 mg tablet Take 500 mg by mouth daily (with breakfast). Yes Provider, Historical  
montelukast (SINGULAIR) 10 mg tablet Take 10 mg by mouth daily. Yes Provider, Historical  
predniSONE (DELTASONE) 10 mg tablet Take 20 mg by mouth daily (with breakfast). COPD exacerbation   Yes Provider, Historical  
budesonide-formoteroL (SYMBICORT) 160-4.5 mcg/actuation HFAA Take 2 Puffs by inhalation two (2) times a day. Yes Provider, Historical  
levalbuterol tartrate (XOPENEX) 45 mcg/actuation inhaler Take 1 Puff by inhalation every eight (8) hours. Yes Provider, Historical  
cetirizine (ZYRTEC) 10 mg tablet Take 10 mg by mouth daily. Yes Provider, Historical  
levETIRAcetam (KEPPRA) 500 mg tablet TAKE 1 TABLET BY MOUTH TWICE A DAY 11/8/19  Yes Nirali Howard DO  
pantoprazole (PROTONIX) 40 mg tablet Take 1 Tab by mouth Before breakfast and dinner. 10/6/17  Yes Roland Callejas MD  
umeclidinium (INCRUSE ELLIPTA) 62.5 mcg/actuation inhaler Take 1 Puff by inhalation daily. Indications: Rescue inhaler   Yes Provider, Historical  
 
 
Allergies/Social/Family History: No Known Allergies Social History Tobacco Use  Smoking status: Current Every Day Smoker  Smokeless tobacco: Never Used  Tobacco comment: cigarettes Substance Use Topics  Alcohol use: No  
  
Family History Problem Relation Age of Onset  Colon Cancer Maternal Aunt  Colon Cancer Maternal Aunt  Colon Cancer Maternal Aunt Review of Systems:  
 
Pertinent items are noted in HPI. Objective:  
Vital Signs: 
Visit Vitals /78 Pulse (!) 107 Temp 97.5 °F (36.4 °C) Resp 21 Ht 6' 2\" (1.88 m) Wt 72.8 kg (160 lb 7.9 oz) SpO2 90% BMI 20.61 kg/m² O2 Flow Rate (L/min): 6 l/min O2 Device: Nasal cannula Temp (24hrs), Av.5 °F (36.4 °C), Min:93 °F (33.9 °C), Max:99.3 °F (37.4 °C) Intake/Output:  
 
Intake/Output Summary (Last 24 hours) at 3/5/2020 1457 Last data filed at 3/5/2020 1200 Gross per 24 hour Intake 1150 ml Output 200 ml Net 950 ml Physical Exam: 
 
General:  alert, cooperative, mild distress, appears stated age Eye:  conjunctivae/corneas clear. PERRL,EOMI Neurologic:  oriented, grossly non-focal 
Neck:  normal 
Lungs: distant lung sounds Heart: RR S1, S2 Abdomen:  soft, non-tender. Bowel sounds normal. No masses,  no organomegaly Cardiovascular:  pedal pulses normal and no edema Skin:  Normal. and no rash or abnormalities LABS AND  DATA: Personally reviewed Recent Labs 20 
0414 20 
1030 WBC 11.0 13.4* HGB 9.4* 11.2* HCT 32.1* 40.0  411* Recent Labs 20 
0414 20 
1030  138  
K 4.1 4.0  
CL 98 95* CO2 36* 41*  
BUN 15 13 CREA 0.43* 0.57* * 238* CA 9.7 9.9 No results for input(s): SGOT, GPT, AP, TBIL, TP, ALB, GLOB, AML, LPSE in the last 72 hours. No lab exists for component: AMYP No results for input(s): INR, PTP, APTT, INREXT in the last 72 hours. Recent Labs 20 
1027 20 
2329 PHI 7.400 7.333* PCO2I 67.6* 79.8*  
PO2I 35* 71* FIO2I 40 50 No results for input(s): CPK, CKMB, TROIQ, BNPP in the last 72 hours.  
 
Hemodynamics:  
PAP:   CO:    
Wedge:   CI:    
CVP:    SVR:    
 PVR:    
 
Ventilator Settings: 
Mode Rate Tidal Volume Pressure FiO2 PEEP  
         40 % Peak airway pressure:     
Minute ventilation: 11.1 l/min MEDS: Reviewed Chest X-Ray: CXR Results  (Last 48 hours) None ECHO 2/21/20 · Normal cavity size, wall thickness and systolic function (ejection fraction normal). Estimated left ventricular ejection fraction is 55 - 60%. No regional wall motion abnormality noted. Assessment:  
 
ICU Problems: 
- Severe COPD exacerbation now requiring BiPAP 
- Acute hypercapnic respiratory failure - Right lower lobe infiltrate - Pulmonary hypertension - Hyperglycemia - Pseudomonas in sputum Cx, ? Colonization will hold abx at this time given patient is clinically improving, consider restart Abx if clinically worsens ICU Comprehensive Plan of Care:  
Plans for this Shift:  
1. Discontinue BiPAP 2. Keep on NC for O2 support, keep saturation above 88-92% 3. Repeat H/H in AM, no evidence of bleeding at this time 4. Start PO diet 5. Continue COPD medications Multidisciplinary Rounds Completed: Yes ABCDEF Bundle/Checklist 
Pain Medications: None Target RASS: N/A Sedation Medications: None CAM-ICU:  Negative Mobility: Fair PT/OT: PT consulted and on board and OT consulted and on board Restraints: None needed at this time Discussed Plan of Care (goals of care): Yes Addressed Code Status: Full Code CARDIOVASCULAR Cardiac Gtts: None SBP Goal of: > 90 mmHg MAP Goal of: > 65 mmHg Transfusion Trigger (Hgb): <7 g/dL RESPIRATORY Vent Goals: N/A 
DVT Prophylaxis (if no, list reason): Eliquis SPO2 Goal: > 92% Pulmonary toilet: Duo-Nebs GI/ Travis Catheter Present: No 
GI Prophylaxis: Not at this time Nutrition: Yes IVFs: N/A Bowel Movement: Yes Bowel Regimen: None needed at this time Insulin: Sliding Scale Insulin ANTIBIOTICS Antibiotics: 
Vancomycin Zosyn T/L/D Tubes: None Lines: Peripheral IV 
 Drains: None SPECIAL EQUIPMENT None DISPOSITION Transfer to non-ICU bed CRITICAL CARE CONSULTANT NOTE I had a face to face encounter with the patient, reviewed and interpreted patient data including clinical events, labs, images, vital signs, I/O's, and examined patient. I have discussed the case and the plan and management of the patient's care with the consulting services, the bedside nurses and the respiratory therapist.   
 
NOTE OF PERSONAL INVOLVEMENT IN CARE This patient has a high probability of imminent, clinically significant deterioration, which requires the highest level of preparedness to intervene urgently. I participated in the decision-making and personally managed or directed the management of the following life and organ supporting interventions that required my frequent assessment to treat or prevent imminent deterioration. I personally spent 30 minutes of critical care time. This is time spent at this critically ill patient's bedside actively involved in patient care as well as the coordination of care and discussions with the patient's family. This does not include any procedural time which has been billed separately. Carmen Crow MD 
Staff KIM/ [address] 
3/5/2020

## 2020-03-05 NOTE — PROGRESS NOTES
0730 
Assumed care of pt 
 
1030 ABGs actually were done from a Venous draw 
 
1200 Pt taken off Bipap and put on 4 L NC 
 
1400 Pt requires a boost to 6L o2 NC when eating. com 
1600 Bedside and Verbal shift change report given to [facility] (oncoming nurse) by Betty Lopez (offgoing nurse). Report included the following information SBAR, Kardex, OR Summary, Procedure Summary, Intake/Output, MAR and Recent Results.

## 2020-03-05 NOTE — PROGRESS NOTES
[facility] Adult  Hospitalist Group ICU Transfer/Accept Summary This patient is being transferred INTO THE ICU 
DATE OF TRANSFER: 3/4/2020 PATIENT ID: Rik Pepe MRN: [id number] YOB: 1945 PRIMARY CARE PROVIDER: Yonny Shirley MD  
DATE OF ADMISSION: 2/20/2020  1:12 PM   
ATTENDING PHYSICIAN: Eli Garcia MD 
CONSULTATIONS:  
IP CONSULT TO HOSPITALIST 
IP CONSULT TO HEMATOLOGY 
IP CONSULT TO UROLOGY 
IP CONSULT TO PALLIATIVE CARE - PROVIDER 
IP CONSULT TO PULMONOLOGY PROCEDURES/SURGERIES:  
* No surgery found * REASON FOR ADMISSION: <principal problem not specified> HOSPITAL PROBLEM LIST: 
Patient Active Problem List  
Diagnosis Code  Slurred speech R47.81  
 Seizures ([facility] R56.9  Altered mental status R41.82  
 Cerebrovascular accident (CVA) due to thrombosis of right posterior cerebral artery ([facility] B14.221  Acute respiratory failure with hypoxia (HCC) J96.01  
 COPD exacerbation (HCC) J44.1 Brief HPI and Hospital Course: Pt was sleepy later in the day. Has not been tolerating and wearing BiPAP at night continuously. ABG checked earlier in the day, Ph 7.24, PCO2>90, PO2 <60. After 3hrs + BiPAP, ABG did not improve much Ph 7.29, PCO2>90 and PO2<70. D/w ICU team for possible need for intubation, close monitoring for uptitrating BiPAP pressure support. Pt was also given 0.5mg of Klonopin around 11am for overt anxiety after d/w pt about risks-including respiratory depression/ sedation. Intensivist evaluated the patient and agreed with transfer to ICU. D/w MPOA Ms. Tammie Odom on phone. She wants to continue with all necessary measures overnight, continue with the full code. D/w her on phone about palliative care and she is willing to have d/w palliative care tomorrow. Updated team. 
 
Assessment and Plan: As noted in details in my earlier progress note. PHYSICAL EXAMINATION: 
Visit Vitals /64 (BP 1 Location: Right arm, BP Patient Position: At rest) Pulse 100 Temp 98.2 °F (36.8 °C) Resp 14 Ht 6' 2\" (1.88 m) Wt 70.3 kg (155 lb) SpO2 96% BMI 19.90 kg/m² As per my progress note except Sleepy, arousable on stimulous CODE STATUS: 
x Full Code DNR Partial  
 Comfort Care Signed:  
Segun Crisostomo MD 
Date of Service:  3/4/2020 
9:05 PM

## 2020-03-05 NOTE — PROGRESS NOTES
TRANSFER - OUT REPORT: 
 
Verbal report given to JAGRUTI Navarro(name) on Maryuri Horne  being transferred to CCU(unit) for change in patient condition(CO2 remains elevated despite 4 hours on BIPAP) Report consisted of patients Situation, Background, Assessment and  
Recommendations(SBAR). Information from the following report(s) SBAR, Kardex, Intake/Output, MAR, Recent Results, Cardiac Rhythm NSR/ST and Quality Measures was reviewed with the receiving nurse. Lines:  
Peripheral IV 17/10/03 Left Basilic (Active) Site Assessment Clean, dry, & intact 3/4/2020  4:50 PM  
Phlebitis Assessment 0 3/4/2020  4:50 PM  
Infiltration Assessment 0 3/4/2020  4:50 PM  
Dressing Status Clean, dry, & intact 3/4/2020  4:50 PM  
Dressing Type Transparent 3/4/2020  4:50 PM  
Hub Color/Line Status Pink 3/4/2020  4:50 PM  
Action Taken Open ports on tubing capped 3/4/2020  4:50 PM  
Alcohol Cap Used Yes 3/4/2020  4:50 PM  
  
 
Opportunity for questions and clarification was provided. Patient transported with: 
 O2 @ BIPAP liters Patient-specific medications from Pharmacy

## 2020-03-05 NOTE — CONSULTS
[id number]-old man who presented on 20 February with shortness of breath. He was diagnosed with a severe COPD exacerbation and admitted to the floor. Pulmonology on the case. Of note also has a right upper lobe PE-on Eliquis and moderate to severe pulmonary hypertension This afternoon he developed acute on chronic hypercarbic respiratory failure and despite BiPAP blood gas showed severe respiratory acidosis-brought to the ICU for continued care and possible intubation. Patient seen, currently lethargic but answering questions appropriately, has no complaints at this time. Review of systems unable to properly assess at this time Past Medical History:  
Diagnosis Date  Asthma   
 hx of/has wheezing  Cancer [facility])   
 prostate - not treated yet  Cerebral artery occlusion with cerebral infarction ([facility]  Chronic obstructive pulmonary disease ([facility]  Epilepsy ([facility]  Ill-defined condition   
 shortness of breath - being evaluated  Psychiatric disorder   
 mental disability Past Surgical History:  
Procedure Laterality Date  COLONOSCOPY Left 10/4/2017 COLONOSCOPY performed by Nakul Martinez MD at [facility] ENDOSCOPY  
 HX INTRACRANIAL ANEURYSM REPAIR    
 HX OTHER SURGICAL    
 cyst removed from neck  HX PROSTATE SURGERY    
 HX SPLENECTOMY No current facility-administered medications on file prior to encounter. Current Outpatient Medications on File Prior to Encounter Medication Sig Dispense Refill  sertraline (ZOLOFT) 25 mg tablet Take 25 mg by mouth daily.  atorvastatin (LIPITOR) 40 mg tablet Take 40 mg by mouth daily.  arformoteroL (BROVANA) 15 mcg/2 mL nebu neb solution 15 mcg by Nebulization route two (2) times a day.  multivitamin (ONE A DAY) tablet Take 1 Tab by mouth daily.  ferrous sulfate 325 mg (65 mg iron) tablet Take 325 mg by mouth Daily (before breakfast).  fluticasone propionate (FLONASE ALLERGY RELIEF) 50 mcg/actuation nasal spray 2 Sprays by Both Nostrils route daily.  hydroxyzine HCL (ATARAX) 25 mg tablet Take 25 mg by mouth two (2) times a day.  ipratropium (ATROVENT) 0.02 % soln 0.5 mg by Other route every eight (8) hours. Oral inhalation  metFORMIN (GLUCOPHAGE) 500 mg tablet Take 500 mg by mouth daily (with breakfast).  montelukast (SINGULAIR) 10 mg tablet Take 10 mg by mouth daily.  predniSONE (DELTASONE) 10 mg tablet Take 20 mg by mouth daily (with breakfast). COPD exacerbation  budesonide-formoteroL (SYMBICORT) 160-4.5 mcg/actuation HFAA Take 2 Puffs by inhalation two (2) times a day.  levalbuterol tartrate (XOPENEX) 45 mcg/actuation inhaler Take 1 Puff by inhalation every eight (8) hours.  cetirizine (ZYRTEC) 10 mg tablet Take 10 mg by mouth daily.  levETIRAcetam (KEPPRA) 500 mg tablet TAKE 1 TABLET BY MOUTH TWICE A DAY 60 Tab 1  
 pantoprazole (PROTONIX) 40 mg tablet Take 1 Tab by mouth Before breakfast and dinner. 60 Tab 0  
 umeclidinium (INCRUSE ELLIPTA) 62.5 mcg/actuation inhaler Take 1 Puff by inhalation daily. Indications: Rescue inhaler No Known Allergies Social History Socioeconomic History  Marital status: LEGALLY  Spouse name: Not on file  Number of children: Not on file  Years of education: Not on file  Highest education level: Not on file Occupational History  Not on file Social Needs  Financial resource strain: Not on file  Food insecurity:  
  Worry: Not on file Inability: Not on file  Transportation needs:  
  Medical: Not on file Non-medical: Not on file Tobacco Use  Smoking status: Current Every Day Smoker  Smokeless tobacco: Never Used  Tobacco comment: cigarettes Substance and Sexual Activity  Alcohol use: No  
 Drug use: No  
 Sexual activity: Not on file Lifestyle  Physical activity: Days per week: Not on file Minutes per session: Not on file  Stress: Not on file Relationships  Social connections:  
  Talks on phone: Not on file Gets together: Not on file Attends Religion service: Not on file Active member of club or organization: Not on file Attends meetings of clubs or organizations: Not on file Relationship status: Not on file  Intimate partner violence:  
  Fear of current or ex partner: Not on file Emotionally abused: Not on file Physically abused: Not on file Forced sexual activity: Not on file Other Topics Concern  Not on file Social History Narrative  Not on file Patient Vitals for the past 24 hrs: 
 Temp Pulse Resp BP SpO2  
03/04/20 2012 98.2 °F (36.8 °C)      
03/04/20 1920 97.4 °F (36.3 °C) 100 14 104/64 96 % 03/04/20 1527 99.3 °F (37.4 °C) (!) 105 16 128/72 95 % 03/04/20 1412     93 % 03/04/20 1333     90 % 03/04/20 1155 98.1 °F (36.7 °C) (!) 118 25 159/85 (!) 89 % 03/04/20 0820     92 % 03/04/20 0642 98 °F (36.7 °C) (!) 107 24 154/66 94 % 03/04/20 0333 98.4 °F (36.9 °C) (!) 106 26 (!) 134/98 91 % 03/04/20 0110     95 % 03/04/20 0010     91 % 03/03/20 2322 100.3 °F (37.9 °C) 96 14 124/64 98 % 03/03/20 2125     93 % Physical exam 
General-in moderate respiratory distress on BiPAP Neuro-lethargic, pupils reactive, follows simple commands Cardiac-RRR Lungs-prolonged expiratory phase, mild wheezing, EAE BL Abdomen-soft, nontender, nondistended Extremities-warm, no pedal edema appreciated Recent Results (from the past 24 hour(s)) GLUCOSE, POC Collection Time: 03/03/20  9:17 PM  
Result Value Ref Range Glucose (POC) 150 (H) 65 - 100 mg/dL Performed by Romario Andino GLUCOSE, POC Collection Time: 03/04/20  8:13 AM  
Result Value Ref Range Glucose (POC) 129 (H) 65 - 100 mg/dL Performed by [facility] METABOLIC PANEL, BASIC  
 Collection Time: 03/04/20 10:30 AM  
Result Value Ref Range Sodium 138 136 - 145 mmol/L Potassium 4.0 3.5 - 5.1 mmol/L Chloride 95 (L) 97 - 108 mmol/L  
 CO2 41 (HH) 21 - 32 mmol/L Anion gap 2 (L) 5 - 15 mmol/L Glucose 238 (H) 65 - 100 mg/dL BUN 13 6 - 20 MG/DL Creatinine 0.57 (L) 0.70 - 1.30 MG/DL  
 BUN/Creatinine ratio 23 (H) 12 - 20 GFR est AA >60 >60 ml/min/1.73m2 GFR est non-AA >60 >60 ml/min/1.73m2 Calcium 9.9 8.5 - 10.1 MG/DL  
CBC W/O DIFF Collection Time: 03/04/20 10:30 AM  
Result Value Ref Range WBC 13.4 (H) 4.1 - 11.1 K/uL  
 RBC 4.41 4.10 - 5.70 M/uL  
 HGB 11.2 (L) 12.1 - 17.0 g/dL HCT 40.0 36.6 - 50.3 % MCV 90.7 80.0 - 99.0 FL  
 MCH 25.4 (L) 26.0 - 34.0 PG  
 MCHC 28.0 (L) 30.0 - 36.5 g/dL  
 RDW 18.8 (H) 11.5 - 14.5 % PLATELET 880 (H) 329 - 400 K/uL MPV 10.5 8.9 - 12.9 FL  
 NRBC 0.8 (H) 0  WBC ABSOLUTE NRBC 0.11 (H) 0.00 - 0.01 K/uL CULTURE, RESPIRATORY/SPUTUM/BRONCH W GRAM STAIN Collection Time: 03/04/20 11:54 AM  
Result Value Ref Range Special Requests: NO SPECIAL REQUESTS    
 GRAM STAIN FEW EPITHELIAL CELLS SEEN    
 GRAM STAIN 1+ WBCS SEEN    
 GRAM STAIN 1+ GRAM POSITIVE COCCI IN CHAINS Culture result: PENDING   
GLUCOSE, POC Collection Time: 03/04/20 12:14 PM  
Result Value Ref Range Glucose (POC) 221 (H) 65 - 100 mg/dL Performed by Derrick MARINO   
POC EG7 Collection Time: 03/04/20  1:31 PM  
Result Value Ref Range Calcium, ionized (POC) 1.27 1.12 - 1.32 mmol/L  
 pH (POC) 7.243 (LL) 7.35 - 7.45    
 pCO2 (POC) >90.0 (H) 35.0 - 45.0 MMHG  
 pO2 (POC) 66 (L) 80 - 100 MMHG Site RIGHT RADIAL Device: NASAL CANNULA Flow rate (POC) 9 L/M Allens test (POC) YES Specimen type (POC) ARTERIAL    
GLUCOSE, POC Collection Time: 03/04/20  5:34 PM  
Result Value Ref Range Glucose (POC) 260 (H) 65 - 100 mg/dL Performed by Theodore Elaina POC EG7  
 Collection Time: 03/04/20  5:41 PM  
Result Value Ref Range Calcium, ionized (POC) 1.29 1.12 - 1.32 mmol/L  
 FIO2 (POC) 0.50 % pH (POC) 7.291 (L) 7.35 - 7.45    
 pCO2 (POC) >90.0 (H) 35.0 - 45.0 MMHG  
 pO2 (POC) 86 80 - 100 MMHG Site RIGHT RADIAL Device: BIPAP    
 PEEP/CPAP (POC) 6 cmH2O  
 PIP (POC) 12 Allens test (POC) YES Specimen type (POC) ARTERIAL Spontaneous timed YES Imaging reviewed Assessment Severe COPD exacerbation now requiring BiPAP Acute hypercapnic respiratory failure Right lower lobe infiltrate Pulmonary hypertension Hyperglycemia Pneumonia-GPC's and change in sputum Plan Serial neuro checks, pain control as needed, PT OT when more stable, avoid benzodiazepines, other delirium prevention strategies Continue hemodynamic monitoring, map goal greater than 65, SBP goal less than 150 Continue BiPAP therapy, serial ABGs-if respiratory acidosis is worsening will need to be intubated, continue TOMY/LINA therapy, continue steroid therapy, continue antibiotics, continue Eliquis, follow-up pulmonary recommendations N.p.o. for now, antiemetics as needed, serial abdominal examinations Monitor urine output, daily BMPs, correct electrolyte derangements as needed Daily CBCs, follow-up hematology recommendations Continue antibiotic coverage, add azithromycin for atypical coverage, send procalcitonin level, follow-up pending micro studies Keep glucose less than 180 with subcutaneous insulin/dextrose as needed Prognosis very guarded at this time-follow-up palliative care recommendations Critical care time-55 minutes

## 2020-03-05 NOTE — PROGRESS NOTES
Problem: Self Care Deficits Care Plan (Adult) Goal: *Acute Goals and Plan of Care (Insert Text) Description FUNCTIONAL STATUS PRIOR TO ADMISSION: Per chart review, patient was admitted from a SNF. However patient stated he was admitted from his 2-story house. Patient is a questionable historian at this, however states he owned a wheelchair for community mobility and a shower chair for bathing. HOME SUPPORT: The patient lived with his sister but did not require assist. 
 
Occupational Therapy Goals: OT re-evaluation 3/5/2020 after transfer to CCU for respiratory acidosis 1. Patient will perform lower body dressing with minimum A and AE PRN within 7 day(s). 2.  Patient will perform bathing with minimum A within 7 day(s). 3.  Patient will perform grooming tasks standing with minimum assistance while maintaining SpO2 >90% within 7 day(s). 4.  Patient will perform toilet transfers with minimum assistance within 7 day(s). 5.  Patient will perform all aspects of toileting with minimum assistance within 7 day(s). 6.  Patient will participate in upper extremity therapeutic exercise/activities with supervision/set-up for 5 minutes within 7 day(s). 7.  Patient will utilize energy conservation techniques during functional activities with verbal cues within 7 day(s). Initiated 2/26/2020 1. Patient will perform lower body dressing with supervision/set-up within 7 day(s). 2.  Patient will perform bathing with supervision/set-up within 7 day(s). 3.  Patient will perform grooming tasks standing with supervision/set-up within 7 day(s). 4.  Patient will perform toilet transfers with supervision/set-up within 7 day(s). 5.  Patient will perform all aspects of toileting with supervision/set-up within 7 day(s). 6.  Patient will participate in upper extremity therapeutic exercise/activities with supervision/set-up for 5 minutes within 7 day(s). 7.  Patient will utilize energy conservation techniques during functional activities with verbal cues within 7 day(s). Outcome: Progressing Towards Goal 
 
OCCUPATIONAL THERAPY RE-EVALUATION Patient: Audelia Kaiser (53 y.o. male) Date: 3/5/2020 Diagnosis: Acute respiratory failure with hypoxia ([facility] [J96.01] CHF (congestive heart failure) ([facility] [address] [I50.9] COPD exacerbation ([facility] [address] [facility] [J44.1] <principal problem not specified> Precautions: Fall Chart, occupational therapy assessment, plan of care, and goals were reviewed. ASSESSMENT Based on the objective data described below, decreased pulmonary endurance with need for BIPAP 40% FiO2, decreased activity tolerance, impaired standing tolerance in prep for ADLs and functional decline in status. Pt transferred to CCU yesterday for worsening respiratory status with ABGs showing respiratory acidosis and pt in need of BIPAP. This date, pt alert and cooperative, CGA for supine to sit with intact sitting balance progressing from UE support to no support while EOB. CGA to stand with pt able to side step to [facility] with CGA x 2, standing tolerance x 1 minute before requesting to sit. O2 pleth difficult to read, as low as 86% but recovered to >90% with sitting. He is functioning below his ADL baseline Current Level of Function Impacting Discharge (ADLs): setup to max A Other factors to consider for discharge: was at rehab prior PLAN : 
Recommendations and Planned Interventions: self care training, functional mobility training, therapeutic exercise, balance training, endurance activities, patient education, and home safety training Frequency/Duration: Patient will be followed by occupational therapy 5 times a week to address goals. Recommend with staff: OOB to chair, BSC as able Recommend next OT session: OOB, standing tolerance for ADLs Recommendation for discharge: (in order for the patient to meet his/her long term goals) Therapy up to 5 days/week in SNF setting This discharge recommendation: 
Has not yet been discussed the attending provider and/or case management Equipment recommendations for successful discharge (if) home: TBD SUBJECTIVE:  
Patient stated Ok.  OBJECTIVE DATA SUMMARY:  
Hospital course since last seen and reason for reevaluation: CCU transfer from [facility] for BIPAP, increased respiratory demands Cognitive/Behavioral Status: 
Neurologic State: Drowsy; Alert Orientation Level: Oriented X4 Cognition: Appropriate decision making; Appropriate for age attention/concentration; Appropriate safety awareness; Follows commands Functional Mobility and Transfers for ADLs: 
Bed Mobility: 
Supine to Sit: Contact guard assistance Sit to Supine: Minimum assistance Scooting: Contact guard assistance Transfers: 
Sit to Stand: Contact guard assistance Balance: 
Sitting: Intact; Without support Standing: Impaired; With support Standing - Static: Fair Standing - Dynamic : Fair ADL Assessment: 
Feeding: (pt on BIPAP but infer independent) Oral Facial Hygiene/Grooming: Setup(EOB, inferred, on BIPAP) Bathing: Moderate assistance(decreased standing  balance, activity tolerance) Upper Body Dressing: Minimum assistance Lower Body Dressing: Moderate assistance Toileting: Maximum assistance(for balance, decreased activity tolerance/endurance) ADL Intervention and task modifications: 
  
 
  
 
  
 
  
 
  
Functional Measure: 
Barthel Index: 
 
Bathin Bladder: 5 Bowels: 10 
Groomin Dressin Feeding: 10 Mobility: 0 Stairs: 5 Toilet Use: 5 Transfer (Bed to Chair and Back): 5 Total: 45/100 The Barthel ADL Index: Guidelines 1. The index should be used as a record of what a patient does, not as a record of what a patient could do.  
2. The main aim is to establish degree of independence from any help, physical or verbal, however minor and for whatever reason. 3. The need for supervision renders the patient not independent. 4. A patient's performance should be established using the best available evidence. Asking the patient, friends/relatives and nurses are the usual sources, but direct observation and common sense are also important. However direct testing is not needed. 5. Usually the patient's performance over the preceding 24-48 hours is important, but occasionally longer periods will be relevant. 6. Middle categories imply that the patient supplies over 50 per cent of the effort. 7. Use of aids to be independent is allowed. Nonie Freshwater., Barthel, D.WLizeth (9069). Functional evaluation: the Barthel Index. [facility] (14)2. Deepti Briones gabi ADILIA Cuevas, Monet Mascorro., Prasadjas Ricci., Tracy, [address] [place] [address] Ave (1999). Measuring the change indisability after inpatient rehabilitation; comparison of the responsiveness of the Barthel Index and Functional [place] Measure. Journal of Neurology, Neurosurgery, and Psychiatry, 66(4), 771-806. Hanna Potts NFABYA, DIVINE Jamil, & Shania Garcia M.A. (2004.) Assessment of post-stroke quality of life in cost-effectiveness studies: The usefulness of the Barthel Index and the EuroQoL-5D. [facility], 13, 357-57 Pain: 
none Activity Tolerance:  
Fair Please refer to the flowsheet for vital signs taken during this treatment. After treatment patient left in no apparent distress:  
Supine in bed, Heels elevated for pressure relief, and Call bell within reach COMMUNICATION/COLLABORATION:  
The patients plan of care was discussed with: Physical therapist and Registered nurse. Beatrice Randall OT Time Calculation: 20 mins

## 2020-03-05 NOTE — ACP (ADVANCE CARE PLANNING)
Advanced Care Planning Patient completed AMD this admission naming his sister Parrish as [facility]. Pt w ould be okay w/ short term intubation as a bridge to recovery and for now is okay w/ CPR/shock but would not want prolonged life support if medical team felt that there were was little chance for meaningful recovery (to be able to interact w/ his loved ones). Encourage ongoing conversations, chalino w/ PCP and Dr Berger Pi- because given COPD I tell him that even now it would likely be difficult for him to come off the vent. Primary Decision Maker: Kendra Randle - Sister - [phone]

## 2020-03-05 NOTE — PROGRESS NOTES
Occupational Therapy Chart reviewed. Noted pt transferred yesterday from IMCU to CCU for respiratory acidosis and need for BIPAP. Pt about to have ABGs taken by RT. Will follow back.  Princess Zapata OT

## 2020-03-05 NOTE — PROGRESS NOTES
Problem: Mobility Impaired (Adult and Pediatric) Goal: *Acute Goals and Plan of Care (Insert Text) Description FUNCTIONAL STATUS PRIOR TO ADMISSION: Pt admitted from SNF and unclear of level of assistance requiring. Prior to rehab, pt was independent with functional mobility. HOME SUPPORT PRIOR TO ADMISSION: The patient lived with sister and brother-in-law. Pt's sister works during the day and brother-in-law assists pt with ADLs prior to leaving the house for a couple hours/day. Pt receives assistance with bathing from an aide 1x/wk for 1 hour. Physical Therapy Goals Initiated 3/5/2020- Goals downgraded following transfer to CCU for respiratory distress 1. Patient will move from supine to sit and sit to supine , scoot up and down and roll side to side in bed with supervision/set-up within 7 day(s). 2.  Patient will transfer from bed to chair and chair to bed with minimal assistance using the least restrictive device within 7 day(s). 3.  Patient will perform sit to stand with supervision/set-up within 7 day(s). 4.  Patient will ambulate with minimal assistance for 50 feet with the least restrictive device within 7 day(s). 5.  Patient will ascend/descend 2 stairs with bilateral handrail(s) with minimal assistance within 7 day(s). Initiated 2/26/2020 1. Patient will move from supine to sit and sit to supine , scoot up and down and roll side to side in bed with modified independence within 7 day(s). 2.  Patient will transfer from bed to chair and chair to bed with supervision/set-up using the least restrictive device within 7 day(s). 3.  Patient will perform sit to stand with supervision/set-up within 7 day(s). 4.  Patient will ambulate with supervision/set-up for 150 feet with the least restrictive device within 7 day(s). 5.  Patient will ascend/descend 2 stairs with bilateral handrail(s) with minimal assistance/contact guard assist within 7 day(s). Outcome: Progressing Towards Goal 
 PHYSICAL THERAPY REEVALUATION Patient: Nereida Saunders (89 y.o. male) Date: 3/5/2020 Primary Diagnosis: Acute respiratory failure with hypoxia ([facility] [address] [J96.01] CHF (congestive heart failure) ([facility] [address] [I50.9] [facility] [J44.1] Precautions:   Fall ASSESSMENT Based on the objective data described below, the patient presents with generalized weakness, impaired balance, decreased activity tolerance (continuous biPAP support during PT), decreased endurance, and continued decline from independent baseline. Overall, patient able to perform bed mobility and stand + side step to [facility] with min Ax2 and HHA x1. Noted sats throughout to be >90%. Fatigued after stand and sidestep, declined standing again. Returned to supine. Will need rehab at d/c and currently working towards tolerating 3 hrs therapy/day. Current Level of Function Impacting Discharge (mobility/balance): min Ax2 and ambulatory 4 ft Functional Outcome Measure: The patient scored 5/28 on the Tinetti outcome measure which is indicative of high fall risk. Other factors to consider for discharge: previously at rehab Patient will benefit from skilled therapy intervention to address the above noted impairments. PLAN : 
Recommendations and Planned Interventions: bed mobility training, transfer training, gait training, therapeutic exercises, neuromuscular re-education, patient and family training/education, and therapeutic activities Frequency/Duration: Patient will be followed by physical therapy:  5 times a week to address goals. Recommendation for discharge: (in order for the patient to meet his/her long term goals) Therapy up to 5 days/week in SNF setting This discharge recommendation: 
Has been made in collaboration with the attending provider and/or case management Equipment recommendations for successful discharge (if) home: TBD SUBJECTIVE:  
 Patient stated No. when asked if he wanted to stand again OBJECTIVE DATA SUMMARY:  
HISTORY:   
Past Medical History:  
Diagnosis Date Asthma   
 hx of/has wheezing Cancer [facility])   
 prostate - not treated yet Cerebral artery occlusion with cerebral infarction ([facility] [address] [address] Chronic obstructive pulmonary disease (HCC) Epilepsy ([facility] [address] [address] Ill-defined condition   
 shortness of breath - being evaluated Psychiatric disorder   
 mental disability Past Surgical History:  
Procedure Laterality Date COLONOSCOPY Left 10/4/2017 COLONOSCOPY performed by Rangel Rao MD at [facility] ENDOSCOPY  
 [address]    
 HX OTHER SURGICAL    
 cyst removed from neck HX PROSTATE SURGERY HX SPLENECTOMY Hospital course since last seen and reason for reevaluation: CCU transfer for respiratory distress Personal factors and/or comorbidities impacting plan of care: PMH Home Situation Home Environment: Private residence # Steps to Enter: 6 Rails to Enter: Yes Hand Rails : Bilateral 
One/Two Story Residence: Two story # of Interior Steps: 12 Interior Rails: Both Living Alone: No 
Support Systems: Family member(s), Home care staff(sister, aide 1 day/wk for 1 hr) Patient Expects to be Discharged to[de-identified] Skilled nursing facility Current DME Used/Available at Home: Oxygen, portable, Shower chair, Wheelchair(2.L at rest) Tub or Shower Type: Tub/Shower combination EXAMINATION/PRESENTATION/DECISION MAKING:  
Critical Behavior: 
Neurologic State: Blondell Sabins Orientation Level: Oriented X4 Cognition: Appropriate for age attention/concentration Safety/Judgement: Decreased insight into deficits, Decreased awareness of need for safety, Decreased awareness of need for assistance Hearing: Auditory Auditory Impairment: None Range Of Motion: 
AROM: Generally decreased, functional 
Strength:   
Strength: Generally decreased, functional 
Tone & Sensation:  
Tone: Normal 
 Coordination: 
Coordination: Generally decreased, functional 
Functional Mobility: 
Bed Mobility: 
Supine to Sit: Contact guard assistance Sit to Supine: Minimum assistance Scooting: Contact guard assistance Transfers: 
Sit to Stand: Contact guard assistance Stand to Sit: Contact guard assistance(min a to stabilize upon standing) Balance:  
Sitting: Intact; Without support Standing: Impaired; With support Standing - Static: Fair Standing - Dynamic : Fair Ambulation/Gait Training: 
Distance (ft): 4 Feet (ft) Assistive Device: Gait belt(HHA x1) Ambulation - Level of Assistance: Minimal assistance;Assist x2 Base of Support: Widened Functional Measure: 
Tinetti test: 
 
Sitting Balance: 1 Arises: 1 Attempts to Rise: 2 Immediate Standing Balance: 0 Standing Balance: 0 Nudged: 0 Eyes Closed: 0 Turn 360 Degrees - Continuous/Discontinuous: 0 Turn 360 Degrees - Steady/Unsteady: 0 Sitting Down: 1 Balance Score: 5 Balance total score Indication of Gait: 0 
R Step Length/Height: 0 
L Step Length/Height: 0 
R Foot Clearance: 0 
L Foot Clearance: 0 Step Symmetry: 0 Step Continuity: 0 Path: 0 Trunk: 0 Walking Time: 0 Gait Score: 0 Gait total score Total Score: 5/28 Overall total score Tinetti Tool Score Risk of Falls 
<19 = High Fall Risk 19-24 = Moderate Fall Risk 25-28 = Low Fall Risk Tinetti ME. Performance-Oriented Assessment of Mobility Problems in Elderly Patients. Yusuf 66; [id number]. (Scoring Description: PT Bulletin Feb. 10, 1993) Older adults: Judi Syed et al, 2009; n = 1601 [facility] elderly evaluated with ABC, DOUG, ADL, and IADL) · Mean DOUG score for males aged 69-68 years = 26.21(3.40) · Mean DOUG score for females age 69-68 years = 25.16(4.30) · Mean DOUG score for males over 80 years = 23.29(6.02) · Mean DOUG score for females over 80 years = 17.20(8.32) Pain Rating: 
Denied pain Activity Tolerance: Fair, requires rest breaks, observed SOB with activity, and on BiPAP throughout Please refer to the flowsheet for vital signs taken during this treatment. After treatment patient left in no apparent distress:  
Supine in bed, Heels elevated for pressure relief, and Call bell within reach COMMUNICATION/EDUCATION:  
The patients plan of care was discussed with: Occupational therapist and Registered nurse. Fall prevention education was provided and the patient/caregiver indicated understanding., Patient/family have participated as able in goal setting and plan of care. , and Patient/family agree to work toward stated goals and plan of care. Thank you for this referral. 
Deo Mares, PT, DPT Time Calculation: 13 mins

## 2020-03-05 NOTE — PROGRESS NOTES
Freda Hu, Dr. Jolie House regarding transferring Mr. Thomas Park out of the ICU. Informed that Dr. Lindsay Wong would be assigned Mr. Thomas Park.

## 2020-03-05 NOTE — PROGRESS NOTES
Visited Mr Anthony Balderas in [facility] for ongoing spiritual/emotional support. Mr Anthony Balderas was sitting up in bed and appeared in pretty good spirits. Provided active listening as patient shared that he thought he was doing pretty good this morning; he offered no specific concerns. Patient did request that  have prayer on his behalf. Had prayer as requested and reassured him of ongoing  availability for support. : Rev. Perlita Herrera. Deuce Query; [facility], to contact [phone] [facility] call: [phone]

## 2020-03-05 NOTE — PROGRESS NOTES
2015:TRANSFER - IN REPORT: 
 
Verbal report received from JAGRUTI Benites(name) on Charlyne Lesch  being [facility] from [facility]) for change in patient condition(Possible intubation)  Report consisted of patients Situation, Background, Assessment and Recommendations(SBAR). Information from the following report(s) SBAR, Kardex, Intake/Output and MAR was reviewed with the receiving nurse. Opportunity for questions and clarification was provided. Assessment completed upon patients arrival to unit and care assumed. Skin: Primary Nurse Jessica Colvin RN and JAGRUTI Talbot performed a dual skin assessment on this patient No impairment noted Current Bed: ISRAEL Morel Carlos Enrique score is 14 
 
2300: Repeat ABG improved, intubation plans on hold. 0400: AM labs drawn and sent 0730: Bedside shift change report given to JAGRUTI Ramires (oncoming nurse) by Melissa Walker (offgoing nurse). Report included the following information SBAR, Kardex, Intake/Output and MAR.

## 2020-03-06 NOTE — PROGRESS NOTES
Problem: Falls - Risk of 
Goal: *Absence of Falls Description Document Yogesh Banks Fall Risk and appropriate interventions in the flowsheet. Outcome: Progressing Towards Goal 
Note: Fall Risk Interventions: 
Mobility Interventions: Communicate number of staff needed for ambulation/transfer, Patient to call before getting OOB Mentation Interventions: Adequate sleep, hydration, pain control, More frequent rounding, Toileting rounds Medication Interventions: Patient to call before getting OOB, Teach patient to arise slowly Elimination Interventions: Call light in reach, Toileting schedule/hourly rounds History of Falls Interventions: Door open when patient unattended, Room close to nurse's station Problem: Breathing Pattern - Ineffective Goal: *Absence of hypoxia Outcome: Progressing Towards Goal 
 
Bedside shift change report given to Talya Ruano RN (oncoming nurse) by Kailey Macedo RN (offgoing nurse). Report included the following information SBAR, ED Summary, OR Summary, Procedure Summary, MAR, Accordion, Recent Results and Cardiac Rhythm NSR.

## 2020-03-06 NOTE — INTERDISCIPLINARY ROUNDS
IDR/SLIDR Summary Patient: Lubna Anders MRN: [id number]    Age: 77 y.o. YOB: 1945 Room/Bed: [address] [place] [address] Admit Diagnosis: Acute respiratory failure with hypoxia ([facility] [address] [J96.01] CHF (congestive heart failure) ([facility] [address] [I50.9] COPD exacerbation (HCC) [J44.1]  Principal Diagnosis: <principal problem not specified>  
Goals: PRN bipap  
antibiotics Readmission: YES  Quality Measure: COPD 
VTE Prophylaxis: Chemical 
Influenza Vaccine screening completed? YES Pneumococcal Vaccine screening completed? YES Mobility needs: Yes   Nutrition plan:Yes 
Consults:P.T, O.T. and Respiratory Financial concerns:Yes  Escalated to CM? YES 
RRAT Score: 23   Interventions:H2H Testing due for pt today? YES 
LOS: 14 days Expected length of stay ? days Discharge plan: home   PCP: Sergei Coles MD 
Transportation needs: Yes Days before discharge:two or more days before discharge  and longer than expected LOS Discharge disposition: Home and SNF Signed:  
 
Jian Block 3/5/2020 
11:51 PM

## 2020-03-06 NOTE — PROGRESS NOTES
[facility] Adult  Hospitalist Group ICU Transfer/Accept [place] [address] [place] 
DATE OF TRANSFER: 3/5/2020 PATIENT ID: Lucretia Cummings MRN: [id number] YOB: 1945 PRIMARY CARE PROVIDER: Dorcas Crook MD  
DATE OF ADMISSION: 2/20/2020  1:12 PM   
ATTENDING PHYSICIAN: Jose Turcios MD 
CONSULTATIONS:  
IP CONSULT TO HOSPITALIST 
IP CONSULT TO HOSPITALIST 
IP CONSULT TO HEMATOLOGY 
IP CONSULT TO UROLOGY 
IP CONSULT TO PALLIATIVE CARE - PROVIDER 
IP CONSULT TO PULMONOLOGY PROCEDURES/SURGERIES:  
* No surgery found * REASON FOR ADMISSION: <principal problem not specified> HOSPITAL PROBLEM LIST: 
Patient Active Problem List  
Diagnosis Code  Slurred speech R47.81  
 Seizures ([address] R56.9  Altered mental status R41.82  
 Cerebrovascular accident (CVA) due to thrombosis of right posterior cerebral artery ([facility] [address] P67.643  Acute respiratory failure with hypoxia (HCC) J96.01  
 COPD exacerbation (HCC) J44.1 Brief HPI and Hospital Course:   
 
Pt overnight was treated on BiPAP at 16/6, FiO2 30-50%. ABG continued to improve with PCO2. Pt back to alert and oriented. On 4-6LPM O2 NC during the day. Palliative care to see. No other concern. Assessment and Plan: 
Please see detailed note. PHYSICAL EXAMINATION: 
Visit Vitals /73 Pulse 91 Temp 97 °F (36.1 °C) Resp 24 Ht 6' 2\" (1.88 m) Wt 72.8 kg (160 lb 7.9 oz) SpO2 94% BMI 20.61 kg/m² General:  Alert, cooperative, no distress, appears stated age. Head:  Normocephalic, without obvious abnormality, atraumatic. Lungs:    Significantly tight AE with reduced breath sounds, no wheezing/ rhonchi/ rales, not much changed today Heart:  Regular rate and rhythm, tachycardia +, S1, S2 normal, no murmur, click, rub or gallop. Abdomen:   Soft, non-tender. Bowel sounds normal. No masses,  No organomegaly. Extremities: Extremities normal, atraumatic, no cyanosis or edema Pulses: 2+ and symmetric all extremities. CODE STATUS: 
x Full Code DNR Partial  
 Comfort Care Signed:  
Harrold Buerger, MD 
Date of Service:  3/5/2020 
9:17 PM

## 2020-03-06 NOTE — PROGRESS NOTES
0730 Bedside shift change report given to Lien Isidro (oncoming nurse) by Rubi Estevez (offgoing nurse). Report included the following information SBAR, Intake/Output, MAR, Recent Results and Cardiac Rhythm NSR.  
 
0823 MD at bedside. AM labs and plan of care discussed. 0853 Pt desatting to 86% on 4L NC while eating, O2 increased to 6L NC 
1242 Brief changed 1415 TRANSFER - OUT REPORT: 
 
Verbal report given to Delmis(name) on Sheilda Alpers  being transferred to [place](unit) for routine progression of care Report consisted of patients Situation, Background, Assessment and  
Recommendations(SBAR). Information from the following report(s) SBAR, Intake/Output, MAR, Recent Results and Cardiac Rhythm NSR was reviewed with the receiving nurse. Lines:  
Peripheral IV 51/68/46 Left Basilic (Active) Site Assessment Clean, dry, & intact 3/6/2020 12:00 PM  
Phlebitis Assessment 0 3/6/2020 12:00 PM  
Infiltration Assessment 0 3/6/2020 12:00 PM  
Dressing Status Clean, dry, & intact 3/6/2020 12:00 PM  
Dressing Type Transparent 3/6/2020 12:00 PM  
Hub Color/Line Status Capped 3/6/2020 12:00 PM  
Action Taken Open ports on tubing capped 3/6/2020 12:00 PM  
Alcohol Cap Used Yes 3/6/2020 12:00 PM  
   
Peripheral IV 03/04/20 Right Antecubital (Active) Site Assessment Clean, dry, & intact 3/6/2020 12:00 PM  
Phlebitis Assessment 0 3/6/2020 12:00 PM  
Infiltration Assessment 0 3/6/2020 12:00 PM  
Dressing Status Clean, dry, & intact 3/6/2020 12:00 PM  
Dressing Type Transparent 3/6/2020 12:00 PM  
Hub Color/Line Status Pink 3/6/2020 12:00 PM  
Action Taken Open ports on tubing capped 3/6/2020 12:00 PM  
Alcohol Cap Used Yes 3/6/2020 12:00 PM  
  
 
Opportunity for questions and clarification was provided. Patient transported with: 
 Monitor O2 @ 6 liters Registered Nurse

## 2020-03-06 NOTE — CONSULTS
Infectious Disease Consult Today's Date: 3/6/2020 Admit Date: 2/20/2020 Impression: · Severe COPD 
· ? bibasilar airspace disease · Pseudomonas from sputum culture Plan:  
· Continue IV antibiotic therapy for now · Follow-up cultures and studies · Work-up any new fevers Anti-infectives:  
· Subjective:  
Date of Consultation:  March 6, 2020 Referring Physician: Dr Juan Carlos Morales Patient is a 77 y.o. male admitted with bradycardia and hypoxia. He did have a stay in the ICU for respiratory support and is now on a monitored unit. He has a history of pulmonary emboli and, repeat imaging x-ray revealed resolution of this problem, but he apparently developed bilateral lower lobe airspace disease. Pseudomonas aeruginosa (resistant to quinolones) has grown in sputum specimens. He is on IV antibiotic therapy with Zosyn and we are asked to see him in consultation. Patient Active Problem List  
Diagnosis Code  Slurred speech R47.81  
 Seizures ([address] R56.9  Altered mental status R41.82  
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  
 COPD exacerbation ([facility]) J44.1 Past Medical History:  
Diagnosis Date  Asthma   
 hx of/has wheezing  Cancer [facility])   
 prostate - not treated yet  Cerebral artery occlusion with cerebral infarction ([address]  Chronic obstructive pulmonary disease ([address]  Epilepsy ([address]  Ill-defined condition   
 shortness of breath - being evaluated  Psychiatric disorder   
 mental disability Family History Problem Relation Age of Onset  Colon Cancer Maternal Aunt  Colon Cancer Maternal Aunt  Colon Cancer Maternal Aunt Social History Tobacco Use  Smoking status: Current Every Day Smoker  Smokeless tobacco: Never Used  Tobacco comment: cigarettes Substance Use Topics  Alcohol use:  No  
 
 Past Surgical History:  
Procedure Laterality Date  COLONOSCOPY Left 10/4/2017 COLONOSCOPY performed by Anais Horton [facility] at [facility] ENDOSCOPY  
 HX INTRACRANIAL ANEURYSM REPAIR    
 HX OTHER SURGICAL    
 cyst removed from neck  HX PROSTATE SURGERY    
 HX SPLENECTOMY Prior to Admission medications Medication Sig Start Date End Date Taking? Authorizing Provider  
sertraline (ZOLOFT) 25 mg tablet Take 25 mg by mouth daily. Yes Provider, Historical  
atorvastatin (LIPITOR) 40 mg tablet Take 40 mg by mouth daily. Yes Provider, Historical  
arformoteroL (BROVANA) 15 mcg/2 mL nebu neb solution 15 mcg by Nebulization route two (2) times a day. Yes Provider, Historical  
multivitamin (ONE A DAY) tablet Take 1 Tab by mouth daily. Yes Provider, Historical  
ferrous sulfate 325 mg (65 mg iron) tablet Take 325 mg by mouth Daily (before breakfast). Yes Provider, Historical  
fluticasone propionate (FLONASE ALLERGY RELIEF) 50 mcg/actuation nasal spray 2 Sprays by Both Nostrils route daily. Yes Provider, Historical  
hydroxyzine HCL (ATARAX) 25 mg tablet Take 25 mg by mouth two (2) times a day. Yes Provider, Historical  
ipratropium (ATROVENT) 0.02 % soln 0.5 mg by Other route every eight (8) hours. Oral inhalation   Yes Provider, Historical  
metFORMIN (GLUCOPHAGE) 500 mg tablet Take 500 mg by mouth daily (with breakfast). Yes Provider, Historical  
montelukast (SINGULAIR) 10 mg tablet Take 10 mg by mouth daily. Yes Provider, Historical  
predniSONE (DELTASONE) 10 mg tablet Take 20 mg by mouth daily (with breakfast). COPD exacerbation   Yes Provider, Historical  
budesonide-formoteroL (SYMBICORT) 160-4.5 mcg/actuation HFAA Take 2 Puffs by inhalation two (2) times a day. Yes Provider, Historical  
levalbuterol tartrate (XOPENEX) 45 mcg/actuation inhaler Take 1 Puff by inhalation every eight (8) hours.    Yes Provider, Historical  
 cetirizine (ZYRTEC) 10 mg tablet Take 10 mg by mouth daily. Yes Provider, Historical  
levETIRAcetam (KEPPRA) 500 mg tablet TAKE 1 TABLET BY MOUTH TWICE A DAY 19  Yes Nirali Howard DO  
pantoprazole (PROTONIX) 40 mg tablet Take 1 Tab by mouth Before breakfast and dinner. 10/6/17  Yes Chet Callejas MD  
umeclidinium (INCRUSE ELLIPTA) 62.5 mcg/actuation inhaler Take 1 Puff by inhalation daily. Indications: Rescue inhaler   Yes Provider, Historical  
 
 
No Known Allergies Review of Systems:  His review of systems is significant for dyspnea. He denies other systemic complaints other than cough on systems review. Objective:  
 
Visit Vitals /80 (BP 1 Location: Right arm, BP Patient Position: Sitting; At rest) Pulse 90 Temp 98.2 °F (36.8 °C) Resp 27 Ht 6' 2\" (1.88 m) Wt 91.5 kg (201 lb 11.5 oz) SpO2 93% BMI 25.90 kg/m² Temp (24hrs), Av.1 °F (36.7 °C), Min:97.5 °F (36.4 °C), Max:98.7 °F (37.1 °C) Lines:  Peripheral IV:    
 
Physical Exam:  Lungs:  diminished breath sounds R base, L base Heart:  regular rate and rhythm Abdomen:  soft, non-tender. Bowel sounds normal. No masses,  no organomegaly Skin:  no rash or abnormalities Data Review: CBC: 
Recent Labs 20 
04220 
04120 
1030 WBC 12.4* 11.0 13.4* HGB 9.3* 9.4* 11.2* HCT 31.7* 32.1* 40.0  339 411* BMP: 
Recent Labs 20 
04220 
04120 
1030 CREA 0.51* 0.43* 0.57* BUN 19 15 13  139 138  
K 4.0 4.1 4.0  
 98 95* CO2 40* 36* 41* AGAP 0* 5 2* * 155* 238* LFTS: 
No results for input(s): TBILI, ALT, SGOT, AP, TP, ALB in the last 72 hours. Microbiology:  
 
All Micro Results Procedure Component Value Units Date/Time CULTURE, RESPIRATORY/SPUTUM/BRONCH Lenora Sacks STAIN [[id number]]  (Abnormal)  (Susceptibility) Collected:  20 1152 Order Status:  Completed Specimen:  Sputum Updated:  03/06/20 5163 Special Requests: NO SPECIAL REQUESTS     
  GRAM STAIN FEW EPITHELIAL CELLS SEEN     
   1+ WBCS SEEN     
      
  1+ GRAM POSITIVE COCCI IN CHAINS Culture result:    
  HEAVY PSEUDOMONAS AERUGINOSA MODERATE NORMAL RESPIRATORY DAVID  
     
 CULTURE, BLOOD, PAIRED [[id number]] Collected:  03/04/20 1049 Order Status:  Completed Specimen:  Blood Updated:  03/06/20 8427 Special Requests: NO SPECIAL REQUESTS Culture result: NO GROWTH 2 DAYS     
 CULTURE, MRSA [[id number]] Collected:  03/04/20 1030 Order Status:  Completed Specimen:  Nares Updated:  03/05/20 2243 Special Requests: NO SPECIAL REQUESTS Culture result: MRSA NOT PRESENT Screening of patient nares for MRSA is for surveillance purposes and, if positive, to facilitate isolation considerations in high risk settings. It is not intended for automatic decolonization interventions per se as regimens are not sufficiently effective to warrant routine use. Imaging:  
Reviewed Signed By: Jd Russell MD   
 March 6, 2020

## 2020-03-06 NOTE — PROGRESS NOTES
Hospitalist Progress Note Mable Russo MD 
Answering service: [phone] OR [phone] from in house phone Date of Service:  3/6/2020 NAME:  Barney Webber :  1945 MRN:  [id number] PCP: Elizabeth Morales MD 
 
Chief Complaint:  
Chief Complaint Patient presents with  Shortness of Breath Follow up COPD exacerbation, combined respiratory failure. Patient seen and examined at the bedside. Labs, images and notes reviewed Discussed with nursing staff, orders reviewed. Plan discussed with patient/Family No overnight events. Tolerated BiPAP well. Breathing is getting back to before ICU transfer on 3/4. Awake, alert and Ox3. Feels SOB with minimal exertion. Assessment & Plan: # Acute worsening of hypercarbia secondary to poor tolerance and compliance with BiPAP and low dose Benzo for anxiety -BiPAP compliance 
-Not able to tolerate low dose respiratory depressant as well for anxiety 
-Palliative care appropriate 
-Request Pulmonology to resume the care # Sinus tach, high O2 need, easy desaturation, related to bibasilar infiltrate-HCAP? 
-No PE 
-Sputum Cx noted with Pseudomonas Aeruginosa + 
-ID consult 
-Close monitoring 
-CTA Chest PE protocol 3/3 noted 
-Continue Eliquis 
-Blood culture negative so far, MRSA screen negative 
-Acapella for chest PT 
-Discuss Palliative care with Pt and his sister # Acute on chronic hypoxic and hypercarbic - mixed respiratory failure,  improving # Right UL PE # Localized Rigth LL consolidation # Severe COPD exacerbation # Pulmonary hypertension per Echo : EF 60% no MR or AS PASP 66 RV nml 
-Combined complex respiratory issues 
-Still very high O2 requirement 6-8LPM O2 
-ABG  s/o ? baseline elevated hypercarbia. -Pulmonology on board. Trilogy on disposition 
-Hematology recommendations, lovenox to Eliquis 10mg BID for 7 days, then 5mg BID x 3-6 months. 
-Outpatient hematology follow up in the clinic -Outpatient Pulmonology follow up in the clinic 
-Continue NIV-BIPAP QHS and PRN during the day time 
-Levaquin 5 days for possible PNA, last 2/26 
-Prednisone up to 20mg continue 
-Duenebs and supportive care 
-Trilogy/ BiPAP order on discharge to SNF and would need at home as well-Pulm arranged 
-Palliative care consult as seems like End Stage Lung Disease # Respiratory acidosis with metabolic alkalosis 
-Complex driving forces with diuretics, COPD and other pulmological etiologies -BiPAP as noted above # DM2, with hyperglycemia 
-No recent A1c 
-Likely steroid driven 
-SSI only # Abdominal distention 
-No ascites 
-Large right renal cyst 
-Urology consulted, need outpatient follow up # H/o seizure:  
-continue home meds Code status: Full code DDVT Px: Eliquis Care plan discussed with: Patient/ Family, Nurse Disposition: [id number] Anticipated discharge: Once O2 requirement. Tachycardia and feeling of wellbeing improves, 24-48hrs, likely 3/3 Hospital Problems  Date Reviewed: 3/16/2018 Codes Class Noted POA  
 COPD exacerbation ([facility] ICD-10-CM: J44.1 ICD-9-CM: 491.21  3/4/2020 Unknown Acute respiratory failure with hypoxia [facility]) ICD-10-CM: J96.01 
ICD-9-CM: 518.81  2/20/2020 Unknown Review of Systems: A comprehensive review of systems was negative except for that written in the HPI. Physical Examination:  
 
Visit Vitals /80 (BP 1 Location: Right arm, BP Patient Position: Sitting; At rest) Pulse 90 Temp 98.2 °F (36.8 °C) Resp 27 Ht 6' 2\" (1.88 m) Wt 91.5 kg (201 lb 11.5 oz) SpO2 93% BMI 25.90 kg/m² General:  Alert, cooperative, no distress, appears stated age. Head:  Normocephalic, without obvious abnormality, atraumatic. Lungs:    Significantly tight AE with reduced breath sounds, no wheezing/ rhonchi/ rales, not much changed today Heart:  Regular rate and rhythm, tachycardia +, S1, S2 normal, no murmur, click, rub or gallop. Abdomen:   Soft, non-tender. Bowel sounds normal. No masses,  No organomegaly. Extremities: Extremities normal, atraumatic, no cyanosis or edema Pulses: 2+ and symmetric all extremities. Vital Signs:  
 Last 24hrs VS reviewed since prior progress note. Most recent are: 
 
Visit Vitals /80 (BP 1 Location: Right arm, BP Patient Position: Sitting; At rest) Pulse 90 Temp 98.2 °F (36.8 °C) Resp 27 Ht 6' 2\" (1.88 m) Wt 91.5 kg (201 lb 11.5 oz) SpO2 93% BMI 25.90 kg/m² Intake/Output Summary (Last 24 hours) at 3/6/2020 1643 Last data filed at 3/6/2020 1616 Gross per 24 hour Intake 700 ml Output 200 ml Net 500 ml Tmax:  Temp (24hrs), Av.9 °F (36.6 °C), Min:97 °F (36.1 °C), Max:98.7 °F (37.1 °C) Data Review:  
Data reviewed by myself: 
Cta Chest W Or W Wo Cont Result Date: 2020 EXAM:  CTA CHEST W OR W WO CONT INDICATION:   SOB COMPARISON: None. TECHNIQUE: Precontrast  images were obtained to localize the volume for acquisition. Multislice helical CT arteriography was performed from the diaphragm to the thoracic inlet during uneventful rapid bolus of 100 cc Isovue-370. Lung and soft tissue windows were generated. Coronal and sagittal images were generated and 3D post processing consisting of coronal maximum intensity images was performed. CT dose reduction was achieved through use of a standardized protocol tailored for this examination and automatic exposure control for dose modulation. FINDINGS: CHEST: THYROID: No nodule. MEDIASTINUM: No mass or lymphadenopathy. LEV: No mass or lymphadenopathy. THORACIC AORTA: Atherosclerotic without evidence of aneurysm. MAIN PULMONARY ARTERY: Pulmonary emboli right upper lobe branches. TRACHEA/BRONCHI: Patent. ESOPHAGUS: No wall thickening or dilatation. HEART: Normal in size. PLEURA: No effusion or pneumothorax. LUNGS: Emphysematous changes. Focal consolidation right lower lobe. Left basilar atelectasis. INCIDENTALLY IMAGED UPPER ABDOMEN: 7.7 cm right renal cyst. 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 Endograft is partially visualized. Marten Chino BONES: No destructive bone lesion. IMPRESSION: Right upper lobe pulmonary emboli. Localized consolidation right lower lobe. Left basilar atelectasis. The findings were called to the patient's nurse on 2/20/2020 at 6:39 PM by myself. [address] Abd Comp Result Date: 2/21/2020 INDICATION: Abdominal distention COMPARISON: None TECHNIQUE: Routine ultrasound images of the abdomen were obtained. FINDINGS: GALLBLADDER: No cholecystolithiasis, gallbladder wall thickening, or pericholecystic fluid. COMMON BILE DUCT: 3 mm in diameter. No evidence of choledocholithiasis. LIVER: Normal in size. Normal echogenicity. No focal hepatic mass or intrahepatic biliary dilatation. MAIN PORTAL VEIN: Patent. Hepatopetal flow direction. PANCREAS: Obscured by overlying bowel gas. RIGHT KIDNEY: 12.8 cm in length. 8.2 cm cyst. No hydronephrosis. No evidence of mass or calculus. LEFT KIDNEY: 11.5 cm in length. No hydronephrosis. No evidence of mass or calculus. SPLEEN: Obscured by overlying bowel gas. AORTA: Obscured by overlying bowel gas. INFERIOR VENA CAVA: Patent. OTHER: No ascites. IMPRESSION: Large right renal cyst. Otherwise unremarkable abdominal ultrasound. Xr Chest [facility] Result Date: 2/20/2020 INDICATION:  SOB. History of chronic asthma. Prostate cancer. COPD. EXAM: Chest single view. COMPARISON: 9/10/2017. FINDINGS: A single frontal view of the chest at 1419 hours shows bibasilar atelectasis or scar, with mild interstitial prominence increased since the prior study. .  The heart, mediastinum and pulmonary vasculature are stable . The bony thorax is unremarkable for age. . Port-A-Cath device on the right is stable. IMPRESSION: Bibasilar atelectasis or scar with interstitial prominence. Correlate for developing interstitial edema or bibasilar inflammatory infiltrate in this patient with COPD and chronic asthma. .  . No results found for: SDES Lab Results Component Value Date/Time Culture result: HEAVY PSEUDOMONAS AERUGINOSA (A) 03/04/2020 11:54 AM  
 Culture result: MODERATE NORMAL RESPIRATORY DAVID 03/04/2020 11:54 AM  
 Culture result: NO GROWTH 2 DAYS 03/04/2020 10:49 AM  
 
All Micro Results Procedure Component Value Units Date/Time CULTURE, RESPIRATORY/SPUTUM/BRONCH Juarez Hedges STAIN [[id number]]  (Abnormal)  (Susceptibility) Collected:  03/04/20 1154 Order Status:  Completed Specimen:  Sputum Updated:  03/06/20 4030 Special Requests: NO SPECIAL REQUESTS     
  GRAM STAIN FEW EPITHELIAL CELLS SEEN     
   1+ WBCS SEEN     
      
  1+ GRAM POSITIVE COCCI IN CHAINS Culture result:    
  HEAVY PSEUDOMONAS AERUGINOSA MODERATE NORMAL RESPIRATORY DAVID  
     
 CULTURE, BLOOD, PAIRED [[id number]] Collected:  03/04/20 1049 Order Status:  Completed Specimen:  Blood Updated:  03/06/20 5893 Special Requests: NO SPECIAL REQUESTS Culture result: NO GROWTH 2 DAYS     
 CULTURE, MRSA [[id number]] Collected:  03/04/20 1030 Order Status:  Completed Specimen:  Nares Updated:  03/05/20 2243 Special Requests: NO SPECIAL REQUESTS Culture result: MRSA NOT PRESENT Screening of patient nares for MRSA is for surveillance purposes and, if positive, to facilitate isolation considerations in high risk settings. It is not intended for automatic decolonization interventions per se as regimens are not sufficiently effective to warrant routine use. Cta Chest W Or W Wo Cont Result Date: 3/3/2020 IMPRESSION: 1. Bibasilar airspace disease 2. Severe emphysema 3. Presumed reactive adenopathy in the right infrahilar region 4. No acute pulmonary embolus Cta Chest W Or W Wo Cont Result Date: 2/20/2020 IMPRESSION: Right upper lobe pulmonary emboli. Localized consolidation right lower lobe. Left basilar atelectasis. The findings were called to the patient's nurse on 2/20/2020 at 6:39 PM by myself. [address] Us Abd Comp Result Date: 2/21/2020 IMPRESSION: Large right renal cyst. Otherwise unremarkable abdominal ultrasound. Xr Chest Mirna Blakes Result Date: 3/6/2020 IMPRESSION: 1. Stable prominent bibasilar pneumonia. 2. Emphysema. Xr Chest [facility] Blakes Result Date: 2/27/2020 IMPRESSION: Stable mild bibasilar opacities. Xr Chest [facility] Blakes Result Date: 2/23/2020 IMPRESSION: 1. No radiographic evidence of acute cardiopulmonary disease. Xr Chest [facility]na Blakes Result Date: 2/20/2020 IMPRESSION: Bibasilar atelectasis or scar with interstitial prominence. Correlate for developing interstitial edema or bibasilar inflammatory infiltrate in this patient with COPD and chronic asthma. .  . Labs: reviewed by myself. Recent Labs 03/06/20 
0422 03/05/20 
7743 WBC 12.4* 11.0 HGB 9.3* 9.4* HCT 31.7* 32.1*  
 339 Recent Labs 03/06/20 
0422 03/05/20 
0414 03/04/20 
1030  139 138  
K 4.0 4.1 4.0  
 98 95* CO2 40* 36* 41*  
BUN 19 15 13 CREA 0.51* 0.43* 0.57* * 155* 238* CA 9.3 9.7 9.9 No results for input(s): SGOT, GPT, ALT, AP, TBIL, TBILI, TP, ALB, GLOB, GGT, AML, LPSE in the last 72 hours. No lab exists for component: AMYP, HLPSE No results for input(s): INR, PTP, APTT, INREXT, INREXT in the last 72 hours. No results for input(s): FE, TIBC, PSAT, FERR in the last 72 hours. No results found for: FOL, RBCF No results for input(s): PH, PCO2, PO2 in the last 72 hours. No results for input(s): CPK, CKNDX, TROIQ in the last 72 hours. No lab exists for component: CPKMB Lab Results Component Value Date/Time  Cholesterol, total 187 09/11/2017 05:53 AM  
 HDL Cholesterol 63 09/11/2017 05:53 AM  
 LDL, calculated 109 (H) 09/11/2017 05:53 AM  
 Triglyceride 75 09/11/2017 05:53 AM  
 CHOL/HDL Ratio 3.0 09/11/2017 05:53 AM  
 
Lab Results Component Value Date/Time Glucose (POC) 173 (H) 03/06/2020 10:56 AM  
 Glucose (POC) 106 (H) 03/06/2020 08:03 AM  
 Glucose (POC) 173 (H) 03/05/2020 09:14 PM  
 Glucose (POC) 284 (H) 03/05/2020 04:30 PM  
 Glucose (POC) 184 (H) 03/05/2020 11:28 AM  
 
Lab Results Component Value Date/Time Color YELLOW/STRAW 09/10/2017 10:09 PM  
 Appearance CLEAR 09/10/2017 10:09 PM  
 Specific gravity 1.010 09/10/2017 10:09 PM  
 pH (UA) 5.0 09/10/2017 10:09 PM  
 Protein NEGATIVE  09/10/2017 10:09 PM  
 Glucose NEGATIVE  09/10/2017 10:09 PM  
 Ketone NEGATIVE  09/10/2017 10:09 PM  
 Bilirubin NEGATIVE  09/10/2017 10:09 PM  
 Urobilinogen 0.2 09/10/2017 10:09 PM  
 Nitrites NEGATIVE  09/10/2017 10:09 PM  
 Leukocyte Esterase NEGATIVE  09/10/2017 10:09 PM  
 Epithelial cells MODERATE (A) 09/10/2017 10:09 PM  
 Bacteria NEGATIVE  09/10/2017 10:09 PM  
 WBC 5-10 09/10/2017 10:09 PM  
 RBC 0-5 09/10/2017 10:09 PM  
 
 
 
Medications Reviewed:  
 
Current Facility-Administered Medications Medication Dose Route Frequency  [START ON 3/7/2020] predniSONE (DELTASONE) tablet 40 mg  40 mg Oral DAILY WITH BREAKFAST  acetaZOLAMIDE (DIAMOX) tablet 125 mg  125 mg Oral BID  albuterol-ipratropium (DUO-NEB) 2.5 MG-0.5 MG/3 ML  3 mL Nebulization Q4H PRN  piperacillin-tazobactam (ZOSYN) 3.375 g in 0.9% sodium chloride (MBP/ADV) 100 mL  3.375 g IntraVENous Q8H  
 apixaban (ELIQUIS) tablet 5 mg  5 mg Oral BID  
 guaiFENesin (ROBITUSSIN) 100 mg/5 mL oral liquid 100 mg  100 mg Oral Q4H PRN  
 arformoteroL (BROVANA) neb solution 15 mcg  15 mcg Nebulization BID RT  
 oxymetazoline (AFRIN) 0.05 % nasal spray 2 Spray  2 Spray Both Nostrils BID PRN  polyethylene glycol (MIRALAX) packet 17 g  17 g Oral DAILY PRN  
 diphenhydrAMINE (BENADRYL) capsule 25 mg  25 mg Oral Q6H PRN  
  sertraline (ZOLOFT) tablet 25 mg  25 mg Oral DAILY  levETIRAcetam (KEPPRA) tablet 500 mg  500 mg Oral BID  sodium chloride (NS) flush 5-40 mL  5-40 mL IntraVENous Q8H  
 sodium chloride (NS) flush 5-40 mL  5-40 mL IntraVENous PRN  
 acetaminophen (TYLENOL) tablet 650 mg  650 mg Oral Q4H PRN  
 ferrous sulfate tablet 325 mg  325 mg Oral ACB  hydroxyzine HCL (ATARAX) tablet 25 mg  25 mg Oral BID  montelukast (SINGULAIR) tablet 10 mg  10 mg Oral DAILY  pantoprazole (PROTONIX) tablet 40 mg  40 mg Oral ACB&D  
 budesonide (PULMICORT) 250 mcg/2ml nebulizer susp  250 mcg Nebulization BID RT  
 albuterol (PROVENTIL VENTOLIN) nebulizer solution 2.5 mg  2.5 mg Nebulization Q2H PRN  
 glucose chewable tablet 16 g  4 Tab Oral PRN  
 glucagon (GLUCAGEN) injection 1 mg  1 mg IntraMUSCular PRN  
 dextrose 10% infusion 0-250 mL  0-250 mL IntraVENous PRN  
 insulin lispro (HUMALOG) injection   SubCUTAneous AC&HS  
 
______________________________________________________________________ EXPECTED LENGTH OF STAY: 4d 7h 
ACTUAL LENGTH OF STAY:          15 
 
            
Charisma Pace MD  
 
Patient's emergency contacts: 
Extended Emergency Contact Information Primary Emergency Contact:  Juvenal Address: [address] [place] [address] Home Phone: [phone] Mobile Phone: [phone] Relation:

## 2020-03-06 NOTE — PROGRESS NOTES
Hospitalist Progress Note Jose Turcios MD 
Answering service: [phone] OR [phone] from in house phone Date of Service:  3/5/2020 NAME:  Lucretia Cummings :  1945 MRN:  [id number] PCP: Dorcas Crook MD 
 
Chief Complaint:  
Chief Complaint Patient presents with  Shortness of Breath Pt is back to his baseline before yesterday. Still high O2 requirement but mentation clear. Well rested. No other concerns. BiPAP at 16/6 overnight with improvedand adequate Co2 washout with improved PCO2. Met with sister, Sebastian Crockett at bedside in presence of Palliative care, Dr. Sariah Boswell. Assessment & Plan: # Acute worsening of hypercarbia secondary to poor tolerance and compliance with BiPAP and low dose Benzo for anxiety -BiPAP compliance 
-Not able to tolerate low dose respiratory depressant as well for anxiety 
-Palliative care appropriate 
-Request Pulmonology to resume the care # Sinus tach, high O2 need, easy desaturation, likely related to possible bibasilar infiltrate, but no PE 
-Close monitoring 
-CTA Chest PE protocol 3/3 noted 
-Continue Eliquis 
-Blood culture, sputum culture, MRSA screen 
-Acapella for chest PT 
-Discuss Palliative care with Pt and his sister # Acute on chronic hypoxic and hypercarbic - mixed respiratory failure,  improving # Right UL PE # Localized Rigth LL consolidation # Severe COPD exacerbation # Pulmonary hypertension per Echo : EF 60% no MR or AS PASP 66 RV nml 
-Combined complex respiratory issues 
-Still very high O2 requirement 6-8LPM O2 
-ABG  s/o ? baseline elevated hypercarbia. -Pulmonology on board. Trilogy on disposition 
-Hematology recommendations, lovenox to Eliquis 10mg BID for 7 days, then 5mg BID x 3-6 months. 
-Outpatient hematology follow up in the clinic 
-Outpatient Pulmonology follow up in the clinic 
-Continue NIV-BIPAP QHS and PRN during the day time 
-Levaquin 5 days for possible PNA, last  -Prednisone up to 20mg continue 
-Duenebs and supportive care 
-Trilogy/ BiPAP order on discharge to SNF and would need at home as well-Pulm arranged 
-Palliative care consult as seems like End Stage Lung Disease # Respiratory acidosis with metabolic alkalosis 
-Complex driving forces with diuretics, COPD and other pulmological etiologies -BiPAP as noted above # DM2, with hyperglycemia 
-No recent A1c 
-Likely steroid driven 
-SSI only # Abdominal distention 
-No ascites 
-Large right renal cyst 
-Urology consulted, need outpatient follow up # H/o seizure:  
-continue home meds Code status: Full code DDVT Px: Eliquis Care plan discussed with: Patient/ Family, Nurse Disposition: [id number] Anticipated discharge: Once O2 requirement. Tachycardia and feeling of wellbeing improves, 24-48hrs, likely 3/3 Hospital Problems  Date Reviewed: 3/16/2018 Codes Class Noted POA  
 COPD exacerbation ([facility] [address] ICD-10-CM: J44.1 ICD-9-CM: 491.21  3/4/2020 Unknown Acute respiratory failure with hypoxia [facility]) ICD-10-CM: J96.01 
ICD-9-CM: 518.81  2/20/2020 Unknown Review of Systems: A comprehensive review of systems was negative except for that written in the HPI. Physical Examination:  
 
Visit Vitals /73 Pulse 91 Temp 97 °F (36.1 °C) Resp 24 Ht 6' 2\" (1.88 m) Wt 72.8 kg (160 lb 7.9 oz) SpO2 94% BMI 20.61 kg/m² General:  Alert, cooperative, no distress, appears stated age. Head:  Normocephalic, without obvious abnormality, atraumatic. Lungs:    Significantly tight AE with reduced breath sounds, no wheezing/ rhonchi/ rales, not much changed today Heart:  Regular rate and rhythm, tachycardia +, S1, S2 normal, no murmur, click, rub or gallop. Abdomen:   Soft, non-tender. Bowel sounds normal. No masses,  No organomegaly. Extremities: Extremities normal, atraumatic, no cyanosis or edema Pulses: 2+ and symmetric all extremities. Vital Signs:  
 Last 24hrs VS reviewed since prior progress note. Most recent are: 
 
Visit Vitals /73 Pulse 91 Temp 97 °F (36.1 °C) Resp 24 Ht 6' 2\" (1.88 m) Wt 72.8 kg (160 lb 7.9 oz) SpO2 94% BMI 20.61 kg/m² Intake/Output Summary (Last 24 hours) at 3/5/2020 2119 Last data filed at 3/5/2020 1600 Gross per 24 hour Intake 950 ml Output 200 ml Net 750 ml Tmax:  Temp (24hrs), Av °F (36.1 °C), Min:93 °F (33.9 °C), Max:98 °F (36.7 °C) Data Review:  
Data reviewed by myself: 
Cta Chest W Or W Wo Cont Result Date: 2020 EXAM:  CTA CHEST W OR W WO CONT INDICATION:   SOB COMPARISON: None. TECHNIQUE: Precontrast  images were obtained to localize the volume for acquisition. Multislice helical CT arteriography was performed from the diaphragm to the thoracic inlet during uneventful rapid bolus of 100 cc Isovue-370. Lung and soft tissue windows were generated. Coronal and sagittal images were generated and 3D post processing consisting of coronal maximum intensity images was performed. CT dose reduction was achieved through use of a standardized protocol tailored for this examination and automatic exposure control for dose modulation. FINDINGS: CHEST: THYROID: No nodule. MEDIASTINUM: No mass or lymphadenopathy. LEV: No mass or lymphadenopathy. THORACIC AORTA: Atherosclerotic without evidence of aneurysm. MAIN PULMONARY ARTERY: Pulmonary emboli right upper lobe branches. TRACHEA/BRONCHI: Patent. ESOPHAGUS: No wall thickening or dilatation. HEART: Normal in size. PLEURA: No effusion or pneumothorax. LUNGS: Emphysematous changes. Focal consolidation right lower lobe. Left basilar atelectasis. INCIDENTALLY IMAGED UPPER ABDOMEN: 7.7 cm right renal cyst. 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 Endograft is partially visualized. Danielle Barrio BONES: No destructive bone lesion. IMPRESSION: Right upper lobe pulmonary emboli.  Localized consolidation right lower lobe. Left basilar atelectasis. The findings were called to the patient's nurse on 2/20/2020 at 6:39 PM by myself. [address] Us Abd Comp Result Date: 2/21/2020 INDICATION: Abdominal distention COMPARISON: None TECHNIQUE: Routine ultrasound images of the abdomen were obtained. FINDINGS: GALLBLADDER: No cholecystolithiasis, gallbladder wall thickening, or pericholecystic fluid. COMMON BILE DUCT: 3 mm in diameter. No evidence of choledocholithiasis. LIVER: Normal in size. Normal echogenicity. No focal hepatic mass or intrahepatic biliary dilatation. MAIN PORTAL VEIN: Patent. Hepatopetal flow direction. PANCREAS: Obscured by overlying bowel gas. RIGHT KIDNEY: 12.8 cm in length. 8.2 cm cyst. No hydronephrosis. No evidence of mass or calculus. LEFT KIDNEY: 11.5 cm in length. No hydronephrosis. No evidence of mass or calculus. SPLEEN: Obscured by overlying bowel gas. AORTA: Obscured by overlying bowel gas. INFERIOR VENA CAVA: Patent. OTHER: No ascites. IMPRESSION: Large right renal cyst. Otherwise unremarkable abdominal ultrasound. Xr Chest [facility] Result Date: 2/20/2020 INDICATION:  SOB. History of chronic asthma. Prostate cancer. COPD. EXAM: Chest single view. COMPARISON: 9/10/2017. FINDINGS: A single frontal view of the chest at 1419 hours shows bibasilar atelectasis or scar, with mild interstitial prominence increased since the prior study. .  The heart, mediastinum and pulmonary vasculature are stable . The bony thorax is unremarkable for age. . Port-A-Cath device on the right is stable. IMPRESSION: Bibasilar atelectasis or scar with interstitial prominence. Correlate for developing interstitial edema or bibasilar inflammatory infiltrate in this patient with COPD and chronic asthma. .  . No results found for: SDES Lab Results Component Value Date/Time  Culture result: HEAVY PSEUDOMONAS AERUGINOSA (A) 03/04/2020 11:54 AM  
 Culture result: MODERATE NORMAL RESPIRATORY DAVID 03/04/2020 11:54 AM  
 Culture result: NO GROWTH AFTER 19 HOURS 03/04/2020 10:49 AM  
 
All Micro Results Procedure Component Value Units Date/Time CULTURE, MRSA [[id number]] Collected:  03/04/20 1030 Order Status:  Completed Specimen:  Nares Updated:  03/05/20 1501 Special Requests: NO SPECIAL REQUESTS Culture result: MRSA NOT PRESENT AT 12 HOURS  
     
 CULTURE, RESPIRATORY/SPUTUM/BRONCH Anu Bolls STAIN [[id number]]  (Abnormal) Collected:  03/04/20 1154 Order Status:  Completed Specimen:  Sputum Updated:  03/05/20 1138 Special Requests: NO SPECIAL REQUESTS     
  GRAM STAIN FEW EPITHELIAL CELLS SEEN     
   1+ WBCS SEEN     
      
  1+ GRAM POSITIVE COCCI IN CHAINS Culture result:    
  HEAVY PSEUDOMONAS AERUGINOSA MODERATE NORMAL RESPIRATORY DAVID  
     
 CULTURE, BLOOD, PAIRED [[id number]] Collected:  03/04/20 1049 Order Status:  Completed Specimen:  Blood Updated:  03/05/20 8378 Special Requests: NO SPECIAL REQUESTS Culture result: NO GROWTH AFTER 19 HOURS Cta Chest W Or W Wo Cont Result Date: 3/3/2020 IMPRESSION: 1. Bibasilar airspace disease 2. Severe emphysema 3. Presumed reactive adenopathy in the right infrahilar region 4. No acute pulmonary embolus Cta Chest W Or W Wo Cont Result Date: 2/20/2020 IMPRESSION: Right upper lobe pulmonary emboli. Localized consolidation right lower lobe. Left basilar atelectasis. The findings were called to the patient's nurse on 2/20/2020 at 6:39 PM by myself. [address] Us Abd Comp Result Date: 2/21/2020 IMPRESSION: Large right renal cyst. Otherwise unremarkable abdominal ultrasound. Xr Chest Tami Otter Result Date: 2/27/2020 IMPRESSION: Stable mild bibasilar opacities. Xr Chest Tami Otter Result Date: 2/23/2020 IMPRESSION: 1. No radiographic evidence of acute cardiopulmonary disease. Xr Chest Tami Otter Result Date: 2/20/2020 IMPRESSION: Bibasilar atelectasis or scar with interstitial prominence. Correlate for developing interstitial edema or bibasilar inflammatory infiltrate in this patient with COPD and chronic asthma. .  . Labs: reviewed by myself. Recent Labs 03/05/20 
0414 03/04/20 
1030 WBC 11.0 13.4* HGB 9.4* 11.2* HCT 32.1* 40.0  411* Recent Labs 03/05/20 
0414 03/04/20 
1030  138  
K 4.1 4.0  
CL 98 95* CO2 36* 41*  
BUN 15 13 CREA 0.43* 0.57* * 238* CA 9.7 9.9 No results for input(s): SGOT, GPT, ALT, AP, TBIL, TBILI, TP, ALB, GLOB, GGT, AML, LPSE in the last 72 hours. No lab exists for component: AMYP, HLPSE No results for input(s): INR, PTP, APTT, INREXT, INREXT in the last 72 hours. No results for input(s): FE, TIBC, PSAT, FERR in the last 72 hours. No results found for: FOL, RBCF No results for input(s): PH, PCO2, PO2 in the last 72 hours. No results for input(s): CPK, CKNDX, TROIQ in the last 72 hours. No lab exists for component: CPKMB Lab Results Component Value Date/Time Cholesterol, total 187 09/11/2017 05:53 AM  
 HDL Cholesterol 63 09/11/2017 05:53 AM  
 LDL, calculated 109 (H) 09/11/2017 05:53 AM  
 Triglyceride 75 09/11/2017 05:53 AM  
 CHOL/HDL Ratio 3.0 09/11/2017 05:53 AM  
 
Lab Results Component Value Date/Time Glucose (POC) 173 (H) 03/05/2020 09:14 PM  
 Glucose (POC) 284 (H) 03/05/2020 04:30 PM  
 Glucose (POC) 184 (H) 03/05/2020 11:28 AM  
 Glucose (POC) 162 (H) 03/05/2020 08:59 AM  
 Glucose (POC) 95 03/04/2020 09:50 PM  
 
Lab Results Component Value Date/Time  Color YELLOW/STRAW 09/10/2017 10:09 PM  
 Appearance CLEAR 09/10/2017 10:09 PM  
 Specific gravity 1.010 09/10/2017 10:09 PM  
 pH (UA) 5.0 09/10/2017 10:09 PM  
 Protein NEGATIVE  09/10/2017 10:09 PM  
 Glucose NEGATIVE  09/10/2017 10:09 PM  
 Ketone NEGATIVE  09/10/2017 10:09 PM  
 Bilirubin NEGATIVE  09/10/2017 10:09 PM  
 Urobilinogen 0.2 09/10/2017 10:09 PM  
 Nitrites NEGATIVE  09/10/2017 10:09 PM  
 Leukocyte Esterase NEGATIVE  09/10/2017 10:09 PM  
 Epithelial cells MODERATE (A) 09/10/2017 10:09 PM  
 Bacteria NEGATIVE  09/10/2017 10:09 PM  
 WBC 5-10 09/10/2017 10:09 PM  
 RBC 0-5 09/10/2017 10:09 PM  
 
 
 
Medications Reviewed:  
 
Current Facility-Administered Medications Medication Dose Route Frequency  albuterol-ipratropium (DUO-NEB) 2.5 MG-0.5 MG/3 ML  3 mL Nebulization Q4H PRN  piperacillin-tazobactam (ZOSYN) 3.375 g in 0.9% sodium chloride (MBP/ADV) 100 mL  3.375 g IntraVENous Q8H  
 apixaban (ELIQUIS) tablet 5 mg  5 mg Oral BID  Vancomycin- Pharmacy Dosing   Other Rx Dosing/Monitoring  vancomycin (VANCOCIN) 1,000 mg in 0.9% sodium chloride (MBP/ADV) 250 mL  1,000 mg IntraVENous Q12H  
 methylPREDNISolone (PF) (Solu-MEDROL) injection 60 mg  60 mg IntraVENous DAILY  azithromycin (ZITHROMAX) 500 mg in 0.9% sodium chloride (MBP/ADV) 250 mL  500 mg IntraVENous Q24H  
 guaiFENesin (ROBITUSSIN) 100 mg/5 mL oral liquid 100 mg  100 mg Oral Q4H PRN  
 arformoteroL (BROVANA) neb solution 15 mcg  15 mcg Nebulization BID RT  
 oxymetazoline (AFRIN) 0.05 % nasal spray 2 Spray  2 Spray Both Nostrils BID PRN  polyethylene glycol (MIRALAX) packet 17 g  17 g Oral DAILY PRN  
 diphenhydrAMINE (BENADRYL) capsule 25 mg  25 mg Oral Q6H PRN  
 sertraline (ZOLOFT) tablet 25 mg  25 mg Oral DAILY  levETIRAcetam (KEPPRA) tablet 500 mg  500 mg Oral BID  sodium chloride (NS) flush 5-40 mL  5-40 mL IntraVENous Q8H  
 sodium chloride (NS) flush 5-40 mL  5-40 mL IntraVENous PRN  
 acetaminophen (TYLENOL) tablet 650 mg  650 mg Oral Q4H PRN  
 ferrous sulfate tablet 325 mg  325 mg Oral ACB  hydroxyzine HCL (ATARAX) tablet 25 mg  25 mg Oral BID  montelukast (SINGULAIR) tablet 10 mg  10 mg Oral DAILY  pantoprazole (PROTONIX) tablet 40 mg  40 mg Oral ACB&D  
  budesonide (PULMICORT) 250 mcg/2ml nebulizer susp  250 mcg Nebulization BID RT  
 albuterol (PROVENTIL VENTOLIN) nebulizer solution 2.5 mg  2.5 mg Nebulization Q2H PRN  
 glucose chewable tablet 16 g  4 Tab Oral PRN  
 glucagon (GLUCAGEN) injection 1 mg  1 mg IntraMUSCular PRN  
 dextrose 10% infusion 0-250 mL  0-250 mL IntraVENous PRN  
 insulin lispro (HUMALOG) injection   SubCUTAneous AC&HS  
 
______________________________________________________________________ EXPECTED LENGTH OF STAY: 4d 7h 
ACTUAL LENGTH OF STAY:          14 
 
            
Nicho Linares MD  
 
Patient's emergency contacts: 
Extended Emergency Contact Information Primary Emergency Contact: Aniya Ernandez Address: [address] [place] [address] Home Phone: [phone] Mobile Phone: [phone] Relation: Sister

## 2020-03-06 NOTE — PROGRESS NOTES
[facility] Sleep [facility] Progress Note Name: Steve Multani MRN: [id number] : 1945 Hospital[facility] Date: 3/6/2020 IMPRESSION:  
· AE of COPD- due to PE and probable RLL PNA ( followed by Dr Wm Jacobs as an outpatient basis) · Acute on chronic Resp acidosis with  metabolic alkaosis, likely secondary to ongoing diuretics on top of chronic resp acidosis. Suspsect that this is driving hypoxemia and his WOB. No, pO2, pCO2 and bicarb are better because of diamox. Improving · RUL PE 
· PH- ECHO  EF 60% no MR or AS PASP 66 RV nml · COPD · DM2 RECOMMENDATIONS:  
· Pt will need ECHO in 6 months to reassess- if PASP still > 60 will need w/u for other causes PH (group I or 2 etc) · Wean steroids · eliquis · Low dose diamox as his serum CO2 is increasing again. · Nebs, go to home inhalers at discharge · Continue NIV at night and PRN during the day · Going to rehab facility at discharge; set up for NIV while there because they cannot accept trilogy unit · Trilogy setup. Bipap (including s/t) and cpap will not adequately manage his hypercapnia for the long term. AVAPS mode in the form of trilogy will be the most appropriate tool. I have discussed with patient and sister and they are both in agreement. This is for the management of hypercapnia in the setting of COPD and he does not have any signs suggestive of YOVANY. · Follow up Chest xray in 4-6 wks with Dr Jordana Viramontes · shirlene and zosyn. Can stop azithromycin · Chest x-ray · PRN over the weekend Subjective:  
 
3/6 Eating breakfast 
breathing well 3/ Increased O2 requirement. + sputum production 3/3 Working with PT Possibly leaving today Noted increased anxiety 3/2 He is feeling better  Doing better overall today I called and discussed with sister  Clinical status is improving  Feeling a little better today. Used NIV overnight. He is mildly dyspneic but was able to eat his breakfast. Denies cough or hemoptysis or chest pain. 2/25 A/o; talkative. On NIV for WOB  
 
2/24 On NIV  
 
2/22 This patient has been seen and evaluated at the request of Dr. Dottie Wynn for SOB. Patient is a 77 y.o. male H/o COPD on spiriva and follows with Dr. Vignesh Calvo. Pt admitted with SOB and RUL PE. Started on eliquis. Pt alert on bipap and nebs Less SOb moves all ext equally no CP Past Medical History:  
Diagnosis Date  Asthma   
 hx of/has wheezing  Cancer [facility])   
 prostate - not treated yet  Cerebral artery occlusion with cerebral infarction ([facility] [address]  Chronic obstructive pulmonary disease ([facility] [facility]  Epilepsy ([facility] [address] [facility]  Ill-defined condition   
 shortness of breath - being evaluated  Psychiatric disorder   
 mental disability Past Surgical History:  
Procedure Laterality Date  COLONOSCOPY Left 10/4/2017 COLONOSCOPY performed by Dorothea Arroyo MD at [facility] ENDOSCOPY  
 HX INTRACRANIAL ANEURYSM REPAIR    
 HX OTHER SURGICAL    
 cyst removed from neck  HX PROSTATE SURGERY    
 HX SPLENECTOMY Prior to Admission medications Medication Sig Start Date End Date Taking? Authorizing Provider  
sertraline (ZOLOFT) 25 mg tablet Take 25 mg by mouth daily. Yes Provider, Historical  
atorvastatin (LIPITOR) 40 mg tablet Take 40 mg by mouth daily. Yes Provider, Historical  
arformoteroL (BROVANA) 15 mcg/2 mL nebu neb solution 15 mcg by Nebulization route two (2) times a day. Yes Provider, Historical  
multivitamin (ONE A DAY) tablet Take 1 Tab by mouth daily. Yes Provider, Historical  
ferrous sulfate 325 mg (65 mg iron) tablet Take 325 mg by mouth Daily (before breakfast). Yes Provider, Historical  
fluticasone propionate (FLONASE ALLERGY RELIEF) 50 mcg/actuation nasal spray 2 Sprays by Both Nostrils route daily.    Yes Provider, Historical  
 hydroxyzine HCL (ATARAX) 25 mg tablet Take 25 mg by mouth two (2) times a day. Yes Provider, Historical  
ipratropium (ATROVENT) 0.02 % soln 0.5 mg by Other route every eight (8) hours. Oral inhalation   Yes Provider, Historical  
metFORMIN (GLUCOPHAGE) 500 mg tablet Take 500 mg by mouth daily (with breakfast). Yes Provider, Historical  
montelukast (SINGULAIR) 10 mg tablet Take 10 mg by mouth daily. Yes Provider, Historical  
predniSONE (DELTASONE) 10 mg tablet Take 20 mg by mouth daily (with breakfast). COPD exacerbation   Yes Provider, Historical  
budesonide-formoteroL (SYMBICORT) 160-4.5 mcg/actuation HFAA Take 2 Puffs by inhalation two (2) times a day. Yes Provider, Historical  
levalbuterol tartrate (XOPENEX) 45 mcg/actuation inhaler Take 1 Puff by inhalation every eight (8) hours. Yes Provider, Historical  
cetirizine (ZYRTEC) 10 mg tablet Take 10 mg by mouth daily. Yes Provider, Historical  
levETIRAcetam (KEPPRA) 500 mg tablet TAKE 1 TABLET BY MOUTH TWICE A DAY 11/8/19  Yes Nirali Howard DO  
pantoprazole (PROTONIX) 40 mg tablet Take 1 Tab by mouth Before breakfast and dinner. 10/6/17  Yes Agustin Callejas MD  
umeclidinium (INCRUSE ELLIPTA) 62.5 mcg/actuation inhaler Take 1 Puff by inhalation daily. Indications: Rescue inhaler   Yes Provider, Historical  
 
No Known Allergies Social History Tobacco Use  Smoking status: Current Every Day Smoker  Smokeless tobacco: Never Used  Tobacco comment: cigarettes Substance Use Topics  Alcohol use: No  
  
Family History Problem Relation Age of Onset  Colon Cancer Maternal Aunt  Colon Cancer Maternal Aunt  Colon Cancer Maternal Aunt Current Facility-Administered Medications Medication Dose Route Frequency  vancomycin trough today @ 11:30   Other ONCE  piperacillin-tazobactam (ZOSYN) 3.375 g in 0.9% sodium chloride (MBP/ADV) 100 mL  3.375 g IntraVENous Q8H  
  apixaban (ELIQUIS) tablet 5 mg  5 mg Oral BID  Vancomycin- Pharmacy Dosing   Other Rx Dosing/Monitoring  vancomycin (VANCOCIN) 1,000 mg in 0.9% sodium chloride (MBP/ADV) 250 mL  1,000 mg IntraVENous Q12H  
 methylPREDNISolone (PF) (Solu-MEDROL) injection 60 mg  60 mg IntraVENous DAILY  azithromycin (ZITHROMAX) 500 mg in 0.9% sodium chloride (MBP/ADV) 250 mL  500 mg IntraVENous Q24H  
 arformoteroL (BROVANA) neb solution 15 mcg  15 mcg Nebulization BID RT  
 sertraline (ZOLOFT) tablet 25 mg  25 mg Oral DAILY  levETIRAcetam (KEPPRA) tablet 500 mg  500 mg Oral BID  sodium chloride (NS) flush 5-40 mL  5-40 mL IntraVENous Q8H  
 ferrous sulfate tablet 325 mg  325 mg Oral ACB  hydroxyzine HCL (ATARAX) tablet 25 mg  25 mg Oral BID  montelukast (SINGULAIR) tablet 10 mg  10 mg Oral DAILY  pantoprazole (PROTONIX) tablet 40 mg  40 mg Oral ACB&D  
 budesonide (PULMICORT) 250 mcg/2ml nebulizer susp  250 mcg Nebulization BID RT  
 insulin lispro (HUMALOG) injection   SubCUTAneous AC&HS Review of Systems: 
 
 
Objective:  
Vital Signs:   
Visit Vitals /65 (BP 1 Location: Left arm, BP Patient Position: At rest) Pulse 69 Temp 98.2 °F (36.8 °C) Resp 15 Ht 6' 2\" (1.88 m) Wt 74.4 kg (164 lb 0.4 oz) SpO2 94% BMI 21.06 kg/m² O2 Device: Nasal cannula O2 Flow Rate (L/min): 6 l/min(Desats with eating) Temp (24hrs), Av.7 °F (36.5 °C), Min:97 °F (36.1 °C), Max:98.7 °F (37.1 °C) Intake/Output:  
Last shift:      No intake/output data recorded. Last 3 shifts:  1901 -  0700 In: 1350 [I.V.:1350] Out: 200 [Urine:200] Intake/Output Summary (Last 24 hours) at 3/6/2020 2870 Last data filed at 3/6/2020 0200 Gross per 24 hour Intake 550 ml Output  Net 550 ml Physical Exam:  
General:  Alert, cooperative, no distress, appears stated age. Head:  Normocephalic, without obvious abnormality, atraumatic. Eyes:  Conjunctivae/corneas clear. Nose: Nares normal. Septum midline. Mucosa normal. No drainage or sinus tenderness. Lungs:   Clear to auscultation bilaterally. Mild tachypnea, no accessory muscle use. Heart:  Regular rate and rhythm, S1, S2 normal, no murmur, click, rub or gallop. Abdomen:   Protuberant / distended, non-tender. Extremities: Extremities normal, atraumatic, no cyanosis or edema. SCDs Pulses: 2+ and symmetric all extremities. Skin: Skin color, texture, turgor normal. No rashes or lesions Data review:  
 
Recent Results (from the past 24 hour(s)) POC EG7 Collection Time: 03/05/20 10:27 AM  
Result Value Ref Range Calcium, ionized (POC) 1.26 1.12 - 1.32 mmol/L  
 FIO2 (POC) 40 % pH (POC) 7.400 7.35 - 7.45    
 pCO2 (POC) 67.6 (H) 35.0 - 45.0 MMHG  
 pO2 (POC) 35 (LL) 80 - 100 MMHG  
 HCO3 (POC) 41.9 (H) 22 - 26 MMOL/L Base excess (POC) 17 mmol/L  
 sO2 (POC) 65 (L) 92 - 97 % Site OTHER Device: BIPAP    
 PEEP/CPAP (POC) 6 cmH2O  
 PIP (POC) 17 Allens test (POC) YES Bleed In 40 L/min Specimen type (POC) BLOOD DARK/?VENOUS Total resp. rate 15 GLUCOSE, POC Collection Time: 03/05/20 11:28 AM  
Result Value Ref Range Glucose (POC) 184 (H) 65 - 100 mg/dL Performed by Jorge Briones, POC Collection Time: 03/05/20  4:30 PM  
Result Value Ref Range Glucose (POC) 284 (H) 65 - 100 mg/dL Performed by Tiffanie Shah   
GLUCOSE, POC Collection Time: 03/05/20  9:14 PM  
Result Value Ref Range Glucose (POC) 173 (H) 65 - 100 mg/dL Performed by Reema Gonzales METABOLIC PANEL, BASIC Collection Time: 03/06/20  4:22 AM  
Result Value Ref Range Sodium 140 136 - 145 mmol/L Potassium 4.0 3.5 - 5.1 mmol/L Chloride 100 97 - 108 mmol/L  
 CO2 40 (H) 21 - 32 mmol/L Anion gap 0 (L) 5 - 15 mmol/L Glucose 139 (H) 65 - 100 mg/dL BUN 19 6 - 20 MG/DL  Creatinine 0.51 (L) 0.70 - 1.30 MG/DL  
 BUN/Creatinine ratio 37 (H) 12 - 20 GFR est AA >60 >60 ml/min/1.73m2 GFR est non-AA >60 >60 ml/min/1.73m2 Calcium 9.3 8.5 - 10.1 MG/DL  
CBC W/O DIFF Collection Time: 03/06/20  4:22 AM  
Result Value Ref Range WBC 12.4 (H) 4.1 - 11.1 K/uL  
 RBC 3.61 (L) 4.10 - 5.70 M/uL HGB 9.3 (L) 12.1 - 17.0 g/dL HCT 31.7 (L) 36.6 - 50.3 % MCV 87.8 80.0 - 99.0 FL  
 MCH 25.8 (L) 26.0 - 34.0 PG  
 MCHC 29.3 (L) 30.0 - 36.5 g/dL  
 RDW 18.1 (H) 11.5 - 14.5 % PLATELET 074 864 - 598 K/uL MPV 10.0 8.9 - 12.9 FL  
 NRBC 0.2 (H) 0  WBC ABSOLUTE NRBC 0.02 (H) 0.00 - 0.01 K/uL GLUCOSE, POC Collection Time: 03/06/20  8:03 AM  
Result Value Ref Range Glucose (POC) 106 (H) 65 - 100 mg/dL Performed by Alex[facility]: 
 
 
  
Joel Burger PA-C

## 2020-03-06 NOTE — PROGRESS NOTES
1930: bedside shift report received from Roxy Muñoz 8. 2100: Pt bathed with CHG. 0400: am labs drawn. 0730: Bedside and Verbal shift change report given to [address] [place] [address] (oncoming nurse) by Josh Escudero (offgoing nurse). Report included the following information SBAR, Kardex, Intake/Output, MAR, Accordion, Recent Results and Med Rec Status.

## 2020-03-06 NOTE — PROGRESS NOTES
Transitions of Care Plan: 
   Disposition: [address] [place] Nunez spoke with Tressa Chong at [facility]. Able to accept patient when medically ready. [facility] can also accept patient while on Trilogy.  
 
Serenity Pena, MPH

## 2020-03-06 NOTE — PROGRESS NOTES
Transition Plan of Care Transfer back from CCU noted Disposition plan is to go to [facility] for rehab once stable Requiring oxygen at 6/L while eating He will need transport to discharge. Sister Saeed George has applied for Medicaid about a month ago. UAI was completed on 1/31/20 at [address] This CM called [place] CM department to request a copy of UAI. Luigi Foley liaison from [facility] is aware that UAI has been done. Gaudencio Matias RN CRM Ext [phone]

## 2020-03-06 NOTE — PROGRESS NOTES
NUTRITION Pt seen for:    
[x] Rescreen RECOMMENDATIONS:  
Encourage intake of ONS between meals Interventions: 
Ensure Enlive and Glucerna shake SUBJECTIVE/OBJECTIVE: Information obtained from:   Chart, patient, RN They told me to take breaks when I eat when I have trouble breathing. Pt admitted with Acute respiratory failure with hypoxia, COPD exacerbation. Past Medical History:  
Diagnosis Date  Asthma   
 hx of/has wheezing  Cancer [facility])   
 prostate - not treated yet  Cerebral artery occlusion with cerebral infarction ([facility] [address]  Chronic obstructive pulmonary disease ([facility]  Epilepsy ([facility]  Ill-defined condition   
 shortness of breath - being evaluated  Psychiatric disorder   
 mental disability Diet: Diabetic Consistent Carb 2200 Kcal 
Supplements: none Intake: [] Good     [x] Fair      [] Poor Meal intake:  
Patient Vitals for the past 168 hrs: 
 % Diet Eaten 03/02/20 1010 80 % 03/01/20 1731 75 % 03/01/20 1230 75 % 03/01/20 0815 55 % 02/29/20 1756 75 % 02/29/20 1252 40 % 02/29/20 0800 40 % 02/28/20 1500 75 % Weight Changes:  
[] Loss 
[] Gain 
[x] Stable Wt Readings from Last 10 Encounters:  
03/06/20 74.4 kg (164 lb 0.4 oz) 03/16/18 74.8 kg (165 lb) 12/04/17 75.3 kg (166 lb)  
11/17/17 77 kg (169 lb 12.1 oz) 10/04/17 79.8 kg (176 lb)  
09/13/17 75 kg (165 lb 5.5 oz)  
06/26/14 58.1 kg (128 lb) Nutrition Problems: 
[x]  Decreased PO intake (see above) since admission. Pt states today's weight of 164# is his UBW (86% IBW). Suspect decline in PO intake d/t worsening respiratory failure; was on continuous BiPAP yesterday. Pt agreeable to drinking oral supplements between meals to increase nutrient intake. Reports needing to take breaks during meals to catch his breath. Nutrition Diagnosis: 1. Altered nutrition-related lab values related to endocrine dysfunction + steroids as evidenced by elevated BG; HbA1C = 7.8% Estimated Nutrition Needs:  
Kcals/day: 2100 Kcals/day(or more) Protein: 85 g(or more using 1.2+ gm/kg) Fluid: 2100 ml(1 mL/kcal) Based On: Kcal/kg - specify (Comment)(30+ kcal/kg) Weight Used: Actual wt(70.4 kg) PLAN:  
[] Obtained or adjusted food preferences/tolerances 
[] Adjust texture due to difficulty chewing  
[] Educated patient [x] Added or adjusted supplements/snacks [x] Continue current diet [x] Continue to rescreen/follow per protocol Michelle Chao RD CNSC

## 2020-03-06 NOTE — PROGRESS NOTES
TRANSFER - IN REPORT: 
 
Verbal report received from Rachell Oneal RN(name) on Thad Pérez  being received from CCU(unit) for routine progression of care Report consisted of patients Situation, Background, Assessment and  
Recommendations(SBAR). Information from the following report(s) SBAR, Kardex, Intake/Output, MAR, Accordion, Recent Results and Cardiac Rhythm NSR w/ PVC's was reviewed with the receiving nurse. Opportunity for questions and clarification was provided. Assessment completed upon patients arrival to unit and care assumed. Primary Nurse Lenka Lucas and Tra Patel RN performed a dual skin assessment on this patient No impairment noted Carlos Enrique score is 18.

## 2020-03-07 NOTE — PROGRESS NOTES
Problem: Heart Failure: Day 5 Goal: Off Pathway (Use only if patient is Off Pathway) Outcome: Progressing Towards Goal 
 Pt heart rhythm NSR. Education provided on heart failure medications including Eliquis. Pt verbalized understanding. Problem: Chronic Obstructive Pulmonary Disease (COPD) Goal: *Absence of hypoxia Outcome: Progressing Towards Goal 
Pt on 4L NC with bipap at night, dyspneic with exertion. Pt baseline O2 is 2L NC  
 
Problem: Pressure Injury - Risk of 
Goal: *Prevention of pressure injury Description Document Carlos Enrique Scale and appropriate interventions in the flowsheet. Outcome: Progressing Towards Goal 
Note: Pressure Injury Interventions: 
Sensory Interventions: Assess changes in LOC, Float heels, Keep linens dry and wrinkle-free, Minimize linen layers, Pressure redistribution bed/mattress (bed type) Moisture Interventions: Absorbent underpads, Minimize layers Activity Interventions: Increase time out of bed, Pressure redistribution bed/mattress(bed type) Mobility Interventions: HOB 30 degrees or less, Pressure redistribution bed/mattress (bed type) Nutrition Interventions: Document food/fluid/supplement intake Friction and Shear Interventions: Apply protective barrier, creams and emollients, HOB 30 degrees or less, Minimize layers Bedside shift change report given to Sujata Dinh RN (oncoming nurse) by Eulalio Phipps RN (offgoing nurse). Report included the following information SBAR, Kardex, Intake/Output, MAR, Recent Results, and Cardiac Rhythm NSR .

## 2020-03-07 NOTE — PROGRESS NOTES
ID Progress Note 3/7/2020 Subjective: He states that he is feeling okay today. He is still dyspneic. Objective: Antibiotics: 1. Zosyn Vitals:  
Visit Vitals /66 (BP 1 Location: Right arm, BP Patient Position: At rest) Pulse 95 Temp 98 °F (36.7 °C) Resp 24 Ht 6' 2\" (1.88 m) Wt 90.7 kg (199 lb 14.4 oz) SpO2 96% BMI 25.67 kg/m² Tmax:  Temp (24hrs), Av °F (36.7 °C), Min:97.6 °F (36.4 °C), Max:98.2 °F (36.8 °C) Exam:  Lungs:  diminished breath sounds R base, L base Heart:  regular rate and rhythm Abdomen:  soft, non-tender. Bowel sounds normal. No masses,  no organomegaly Skin:  no rash or abnormalities Labs:     
Recent Labs 20 
0424 20 
0422 20 
5983 WBC  --  12.4* 11.0 HGB  --  9.3* 9.4* PLT  --  362 339 BUN 15 19 15 CREA 0.55* 0.51* 0.43* Cultures: No results found for: SDES Lab Results Component Value Date/Time Culture result: HEAVY PSEUDOMONAS AERUGINOSA (A) 2020 11:54 AM  
 Culture result: MODERATE NORMAL RESPIRATORY DAVID 2020 11:54 AM  
 Culture result: NO GROWTH 2 DAYS 2020 10:49 AM  
 
 
Radiology:  
 
Line/Insert Date:        
 
Assessment: 1. Pseudomonas from respiratory cultures 2. COPD 3. Debility Objective: 1. Continue IV antibiotic therapy 2. Work-up any new fevers 3. Pulmonary rehab Hossein Rai MD

## 2020-03-07 NOTE — PROGRESS NOTES
Hospitalist Progress Note Elli Choi MD 
Answering service: [phone] OR 36 from in house phone Date of Service:  3/7/2020 NAME:  Lubna Anders :  1945 MRN:  [id number] PCP: Sergei Coles MD 
 
Chief Complaint:  
Chief Complaint Patient presents with  Shortness of Breath Follow up COPD exacerbation, combined respiratory failure. Patient seen and examined at the bedside. RN reports no acute events. He says breathing OK. Seen by ID help appreciated. Assessment & Plan: # Acute worsening of hypercarbia secondary to poor tolerance and compliance with BiPAP and low dose Benzo for anxiety -BiPAP compliance 
-Not able to tolerate low dose respiratory depressant as well for anxiety 
-Palliative care appropriate 
-Request Pulmonology to resume the care # Sinus tach, high O2 need, easy desaturation, related to bibasilar infiltrate-HCAP? 
-No PE 
-Sputum Cx noted with Pseudomonas Aeruginosa + 
-ID consult 
-Close monitoring 
-CTA Chest PE protocol 3/3 noted 
-Continue Eliquis 
-Blood culture negative so far, MRSA screen negative 
-Acapella for chest PT 
-Discuss Palliative care with Pt and his sister # Acute on chronic hypoxic and hypercarbic - mixed respiratory failure,  improving # Right UL PE # Localized Rigth LL consolidation # Severe COPD exacerbation # Pulmonary hypertension per Echo : EF 60% no MR or AS PASP 66 RV nml 
-Combined complex respiratory issues 
-Still very high O2 requirement 6-8LPM O2 
-ABG  s/o ? baseline elevated hypercarbia. -Pulmonology on board. Trilogy on disposition 
-Hematology recommendations, lovenox to Eliquis 10mg BID for 7 days, then 5mg BID x 3-6 months. 
-Outpatient hematology follow up in the clinic 
-Outpatient Pulmonology follow up in the clinic 
-Continue NIV-BIPAP QHS and PRN during the day time 
-Levaquin 5 days for possible PNA, last  
-Prednisone up to 20mg continue 
-Duenebs and supportive care -Trilogy/ BiPAP order on discharge to SNF and would need at home as well-Pulm arranged 
-Palliative care consult as seems like End Stage Lung Disease # Respiratory acidosis with metabolic alkalosis 
-Complex driving forces with diuretics, COPD and other pulmological etiologies -BiPAP as noted above # DM2, with hyperglycemia 
-No recent A1c 
-Likely steroid driven 
-SSI only # Abdominal distention 
-No ascites 
-Large right renal cyst 
-Urology consulted, need outpatient follow up # H/o seizure:  
-continue home meds Code status: Full code DDVT Px: Eliquis Care plan discussed with: Patient/ Family, Nurse Disposition: [id number] Anticipated discharge: Once O2 requirement. Tachycardia and feeling of wellbeing improves, 24-48hrs, likely 3/3 Hospital Problems  Date Reviewed: 3/16/2018 Codes Class Noted POA  
 COPD exacerbation ([facility] [address] ICD-10-CM: J44.1 ICD-9-CM: 491.21  3/4/2020 Unknown Acute respiratory failure with hypoxia [facility]) ICD-10-CM: J96.01 
ICD-9-CM: 518.81  2/20/2020 Unknown Review of Systems: A comprehensive review of systems was negative except for that written in the HPI. Physical Examination:  
 
Visit Vitals /72 (BP 1 Location: Right arm, BP Patient Position: At rest) Pulse 76 Temp 98.2 °F (36.8 °C) Resp 18 Ht 6' 2\" (1.88 m) Wt 90.7 kg (199 lb 14.4 oz) SpO2 95% BMI 25.67 kg/m² General:  Alert, cooperative, no distress, appears stated age. Head:  Normocephalic, without obvious abnormality, atraumatic. Lungs:    Significantly tight AE with reduced breath sounds, no wheezing/ rhonchi/ rales, not much changed today Heart:  Regular rate and rhythm, tachycardia +, S1, S2 normal, no murmur, click, rub or gallop. Abdomen:   Soft, non-tender. Bowel sounds normal. No masses,  No organomegaly. Extremities: Extremities normal, atraumatic, no cyanosis or edema Pulses: 2+ and symmetric all extremities. Vital Signs:  
 Last 24hrs VS reviewed since prior progress note. Most recent are: 
 
Visit Vitals /72 (BP 1 Location: Right arm, BP Patient Position: At rest) Pulse 76 Temp 98.2 °F (36.8 °C) Resp 18 Ht 6' 2\" (1.88 m) Wt 90.7 kg (199 lb 14.4 oz) SpO2 95% BMI 25.67 kg/m² Intake/Output Summary (Last 24 hours) at 3/7/2020 1220 Last data filed at 3/7/2020 1002 Gross per 24 hour Intake 200 ml Output 1200 ml Net -1000 ml Tmax:  Temp (24hrs), Av °F (36.7 °C), Min:97.6 °F (36.4 °C), Max:98.2 °F (36.8 °C) Data Review:  
Data reviewed by myself: 
Cta Chest W Or W Wo Cont Result Date: 2020 EXAM:  CTA CHEST W OR W WO CONT INDICATION:   SOB COMPARISON: None. TECHNIQUE: Precontrast  images were obtained to localize the volume for acquisition. Multislice helical CT arteriography was performed from the diaphragm to the thoracic inlet during uneventful rapid bolus of 100 cc Isovue-370. Lung and soft tissue windows were generated. Coronal and sagittal images were generated and 3D post processing consisting of coronal maximum intensity images was performed. CT dose reduction was achieved through use of a standardized protocol tailored for this examination and automatic exposure control for dose modulation. FINDINGS: CHEST: THYROID: No nodule. MEDIASTINUM: No mass or lymphadenopathy. LEV: No mass or lymphadenopathy. THORACIC AORTA: Atherosclerotic without evidence of aneurysm. MAIN PULMONARY ARTERY: Pulmonary emboli right upper lobe branches. TRACHEA/BRONCHI: Patent. ESOPHAGUS: No wall thickening or dilatation. HEART: Normal in size. PLEURA: No effusion or pneumothorax. LUNGS: Emphysematous changes. Focal consolidation right lower lobe. Left basilar atelectasis. INCIDENTALLY IMAGED UPPER ABDOMEN: 7.7 cm right renal cyst. 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 Endograft is partially visualized. Izzy Ku BONES: No destructive bone lesion. IMPRESSION: Right upper lobe pulmonary emboli. Localized consolidation right lower lobe. Left basilar atelectasis. The findings were called to the patient's nurse on 2/20/2020 at 6:39 PM by myself. Krystle Renae Us Abd Comp Result Date: 2/21/2020 INDICATION: Abdominal distention COMPARISON: None TECHNIQUE: Routine ultrasound images of the abdomen were obtained. FINDINGS: GALLBLADDER: No cholecystolithiasis, gallbladder wall thickening, or pericholecystic fluid. COMMON BILE DUCT: 3 mm in diameter. No evidence of choledocholithiasis. LIVER: Normal in size. Normal echogenicity. No focal hepatic mass or intrahepatic biliary dilatation. MAIN PORTAL VEIN: Patent. Hepatopetal flow direction. PANCREAS: Obscured by overlying bowel gas. RIGHT KIDNEY: 12.8 cm in length. 8.2 cm cyst. No hydronephrosis. No evidence of mass or calculus. LEFT KIDNEY: 11.5 cm in length. No hydronephrosis. No evidence of mass or calculus. SPLEEN: Obscured by overlying bowel gas. AORTA: Obscured by overlying bowel gas. INFERIOR VENA CAVA: Patent. OTHER: No ascites. IMPRESSION: Large right renal cyst. Otherwise unremarkable abdominal ultrasound. Xr Chest [facility] Result Date: 2/20/2020 INDICATION:  SOB. History of chronic asthma. Prostate cancer. COPD. EXAM: Chest single view. COMPARISON: 9/10/2017. FINDINGS: A single frontal view of the chest at 1419 hours shows bibasilar atelectasis or scar, with mild interstitial prominence increased since the prior study. .  The heart, mediastinum and pulmonary vasculature are stable . The bony thorax is unremarkable for age. . Port-A-Cath device on the right is stable. IMPRESSION: Bibasilar atelectasis or scar with interstitial prominence. Correlate for developing interstitial edema or bibasilar inflammatory infiltrate in this patient with COPD and chronic asthma. .  . No results found for: SDES Lab Results Component Value Date/Time Culture result: HEAVY PSEUDOMONAS AERUGINOSA (A) 03/04/2020 11:54 AM  
 Culture result: MODERATE NORMAL RESPIRATORY DAVID 03/04/2020 11:54 AM  
 Culture result: NO GROWTH 2 DAYS 03/04/2020 10:49 AM  
 
All Micro Results Procedure Component Value Units Date/Time CULTURE, RESPIRATORY/SPUTUM/BRONCH Jerrye Jason STAIN [[id number]]  (Abnormal)  (Susceptibility) Collected:  03/04/20 1154 Order Status:  Completed Specimen:  Sputum Updated:  03/06/20 4075 Special Requests: NO SPECIAL REQUESTS     
  GRAM STAIN FEW EPITHELIAL CELLS SEEN     
   1+ WBCS SEEN     
      
  1+ GRAM POSITIVE COCCI IN CHAINS Culture result:    
  HEAVY PSEUDOMONAS AERUGINOSA MODERATE NORMAL RESPIRATORY DAVID  
     
 CULTURE, BLOOD, PAIRED [[id number]] Collected:  03/04/20 1049 Order Status:  Completed Specimen:  Blood Updated:  03/06/20 5132 Special Requests: NO SPECIAL REQUESTS Culture result: NO GROWTH 2 DAYS     
 CULTURE, MRSA [[id number]] Collected:  03/04/20 1030 Order Status:  Completed Specimen:  Nares Updated:  03/05/20 2243 Special Requests: NO SPECIAL REQUESTS Culture result: MRSA NOT PRESENT Screening of patient nares for MRSA is for surveillance purposes and, if positive, to facilitate isolation considerations in high risk settings. It is not intended for automatic decolonization interventions per se as regimens are not sufficiently effective to warrant routine use. Cta Chest W Or W Wo Cont Result Date: 3/3/2020 IMPRESSION: 1. Bibasilar airspace disease 2. Severe emphysema 3. Presumed reactive adenopathy in the right infrahilar region 4. No acute pulmonary embolus Cta Chest W Or W Wo Cont Result Date: 2/20/2020 IMPRESSION: Right upper lobe pulmonary emboli. Localized consolidation right lower lobe. Left basilar atelectasis. The findings were called to the patient's nurse on 2/20/2020 at 6:39 PM by myself. [address] Us Abd Comp Result Date: 2/21/2020 IMPRESSION: Large right renal cyst. Otherwise unremarkable abdominal ultrasound. Xr Chest [facility] Result Date: 3/6/2020 IMPRESSION: 1. Stable prominent bibasilar pneumonia. 2. Emphysema. Xr Chest [facility] Result Date: 2/27/2020 IMPRESSION: Stable mild bibasilar opacities. Xr Chest [facility] Result Date: 2/23/2020 IMPRESSION: 1. No radiographic evidence of acute cardiopulmonary disease. Xr Chest [facility] Result Date: 2/20/2020 IMPRESSION: Bibasilar atelectasis or scar with interstitial prominence. Correlate for developing interstitial edema or bibasilar inflammatory infiltrate in this patient with COPD and chronic asthma. .  . Labs: reviewed by myself. Recent Labs 03/06/20 
0422 03/05/20 
2128 WBC 12.4* 11.0 HGB 9.3* 9.4* HCT 31.7* 32.1*  
 339 Recent Labs 03/07/20 
0424 03/06/20 
0422 03/05/20 
2877  140 139  
K 4.9 4.0 4.1  100 98 CO2 27 40* 36* BUN 15 19 15 CREA 0.55* 0.51* 0.43* * 139* 155* CA 9.3 9.3 9.7 MG 2.1  --   --   
PHOS 2.5*  --   -- No results for input(s): SGOT, GPT, ALT, AP, TBIL, TBILI, TP, ALB, GLOB, GGT, AML, LPSE in the last 72 hours. No lab exists for component: AMYP, HLPSE No results for input(s): INR, PTP, APTT, INREXT, INREXT in the last 72 hours. No results for input(s): FE, TIBC, PSAT, FERR in the last 72 hours. No results found for: FOL, RBCF No results for input(s): PH, PCO2, PO2 in the last 72 hours. No results for input(s): CPK, CKNDX, TROIQ in the last 72 hours. No lab exists for component: CPKMB Lab Results Component Value Date/Time Cholesterol, total 187 09/11/2017 05:53 AM  
 HDL Cholesterol 63 09/11/2017 05:53 AM  
 LDL, calculated 109 (H) 09/11/2017 05:53 AM  
 Triglyceride 75 09/11/2017 05:53 AM  
 CHOL/HDL Ratio 3.0 09/11/2017 05:53 AM  
 
Lab Results Component Value Date/Time  Glucose (POC) 161 (H) 03/07/2020 11:23 AM  
 Glucose (POC) 112 (H) 03/07/2020 07:56 AM  
 Glucose (POC) 190 (H) 03/06/2020 09:15 PM  
 Glucose (POC) 187 (H) 03/06/2020 05:22 PM  
 Glucose (POC) 173 (H) 03/06/2020 10:56 AM  
 
Lab Results Component Value Date/Time Color YELLOW/STRAW 09/10/2017 10:09 PM  
 Appearance CLEAR 09/10/2017 10:09 PM  
 Specific gravity 1.010 09/10/2017 10:09 PM  
 pH (UA) 5.0 09/10/2017 10:09 PM  
 Protein NEGATIVE  09/10/2017 10:09 PM  
 Glucose NEGATIVE  09/10/2017 10:09 PM  
 Ketone NEGATIVE  09/10/2017 10:09 PM  
 Bilirubin NEGATIVE  09/10/2017 10:09 PM  
 Urobilinogen 0.2 09/10/2017 10:09 PM  
 Nitrites NEGATIVE  09/10/2017 10:09 PM  
 Leukocyte Esterase NEGATIVE  09/10/2017 10:09 PM  
 Epithelial cells MODERATE (A) 09/10/2017 10:09 PM  
 Bacteria NEGATIVE  09/10/2017 10:09 PM  
 WBC 5-10 09/10/2017 10:09 PM  
 RBC 0-5 09/10/2017 10:09 PM  
 
 
 
Medications Reviewed:  
 
Current Facility-Administered Medications Medication Dose Route Frequency  predniSONE (DELTASONE) tablet 40 mg  40 mg Oral DAILY WITH BREAKFAST  albuterol-ipratropium (DUO-NEB) 2.5 MG-0.5 MG/3 ML  3 mL Nebulization Q4H PRN  piperacillin-tazobactam (ZOSYN) 3.375 g in 0.9% sodium chloride (MBP/ADV) 100 mL  3.375 g IntraVENous Q8H  
 apixaban (ELIQUIS) tablet 5 mg  5 mg Oral BID  
 guaiFENesin (ROBITUSSIN) 100 mg/5 mL oral liquid 100 mg  100 mg Oral Q4H PRN  
 arformoteroL (BROVANA) neb solution 15 mcg  15 mcg Nebulization BID RT  
 oxymetazoline (AFRIN) 0.05 % nasal spray 2 Spray  2 Spray Both Nostrils BID PRN  polyethylene glycol (MIRALAX) packet 17 g  17 g Oral DAILY PRN  
 diphenhydrAMINE (BENADRYL) capsule 25 mg  25 mg Oral Q6H PRN  
 sertraline (ZOLOFT) tablet 25 mg  25 mg Oral DAILY  levETIRAcetam (KEPPRA) tablet 500 mg  500 mg Oral BID  sodium chloride (NS) flush 5-40 mL  5-40 mL IntraVENous Q8H  
 sodium chloride (NS) flush 5-40 mL  5-40 mL IntraVENous PRN  
 acetaminophen (TYLENOL) tablet 650 mg  650 mg Oral Q4H PRN  
  ferrous sulfate tablet 325 mg  325 mg Oral ACB  hydroxyzine HCL (ATARAX) tablet 25 mg  25 mg Oral BID  montelukast (SINGULAIR) tablet 10 mg  10 mg Oral DAILY  pantoprazole (PROTONIX) tablet 40 mg  40 mg Oral ACB&D  
 budesonide (PULMICORT) 250 mcg/2ml nebulizer susp  250 mcg Nebulization BID RT  
 albuterol (PROVENTIL VENTOLIN) nebulizer solution 2.5 mg  2.5 mg Nebulization Q2H PRN  
 glucose chewable tablet 16 g  4 Tab Oral PRN  
 glucagon (GLUCAGEN) injection 1 mg  1 mg IntraMUSCular PRN  
 dextrose 10% infusion 0-250 mL  0-250 mL IntraVENous PRN  
 insulin lispro (HUMALOG) injection   SubCUTAneous AC&HS  
 
______________________________________________________________________ EXPECTED LENGTH OF STAY: 4d 7h 
ACTUAL LENGTH OF STAY:          16 Radha Hatfield MD  
 
Patient's emergency contacts: 
Extended Emergency Contact Information Primary Emergency Contact: Jennifer Doty Address: [address] [place] [address] Home Phone: [phone] Mobile Phone: [phone] Relation: Sister

## 2020-03-07 NOTE — PROGRESS NOTES
Problem: Falls - Risk of 
Goal: *Absence of Falls Description Document Delio Oliver Fall Risk and appropriate interventions in the flowsheet. Outcome: Progressing Towards Goal 
Note: Fall Risk Interventions: 
Mobility Interventions: Patient to call before getting OOB, PT Consult for mobility concerns, PT Consult for assist device competence, Strengthening exercises (ROM-active/passive), Communicate number of staff needed for ambulation/transfer Mentation Interventions: Adequate sleep, hydration, pain control, Bed/chair exit alarm Medication Interventions: Evaluate medications/consider consulting pharmacy, Teach patient to arise slowly, Patient to call before getting OOB Elimination Interventions: Patient to call for help with toileting needs, Call light in reach, Stay With Me (per policy), Toileting schedule/hourly rounds, Urinal in reach History of Falls Interventions: Consult care management for discharge planning, Door open when patient unattended, Evaluate medications/consider consulting pharmacy, Investigate reason for fall, Room close to nurse's station, Assess for delayed presentation/identification of injury for 48 hrs (comment for end date), Vital signs minimum Q4HRs X 24 hrs (comment for end date) Problem: Pressure Injury - Risk of 
Goal: *Prevention of pressure injury Description Document Carlos Enrique Scale and appropriate interventions in the flowsheet. Outcome: Progressing Towards Goal 
Note: Pressure Injury Interventions: 
Sensory Interventions: Assess changes in LOC, Maintain/enhance activity level, Minimize linen layers Moisture Interventions: Absorbent underpads Activity Interventions: Increase time out of bed, Pressure redistribution bed/mattress(bed type), PT/OT evaluation Mobility Interventions: Assess need for specialty bed, Pressure redistribution bed/mattress (bed type), Float heels, PT/OT evaluation Nutrition Interventions: Document food/fluid/supplement intake, Offer support with meals,snacks and hydration Friction and Shear Interventions: Lift team/patient mobility team, Minimize layers Problem: Breathing Pattern - Ineffective Goal: *Absence of hypoxia Outcome: Progressing Towards Goal 
Goal: *Use of effective breathing techniques Outcome: Progressing Towards Goal 
 Pt tolerated BIPAP well throughout night. Denies any SOB or trouble breathing. Will continue to monitor. Last 3 Recorded Weights in this Encounter 03/06/20 1047 03/06/20 1448 03/07/20 5016 Weight: 74.4 kg (164 lb 0.4 oz) 91.5 kg (201 lb 11.5 oz) 90.7 kg (199 lb 14.4 oz) Bedside shift change report given to Evert Silva (oncoming nurse) by [facility] (offgoing nurse). Report included the following information SBAR, Kardex, ED Summary, Intake/Output, MAR, Accordion, Recent Results, Med Rec Status, Cardiac Rhythm NSR/PACs and Quality Measures.

## 2020-03-07 NOTE — PROGRESS NOTES
Bedside and Verbal shift change report given to [id number] (oncoming nurse) by Gabriele Castaneda (offgoing nurse). Report included the following information SBAR, Kardex, Intake/Output, MAR, Accordion and Recent Results. Problem: Falls - Risk of 
Goal: *Absence of Falls Description Document Zulma Alvarez Fall Risk and appropriate interventions in the flowsheet. Outcome: Progressing Towards Goal 
Note: Fall Risk Interventions: 
Mobility Interventions: Communicate number of staff needed for ambulation/transfer, OT consult for ADLs, Patient to call before getting OOB, PT Consult for mobility concerns, PT Consult for assist device competence, Strengthening exercises (ROM-active/passive), Utilize walker, cane, or other assistive device, Utilize gait belt for transfers/ambulation Mentation Interventions: Adequate sleep, hydration, pain control, Door open when patient unattended, Evaluate medications/consider consulting pharmacy, Gait belt with transfers/ambulation, Increase mobility, More frequent rounding, Reorient patient, Room close to nurse's station, Update white board, Toileting rounds Medication Interventions: Evaluate medications/consider consulting pharmacy, Patient to call before getting OOB, Teach patient to arise slowly, Utilize gait belt for transfers/ambulation Elimination Interventions: Call light in reach, Patient to call for help with toileting needs, Stay With Me (per policy), Toilet paper/wipes in reach, Toileting schedule/hourly rounds, Urinal in reach History of Falls Interventions: Consult care management for discharge planning, Door open when patient unattended, Evaluate medications/consider consulting pharmacy, Room close to nurse's station Problem: Patient Education: Go to Patient Education Activity Goal: Patient/Family Education Outcome: Progressing Towards Goal 
  
Problem: Pressure Injury - Risk of 
Goal: *Prevention of pressure injury Description Document Carlos Enrique Scale and appropriate interventions in the flowsheet. Outcome: Progressing Towards Goal 
Note: Pressure Injury Interventions: 
Sensory Interventions: Assess changes in LOC, Assess need for specialty bed, Avoid rigorous massage over bony prominences, Check visual cues for pain, Discuss PT/OT consult with provider, Float heels, Keep linens dry and wrinkle-free, Maintain/enhance activity level, Minimize linen layers, Monitor skin under medical devices, Pressure redistribution bed/mattress (bed type), Turn and reposition approx. every two hours (pillows and wedges if needed) Moisture Interventions: Absorbent underpads, Apply protective barrier, creams and emollients, Assess need for specialty bed, Check for incontinence Q2 hours and as needed, Limit adult briefs, Maintain skin hydration (lotion/cream), Minimize layers, Moisture barrier, Offer toileting Q_hr Activity Interventions: Assess need for specialty bed, Increase time out of bed, PT/OT evaluation, Pressure redistribution bed/mattress(bed type) Mobility Interventions: Assess need for specialty bed, Float heels, HOB 30 degrees or less, Pressure redistribution bed/mattress (bed type), PT/OT evaluation, Turn and reposition approx. every two hours(pillow and wedges) Nutrition Interventions: Document food/fluid/supplement intake, Discuss nutritional consult with provider, Offer support with meals,snacks and hydration Friction and Shear Interventions: Apply protective barrier, creams and emollients, HOB 30 degrees or less, Minimize layers Problem: Patient Education: Go to Patient Education Activity Goal: Patient/Family Education Outcome: Progressing Towards Goal 
  
Problem: Deep Venous Thrombosis - Risk of 
Goal: *Knowledge of prescribed medications Outcome: Progressing Towards Goal 
  
Problem: Breathing Pattern - Ineffective Goal: *Absence of hypoxia Outcome: Progressing Towards Goal 
 Goal: *Use of effective breathing techniques Outcome: Progressing Towards Goal 
Goal: *PALLIATIVE CARE:  Alleviation of Dyspnea Outcome: Progressing Towards Goal 
  
Problem: Heart Failure: Discharge Outcomes Goal: *Verbalizes understanding and describes prescribed diet Outcome: Progressing Towards Goal 
Goal: *Describes available resources and support systems Description 
(eg: Home Health, Palliative Care, Advanced Medical Directive) Outcome: Progressing Towards Goal 
  
Problem: Chronic Obstructive Pulmonary Disease (COPD) Goal: *Absence of hypoxia Outcome: Progressing Towards Goal 
Goal: *Optimize nutritional status Outcome: Progressing Towards Goal

## 2020-03-08 NOTE — PROGRESS NOTES
Problem: Falls - Risk of 
Goal: *Absence of Falls Description Document Nhi Meyer Fall Risk and appropriate interventions in the flowsheet. Outcome: Progressing Towards Goal 
Note: Fall Risk Interventions: 
Mobility Interventions: Communicate number of staff needed for ambulation/transfer, OT consult for ADLs, Patient to call before getting OOB, PT Consult for mobility concerns, PT Consult for assist device competence, Strengthening exercises (ROM-active/passive) Mentation Interventions: Adequate sleep, hydration, pain control, More frequent rounding, Room close to nurse's station, Toileting rounds, Update white board Medication Interventions: Evaluate medications/consider consulting pharmacy, Patient to call before getting OOB, Teach patient to arise slowly Elimination Interventions: Call light in reach, Patient to call for help with toileting needs, Stay With Me (per policy), Toilet paper/wipes in reach, Toileting schedule/hourly rounds, Urinal in reach History of Falls Interventions: Vital signs minimum Q4HRs X 24 hrs (comment for end date), Assess for delayed presentation/identification of injury for 48 hrs (comment for end date), Room close to nurse's station, Door open when patient unattended Problem: Pressure Injury - Risk of 
Goal: *Prevention of pressure injury Description Document Carlos Enrique Scale and appropriate interventions in the flowsheet. Outcome: Progressing Towards Goal 
Note: Pressure Injury Interventions: 
Sensory Interventions: Assess changes in LOC, Assess need for specialty bed, Discuss PT/OT consult with provider, Float heels, Keep linens dry and wrinkle-free, Maintain/enhance activity level, Minimize linen layers Moisture Interventions: Assess need for specialty bed, Absorbent underpads, Maintain skin hydration (lotion/cream), Minimize layers Activity Interventions: Assess need for specialty bed, Increase time out of bed, PT/OT evaluation, Pressure redistribution bed/mattress(bed type) Mobility Interventions: Assess need for specialty bed, Float heels, Pressure redistribution bed/mattress (bed type), PT/OT evaluation Nutrition Interventions: Offer support with meals,snacks and hydration, Document food/fluid/supplement intake Friction and Shear Interventions: Minimize layers Problem: Breathing Pattern - Ineffective Goal: *Absence of hypoxia Outcome: Progressing Towards Goal 
Goal: *Use of effective breathing techniques Outcome: Progressing Towards Goal 
 Pt tolerated BIPAP well throughout the night. Pt denies any pain or SOB. Will continue to monitor. Last 3 Recorded Weights in this Encounter 03/06/20 1448 03/07/20 9196 03/08/20 2882 Weight: 91.5 kg (201 lb 11.5 oz) 90.7 kg (199 lb 14.4 oz) 80.4 kg (177 lb 4 oz) Notified oncoming RN of weight varience. Pt weighed in bed with 1 blanket, 1 sheet and 1 pillow. Bedside shift change report given to Treva (oncoming nurse) by Justin Butler (offgoing nurse). Report included the following information SBAR, Kardex, ED Summary, Intake/Output, MAR, Accordion, Recent Results, Med Rec Status, Cardiac Rhythm NSR and Quality Measures.

## 2020-03-08 NOTE — CONSULTS
Cardiology Note dictated # [id number] Imp:  
Admitted for acute PE . On apixaban. Has been hospitalized now for 2 weeks here and was transferred here after a 2 week stay at [facility] 
1 episode of nonsustained monomorphic VT. Chronic respiratory failure. Recommend: MPI when his respiratory function is better since he will be unable to lie down for scans in hs current condition. Check for adequate oxygenation Further recommendations to follow. Thank you for this referral. 
Monse Bermudez MD 
Interventional Cardiology [facility]

## 2020-03-08 NOTE — PROGRESS NOTES
Hospitalist Progress Note Lisa Peña MD 
Answering service: [phone] from in house phone Date of Service:  3/8/2020 NAME:  Tc Banks :  1945 MRN:  [id number] PCP: Nellie Carter MD 
 
Chief Complaint:  
Chief Complaint Patient presents with  Shortness of Breath Follow up COPD exacerbation, combined respiratory failure. Patient seen and examined at the bedside. RN reports no acute events. He says breathing OK. Seen by ID help appreciated. 3/8: 
Patient says he feels better denies any SOB. He has 8 beat run of V tach (Asymptomatic). Denies chest pain. Will get EKG and ask Cardiology to see last Echo Normal cavity size, wall thickness and systolic function (ejection fraction normal). Estimated left ventricular ejection fraction is 55 - 60%. No regional wall motion abnormality noted. I will obtain ABG. Assessment & Plan: # Acute worsening of hypercarbia secondary to poor tolerance and compliance with BiPAP and low dose Benzo for anxiety -BiPAP compliance 
-Not able to tolerate low dose respiratory depressant as well for anxiety 
-Palliative care appropriate 
-Request Pulmonology to resume the care # Sinus tach, high O2 need, easy desaturation, related to bibasilar infiltrate-HCAP? 
-Sputum Cx noted with Pseudomonas Aeruginosa + 
-ID consult 
-Close monitoring 
-CTA Chest PE protocol 3/3 noted 
-Continue Eliquis 
-Blood culture negative so far, MRSA screen negative 
-Acapella for chest PT 
-Discuss Palliative care with Pt and his sister # Acute on chronic hypoxic and hypercarbic - mixed respiratory failure,  improving # Right UL PE # Localized Rigth LL consolidation # Severe COPD exacerbation # Pulmonary hypertension per Echo : EF 60% no MR or AS PASP 66 RV nml 
-Combined complex respiratory issues 
-Still very high O2 requirement 6-8LPM O2 
-ABG  s/o ? baseline elevated hypercarbia. -Pulmonology on board. Trilogy on disposition -Hematology recommendations, lovenox to Eliquis 10mg BID for 7 days, then 5mg BID x 3-6 months. 
-Outpatient hematology follow up in the clinic 
-Outpatient Pulmonology follow up in the clinic 
-Continue NIV-BIPAP QHS and PRN during the day time 
-Levaquin 5 days for possible PNA, last 2/26 
-Prednisone up to 20mg continue 
-Duenebs and supportive care 
-Trilogy/ BiPAP order on discharge to SNF and would need at home as well-Pulm arranged 
-Palliative care consult as seems like End Stage Lung Disease # Respiratory acidosis with metabolic alkalosis 
-Complex driving forces with diuretics, COPD and other pulmological etiologies -BiPAP as noted above # DM2, with hyperglycemia 
-No recent A1c 
-Likely steroid driven 
-SSI only # Abdominal distention 
-No ascites 
-Large right renal cyst 
-Urology consulted, need outpatient follow up # H/o seizure:  
-continue home meds Code status: Full code DDVT Px: Eliquis Care plan discussed with: Patient/ Family, Nurse Disposition: [id number]-rwirvtgo Anticipated discharge: Once O2 requirement. Tachycardia and feeling of wellbeing improves, 24-48hrs, likely 3/3 Hospital Problems  Date Reviewed: 3/16/2018 Codes Class Noted POA  
 [facility] exacerbation [facility] ICD-10-CM: J44.1 ICD-9-CM: 491.21  3/4/2020 Unknown Acute respiratory failure with hypoxia [facility]) ICD-10-CM: J96.01 
ICD-9-CM: 518.81  2/20/2020 Unknown Review of Systems: A comprehensive review of systems was negative except for that written in the HPI. Physical Examination:  
 
Visit Vitals /64 (BP 1 Location: Right arm, BP Patient Position: At rest) Pulse 65 Temp 97.9 °F (36.6 °C) Resp 16 Ht 6' 2\" (1.88 m) Wt 80.4 kg (177 lb 4 oz) SpO2 96% BMI 22.76 kg/m² General:  Alert, cooperative, no distress, appears stated age. Head:  Normocephalic, without obvious abnormality, atraumatic. Lungs:    Significantly tight AE with reduced breath sounds, no wheezing/ rhonchi/ rales, not much changed today Heart:  Regular rate and rhythm, tachycardia +, S1, S2 normal, no murmur, click, rub or gallop. Abdomen:   Soft, non-tender. Bowel sounds normal. No masses,  No organomegaly. Extremities: Extremities normal, atraumatic, no cyanosis or edema Pulses: 2+ and symmetric all extremities. Vital Signs:  
 Last 24hrs VS reviewed since prior progress note. Most recent are: 
 
Visit Vitals /64 (BP 1 Location: Right arm, BP Patient Position: At rest) Pulse 65 Temp 97.9 °F (36.6 °C) Resp 16 Ht 6' 2\" (1.88 m) Wt 80.4 kg (177 lb 4 oz) SpO2 96% BMI 22.76 kg/m² Intake/Output Summary (Last 24 hours) at 3/8/2020 1113 Last data filed at 3/8/2020 1044 Gross per 24 hour Intake 240 ml Output 50 ml Net 190 ml Tmax:  Temp (24hrs), Av.7 °F (36.5 °C), Min:97 °F (36.1 °C), Max:98.2 °F (36.8 °C) Data Review:  
Data reviewed by myself: 
Cta Chest W Or W Wo Cont Result Date: 2020 EXAM:  CTA CHEST W OR W WO CONT INDICATION:   SOB COMPARISON: None. TECHNIQUE: Precontrast  images were obtained to localize the volume for acquisition. Multislice helical CT arteriography was performed from the diaphragm to the thoracic inlet during uneventful rapid bolus of 100 cc Isovue-370. Lung and soft tissue windows were generated. Coronal and sagittal images were generated and 3D post processing consisting of coronal maximum intensity images was performed. CT dose reduction was achieved through use of a standardized protocol tailored for this examination and automatic exposure control for dose modulation. FINDINGS: CHEST: THYROID: No nodule. MEDIASTINUM: No mass or lymphadenopathy. LEV: No mass or lymphadenopathy. THORACIC AORTA: Atherosclerotic without evidence of aneurysm.  MAIN PULMONARY ARTERY: Pulmonary emboli right upper lobe branches. TRACHEA/BRONCHI: Patent. ESOPHAGUS: No wall thickening or dilatation. HEART: Normal in size. PLEURA: No effusion or pneumothorax. LUNGS: Emphysematous changes. Focal consolidation right lower lobe. Left basilar atelectasis. INCIDENTALLY IMAGED UPPER ABDOMEN: 7.7 cm right renal cyst. 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 Endograft is partially visualized. Candance Huger BONES: No destructive bone lesion. IMPRESSION: Right upper lobe pulmonary emboli. Localized consolidation right lower lobe. Left basilar atelectasis. The findings were called to the patient's nurse on 2/20/2020 at 6:39 PM by myself. [address] Us Abd Comp Result Date: 2/21/2020 INDICATION: Abdominal distention COMPARISON: None TECHNIQUE: Routine ultrasound images of the abdomen were obtained. FINDINGS: GALLBLADDER: No cholecystolithiasis, gallbladder wall thickening, or pericholecystic fluid. COMMON BILE DUCT: 3 mm in diameter. No evidence of choledocholithiasis. LIVER: Normal in size. Normal echogenicity. No focal hepatic mass or intrahepatic biliary dilatation. MAIN PORTAL VEIN: Patent. Hepatopetal flow direction. PANCREAS: Obscured by overlying bowel gas. RIGHT KIDNEY: 12.8 cm in length. 8.2 cm cyst. No hydronephrosis. No evidence of mass or calculus. LEFT KIDNEY: 11.5 cm in length. No hydronephrosis. No evidence of mass or calculus. SPLEEN: Obscured by overlying bowel gas. AORTA: Obscured by overlying bowel gas. INFERIOR VENA CAVA: Patent. OTHER: No ascites. IMPRESSION: Large right renal cyst. Otherwise unremarkable abdominal ultrasound. Xr Chest [facility] Result Date: 2/20/2020 INDICATION:  SOB. History of chronic asthma. Prostate cancer. COPD. EXAM: Chest single view. COMPARISON: 9/10/2017. FINDINGS: A single frontal view of the chest at 1419 hours shows bibasilar atelectasis or scar, with mild interstitial prominence increased since the prior study. .  The heart, mediastinum and pulmonary vasculature are stable . The bony thorax is unremarkable for age. . Port-A-Cath device on the right is stable. IMPRESSION: Bibasilar atelectasis or scar with interstitial prominence. Correlate for developing interstitial edema or bibasilar inflammatory infiltrate in this patient with COPD and chronic asthma. .  . No results found for: SDES Lab Results Component Value Date/Time Culture result: HEAVY PSEUDOMONAS AERUGINOSA (A) 03/04/2020 11:54 AM  
 Culture result: MODERATE NORMAL RESPIRATORY DAVID 03/04/2020 11:54 AM  
 Culture result: NO GROWTH 4 DAYS 03/04/2020 11:49 AM  
 
All Micro Results Procedure Component Value Units Date/Time CULTURE, BLOOD, PAIRED [[id number]] Collected:  03/04/20 1149 Order Status:  Completed Specimen:  Blood Updated:  03/08/20 6199 Special Requests: NO SPECIAL REQUESTS Culture result: NO GROWTH 4 DAYS     
 CULTURE, RESPIRATORY/SPUTUM/BRONCH Elease Hussar STAIN [[id number]]  (Abnormal)  (Susceptibility) Collected:  03/04/20 1154 Order Status:  Completed Specimen:  Sputum Updated:  03/06/20 8600 Special Requests: NO SPECIAL REQUESTS     
  GRAM STAIN FEW EPITHELIAL CELLS SEEN     
   1+ WBCS SEEN     
      
  1+ GRAM POSITIVE COCCI IN CHAINS Culture result:    
  HEAVY PSEUDOMONAS AERUGINOSA MODERATE NORMAL RESPIRATORY DAVID  
     
 CULTURE, MRSA [[id number]] Collected:  03/04/20 1030 Order Status:  Completed Specimen:  Nares Updated:  03/05/20 2243 Special Requests: NO SPECIAL REQUESTS Culture result: MRSA NOT PRESENT Screening of patient nares for MRSA is for surveillance purposes and, if positive, to facilitate isolation considerations in high risk settings. It is not intended for automatic decolonization interventions per se as regimens are not sufficiently effective to warrant routine use. Cta Chest W Or W Wo Cont Result Date: 3/3/2020 IMPRESSION: 1. Bibasilar airspace disease 2. Severe emphysema 3. Presumed reactive adenopathy in the right infrahilar region 4. No acute pulmonary embolus Cta Chest W Or W Wo Cont Result Date: 2/20/2020 IMPRESSION: Right upper lobe pulmonary emboli. Localized consolidation right lower lobe. Left basilar atelectasis. The findings were called to the patient's nurse on 2/20/2020 at 6:39 PM by myself. [address] Us Abd Comp Result Date: 2/21/2020 IMPRESSION: Large right renal cyst. Otherwise unremarkable abdominal ultrasound. Xr Chest [facility] Result Date: 3/6/2020 IMPRESSION: 1. Stable prominent bibasilar pneumonia. 2. Emphysema. Xr Chest [facility] Result Date: 2/27/2020 IMPRESSION: Stable mild bibasilar opacities. Xr Chest [facility] Result Date: 2/23/2020 IMPRESSION: 1. No radiographic evidence of acute cardiopulmonary disease. Xr Chest [facility] Result Date: 2/20/2020 IMPRESSION: Bibasilar atelectasis or scar with interstitial prominence. Correlate for developing interstitial edema or bibasilar inflammatory infiltrate in this patient with COPD and chronic asthma. .  . Labs: reviewed by myself. Recent Labs 03/08/20 
0622 03/06/20 
0422 WBC 8.8 12.4* HGB 9.5* 9.3* HCT 32.5* 31.7*  362 Recent Labs 03/08/20 
7620 03/07/20 
0424 03/06/20 
0422  136 140  
K 3.9 4.9 4.0  
 103 100 CO2 36* 27 40* BUN 18 15 19 CREA 0.65* 0.55* 0.51* * 132* 139* CA 9.2 9.3 9.3 MG  --  2.1  --   
PHOS  --  2.5*  -- No results for input(s): SGOT, GPT, ALT, AP, TBIL, TBILI, TP, ALB, GLOB, GGT, AML, LPSE in the last 72 hours. No lab exists for component: AMYP, HLPSE No results for input(s): INR, PTP, APTT, INREXT, INREXT in the last 72 hours. No results for input(s): FE, TIBC, PSAT, FERR in the last 72 hours. No results found for: FOL, RBCF No results for input(s): PH, PCO2, PO2 in the last 72 hours. No results for input(s): CPK, CKNDX, TROIQ in the last 72 hours. No lab exists for component: CPKMB Lab Results Component Value Date/Time Cholesterol, total 187 09/11/2017 05:53 AM  
 HDL Cholesterol 63 09/11/2017 05:53 AM  
 LDL, calculated 109 (H) 09/11/2017 05:53 AM  
 Triglyceride 75 09/11/2017 05:53 AM  
 CHOL/HDL Ratio 3.0 09/11/2017 05:53 AM  
 
Lab Results Component Value Date/Time Glucose (POC) 161 (H) 03/08/2020 08:51 AM  
 Glucose (POC) 193 (H) 03/07/2020 09:12 PM  
 Glucose (POC) 260 (H) 03/07/2020 05:18 PM  
 Glucose (POC) 161 (H) 03/07/2020 11:23 AM  
 Glucose (POC) 112 (H) 03/07/2020 07:56 AM  
 
Lab Results Component Value Date/Time Color YELLOW/STRAW 09/10/2017 10:09 PM  
 Appearance CLEAR 09/10/2017 10:09 PM  
 Specific gravity 1.010 09/10/2017 10:09 PM  
 pH (UA) 5.0 09/10/2017 10:09 PM  
 Protein NEGATIVE  09/10/2017 10:09 PM  
 Glucose NEGATIVE  09/10/2017 10:09 PM  
 Ketone NEGATIVE  09/10/2017 10:09 PM  
 Bilirubin NEGATIVE  09/10/2017 10:09 PM  
 Urobilinogen 0.2 09/10/2017 10:09 PM  
 Nitrites NEGATIVE  09/10/2017 10:09 PM  
 Leukocyte Esterase NEGATIVE  09/10/2017 10:09 PM  
 Epithelial cells MODERATE (A) 09/10/2017 10:09 PM  
 Bacteria NEGATIVE  09/10/2017 10:09 PM  
 WBC 5-10 09/10/2017 10:09 PM  
 RBC 0-5 09/10/2017 10:09 PM  
 
 
 
Medications Reviewed:  
 
Current Facility-Administered Medications Medication Dose Route Frequency  predniSONE (DELTASONE) tablet 40 mg  40 mg Oral DAILY WITH BREAKFAST  albuterol-ipratropium (DUO-NEB) 2.5 MG-0.5 MG/3 ML  3 mL Nebulization Q4H PRN  piperacillin-tazobactam (ZOSYN) 3.375 g in 0.9% sodium chloride (MBP/ADV) 100 mL  3.375 g IntraVENous Q8H  
 apixaban (ELIQUIS) tablet 5 mg  5 mg Oral BID  
 guaiFENesin (ROBITUSSIN) 100 mg/5 mL oral liquid 100 mg  100 mg Oral Q4H PRN  
 arformoteroL (BROVANA) neb solution 15 mcg  15 mcg Nebulization BID RT  
 oxymetazoline (AFRIN) 0.05 % nasal spray 2 Spray  2 Spray Both Nostrils BID PRN  polyethylene glycol (MIRALAX) packet 17 g  17 g Oral DAILY PRN  
 diphenhydrAMINE (BENADRYL) capsule 25 mg  25 mg Oral Q6H PRN  
 sertraline (ZOLOFT) tablet 25 mg  25 mg Oral DAILY  levETIRAcetam (KEPPRA) tablet 500 mg  500 mg Oral BID  sodium chloride (NS) flush 5-40 mL  5-40 mL IntraVENous Q8H  
 sodium chloride (NS) flush 5-40 mL  5-40 mL IntraVENous PRN  
 acetaminophen (TYLENOL) tablet 650 mg  650 mg Oral Q4H PRN  
 ferrous sulfate tablet 325 mg  325 mg Oral ACB  hydroxyzine HCL (ATARAX) tablet 25 mg  25 mg Oral BID  montelukast (SINGULAIR) tablet 10 mg  10 mg Oral DAILY  pantoprazole (PROTONIX) tablet 40 mg  40 mg Oral ACB&D  
 budesonide (PULMICORT) 250 mcg/2ml nebulizer susp  250 mcg Nebulization BID RT  
 albuterol (PROVENTIL VENTOLIN) nebulizer solution 2.5 mg  2.5 mg Nebulization Q2H PRN  
 glucose chewable tablet 16 g  4 Tab Oral PRN  
 glucagon (GLUCAGEN) injection 1 mg  1 mg IntraMUSCular PRN  
 dextrose 10% infusion 0-250 mL  0-250 mL IntraVENous PRN  
 insulin lispro (HUMALOG) injection   SubCUTAneous AC&HS  
 
______________________________________________________________________ EXPECTED LENGTH OF STAY: 4d 7h 
ACTUAL LENGTH OF STAY:          17 Shalini August MD  
 
Patient's emergency contacts: 
Extended Emergency Contact Information Primary Emergency Contact: Nilsaarnulfo Ramey Address: [address] [place] [address] Home Phone: [phone] Mobile Phone: [phone] Relation: Sister

## 2020-03-08 NOTE — PROGRESS NOTES
Bedside and Verbal shift change report given to [id number] (oncoming nurse) by Brijesh Yeager (offgoing nurse). Report included the following information SBAR, Kardex, Intake/Output, MAR, Accordion and Recent Results. Problem: Falls - Risk of 
Goal: *Absence of Falls Description Document Edgar Brunner Fall Risk and appropriate interventions in the flowsheet. Outcome: Progressing Towards Goal 
Note: Fall Risk Interventions: 
Mobility Interventions: Communicate number of staff needed for ambulation/transfer, OT consult for ADLs, Patient to call before getting OOB, PT Consult for mobility concerns, PT Consult for assist device competence, Utilize walker, cane, or other assistive device, Strengthening exercises (ROM-active/passive), Utilize gait belt for transfers/ambulation Mentation Interventions: Adequate sleep, hydration, pain control, Bed/chair exit alarm, Door open when patient unattended, Evaluate medications/consider consulting pharmacy, Eyeglasses and hearing aids, Increase mobility, More frequent rounding, Reorient patient, Room close to nurse's station, Self-releasing belt, Toileting rounds, Update white board Medication Interventions: Evaluate medications/consider consulting pharmacy, Patient to call before getting OOB, Teach patient to arise slowly Elimination Interventions: Call light in reach, Patient to call for help with toileting needs, Stay With Me (per policy), Toileting schedule/hourly rounds, Urinal in reach History of Falls Interventions: Bed/chair exit alarm, Door open when patient unattended, Evaluate medications/consider consulting pharmacy, Investigate reason for fall, Room close to nurse's station, Utilize gait belt for transfer/ambulation Problem: Patient Education: Go to Patient Education Activity Goal: Patient/Family Education Outcome: Progressing Towards Goal 
  
Problem: Pressure Injury - Risk of 
Goal: *Prevention of pressure injury Description Document Carlos Enrique Scale and appropriate interventions in the flowsheet. Outcome: Progressing Towards Goal 
Note: Pressure Injury Interventions: 
Sensory Interventions: Assess changes in LOC, Assess need for specialty bed, Avoid rigorous massage over bony prominences, Check visual cues for pain, Discuss PT/OT consult with provider, Float heels, Keep linens dry and wrinkle-free, Maintain/enhance activity level, Minimize linen layers, Monitor skin under medical devices, Turn and reposition approx. every two hours (pillows and wedges if needed) Moisture Interventions: Assess need for specialty bed, Absorbent underpads, Maintain skin hydration (lotion/cream), Minimize layers Activity Interventions: Assess need for specialty bed, Increase time out of bed, PT/OT evaluation Mobility Interventions: Assess need for specialty bed, HOB 30 degrees or less, PT/OT evaluation, Turn and reposition approx. every two hours(pillow and wedges) Nutrition Interventions: Document food/fluid/supplement intake, Discuss nutritional consult with provider, Offer support with meals,snacks and hydration Friction and Shear Interventions: HOB 30 degrees or less, Minimize layers Problem: Patient Education: Go to Patient Education Activity Goal: Patient/Family Education Outcome: Progressing Towards Goal 
  
Problem: Chronic Obstructive Pulmonary Disease (COPD) Goal: *Oxygen saturation during activity within specified parameters Outcome: Progressing Towards Goal

## 2020-03-08 NOTE — CONSULTS
[facility]  Elaina Lyman 
MR#:  [id number] :  1945 ACCOUNT #:  [de-identified] DATE OF SERVICE:  2020 REFERRING PHYSICIAN:  Dr. Kerwin Rice. HISTORY OF PRESENT ILLNESS:  A consult has been requested for this patient who is noted to have one episode nonsustained VT, monomorphic, fairly rapid. The patient is awake and alert, has very poor understanding of why he is in the hospital, but he does indicate that he was at [facility] for two weeks and was transferred to this hospital for high level of care. He denies any chest pain. He has severe COPD and still struggling to breathe at this time. He denies any chest pain. Other medical problems include but is listed here is a psychiatric disorder, mental disability, epilepsy, previous stroke, prostate cancer not yet treated, and asthma. There is no history of myocardial infarction or rheumatic fever. The patient indicates that he has had two stents placed in his circulation by Dr. Aleisha Mg. Kanu Roman PHYSICAL EXAMINATION: 
GENERAL:  This is a well-developed, alert, gentleman. Moderately dyspneic, working hard to breathe. VITAL SIGNS:  Temperature is normal, pulse 84, respirations 20, blood pressure 135/71, sats 97% on 4 L nasal cannula. HEENT:  Unremarkable. NECK:  Supple. No adenopathy. Normal carotid pulsations and bruits on auscultation. Normal thyroid. Trachea midline. CHEST:  Chest wall is nontender. LUNGS:  Markedly decreased breath sounds diffusely. HEART:  Regular, moderate rhythm. Soft S1, no S3. No friction rub. No significant murmurs. ABDOMEN:  Soft, nontender. No bruits. EXTREMITIES:  No edema. Normal pedal pulses. Normal radial pulses. LABORATORY DATA:  Chest x-ray dated  demonstrates stable prominent bibasilar pneumonia.   CT of the chest done on  demonstrates bibasilar airspace disease, severe emphysema presumed, reactive adenopathy in the right infrahilar region and no acute pulmonary embolus. Renal function is normal.  Potassium 3.9, magnesium 3.1. There is no EKG to correlate with the arrhythmia. Most recent EKG in the chart is from 03/02/2017, he had a normal QT interval, left atrial enlargement, and sinus tachycardia. The patient has an echocardiogram on 02/21 and showed a normal LV size and function. Ejection fraction 55% to 60%. Normal right ventricular size and function. Pulmonary artery systolic pressure is 66 mmHg. IMPRESSION: 
1. The patient has a nonsustained ventricular tachycardia likely from chronic hypoxia. Coronary artery disease cannot be excluded. He does have other vascular disease including apparently stents in his distal circulation. We have no details regarding this. Alternatively, the medication we use for his respiratory failure can trigger abnormal rhythms. 2.  He has pulmonary hypertension as well with chronic obstructive pulmonary disease and bilateral pneumonia. PLAN:  Continue current treatment. Monitor the patient's rhythm. No specific treatment for this at this time. He will need nuclear stress test when he is in better condition. He would not be able to lay down for scans at this time. Further recommendations to follow. Thank you for this referral. 
 
 
Jameel Fong MD SA/[email or web address] 
D:  03/08/2020 12:22 
T:  03/08/2020 16:41 JOB #:  [id number]

## 2020-03-09 NOTE — PROGRESS NOTES
Problem: Self Care Deficits Care Plan (Adult) Goal: *Acute Goals and Plan of Care (Insert Text) Description FUNCTIONAL STATUS PRIOR TO ADMISSION: Per chart review, patient was admitted from a SNF. However patient stated he was admitted from his 2-story house. Patient is a questionable historian at this, however states he owned a wheelchair for community mobility and a shower chair for bathing. HOME SUPPORT: The patient lived with his sister but did not require assist. 
 
Occupational Therapy Goals: OT re-evaluation 3/5/2020 after transfer to CCU for respiratory acidosis 1. Patient will perform lower body dressing with minimum A and AE PRN within 7 day(s). 2.  Patient will perform bathing with minimum A within 7 day(s). 3.  Patient will perform grooming tasks standing with minimum assistance while maintaining SpO2 >90% within 7 day(s). 4.  Patient will perform toilet transfers with minimum assistance within 7 day(s). 5.  Patient will perform all aspects of toileting with minimum assistance within 7 day(s). 6.  Patient will participate in upper extremity therapeutic exercise/activities with supervision/set-up for 5 minutes within 7 day(s). 7.  Patient will utilize energy conservation techniques during functional activities with verbal cues within 7 day(s). Initiated 2/26/2020 1. Patient will perform lower body dressing with supervision/set-up within 7 day(s). 2.  Patient will perform bathing with supervision/set-up within 7 day(s). 3.  Patient will perform grooming tasks standing with supervision/set-up within 7 day(s). 4.  Patient will perform toilet transfers with supervision/set-up within 7 day(s). 5.  Patient will perform all aspects of toileting with supervision/set-up within 7 day(s). 6.  Patient will participate in upper extremity therapeutic exercise/activities with supervision/set-up for 5 minutes within 7 day(s). 7.  Patient will utilize energy conservation techniques during functional activities with verbal cues within 7 day(s). Outcome: Progressing Towards Goal 
 OCCUPATIONAL THERAPY TREATMENT Patient: Rayna Ceron (75 y.o. male) Date: 3/9/2020 Diagnosis: Acute respiratory failure with hypoxia ([facility] [J96.01] CHF (congestive heart failure) ([facility] [I50.9] COPD exacerbation ([facility] [J44.1] <principal problem not specified> Precautions: Fall Chart, occupational therapy assessment, plan of care, and goals were reviewed. ASSESSMENT Patient continues with skilled OT services and is progressing towards goals. Patient continues to be limited by generalized weakness and cardiopulmonary tolerance on 4L NC O2 and with O2 sats  69-92% today with minimal activity and patient requiring verbal cues for PLB throughout session (patient dyspneic and RR 20s-30s, HR 100s-130s). Patient required MIN A for transfer from chair > BSC due to incontinent bowel episode. Patient required MAX A for pericare/brief management and returned to bed. Patient required several minutes to recover following O2 desaturation. Patient will continue to need SNF rehab post discharge to maximize safety and independence with ADL transfers and tasks. Current Level of Function Impacting Discharge (ADLs): MAX A Other factors to consider for discharge: respiratory status PLAN : 
Patient continues to benefit from skilled intervention to address the above impairments. Continue treatment per established plan of care. to address goals. Recommend with staff: OOB x 3 to chair Recommend next OT session: LB ADLs Recommendation for discharge: (in order for the patient to meet his/her long term goals) Therapy up to 5 days/week in SNF setting This discharge recommendation: 
Has been made in collaboration with the attending provider and/or case management IF patient discharges home will need the following DME: [facility]  
 
 
 SUBJECTIVE:  
Patient stated I am very patient.  OBJECTIVE DATA SUMMARY:  
Cognitive/Behavioral Status: 
Neurologic State: Alert Orientation Level: Oriented X4 Cognition: Appropriate for age attention/concentration Perception: Appears intact Perseveration: No perseveration noted Safety/Judgement: Decreased awareness of need for safety Functional Mobility and Transfers for ADLs: 
Bed Mobility: 
Supine to Sit: Stand-by assistance Sit to Supine: Minimum assistance Transfers: 
Sit to Stand: Minimum assistance Functional Transfers Toilet Transfer : Assist x1;Additional time; Adaptive equipment;Minimum assistance Balance: 
Sitting: Impaired; Without support Sitting - Static: Good (unsupported) Sitting - Dynamic: Fair (occasional) Standing: Impaired; Without support Standing - Static: Fair Standing - Dynamic : Fair ADL Intervention: Lower Body Dressing Assistance Underpants: Total assistance (dependent)(brief managment) Toileting Toileting Assistance: Maximum assistance Cognitive Retraining Safety/Judgement: Decreased awareness of need for safety Activity Tolerance:  
Fair and requires rest breaks Please refer to the flowsheet for vital signs taken during this treatment. After treatment patient left in no apparent distress:  
Sitting in chair and Call bell within reach COMMUNICATION/COLLABORATION:  
The patients plan of care was discussed with: Physical therapist and Registered nurse. Alberto Challenger Time Calculation: 27 mins

## 2020-03-09 NOTE — PROGRESS NOTES
ID Progress Note 
3/9/2020 Subjective: He states that he is feeling okay today. He is less dyspneic and his cough is improved Objective: Antibiotics: 1. Zosyn Vitals:  
Visit Vitals /76 (BP 1 Location: Right arm, BP Patient Position: At rest) Pulse 89 Temp 98 °F (36.7 °C) Resp 12 Ht 6' 2\" (1.88 m) Wt 80.3 kg (177 lb 0.5 oz) SpO2 98% BMI 22.73 kg/m² Tmax:  Temp (24hrs), Av.8 °F (36.6 °C), Min:97.4 °F (36.3 °C), Max:98.2 °F (36.8 °C) Exam:  Lungs:  diminished breath sounds R base, L base Heart:  regular rate and rhythm Abdomen:  soft, non-tender. Bowel sounds normal. No masses,  no organomegaly Skin:  no rash or abnormalities Labs:     
Recent Labs 20 
0154 20 
7531 20 
0424 WBC 10.5 8.8  --   
HGB 10.1* 9.5*  --   
* 393  --   
BUN 17 18 15 CREA 0.64* 0.65* 0.55* Cultures: No results found for: SDES Lab Results Component Value Date/Time Culture result: HEAVY PSEUDOMONAS AERUGINOSA (A) 2020 11:54 AM  
 Culture result: MODERATE NORMAL RESPIRATORY DAVID 2020 11:54 AM  
 Culture result: NO GROWTH 5 DAYS 2020 11:49 AM  
 
 
Radiology:  
 
Line/Insert Date:        
 
Assessment: 1. Pseudomonas from respiratory cultures 2. COPD 3. Debility Objective: 1. Continue IV antibiotic therapy 2. Work-up any new fevers 3. Pulmonary rehab Teofilo Barboza MD

## 2020-03-09 NOTE — PROGRESS NOTES
Problem: Mobility Impaired (Adult and Pediatric) Goal: *Acute Goals and Plan of Care (Insert Text) Description FUNCTIONAL STATUS PRIOR TO ADMISSION: Pt admitted from SNF and unclear of level of assistance requiring. Prior to rehab, pt was independent with functional mobility. HOME SUPPORT PRIOR TO ADMISSION: The patient lived with sister and brother-in-law. Pt's sister works during the day and brother-in-law assists pt with ADLs prior to leaving the house for a couple hours/day. Pt receives assistance with bathing from an aide 1x/wk for 1 hour. Physical Therapy Goals Initiated 3/5/2020- Goals downgraded following transfer to CCU for respiratory distress 1. Patient will move from supine to sit and sit to supine , scoot up and down and roll side to side in bed with supervision/set-up within 7 day(s). 2.  Patient will transfer from bed to chair and chair to bed with minimal assistance using the least restrictive device within 7 day(s). 3.  Patient will perform sit to stand with supervision/set-up within 7 day(s). 4.  Patient will ambulate with minimal assistance for 50 feet with the least restrictive device within 7 day(s). 5.  Patient will ascend/descend 2 stairs with bilateral handrail(s) with minimal assistance within 7 day(s). Initiated 2/26/2020 1. Patient will move from supine to sit and sit to supine , scoot up and down and roll side to side in bed with modified independence within 7 day(s). 2.  Patient will transfer from bed to chair and chair to bed with supervision/set-up using the least restrictive device within 7 day(s). 3.  Patient will perform sit to stand with supervision/set-up within 7 day(s). 4.  Patient will ambulate with supervision/set-up for 150 feet with the least restrictive device within 7 day(s). 5.  Patient will ascend/descend 2 stairs with bilateral handrail(s) with minimal assistance/contact guard assist within 7 day(s). Outcome: Progressing Towards Goal 
 
PHYSICAL THERAPY TREATMENT Patient: Kavin eGrman (74 y.o. male) Date: 3/9/2020 Diagnosis: Acute respiratory failure with hypoxia ([facility] [J96.01] CHF (congestive heart failure) ([facility] [I50.9] COPD exacerbation ([facility] [J44.1] <principal problem not specified> Precautions: Fall Chart, physical therapy assessment, plan of care and goals were reviewed. ASSESSMENT Patient continues with skilled PT services and is progressing towards goals. Pt was able to increase gait tolerance. Pt has elevated HR and RR with task. Educated on the importance of exhale. Pt was able to recover with seated rest break. SpO2 92% on 4 L O2, HR 123bpm, RR 32 post gait. Current Level of Function Impacting Discharge (mobility/balance): CGA to min A Other factors to consider for discharge: decrease activity toleracne PLAN : 
Patient continues to benefit from skilled intervention to address the above impairments. Continue treatment per established plan of care. to address goals. Recommendation for discharge: (in order for the patient to meet his/her long term goals) SNF This discharge recommendation: 
Has not yet been discussed the attending provider and/or case management IF patient discharges home will need the following DME: to be determined (TBD) SUBJECTIVE:  
Patient stated Ariel Simmons can try.  OBJECTIVE DATA SUMMARY:  
Critical Behavior: 
Neurologic State: Alert, Eyes open spontaneously Orientation Level: Oriented X4 Cognition: Follows commands Safety/Judgement: Decreased insight into deficits, Decreased awareness of need for safety, Decreased awareness of need for assistance Functional Mobility Training: 
Bed Mobility: 
  
Supine to Sit: Stand-by assistance Transfers: 
Sit to Stand: Contact guard assistance Stand to Sit: Contact guard assistance Balance: 
Sitting: Intact Standing: Impaired Standing - Static: Good Standing - Dynamic : Fair Ambulation/Gait Training: 
Distance (ft): 30 Feet (ft) Assistive Device: Gait belt;Walker, rolling Ambulation - Level of Assistance: Minimal assistance Gait Abnormalities: Decreased step clearance Base of Support: Widened Step Length: Left shortened;Right shortened Stairs: Therapeutic Exercises:  
 
Pain Rating: 
none Activity Tolerance:  
observed SOB with activity Please refer to the flowsheet for vital signs taken during this treatment. After treatment patient left in no apparent distress:  
Sitting in chair and Call bell within reach COMMUNICATION/COLLABORATION:  
The patients plan of care was discussed with: Registered nurse. Eileen Enamorado PTA Time Calculation: 17 mins

## 2020-03-09 NOTE — PROGRESS NOTES
[facility] Pulmonary, Critical Care, and Sleep Medicine Progress Note Name: Belinda Sweeney MRN: [id number] : 1945 Hospital: [facility]  Date: 3/9/2020 IMPRESSION:  
· AE of COPD- due to PE and probable RLL PNA ( followed by Dr Ester Yoder as an outpatient basis) · Acute on chronic Resp acidosis with  metabolic alkaosis, likely secondary to ongoing diuretics on top of chronic resp acidosis. Suspsect that this is driving hypoxemia and his WOB. No, pO2, pCO2 and bicarb are better because of diamox. Improving · RUL PE 
· PH- ECHO  EF 60% no MR or AS PASP 66 RV nml · COPD · DM2 RECOMMENDATIONS:  
· Pt will need ECHO in 6 months to reassess- if PASP still > 60 will need w/u for other causes PH (group I or 2 etc) · Wean steroids · eliquis · Nebs, go to home inhalers at discharge · Continue NIV at night and PRN during the day · Going to rehab facility at discharge; set up for NIV while there because they cannot accept trilogy unit · Trilogy setup. Bipap (including s/t) and cpap will not adequately manage his hypercapnia for the long term. AVAPS mode in the form of trilogy will be the most appropriate tool. I have discussed with patient and sister and they are both in agreement. This is for the management of hypercapnia in the setting of COPD and he does not have any signs suggestive of YOVANY. · Follow up Chest xray in 4-6 wks with Dr Nikita Pierce · + pseudomonas in sputum; now on zosyn alone Subjective:  
 
3/ Feeling better overall 3/ Eating breakfast 
breathing well 3/ Increased O2 requirement. + sputum production 3/3 Working with PT Possibly leaving today Noted increased anxiety 3/2 He is feeling better  Doing better overall today I called and discussed with sister  Clinical status is improving  Feeling a little better today. Used NIV overnight.  He is mildly dyspneic but was able to eat his breakfast. Denies cough or hemoptysis or chest pain. 2/25 A/o; talkative. On NIV for WOB  
 
2/24 On NIV  
 
2/22 This patient has been seen and evaluated at the request of Dr. Luis Orr for SOB. Patient is a 77 y.o. male H/o COPD on spiriva and follows with Dr. Annamarie Miles. Pt admitted with SOB and RUL PE. Started on eliquis. Pt alert on bipap and nebs Less SOb moves all ext equally no CP Past Medical History:  
Diagnosis Date  Asthma   
 hx of/has wheezing  Cancer [facility])   
 prostate - not treated yet  Cerebral artery occlusion with cerebral infarction ([facility] [address]  Chronic obstructive pulmonary disease ([facility] [facility]  Epilepsy ([facility] [address] [facility]  Ill-defined condition   
 shortness of breath - being evaluated  Psychiatric disorder   
 mental disability Past Surgical History:  
Procedure Laterality Date  COLONOSCOPY Left 10/4/2017 COLONOSCOPY performed by Gil Sánchez MD at [facility] ENDOSCOPY  
 HX INTRACRANIAL ANEURYSM REPAIR    
 HX OTHER SURGICAL    
 cyst removed from neck  HX PROSTATE SURGERY    
 HX SPLENECTOMY Prior to Admission medications Medication Sig Start Date End Date Taking? Authorizing Provider  
sertraline (ZOLOFT) 25 mg tablet Take 25 mg by mouth daily. Yes Provider, Historical  
atorvastatin (LIPITOR) 40 mg tablet Take 40 mg by mouth daily. Yes Provider, Historical  
arformoteroL (BROVANA) 15 mcg/2 mL nebu neb solution 15 mcg by Nebulization route two (2) times a day. Yes Provider, Historical  
multivitamin (ONE A DAY) tablet Take 1 Tab by mouth daily. Yes Provider, Historical  
ferrous sulfate 325 mg (65 mg iron) tablet Take 325 mg by mouth Daily (before breakfast). Yes Provider, Historical  
fluticasone propionate (FLONASE ALLERGY RELIEF) 50 mcg/actuation nasal spray 2 Sprays by Both Nostrils route daily. Yes Provider, Historical  
hydroxyzine HCL (ATARAX) 25 mg tablet Take 25 mg by mouth two (2) times a day.    Yes Provider, Historical  
 ipratropium (ATROVENT) 0.02 % soln 0.5 mg by Other route every eight (8) hours. Oral inhalation   Yes Provider, Historical  
metFORMIN (GLUCOPHAGE) 500 mg tablet Take 500 mg by mouth daily (with breakfast). Yes Provider, Historical  
montelukast (SINGULAIR) 10 mg tablet Take 10 mg by mouth daily. Yes Provider, Historical  
predniSONE (DELTASONE) 10 mg tablet Take 20 mg by mouth daily (with breakfast). COPD exacerbation   Yes Provider, Historical  
budesonide-formoteroL (SYMBICORT) 160-4.5 mcg/actuation HFAA Take 2 Puffs by inhalation two (2) times a day. Yes Provider, Historical  
levalbuterol tartrate (XOPENEX) 45 mcg/actuation inhaler Take 1 Puff by inhalation every eight (8) hours. Yes Provider, Historical  
cetirizine (ZYRTEC) 10 mg tablet Take 10 mg by mouth daily. Yes Provider, Historical  
levETIRAcetam (KEPPRA) 500 mg tablet TAKE 1 TABLET BY MOUTH TWICE A DAY 11/8/19  Yes Nirali Howard DO  
pantoprazole (PROTONIX) 40 mg tablet Take 1 Tab by mouth Before breakfast and dinner. 10/6/17  Yes Joanne Callejas MD  
umeclidinium (INCRUSE ELLIPTA) 62.5 mcg/actuation inhaler Take 1 Puff by inhalation daily. Indications: Rescue inhaler   Yes Provider, Historical  
 
No Known Allergies Social History Tobacco Use  Smoking status: Current Every Day Smoker  Smokeless tobacco: Never Used  Tobacco comment: cigarettes Substance Use Topics  Alcohol use: No  
  
Family History Problem Relation Age of Onset  Colon Cancer Maternal Aunt  Colon Cancer Maternal Aunt  Colon Cancer Maternal Aunt Current Facility-Administered Medications Medication Dose Route Frequency  predniSONE (DELTASONE) tablet 40 mg  40 mg Oral DAILY WITH BREAKFAST  piperacillin-tazobactam (ZOSYN) 3.375 g in 0.9% sodium chloride (MBP/ADV) 100 mL  3.375 g IntraVENous Q8H  
 apixaban (ELIQUIS) tablet 5 mg  5 mg Oral BID  arformoteroL (BROVANA) neb solution 15 mcg  15 mcg Nebulization BID RT  sertraline (ZOLOFT) tablet 25 mg  25 mg Oral DAILY  levETIRAcetam (KEPPRA) tablet 500 mg  500 mg Oral BID  sodium chloride (NS) flush 5-40 mL  5-40 mL IntraVENous Q8H  
 ferrous sulfate tablet 325 mg  325 mg Oral ACB  hydroxyzine HCL (ATARAX) tablet 25 mg  25 mg Oral BID  montelukast (SINGULAIR) tablet 10 mg  10 mg Oral DAILY  pantoprazole (PROTONIX) tablet 40 mg  40 mg Oral ACB&D  
 budesonide (PULMICORT) 250 mcg/2ml nebulizer susp  250 mcg Nebulization BID RT  
 insulin lispro (HUMALOG) injection   SubCUTAneous AC&HS Review of Systems: 
 
 
Objective:  
Vital Signs:   
Visit Vitals BP (!) 158/95 (BP 1 Location: Right arm, BP Patient Position: At rest) Pulse 91 Temp 97.7 °F (36.5 °C) Resp 18 Ht 6' 2\" (1.88 m) Wt 80.3 kg (177 lb 0.5 oz) SpO2 94% BMI 22.73 kg/m² O2 Device: Nasal cannula O2 Flow Rate (L/min): 4 l/min Temp (24hrs), Av.8 °F (36.6 °C), Min:97.4 °F (36.3 °C), Max:98.2 °F (36.8 °C) Intake/Output:  
Last shift:      No intake/output data recorded. Last 3 shifts:  1901 -  0700 In: 480 [P.O.:480] Out: 425 [Urine:425] Intake/Output Summary (Last 24 hours) at 3/9/2020 4158 Last data filed at 3/8/2020 1800 Gross per 24 hour Intake 240 ml Output 425 ml Net -185 ml Physical Exam:  
General:  Alert, cooperative, no distress, appears stated age. Head:  Normocephalic, without obvious abnormality, atraumatic. Eyes:  Conjunctivae/corneas clear. Nose: Nares normal. Septum midline. Mucosa normal. No drainage or sinus tenderness. Lungs:   Clear to auscultation bilaterally. Mild tachypnea, no accessory muscle use. Heart:  Regular rate and rhythm, S1, S2 normal, no murmur, click, rub or gallop. Abdomen:   Protuberant / distended, non-tender. Extremities: Extremities normal, atraumatic, no cyanosis or edema. SCDs Pulses: 2+ and symmetric all extremities. Skin: Skin color, texture, turgor normal. No rashes or lesions Data review:  
 
Recent Results (from the past 24 hour(s)) GLUCOSE, POC Collection Time: 03/08/20 11:52 AM  
Result Value Ref Range Glucose (POC) 182 (H) 65 - 100 mg/dL Performed by Brayden Gracia EKG, 12 LEAD, INITIAL Collection Time: 03/08/20 12:24 PM  
Result Value Ref Range Ventricular Rate 86 BPM  
 Atrial Rate 86 BPM  
 P-R Interval 118 ms QRS Duration 88 ms Q-T Interval 376 ms QTC Calculation (Bezet) 449 ms Calculated P Axis 70 degrees Calculated R Axis 77 degrees Calculated T Axis 70 degrees Diagnosis Sinus rhythm with premature atrial complexes When compared with ECG of 02-MAR-2020 10:20, 
Nonspecific T wave abnormality, improved in Anterior leads POC EG7 Collection Time: 03/08/20  2:33 PM  
Result Value Ref Range Calcium, ionized (POC) 1.30 1.12 - 1.32 mmol/L  
 pH (POC) 7.382 7.35 - 7.45    
 pCO2 (POC) 56.9 (H) 35.0 - 45.0 MMHG  
 pO2 (POC) 64 (L) 80 - 100 MMHG  
 HCO3 (POC) 33.8 (H) 22 - 26 MMOL/L Base excess (POC) 9 mmol/L  
 sO2 (POC) 91 (L) 92 - 97 % Site RIGHT RADIAL Device: NASAL CANNULA Flow rate (POC) 4 L/M Allens test (POC) YES Specimen type (POC) ARTERIAL    
GLUCOSE, POC Collection Time: 03/08/20  5:11 PM  
Result Value Ref Range Glucose (POC) 273 (H) 65 - 100 mg/dL Performed by Librado Arreguin (CON) GLUCOSE, POC Collection Time: 03/08/20  9:18 PM  
Result Value Ref Range Glucose (POC) 206 (H) 65 - 100 mg/dL Performed by Frandy Beasley (CCT) METABOLIC PANEL, BASIC Collection Time: 03/09/20  1:54 AM  
Result Value Ref Range Sodium 140 136 - 145 mmol/L Potassium 3.8 3.5 - 5.1 mmol/L Chloride 102 97 - 108 mmol/L  
 CO2 35 (H) 21 - 32 mmol/L Anion gap 3 (L) 5 - 15 mmol/L Glucose 145 (H) 65 - 100 mg/dL BUN 17 6 - 20 MG/DL  Creatinine 0.64 (L) 0.70 - 1.30 MG/DL  
 BUN/Creatinine ratio 27 (H) 12 - 20 GFR est AA >60 >60 ml/min/1.73m2 GFR est non-AA >60 >60 ml/min/1.73m2 Calcium 9.2 8.5 - 10.1 MG/DL  
CBC W/O DIFF Collection Time: 03/09/20  1:54 AM  
Result Value Ref Range WBC 10.5 4.1 - 11.1 K/uL  
 RBC 3.92 (L) 4.10 - 5.70 M/uL  
 HGB 10.1 (L) 12.1 - 17.0 g/dL HCT 34.1 (L) 36.6 - 50.3 % MCV 87.0 80.0 - 99.0 FL  
 MCH 25.8 (L) 26.0 - 34.0 PG  
 MCHC 29.6 (L) 30.0 - 36.5 g/dL  
 RDW 18.3 (H) 11.5 - 14.5 % PLATELET 638 (H) 289 - 400 K/uL MPV 9.9 8.9 - 12.9 FL  
 NRBC 0.5 (H) 0  WBC ABSOLUTE NRBC 0.05 (H) 0.00 - 0.01 K/uL MAGNESIUM Collection Time: 03/09/20  1:54 AM  
Result Value Ref Range Magnesium 2.1 1.6 - 2.4 mg/dL GLUCOSE, POC Collection Time: 03/09/20  8:40 AM  
Result Value Ref Range Glucose (POC) 118 (H) 65 - 100 mg/dL Performed by Janice De La Garza Imaging: 
 
 
  
Joel Sanchez PA-C

## 2020-03-09 NOTE — PROGRESS NOTES
Transition Plan of Care Has been accepted wt [facility] for rehab Awaiting medical stability Attending states he will needs to clear ID 
JEANNETTE also spoke with Sulma Smith from [facility] and they continue to follow him. Elena Sanon RN CRM Ext [phone]

## 2020-03-09 NOTE — PROGRESS NOTES
Problem: Falls - Risk of 
Goal: *Absence of Falls Description Document Clide Failing Fall Risk and appropriate interventions in the flowsheet. Outcome: Progressing Towards Goal 
Note: Fall Risk Interventions: 
Mobility Interventions: Communicate number of staff needed for ambulation/transfer, Patient to call before getting OOB, PT Consult for mobility concerns, Utilize walker, cane, or other assistive device, Strengthening exercises (ROM-active/passive), PT Consult for assist device competence Mentation Interventions: Adequate sleep, hydration, pain control, Door open when patient unattended, Evaluate medications/consider consulting pharmacy, Eyeglasses and hearing aids, Familiar objects from home, More frequent rounding, Room close to nurse's station, Toileting rounds, Update white board Medication Interventions: Evaluate medications/consider consulting pharmacy, Patient to call before getting OOB, Teach patient to arise slowly Elimination Interventions: Call light in reach, Patient to call for help with toileting needs, Stay With Me (per policy), Toilet paper/wipes in reach, Toileting schedule/hourly rounds, Urinal in reach History of Falls Interventions: Consult care management for discharge planning, Door open when patient unattended, Evaluate medications/consider consulting pharmacy, Investigate reason for fall, Room close to nurse's station, Vital signs minimum Q4HRs X 24 hrs (comment for end date), Assess for delayed presentation/identification of injury for 48 hrs (comment for end date) Problem: Breathing Pattern - Ineffective Goal: *Absence of hypoxia Outcome: Progressing Towards Goal 
  
Problem: Chronic Obstructive Pulmonary Disease (COPD) Goal: *Oxygen saturation during activity within specified parameters Outcome: Progressing Towards Goal 
 
Last 3 Recorded Weights in this Encounter 03/07/20 9555 03/08/20 5292 03/09/20 0950 Weight: 90.7 kg (199 lb 14.4 oz) 80.4 kg (177 lb 4 oz) 80.3 kg (177 lb 0.5 oz) Bedside shift change report given to Treva (oncoming nurse) by Bailey Bucio (offgoing nurse). Report included the following information SBAR, Kardex, Intake/Output, MAR, Accordion, Recent Results, Med Rec Status, Cardiac Rhythm Treva and Quality Measures.

## 2020-03-10 NOTE — PROGRESS NOTES
Patient had 5 beat run of vtach, asymptomatic. Hospitalist and cardiologist paged to inform. Patient cleared for discharge to [facility].

## 2020-03-10 NOTE — PROGRESS NOTES
SNF Orders 1. Diagnosis:  Pseudomonas pneumonia 2. Routine PICC care 3. Antibiotic:  Cefepime 2 grams IV Q 12 hours 4. Lab each Monday: CBC/diff/platelets BMP 5. Lab each Thursday: CBC/diff/platelets BMP 6. Fax lab to Dr. Guera Hazel @ [phone]. 
7.  Call Dr. Guera Hazel @ [phone] for wbc under 4 or creatinine over 2. 
8.  Duration of therapy: 3/18/20 and remove PICC Please call Dr. Guera Hazel @ [phone] before stopping therapy. 9.  Allergies:  No Known Allergies Shirley Dorado MD

## 2020-03-10 NOTE — PROGRESS NOTES
Bedside shift change report given to JAGRUTI Fernandez (oncoming nurse) by Fany Cuellar RN (offgoing nurse). Report included the following information SBAR, Kardex, ED Summary, MAR, Recent Results and Cardiac Rhythm NSR.

## 2020-03-10 NOTE — PROGRESS NOTES
Problem: Pulmonary Embolism Care Plan (Adult) Goal: Absence of bleeding Outcome: Progressing Towards Goal 
Pt has no active bleeding sites, educated to report any bleeding to this RN Problem: Breathing Pattern - Ineffective Goal: Absence of hypoxia Outcome: Progressing Towards Goal 
Pt on BIPAP at night, 5L NC cannula during the day, O2 saturation greater than 93% Problem: Heart Failure: Day 5 Goal: Off Pathway (Use only if patient is Off Pathway) Outcome: Progressing Towards Goal 
Pt on BIPAP at night, 5L NC during the day, O2 saturation greater than 93% Problem: Chronic Obstructive Pulmonary Disease (COPD) Goal: Absence of hypoxia Outcome: Progressing Towards Goal 
Pt on BIPAP at night, 5L NC during the day, O2 saturation greater than 93% Problem: Diabetes Self-Management Goal: Monitoring blood glucose, interpreting and using results Outcome: Progressing Towards Goal 
Pt's BG checked ACHS, treated per sliding scale as ordered Hourly rounding done, pt in NSR, stayed on BIPAP all night, O2 saturation greater than 93%, had one small bowel movement, no complaints of pain throughout shift. 0800-Bedside shift change report given to Rodo Flores RN (oncoming nurse) by Mare Lechuga RN (offgoing nurse). Report included the following information SBAR, Kardex, ED Summary, MAR, Accordion, Recent Results, Med Rec Status and Cardiac Rhythm NSR Last 3 Recorded Weights in this Encounter 03/08/20 1906 03/09/20 0081 03/10/20 3593 Weight: 80.4 kg (177 lb 4 oz) 80.3 kg (177 lb 0.5 oz) 85.9 kg (189 lb 6 oz) weight gain variance of 12 lbs, dayshift notified.

## 2020-03-10 NOTE — PROGRESS NOTES
Transitional Care Team: Discharge HUG Note Date of Assessment: 03/10/20 Time of Assessment:  2:33 PM 
 
Oumou Hernandez is a 77 y.o. male inpatient at [address] Oumou Hernandez is resting in bed with sister Parrish at his bedside. This writer introduces self and the Zoe Duff, writer allowed to proceed *Pt is discharging to [facility] for rehab *Trilolgy is being delivered to SNF via [facility].  
 
COPD Exacerbation · Acute on chronic Resp acidosis with  metabolic alkaosis, likely secondary to ongoing diuretics on top of chronic resp acidosis. Suspsect that this is driving hypoxemia and his WOB. No, pO2, pCO2 and bicarb are better because of diamox. Improving Pneumonia: 
+Pseudomonas Cefepime 2 grams IV Q 12 hours thru 3/18 and remove PICC Call Dr. Hien Kowalski @ [phone] for wbc under 4 or creatinine over 2 & before stopping therapy Abdominal distention 
-No ascites 
-Large right renal cyst 
-need outpatient follow up Pulmonary hypertension per Echo 2/20: EF 60% no MR or AS PASP 66 RV nml 
-Combined complex respiratory issues Down to 3 L NC  
-Hematology recommendations, lovenox to Eliquis 10mg BID for 7 days, then 5mg BID x 3-6 months. 
-Outpatient hematology follow up in the clinic 
-Outpatient Pulmonology follow up in the clinic 
-Continue NIV-BIPAP QHS and PRN during the day time 
-Prednisone up to 20mg continue 
-Duenebs and supportive care 
-Trilogy/ BiPAP order on discharge to SNF and would need at home as well-Pulm arranged 
-will need ECHO in 6 months to reassess- if PASP still > 60 will need w/u for other causes PH 
-Follow up Chest xray in 4-6 wks with Dr Julio Ann NSVT 
-Cardiology recommendations noted. -Outpatient cardiology follow up for stress test and follow up  
 
Recent Labs: 
 
  3/5/2020 04:14 3/6/2020 04:22 3/8/2020 03:17 3/9/2020 01:54 RBC  3.62 (L) 3.61 (L) 3.72 (L) 3.92 (L) HGB  9.4 (L) 9.3 (L) 9.5 (L) 10.1 (L) HCT  32.1 (L) 31.7 (L) 32.5 (L) 34.1 (L)  
 
  3/6/2020 04:22 3/7/2020 04:24 3/8/2020 03:17 3/9/2020 01:54 CO2  40 (H) 27 36 (H) 35 (H) Anion gap  0 (L) 6 0 (L) 3 (L) Glucose  139 (H) 132 (H) 222 (H) 145 (H) BUN  19 15 18 17 Creatinine  0.51 (L) 0.55 (L) 0.65 (L) 0.64 (L) Primary Discharge Diagnosis: COPD Exacerbation Advance Directive:  reviewed and current. See ACP note from 2/26/20. Current Code Status:  Full Code Discharge Needs: (to include safety issues) DME depending on status at SNF discharge- wheelchair, [facility] Barriers Identified:  
High fall risk, impaired standing balance DVT & PE Anxiety Medication Review:  was performed. Best Patient Contact Number:  
Ronni Mott- [phone] HUG (Healthy Understanding of Goals) program introduced to patient/family. The Transitional Care Team bridges the gaps in care and education surrounding discharge from the acute care facility. The objective is to empower the patient and family in taking a proactive role in preventing readmission within the first thirty days after discharge. The team is also involved in the efforts to reduce readmission to the acute care setting after stabilization and discharge from the acute care environment either to skilled nursing facilities or community. G RN/NP will return with [facility] Calendar/ follow up appointments/ Ambulatory Nurse Navigator name and contact information when the patient is ready for discharge. Future Appointments Date Time Provider Yrn De Oliveira 5/15/2020 11:00 AM [facility] follow up appointment(s): TBD [facility]: call information given to Jael Karu Patient education focused on readmission zones as described as: The Red Zone: High risk for readmission, days 1-21 The Yellow Zone: Moderate  risk for readmission, days 22-29 The Green Zone: Lower risk for readmission, days 30 and after [facility] team will continue to offer support during the 30- 90 day discharge from acute care setting. Will notify Ambulatory CCT, RN. Past Medical History:  
Diagnosis Date  Asthma   
 hx of/has wheezing  Cancer [facility])   
 prostate - not treated yet  Cerebral artery occlusion with cerebral infarction ([facility] [address]  Chronic obstructive pulmonary disease ([facility] [address]  [facility]  Ill-defined condition   
 shortness of breath - being evaluated  Psychiatric disorder   
 mental disability

## 2020-03-10 NOTE — DISCHARGE INSTRUCTIONS
Discharge Instructions        PATIENT ID: Mery Ham  MRN: [id number]   YOB: 1945    DATE OF ADMISSION: 2/20/2020  1:12 PM    DATE OF DISCHARGE: 3/10/2020    PRIMARY CARE PROVIDER: Hallie Maldonado MD     ATTENDING PHYSICIAN: Judi Pereira   DISCHARGING PROVIDER: Loreto Benjamin MD    To contact this individual call [phone] and ask the  to page. If unavailable ask to be transferred the Adult Hospitalist Department. CONSULTATIONS: IP CONSULT TO HOSPITALIST  IP CONSULT TO HOSPITALIST  IP CONSULT TO HEMATOLOGY  IP CONSULT TO UROLOGY  IP CONSULT TO PALLIATIVE CARE - PROVIDER  IP CONSULT TO INFECTIOUS DISEASES  IP CONSULT TO PULMONOLOGY  IP CONSULT TO CARDIOLOGY    PROCEDURES/SURGERIES: * No surgery found *    ADMITTING [address] [place] [address] COURSE:   Mery Ham is a 69 y.o.  with h/o COPD/ASTHMA, CAD and DM. Pt presents from [facility] for evaluation of shortness of breath. Patient prior was living at home with a family member, 2 or 3 weeks ago he developed shortness breath and then he was admitted to [facility], per sister he was treated with COPD exacerbation patient also was told that there were fluid in the chest but never have diagnosed with CHF. He was then discharged to [facility] for rehab 2 weeks ago was steroids and nebulizer. In SNF, Pt complains of increased shortness of breath with associated leg swelling, and chest pain for the past 3 days. Pt notes he had an oxygen saturation of 88%. Pt denies nausea, vomiting, fever, or abdominal pain. Patient said he can't fit his regular shoe anymore due to the leg and feet swelling. He also noticed increasing abdominal distention.      Recent Results (from the past 12 hour(s))   GLUCOSE, POC    Collection Time: 03/10/20  8:11 AM   Result Value Ref Range    Glucose (POC) 116 (H) 65 - 100 mg/dL    Performed by Valery Cadena, POC    Collection Time: 03/10/20 11:47 AM   Result Value Ref Range    Glucose (POC) 180 (H) 65 - 100 mg/dL    Performed by Kyle Little        CT Results  (Last 48 hours)    None          Acute worsening of hypercarbia secondary to poor tolerance and compliance with BiPAP and low dose Benzo for anxiety  -BiPAP compliance - DC with the same to rehab. Appreciate pulm help in ordering and arranging this   -Not able to tolerate low dose respiratory depressant as well for anxiety       # NSVT  -Cardiology recommendations noted. -Nuclear stress test outpatient once clinically better.  -Outpatient cardiology follow up for stress test and follow up   No events overnight for NSVT      # Sinus tach, high O2 need, easy desaturation, related to bibasilar infiltrate-HCAP  -Sputum Cx noted with Pseudomonas Aeruginosa   -ID consult noted. -IV Zosyn. Duration per ID, infusion orders in   -CTA Chest PE protocol 3/3   -Continue Eliquis  -Blood culture negative so far, MRSA screen negative  -Acapella for chest PT  -Discussed Palliative care with Pt and his sister.  Recommendations noted     # Acute on chronic hypoxic and hypercarbic - mixed respiratory failure,  improving   # Right UL PE  # Localized Rigth LL consolidation  # Severe COPD exacerbation  # Pulmonary hypertension per Echo 2/20: EF 60% no MR or AS PASP 66 RV nml  -Combined complex respiratory issues  Down to 3 L NC   -Hematology recommendations, lovenox to Eliquis 10mg BID for 7 days, then 5mg BID x 3-6 months.  -Outpatient hematology follow up in the clinic  -Outpatient Pulmonology follow up in the clinic  -Continue NIV-BIPAP QHS and PRN during the day time  -Prednisone up to 20mg continue  -Duenebs and supportive care  -Trilogy/ BiPAP order on discharge to SNF and would need at home as well-Pulm arranged        # Respiratory acidosis with metabolic alkalosis, compensated   COPD and Lasix contributing   -BiPAP as noted above     # DM2, with hyperglycemia  -No recent A1c  -Likely steroid driven  -SSI only     # Abdominal distention  -No ascites  -Large right renal cyst  -Urology consulted, need outpatient follow up      # H/o seizure:   -continue home meds          DISCHARGE DIAGNOSES / PLAN:      Pt will need ECHO in 6 months to reassess- if PASP still > 60 will need w/u for other causes PH (group I or 2 etc)   · Follow up Chest xray in 4-6 wks with Dr Shireen Elias     1. Diagnosis:  Pseudomonas pneumonia  2. Routine PICC care  3. Antibiotic:  Cefepime 2 grams IV Q 12 hours  4. Lab each Monday:              CBC/diff/platelets              BMP  5. Lab each Thursday:              CBC/diff/platelets              BMP  6. Fax lab to Dr. Melany Harvey @ [phone].  7.  Call Dr. Melany Harvey @ [phone] for wbc under 4 or creatinine over 2.  8.  Duration of therapy: 3/18/20 and remove PICC              Please call Dr. Melany Harvey @ [phone] before stopping therapy. 9.  Allergies:  No Known Allergies     ADDITIONAL CARE RECOMMENDATIONS:   Please follow up with pcp      PENDING TEST RESULTS:   At the time of discharge the following test results are still pending:     FOLLOW UP APPOINTMENTS:    Follow-up Information     Follow up With Specialties Details Why Contact Info    Loretta Montiel MD Internal Medicine In 1 week  [address] [facility] [id number] [place] [address] 69, [address], MD Pulmonary Disease In 2 weeks  [address] [facility] [place] 300  [facility] 7 [phone]               DIET: diabetic diet   Oral Nutritional Supplements:    ACTIVITY: Activity as tolerated    WOUND CARE:     EQUIPMENT needed:   · Continue NIV at night and PRN during the day       DISCHARGE MEDICATIONS:  Current Discharge Medication List      START taking these medications    Details   apixaban (ELIQUIS) 5 mg tablet Take 1 Tab by mouth two (2) times a day. Qty: 60 Tab, Refills: 3         CONTINUE these medications which have NOT CHANGED    Details   sertraline (ZOLOFT) 25 mg tablet Take 25 mg by mouth daily. atorvastatin (LIPITOR) 40 mg tablet Take 40 mg by mouth daily. arformoteroL (BROVANA) 15 mcg/2 mL nebu neb solution 15 mcg by Nebulization route two (2) times a day. multivitamin (ONE A DAY) tablet Take 1 Tab by mouth daily. ferrous sulfate 325 mg (65 mg iron) tablet Take 325 mg by mouth Daily (before breakfast). fluticasone propionate (FLONASE ALLERGY RELIEF) 50 mcg/actuation nasal spray 2 Sprays by Both Nostrils route daily. hydroxyzine HCL (ATARAX) 25 mg tablet Take 25 mg by mouth two (2) times a day. ipratropium (ATROVENT) 0.02 % soln 0.5 mg by Other route every eight (8) hours. Oral inhalation      metFORMIN (GLUCOPHAGE) 500 mg tablet Take 500 mg by mouth daily (with breakfast). predniSONE (DELTASONE) 10 mg tablet Take 20 mg by mouth daily (with breakfast). COPD exacerbation      budesonide-formoteroL (SYMBICORT) 160-4.5 mcg/actuation HFAA Take 2 Puffs by inhalation two (2) times a day. levalbuterol tartrate (XOPENEX) 45 mcg/actuation inhaler Take 1 Puff by inhalation every eight (8) hours. cetirizine (ZYRTEC) 10 mg tablet Take 10 mg by mouth daily. levETIRAcetam (KEPPRA) 500 mg tablet TAKE 1 TABLET BY MOUTH TWICE A DAY  Qty: 60 Tab, Refills: 1      pantoprazole (PROTONIX) 40 mg tablet Take 1 Tab by mouth Before breakfast and dinner. Qty: 60 Tab, Refills: 0      umeclidinium (INCRUSE ELLIPTA) 62.5 mcg/actuation inhaler Take 1 Puff by inhalation daily. Indications: Rescue inhaler               NOTIFY YOUR PHYSICIAN FOR ANY OF THE FOLLOWING:   Fever over 101 degrees for 24 hours. Chest pain, shortness of breath, fever, chills, nausea, vomiting, diarrhea, change in mentation, falling, weakness, bleeding. Severe pain or pain not relieved by medications. Or, any other signs or symptoms that you may have questions about.     DISPOSITION:  x  Home With:   OT  PT  HH  RN       Long term SNF/Inpatient Rehab    Independent/assisted living    Hospice    Other:       PATIENT CONDITION AT DISCHARGE:     Functional status    Poor Deconditioned    x Independent      Cognition   x  Lucid     Forgetful     Dementia      Catheters/lines (plus indication)    Travis     PICC     PEG    xx None      Code status    x Full code     DNR      PHYSICAL EXAMINATION AT DISCHARGE:  General:          Alert, cooperative, no distress, appears stated age. HEENT:           Atraumatic, anicteric sclerae, pink conjunctivae                          No oral ulcers, mucosa moist, throat clear, dentition fair  Neck:               Supple, symmetrical  Lungs:             Clear to auscultation bilaterally. No Wheezing or Rhonchi. No rales. Chest wall:      No tenderness  No Accessory muscle use. Heart:              Regular  rhythm,  No  murmur   No edema  Abdomen:        Soft, non-tender. Not distended. Bowel sounds normal  Extremities:     No cyanosis. No clubbing,                            Skin turgor normal, Capillary refill normal  Skin:                Not pale. Not Jaundiced  No rashes   Psych:             Not anxious or agitated.   Neurologic:      Alert, moves all extremities, answers questions appropriately and responds to commands       CHRONIC MEDICAL DIAGNOSES:  Problem List as of 3/10/2020 Date Reviewed: 3/16/2018          Codes Class Noted - Resolved    COPD exacerbation ([facility] ICD-10-CM: J44.1  ICD-9-CM: 491.21  3/4/2020 - Present        Acute respiratory failure with hypoxia ([facility] ICD-10-CM: J96.01  ICD-9-CM: 518.81  2/20/2020 - Present        Slurred speech ICD-10-CM: R47.81  ICD-9-CM: 784.59  9/10/2017 - Present        Seizures ([facility] ICD-10-CM: R56.9  ICD-9-CM: 780.39  9/10/2017 - Present        Altered mental status ICD-10-CM: R41.82  ICD-9-CM: 780.97  9/10/2017 - Present        Cerebrovascular accident (CVA) due to thrombosis of right posterior cerebral artery ([facility] ICD-10-CM: U01.886  ICD-9-CM: 434.01  9/10/2017 - Present        RESOLVED: GI bleed ICD-10-CM: K92.2  ICD-9-CM: 578.9  9/29/2017 - 10/6/2017              Greater than 30  minutes were spent with the patient on counseling and coordination of care    Signed:   Ellen Nowak MD  3/10/2020  12:25 PM

## 2020-03-10 NOTE — PROGRESS NOTES
Hospitalist Progress Note Baylee Gentile MD 
Answering service: [phone] OR [phone] from in house phone Date of Service:  3/10/2020 NAME:  Cara Gomez :  1945 MRN:  [id number] PCP: Linus Kumari MD 
 
Chief Complaint:  
Chief Complaint Patient presents with  Shortness of Breath Follow up COPD exacerbation, combined respiratory failure. Patient seen and examined at the bedside. RN reports no acute events Patient says he feels better denies any SOB Blanche Po No chest pain. Cardiology recommendations for Nuclear stress test once better noted Waiting for ID clearance for discharge . Dr Lauren Calle NO NSVT overnight Transfer to remote tele [facility] Assessment & Plan: # Acute worsening of hypercarbia secondary to poor tolerance and compliance with BiPAP and low dose Benzo for anxiety -BiPAP compliance - DC with the same to rehab. Appreciate pulm help in ordering and arranging this  
-Not able to tolerate low dose respiratory depressant as well for anxiety 
-Palliative care consulted # NSVT 
-Cardiology recommendations noted. -Nuclear stress test outpatient once clinically better. 
-Outpatient cardiology follow up for stress test and follow up No events overnight # Sinus tach, high O2 need, easy desaturation, related to bibasilar infiltrate-HCAP 
-Sputum Cx noted with Pseudomonas Aeruginosa  
-ID consult noted. -IV Zosyn. Duration per ID. -CTA Chest PE protocol 3/3  
-Continue Eliquis 
-Blood culture negative so far, MRSA screen negative 
-Acapella for chest PT 
-Discussed Palliative care with Pt and his sister. Recommendations noted # Acute on chronic hypoxic and hypercarbic - mixed respiratory failure,  improving # Right UL PE # Localized Rigth LL consolidation # Severe COPD exacerbation # Pulmonary hypertension per Echo : EF 60% no MR or AS PASP 66 RV nml 
-Combined complex respiratory issues Down to 3 L NC  
 -Hematology recommendations, lovenox to Eliquis 10mg BID for 7 days, then 5mg BID x 3-6 months. 
-Outpatient hematology follow up in the clinic 
-Outpatient Pulmonology follow up in the clinic 
-Continue NIV-BIPAP QHS and PRN during the day time 
-Prednisone up to 20mg continue 
-Duenebs and supportive care 
-Trilogy/ BiPAP order on discharge to SNF and would need at home as well-Pulm arranged # Respiratory acidosis with metabolic alkalosis, compensated COPD and Lasix contributing -BiPAP as noted above # DM2, with hyperglycemia 
-No recent A1c 
-Likely steroid driven 
-SSI only # Abdominal distention 
-No ascites 
-Large right renal cyst 
-Urology consulted, need outpatient follow up # H/o seizure:  
-continue home meds Code status: Full code DDVT Px: Eliquis Care plan discussed with: Patient/ Family, Nurse Disposition: [id number], waiting for ID recs Anticipated discharge: o\ improves, 24-48hrs, Hospital Problems  Date Reviewed: 3/16/2018 Codes Class Noted POA  
 COPD exacerbation ([facility] [address] ICD-10-CM: J44.1 ICD-9-CM: 491.21  3/4/2020 Unknown Acute respiratory failure with hypoxia [facility]) ICD-10-CM: J96.01 
ICD-9-CM: 518.81  2/20/2020 Unknown Review of Systems: A comprehensive review of systems was negative except for that written in the HPI. Physical Examination:  
 
Visit Vitals /81 (BP 1 Location: Right arm, BP Patient Position: At rest) Pulse 84 Temp 98 °F (36.7 °C) Resp 21 Ht 6' 2\" (1.88 m) Wt 85.9 kg (189 lb 6 oz) SpO2 90% BMI 24.31 kg/m² General:  Alert, cooperative, no distress, appears stated age. Head:  Normocephalic, without obvious abnormality, atraumatic. Lungs:    Better AE, however reduced overall at baseline, no wheezing/ rhonchi/ rales Heart:  Regular rate and rhythm, tachycardia +, S1, S2 normal, no murmur, click, rub or gallop. Abdomen:   Soft, non-tender. Bowel sounds normal. No masses,  No organomegaly. Extremities: Extremities normal, atraumatic, no cyanosis or edema Pulses: 2+ and symmetric all extremities. Vital Signs:  
 Last 24hrs VS reviewed since prior progress note. Most recent are: 
 
Visit Vitals /81 (BP 1 Location: Right arm, BP Patient Position: At rest) Pulse 84 Temp 98 °F (36.7 °C) Resp 21 Ht 6' 2\" (1.88 m) Wt 85.9 kg (189 lb 6 oz) SpO2 90% BMI 24.31 kg/m² Intake/Output Summary (Last 24 hours) at 3/10/2020 1018 Last data filed at 3/10/2020 0730 Gross per 24 hour Intake 1240 ml Output  Net 1240 ml Tmax:  Temp (24hrs), Av.1 °F (36.7 °C), Min:97.8 °F (36.6 °C), Max:98.4 °F (36.9 °C) Data Review:  
Data reviewed by myself: 
Cta Chest W Or W Wo Cont Result Date: 2020 EXAM:  CTA CHEST W OR W WO CONT INDICATION:   SOB COMPARISON: None. TECHNIQUE: Precontrast  images were obtained to localize the volume for acquisition. Multislice helical CT arteriography was performed from the diaphragm to the thoracic inlet during uneventful rapid bolus of 100 cc Isovue-370. Lung and soft tissue windows were generated. Coronal and sagittal images were generated and 3D post processing consisting of coronal maximum intensity images was performed. CT dose reduction was achieved through use of a standardized protocol tailored for this examination and automatic exposure control for dose modulation. FINDINGS: CHEST: THYROID: No nodule. MEDIASTINUM: No mass or lymphadenopathy. LEV: No mass or lymphadenopathy. THORACIC AORTA: Atherosclerotic without evidence of aneurysm. MAIN PULMONARY ARTERY: Pulmonary emboli right upper lobe branches. TRACHEA/BRONCHI: Patent. ESOPHAGUS: No wall thickening or dilatation. HEART: Normal in size. PLEURA: No effusion or pneumothorax. LUNGS: Emphysematous changes. Focal consolidation right lower lobe.  Left basilar atelectasis. INCIDENTALLY IMAGED UPPER ABDOMEN: 7.7 cm right renal cyst. 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 Endograft is partially visualized. Niharika [place] BONES: No destructive bone lesion. IMPRESSION: Right upper lobe pulmonary emboli. Localized consolidation right lower lobe. Left basilar atelectasis. The findings were called to the patient's nurse on 2/20/2020 at 6:39 PM by myself. [address] Us Abd Comp Result Date: 2/21/2020 INDICATION: Abdominal distention COMPARISON: None TECHNIQUE: Routine ultrasound images of the abdomen were obtained. FINDINGS: GALLBLADDER: No cholecystolithiasis, gallbladder wall thickening, or pericholecystic fluid. COMMON BILE DUCT: 3 mm in diameter. No evidence of choledocholithiasis. LIVER: Normal in size. Normal echogenicity. No focal hepatic mass or intrahepatic biliary dilatation. MAIN PORTAL VEIN: Patent. Hepatopetal flow direction. PANCREAS: Obscured by overlying bowel gas. RIGHT KIDNEY: 12.8 cm in length. 8.2 cm cyst. No hydronephrosis. No evidence of mass or calculus. LEFT KIDNEY: 11.5 cm in length. No hydronephrosis. No evidence of mass or calculus. SPLEEN: Obscured by overlying bowel gas. AORTA: Obscured by overlying bowel gas. INFERIOR VENA CAVA: Patent. OTHER: No ascites. IMPRESSION: Large right renal cyst. Otherwise unremarkable abdominal ultrasound. Xr Chest [facility] Result Date: 2/20/2020 INDICATION:  SOB. History of chronic asthma. Prostate cancer. COPD. EXAM: Chest single view. COMPARISON: 9/10/2017. FINDINGS: A single frontal view of the chest at 1419 hours shows bibasilar atelectasis or scar, with mild interstitial prominence increased since the prior study. .  The heart, mediastinum and pulmonary vasculature are stable . The bony thorax is unremarkable for age. . Port-A-Cath device on the right is stable. IMPRESSION: Bibasilar atelectasis or scar with interstitial prominence. Correlate for developing interstitial edema or bibasilar inflammatory infiltrate in this patient with COPD and chronic asthma. .  . No results found for: SDES Lab Results Component Value Date/Time Culture result: HEAVY PSEUDOMONAS AERUGINOSA (A) 03/04/2020 11:54 AM  
 Culture result: MODERATE NORMAL RESPIRATORY DAVID 03/04/2020 11:54 AM  
 Culture result: NO GROWTH 5 DAYS 03/04/2020 11:49 AM  
 
All Micro Results Procedure Component Value Units Date/Time CULTURE, BLOOD, PAIRED [[id number]] Collected:  03/04/20 1149 Order Status:  Completed Specimen:  Blood Updated:  03/09/20 1351 Special Requests: NO SPECIAL REQUESTS Culture result: NO GROWTH 5 DAYS     
 CULTURE, RESPIRATORY/SPUTUM/BRONCH Owen Freud STAIN [[id number]]  (Abnormal)  (Susceptibility) Collected:  03/04/20 1154 Order Status:  Completed Specimen:  Sputum Updated:  03/06/20 9230 Special Requests: NO SPECIAL REQUESTS     
  GRAM STAIN FEW EPITHELIAL CELLS SEEN     
   1+ WBCS SEEN     
      
  1+ GRAM POSITIVE COCCI IN CHAINS Culture result:    
  HEAVY PSEUDOMONAS AERUGINOSA MODERATE NORMAL RESPIRATORY DAVID  
     
 CULTURE, MRSA [[id number]] Collected:  03/04/20 1030 Order Status:  Completed Specimen:  Nares Updated:  03/05/20 2243 Special Requests: NO SPECIAL REQUESTS Culture result: MRSA NOT PRESENT Screening of patient nares for MRSA is for surveillance purposes and, if positive, to facilitate isolation considerations in high risk settings. It is not intended for automatic decolonization interventions per se as regimens are not sufficiently effective to warrant routine use. Cta Chest W Or W Wo Cont Result Date: 3/3/2020 IMPRESSION: 1. Bibasilar airspace disease 2. Severe emphysema 3. Presumed reactive adenopathy in the right infrahilar region 4. No acute pulmonary embolus Cta Chest W Or W Wo Cont Result Date: 2/20/2020 IMPRESSION: Right upper lobe pulmonary emboli. Localized consolidation right lower lobe. Left basilar atelectasis. The findings were called to the patient's nurse on 2/20/2020 at 6:39 PM by myself. Krystle Renae Us Abd Comp Result Date: 2/21/2020 IMPRESSION: Large right renal cyst. Otherwise unremarkable abdominal ultrasound. Xr Chest [facility] Result Date: 3/6/2020 IMPRESSION: 1. Stable prominent bibasilar pneumonia. 2. Emphysema. Xr Chest [facility] Result Date: 2/27/2020 IMPRESSION: Stable mild bibasilar opacities. Xr Chest [facility] Result Date: 2/23/2020 IMPRESSION: 1. No radiographic evidence of acute cardiopulmonary disease. Xr Chest [facility] Result Date: 2/20/2020 IMPRESSION: Bibasilar atelectasis or scar with interstitial prominence. Correlate for developing interstitial edema or bibasilar inflammatory infiltrate in this patient with COPD and chronic asthma. .  . Labs: reviewed by myself. Recent Labs 03/09/20 
0154 03/08/20 
8473 WBC 10.5 8.8 HGB 10.1* 9.5* HCT 34.1* 32.5* * 393 Recent Labs 03/09/20 
0154 03/08/20 
5751  138  
K 3.8 3.9  102 CO2 35* 36* BUN 17 18 CREA 0.64* 0.65* * 222* CA 9.2 9.2 MG 2.1 2.2 No results for input(s): SGOT, GPT, ALT, AP, TBIL, TBILI, TP, ALB, GLOB, GGT, AML, LPSE in the last 72 hours. No lab exists for component: AMYP, HLPSE No results for input(s): INR, PTP, APTT, INREXT, INREXT in the last 72 hours. No results for input(s): FE, TIBC, PSAT, FERR in the last 72 hours. No results found for: FOL, RBCF No results for input(s): PH, PCO2, PO2 in the last 72 hours. No results for input(s): CPK, CKNDX, TROIQ in the last 72 hours. No lab exists for component: CPKMB Lab Results Component Value Date/Time  Cholesterol, total 187 09/11/2017 05:53 AM  
 HDL Cholesterol 63 09/11/2017 05:53 AM  
 LDL, calculated 109 (H) 09/11/2017 05:53 AM  
 Triglyceride 75 09/11/2017 05:53 AM  
 CHOL/HDL Ratio 3.0 09/11/2017 05:53 AM  
 
Lab Results Component Value Date/Time Glucose (POC) 116 (H) 03/10/2020 08:11 AM  
 Glucose (POC) 180 (H) 03/09/2020 09:40 PM  
 Glucose (POC) 274 (H) 03/09/2020 04:26 PM  
 Glucose (POC) 124 (H) 03/09/2020 11:41 AM  
 Glucose (POC) 118 (H) 03/09/2020 08:40 AM  
 
Lab Results Component Value Date/Time Color YELLOW/STRAW 09/10/2017 10:09 PM  
 Appearance CLEAR 09/10/2017 10:09 PM  
 Specific gravity 1.010 09/10/2017 10:09 PM  
 pH (UA) 5.0 09/10/2017 10:09 PM  
 Protein NEGATIVE  09/10/2017 10:09 PM  
 Glucose NEGATIVE  09/10/2017 10:09 PM  
 Ketone NEGATIVE  09/10/2017 10:09 PM  
 Bilirubin NEGATIVE  09/10/2017 10:09 PM  
 Urobilinogen 0.2 09/10/2017 10:09 PM  
 Nitrites NEGATIVE  09/10/2017 10:09 PM  
 Leukocyte Esterase NEGATIVE  09/10/2017 10:09 PM  
 Epithelial cells MODERATE (A) 09/10/2017 10:09 PM  
 Bacteria NEGATIVE  09/10/2017 10:09 PM  
 WBC 5-10 09/10/2017 10:09 PM  
 RBC 0-5 09/10/2017 10:09 PM  
 
 
 
Medications Reviewed:  
 
Current Facility-Administered Medications Medication Dose Route Frequency  predniSONE (DELTASONE) tablet 40 mg  40 mg Oral DAILY WITH BREAKFAST  albuterol-ipratropium (DUO-NEB) 2.5 MG-0.5 MG/3 ML  3 mL Nebulization Q4H PRN  piperacillin-tazobactam (ZOSYN) 3.375 g in 0.9% sodium chloride (MBP/ADV) 100 mL  3.375 g IntraVENous Q8H  
 apixaban (ELIQUIS) tablet 5 mg  5 mg Oral BID  
 guaiFENesin (ROBITUSSIN) 100 mg/5 mL oral liquid 100 mg  100 mg Oral Q4H PRN  
 arformoteroL (BROVANA) neb solution 15 mcg  15 mcg Nebulization BID RT  
 oxymetazoline (AFRIN) 0.05 % nasal spray 2 Spray  2 Spray Both Nostrils BID PRN  polyethylene glycol (MIRALAX) packet 17 g  17 g Oral DAILY PRN  
 diphenhydrAMINE (BENADRYL) capsule 25 mg  25 mg Oral Q6H PRN  
 sertraline (ZOLOFT) tablet 25 mg  25 mg Oral DAILY  levETIRAcetam (KEPPRA) tablet 500 mg  500 mg Oral BID  sodium chloride (NS) flush 5-40 mL  5-40 mL IntraVENous Q8H  
  sodium chloride (NS) flush 5-40 mL  5-40 mL IntraVENous PRN  
 acetaminophen (TYLENOL) tablet 650 mg  650 mg Oral Q4H PRN  
 ferrous sulfate tablet 325 mg  325 mg Oral ACB  hydroxyzine HCL (ATARAX) tablet 25 mg  25 mg Oral BID  montelukast (SINGULAIR) tablet 10 mg  10 mg Oral DAILY  pantoprazole (PROTONIX) tablet 40 mg  40 mg Oral ACB&D  
 budesonide (PULMICORT) 250 mcg/2ml nebulizer susp  250 mcg Nebulization BID RT  
 albuterol (PROVENTIL VENTOLIN) nebulizer solution 2.5 mg  2.5 mg Nebulization Q2H PRN  
 glucose chewable tablet 16 g  4 Tab Oral PRN  
 glucagon (GLUCAGEN) injection 1 mg  1 mg IntraMUSCular PRN  
 dextrose 10% infusion 0-250 mL  0-250 mL IntraVENous PRN  
 insulin lispro (HUMALOG) injection   SubCUTAneous AC&HS  
 
______________________________________________________________________ EXPECTED LENGTH OF STAY: 4d 7h 
ACTUAL LENGTH OF STAY:          Jeannette Huitron MD  
 
Patient's emergency contacts: 
Extended Emergency Contact Information Primary Emergency Contact: Debi Tavarez Address: [address] [place] [address] Phone: [phone] Mobile Phone: [phone] Relation: Sister

## 2020-03-10 NOTE — PROGRESS NOTES
I have reviewed discharge instructions with the patient and sister (POA). The patient and sister (POA)  verbalized understanding. Medication education given and reviewed. PIVs and telemetry monitoring removed. Report called to [facility] Michele HICKEY. Awaiting [facility] for transportation

## 2020-03-10 NOTE — DISCHARGE SUMMARY
Discharge Summary PATIENT ID: Tc Banks MRN: [id number] YOB: 1945 DATE OF ADMISSION: 2/20/2020  1:12 PM   
DATE OF DISCHARGE: 3/10/2020 PRIMARY CARE PROVIDER: Nellie Carter MD  
 
ATTENDING PHYSICIAN: Lanre Yen DISCHARGING PROVIDER: Gregoria Negron MD   
To contact this individual call [phone] and ask the  to page. If unavailable ask to be transferred the Adult Hospitalist Department. CONSULTATIONS: IP CONSULT TO HOSPITALIST 
IP CONSULT TO HOSPITALIST 
IP CONSULT TO HEMATOLOGY 
IP CONSULT TO UROLOGY 
IP CONSULT TO PALLIATIVE CARE - PROVIDER 
IP CONSULT TO INFECTIOUS DISEASES 
IP CONSULT TO PULMONOLOGY 
IP CONSULT TO CARDIOLOGY PROCEDURES/SURGERIES: * No surgery found * [address] COURSE:  
Tc Banks is a 69 y.o.  with h/o COPD/ASTHMA, CAD and DM. Pt presents from [facility] for evaluation of shortness of breath. Patient prior was living at home with a family member, 2 or 3 weeks ago he developed shortness breath and then he was admitted to [facility], per sister he was treated with COPD exacerbation patient also was told that there were fluid in the chest but never have diagnosed with CHF. He was then discharged to [facility] for rehab 2 weeks ago was steroids and nebulizer. In SNF, Pt complains of increased shortness of breath with associated leg swelling, and chest pain for the past 3 days. Pt notes he had an oxygen saturation of 88%. Pt denies nausea, vomiting, fever, or abdominal pain. Patient said he can't fit his regular shoe anymore due to the leg and feet swelling. He also noticed increasing abdominal distention. Recent Results (from the past 12 hour(s)) GLUCOSE, POC Collection Time: 03/10/20  8:11 AM  
Result Value Ref Range Glucose (POC) 116 (H) 65 - 100 mg/dL Performed by Sharath Stark GLUCOSE, POC Collection Time: 03/10/20 11:47 AM  
Result Value Ref Range Glucose (POC) 180 (H) 65 - 100 mg/dL Performed by Kenneth Elkins CT Results  (Last 48 hours) None Acute worsening of hypercarbia secondary to poor tolerance and compliance with BiPAP and low dose Benzo for anxiety -BiPAP compliance - DC with the same to rehab. Appreciate pulm help in ordering and arranging this  
-Not able to tolerate low dose respiratory depressant as well for anxiety 
 
  
# NSVT 
-Cardiology recommendations noted. -Nuclear stress test outpatient once clinically better. 
-Outpatient cardiology follow up for stress test and follow up No events overnight for NSVT  
  
# Sinus tach, high O2 need, easy desaturation, related to bibasilar infiltrate-HCAP 
-Sputum Cx noted with Pseudomonas Aeruginosa  
-ID consult noted. -IV Zosyn. Duration per ID, infusion orders in  
-CTA Chest PE protocol 3/3  
-Continue Eliquis 
-Blood culture negative so far, MRSA screen negative 
-Acapella for chest PT 
-Discussed Palliative care with Pt and his sister. Recommendations noted 
  
# Acute on chronic hypoxic and hypercarbic - mixed respiratory failure,  improving # Right UL PE # Localized Rigth LL consolidation # Severe COPD exacerbation # Pulmonary hypertension per Echo 2/20: EF 60% no MR or AS PASP 66 RV nml 
-Combined complex respiratory issues Down to 3 L NC  
-Hematology recommendations, lovenox to Eliquis 10mg BID for 7 days, then 5mg BID x 3-6 months. 
-Outpatient hematology follow up in the clinic 
-Outpatient Pulmonology follow up in the clinic 
-Continue NIV-BIPAP QHS and PRN during the day time 
-Prednisone up to 20mg continue 
-Duenebs and supportive care 
-Trilogy/ BiPAP order on discharge to SNF and would need at home as well-Pulm arranged 
  
  
# Respiratory acidosis with metabolic alkalosis, compensated COPD and Lasix contributing -BiPAP as noted above 
  
# DM2, with hyperglycemia 
-No recent A1c 
-Likely steroid driven 
-SSI only 
  
# Abdominal distention -No ascites 
-Large right renal cyst 
-Urology consulted, need outpatient follow up  
  
# H/o seizure:  
-continue home meds  
  
 
 
DISCHARGE DIAGNOSES / PLAN:   
 
Pt will need ECHO in 6 months to reassess- if PASP still > 60 will need w/u for other causes PH (group I or 2 etc) · Follow up Chest xray in 4-6 wks with Dr Lon Pickett 1. Diagnosis:  Pseudomonas pneumonia 2. Routine PICC care 3. Antibiotic:  Cefepime 2 grams IV Q 12 hours 4. Lab each Monday: CBC/diff/platelets BMP 5. Lab each Thursday: CBC/diff/platelets BMP 6. Fax lab to Dr. Becca Lawler @ [phone]. 
7.  Call Dr. Becca Lawler @ [phone] for wbc under 4 or creatinine over 2. 
8.  Duration of therapy: 3/18/20 and remove PICC Please call Dr. Becca Lawler @ [phone] before stopping therapy. 9.  Allergies:  No Known Allergies ADDITIONAL CARE RECOMMENDATIONS:  
Please follow up with pcp PENDING TEST RESULTS:  
At the time of discharge the following test results are still pending: FOLLOW UP APPOINTMENTS:   
Follow-up Information Follow up With Specialties Details Why Contact Info Marti Perez MD Internal Medicine In 1 week  [place] [id number] [phone] Christopher Coppola MD Pulmonary Disease In 2 weeks  [address] [place] [address] 
[phone] DIET: diabetic diet Oral Nutritional Supplements: 
 
ACTIVITY: Activity as tolerated WOUND CARE:  
 
EQUIPMENT needed:  
· Continue NIV at night and PRN during the day DISCHARGE MEDICATIONS: 
Current Discharge Medication List  
  
START taking these medications Details  
apixaban (ELIQUIS) 5 mg tablet Take 1 Tab by mouth two (2) times a day. Qty: 60 Tab, Refills: 3 CONTINUE these medications which have NOT CHANGED Details  
sertraline (ZOLOFT) 25 mg tablet Take 25 mg by mouth daily. atorvastatin (LIPITOR) 40 mg tablet Take 40 mg by mouth daily. arformoteroL (BROVANA) 15 mcg/2 mL nebu neb solution 15 mcg by Nebulization route two (2) times a day. multivitamin (ONE A DAY) tablet Take 1 Tab by mouth daily. ferrous sulfate 325 mg (65 mg iron) tablet Take 325 mg by mouth Daily (before breakfast). fluticasone propionate (FLONASE ALLERGY RELIEF) 50 mcg/actuation nasal spray 2 Sprays by Both Nostrils route daily. hydroxyzine HCL (ATARAX) 25 mg tablet Take 25 mg by mouth two (2) times a day. ipratropium (ATROVENT) 0.02 % soln 0.5 mg by Other route every eight (8) hours. Oral inhalation  
  
metFORMIN (GLUCOPHAGE) 500 mg tablet Take 500 mg by mouth daily (with breakfast). predniSONE (DELTASONE) 10 mg tablet Take 20 mg by mouth daily (with breakfast). COPD exacerbation  
  
budesonide-formoteroL (SYMBICORT) 160-4.5 mcg/actuation HFAA Take 2 Puffs by inhalation two (2) times a day. levalbuterol tartrate (XOPENEX) 45 mcg/actuation inhaler Take 1 Puff by inhalation every eight (8) hours. cetirizine (ZYRTEC) 10 mg tablet Take 10 mg by mouth daily. levETIRAcetam (KEPPRA) 500 mg tablet TAKE 1 TABLET BY MOUTH TWICE A DAY Qty: 60 Tab, Refills: 1  
  
pantoprazole (PROTONIX) 40 mg tablet Take 1 Tab by mouth Before breakfast and dinner. Qty: 60 Tab, Refills: 0  
  
umeclidinium (INCRUSE ELLIPTA) 62.5 mcg/actuation inhaler Take 1 Puff by inhalation daily. Indications: Rescue inhaler NOTIFY YOUR PHYSICIAN FOR ANY OF THE FOLLOWING:  
Fever over 101 degrees for 24 hours. Chest pain, shortness of breath, fever, chills, nausea, vomiting, diarrhea, change in mentation, falling, weakness, bleeding. Severe pain or pain not relieved by medications. Or, any other signs or symptoms that you may have questions about. DISPOSITION: 
x  Home With: 
 OT  PT  HH  RN  
  
 Long term SNF/Inpatient Rehab Independent/assisted living Hospice Other: PATIENT CONDITION AT DISCHARGE:  
 
Functional status Poor Deconditioned   
x Independent Cognition  
x  Lucid Forgetful Dementia Catheters/lines (plus indication) Travis PICC   
 PEG   
xx None Code status  
 x Full code DNR   
 
PHYSICAL EXAMINATION AT DISCHARGE: 
General:          Alert, cooperative, no distress, appears stated age. HEENT:           Atraumatic, anicteric sclerae, pink conjunctivae No oral ulcers, mucosa moist, throat clear, dentition fair Neck:               Supple, symmetrical 
Lungs:             Clear to auscultation bilaterally. No Wheezing or Rhonchi. No rales. Chest wall:      No tenderness  No Accessory muscle use. Heart:              Regular  rhythm,  No  murmur   No edema Abdomen:        Soft, non-tender. Not distended. Bowel sounds normal 
Extremities:     No cyanosis. No clubbing,   
                        Skin turgor normal, Capillary refill normal 
Skin:                Not pale. Not Jaundiced  No rashes Psych:             Not anxious or agitated. Neurologic:      Alert, moves all extremities, answers questions appropriately and responds to commands CHRONIC MEDICAL DIAGNOSES: 
Problem List as of 3/10/2020 Date Reviewed: 3/16/2018 Codes Class Noted - Resolved COPD exacerbation ([facility] ICD-10-CM: J44.1 ICD-9-CM: 491.21  3/4/2020 - Present Acute respiratory failure with hypoxia [facility]) ICD-10-CM: J96.01 
ICD-9-CM: 518.81  2/20/2020 - Present Slurred speech ICD-10-CM: R47.81 ICD-9-CM: 784.59  9/10/2017 - Present Seizures ([facility].) ICD-10-CM: R56.9 ICD-9-CM: 780.39  9/10/2017 - Present Altered mental status ICD-10-CM: R41.82 
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  
Nicole Pandya MD 
3/10/2020 
12:25 PM

## 2020-03-10 NOTE — PROGRESS NOTES
Hospitalist Progress Note Charisma Pace MD 
Answering service: [phone] OR [phone] from in house phone Date of Service:  3/9/2020 NAME:  Goran Murdock :  1945 MRN:  [id number] PCP: Rosalie Grande MD 
 
Chief Complaint:  
Chief Complaint Patient presents with  Shortness of Breath Follow up COPD exacerbation, combined respiratory failure. Patient seen and examined at the bedside. RN reports no acute events. He says breathing OK. Seen by ID help appreciated. Patient says he feels better denies any SOB. 3/8/20: 8 beat run of V tach (Asymptomatic). No chest pain. Cardiology recommendations for Nuclear stress test once better noted. ID recommendation for Abx pending. D/w CM, IV zosyn can be done at rehab as well. Would request ID recommendations for the same and plan to DC soon. No overnight events. Assessment & Plan: # Acute worsening of hypercarbia secondary to poor tolerance and compliance with BiPAP and low dose Benzo for anxiety -BiPAP compliance - DC with the same to rehab. Appreciate pulm help in ordering and arranging this already 
-Not able to tolerate low dose respiratory depressant as well for anxiety 
-Palliative care appropriate # NSVT 
-Cardiology recommendations noted. -Nuclear stress test outpatient once clinically better. 
-Outpatient cardiology follow up for stress test and follow up # Sinus tach, high O2 need, easy desaturation, related to bibasilar infiltrate-HCAP 
-Sputum Cx noted with Pseudomonas Aeruginosa + 
-ID consult noted. -IV Zosyn. Duration per ID. Can be completed at rehab as well once we have final ID recommendations 
-Close monitoring 
-CTA Chest PE protocol 3/3 noted 
-Continue Eliquis 
-Blood culture negative so far, MRSA screen negative 
-Acapella for chest PT 
-Discussed Palliative care with Pt and his sister. Recommendations noted # Acute on chronic hypoxic and hypercarbic - mixed respiratory failure,  improving # Right UL PE # Localized Rigth LL consolidation # Severe COPD exacerbation # Pulmonary hypertension per Echo 2/20: EF 60% no MR or AS PASP 66 RV nml 
-Combined complex respiratory issues 
-Still very high O2 requirement 6-8LPM O2 
-ABG 2/27 s/o ? baseline elevated hypercarbia. -Pulmonology on board. Trilogy on disposition 
-Hematology recommendations, lovenox to Eliquis 10mg BID for 7 days, then 5mg BID x 3-6 months. 
-Outpatient hematology follow up in the clinic 
-Outpatient Pulmonology follow up in the clinic 
-Continue NIV-BIPAP QHS and PRN during the day time 
-Levaquin 5 days for possible PNA, last 2/26 
-Prednisone up to 20mg continue 
-Duenebs and supportive care 
-Trilogy/ BiPAP order on discharge to SNF and would need at home as well-Pulm arranged 
-Palliative care consult as seems like End Stage Lung Disease # Respiratory acidosis with metabolic alkalosis 
-Complex driving forces with diuretics, COPD and other pulmological etiologies -BiPAP as noted above # DM2, with hyperglycemia 
-No recent A1c 
-Likely steroid driven 
-SSI only # Abdominal distention 
-No ascites 
-Large right renal cyst 
-Urology consulted, need outpatient follow up # H/o seizure:  
-continue home meds Code status: Full code DDVT Px: Eliquis Care plan discussed with: Patient/ Family, Nurse Disposition: [id number] Anticipated discharge: Once O2 requirement. Tachycardia and feeling of wellbeing improves, 24-48hrs, likely 3/3 Hospital Problems  Date Reviewed: 3/16/2018 Codes Class Noted POA  
 COPD exacerbation (RUSTca 75.) ICD-10-CM: J44.1 ICD-9-CM: 491.21  3/4/2020 Unknown Acute respiratory failure with hypoxia [facility]) ICD-10-CM: J96.01 
ICD-9-CM: 518.81  2/20/2020 Unknown Review of Systems: A comprehensive review of systems was negative except for that written in the HPI. Physical Examination:  
 
Visit Vitals /72 (BP 1 Location: Right arm, BP Patient Position: At rest) Pulse 99 Temp 98.4 °F (36.9 °C) Resp 18 Ht 6' 2\" (1.88 m) Wt 80.3 kg (177 lb 0.5 oz) SpO2 99% BMI 22.73 kg/m² General:  Alert, cooperative, no distress, appears stated age. Head:  Normocephalic, without obvious abnormality, atraumatic. Lungs:    Better AE, however reduced overall at baseline, no wheezing/ rhonchi/ rales Heart:  Regular rate and rhythm, tachycardia +, S1, S2 normal, no murmur, click, rub or gallop. Abdomen:   Soft, non-tender. Bowel sounds normal. No masses,  No organomegaly. Extremities: Extremities normal, atraumatic, no cyanosis or edema Pulses: 2+ and symmetric all extremities. Vital Signs:  
 Last 24hrs VS reviewed since prior progress note. Most recent are: 
 
Visit Vitals /72 (BP 1 Location: Right arm, BP Patient Position: At rest) Pulse 99 Temp 98.4 °F (36.9 °C) Resp 18 Ht 6' 2\" (1.88 m) Wt 80.3 kg (177 lb 0.5 oz) SpO2 99% BMI 22.73 kg/m² Intake/Output Summary (Last 24 hours) at 3/9/2020 2139 Last data filed at 3/9/2020 1352 Gross per 24 hour Intake 480 ml Output  Net 480 ml Tmax:  Temp (24hrs), Av.8 °F (36.6 °C), Min:97.4 °F (36.3 °C), Max:98.4 °F (36.9 °C) Data Review:  
Data reviewed by myself: 
Cta Chest W Or W Wo Cont Result Date: 2020 EXAM:  CTA CHEST W OR W WO CONT INDICATION:   SOB COMPARISON: None. TECHNIQUE: Precontrast  images were obtained to localize the volume for acquisition. Multislice helical CT arteriography was performed from the diaphragm to the thoracic inlet during uneventful rapid bolus of 100 cc Isovue-370. Lung and soft tissue windows were generated. Coronal and sagittal images were generated and 3D post processing consisting of coronal maximum intensity images was performed.    CT dose reduction was achieved through use of a standardized protocol tailored for this examination and automatic exposure control for dose modulation. FINDINGS: CHEST: THYROID: No nodule. MEDIASTINUM: No mass or lymphadenopathy. LEV: No mass or lymphadenopathy. THORACIC AORTA: Atherosclerotic without evidence of aneurysm. MAIN PULMONARY ARTERY: Pulmonary emboli right upper lobe branches. TRACHEA/BRONCHI: Patent. ESOPHAGUS: No wall thickening or dilatation. HEART: Normal in size. PLEURA: No effusion or pneumothorax. LUNGS: Emphysematous changes. Focal consolidation right lower lobe. Left basilar atelectasis. INCIDENTALLY IMAGED UPPER ABDOMEN: 7.7 cm right renal cyst. 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 Endograft is partially visualized. Ethelene Gauss BONES: No destructive bone lesion. IMPRESSION: Right upper lobe pulmonary emboli. Localized consolidation right lower lobe. Left basilar atelectasis. The findings were called to the patient's nurse on 2/20/2020 at 6:39 PM by myself. [employer] Result Date: 2/21/2020 INDICATION: Abdominal distention COMPARISON: None TECHNIQUE: Routine ultrasound images of the abdomen were obtained. FINDINGS: GALLBLADDER: No cholecystolithiasis, gallbladder wall thickening, or pericholecystic fluid. COMMON BILE DUCT: 3 mm in diameter. No evidence of choledocholithiasis. LIVER: Normal in size. Normal echogenicity. No focal hepatic mass or intrahepatic biliary dilatation. MAIN PORTAL VEIN: Patent. Hepatopetal flow direction. PANCREAS: Obscured by overlying bowel gas. RIGHT KIDNEY: 12.8 cm in length. 8.2 cm cyst. No hydronephrosis. No evidence of mass or calculus. LEFT KIDNEY: 11.5 cm in length. No hydronephrosis. No evidence of mass or calculus. SPLEEN: Obscured by overlying bowel gas. AORTA: Obscured by overlying bowel gas. INFERIOR VENA CAVA: Patent. OTHER: No ascites. IMPRESSION: Large right renal cyst. Otherwise unremarkable abdominal ultrasound. Xr Chest [facility] Result Date: 2/20/2020 INDICATION:  SOB. History of chronic asthma. Prostate cancer. COPD. EXAM: Chest single view. COMPARISON: 9/10/2017. FINDINGS: A single frontal view of the chest at 1419 hours shows bibasilar atelectasis or scar, with mild interstitial prominence increased since the prior study. .  The heart, mediastinum and pulmonary vasculature are stable . The bony thorax is unremarkable for age. . Port-A-Cath device on the right is stable. IMPRESSION: Bibasilar atelectasis or scar with interstitial prominence. Correlate for developing interstitial edema or bibasilar inflammatory infiltrate in this patient with COPD and chronic asthma. .  . No results found for: SDES Lab Results Component Value Date/Time Culture result: HEAVY PSEUDOMONAS AERUGINOSA (A) 03/04/2020 11:54 AM  
 Culture result: MODERATE NORMAL RESPIRATORY DAVID 03/04/2020 11:54 AM  
 Culture result: NO GROWTH 5 DAYS 03/04/2020 11:49 AM  
 
All Micro Results Procedure Component Value Units Date/Time CULTURE, BLOOD, PAIRED [[id number]] Collected:  03/04/20 1149 Order Status:  Completed Specimen:  Blood Updated:  03/09/20 1351 Special Requests: NO SPECIAL REQUESTS Culture result: NO GROWTH 5 DAYS     
 CULTURE, RESPIRATORY/SPUTUM/BRONCH Walker Fluke STAIN [[id number]]  (Abnormal)  (Susceptibility) Collected:  03/04/20 1154 Order Status:  Completed Specimen:  Sputum Updated:  03/06/20 2250 Special Requests: NO SPECIAL REQUESTS     
  GRAM STAIN FEW EPITHELIAL CELLS SEEN     
   1+ WBCS SEEN     
      
  1+ GRAM POSITIVE COCCI IN CHAINS Culture result:    
  HEAVY PSEUDOMONAS AERUGINOSA MODERATE NORMAL RESPIRATORY DAVID  
     
 CULTURE, MRSA [[id number]] Collected:  03/04/20 1030 Order Status:  Completed Specimen:  Nares Updated:  03/05/20 2243 Special Requests: NO SPECIAL REQUESTS Culture result: MRSA NOT PRESENT       Screening of patient nares for MRSA is for surveillance purposes and, if positive, to facilitate isolation considerations in high risk settings. It is not intended for automatic decolonization interventions per se as regimens are not sufficiently effective to warrant routine use. Cta Chest W Or W Wo Cont Result Date: 3/3/2020 IMPRESSION: 1. Bibasilar airspace disease 2. Severe emphysema 3. Presumed reactive adenopathy in the right infrahilar region 4. No acute pulmonary embolus Cta Chest W Or W Wo Cont Result Date: 2/20/2020 IMPRESSION: Right upper lobe pulmonary emboli. Localized consolidation right lower lobe. Left basilar atelectasis. The findings were called to the patient's nurse on 2/20/2020 at 6:39 PM by myself. [address] Us Abd Comp Result Date: 2/21/2020 IMPRESSION: Large right renal cyst. Otherwise unremarkable abdominal ultrasound. Xr Chest [facility] Result Date: 3/6/2020 IMPRESSION: 1. Stable prominent bibasilar pneumonia. 2. Emphysema. Xr Chest [facility] Result Date: 2/27/2020 IMPRESSION: Stable mild bibasilar opacities. Xr Chest [facility] Result Date: 2/23/2020 IMPRESSION: 1. No radiographic evidence of acute cardiopulmonary disease. Xr Chest [facility] Result Date: 2/20/2020 IMPRESSION: Bibasilar atelectasis or scar with interstitial prominence. Correlate for developing interstitial edema or bibasilar inflammatory infiltrate in this patient with COPD and chronic asthma. .  . Labs: reviewed by myself. Recent Labs 03/09/20 
0154 03/08/20 
5513 WBC 10.5 8.8 HGB 10.1* 9.5* HCT 34.1* 32.5* * 393 Recent Labs 03/09/20 
0154 03/08/20 
0236 03/07/20 
0424  138 136  
K 3.8 3.9 4.9  
 102 103 CO2 35* 36* 27 BUN 17 18 15 CREA 0.64* 0.65* 0.55* * 222* 132* CA 9.2 9.2 9.3 MG 2.1 2.2 2.1 PHOS  --   --  2.5* No results for input(s): SGOT, GPT, ALT, AP, TBIL, TBILI, TP, ALB, GLOB, GGT, AML, LPSE in the last 72 hours. No lab exists for component: AMYP, HLPSE No results for input(s): INR, PTP, APTT, INREXT, INREXT in the last 72 hours. No results for input(s): FE, TIBC, PSAT, FERR in the last 72 hours. No results found for: FOL, RBCF No results for input(s): PH, PCO2, PO2 in the last 72 hours. No results for input(s): CPK, CKNDX, TROIQ in the last 72 hours. No lab exists for component: CPKMB Lab Results Component Value Date/Time Cholesterol, total 187 09/11/2017 05:53 AM  
 HDL Cholesterol 63 09/11/2017 05:53 AM  
 LDL, calculated 109 (H) 09/11/2017 05:53 AM  
 Triglyceride 75 09/11/2017 05:53 AM  
 CHOL/HDL Ratio 3.0 09/11/2017 05:53 AM  
 
Lab Results Component Value Date/Time Glucose (POC) 274 (H) 03/09/2020 04:26 PM  
 Glucose (POC) 124 (H) 03/09/2020 11:41 AM  
 Glucose (POC) 118 (H) 03/09/2020 08:40 AM  
 Glucose (POC) 206 (H) 03/08/2020 09:18 PM  
 Glucose (POC) 273 (H) 03/08/2020 05:11 PM  
 
Lab Results Component Value Date/Time Color YELLOW/STRAW 09/10/2017 10:09 PM  
 Appearance CLEAR 09/10/2017 10:09 PM  
 Specific gravity 1.010 09/10/2017 10:09 PM  
 pH (UA) 5.0 09/10/2017 10:09 PM  
 Protein NEGATIVE  09/10/2017 10:09 PM  
 Glucose NEGATIVE  09/10/2017 10:09 PM  
 Ketone NEGATIVE  09/10/2017 10:09 PM  
 Bilirubin NEGATIVE  09/10/2017 10:09 PM  
 Urobilinogen 0.2 09/10/2017 10:09 PM  
 Nitrites NEGATIVE  09/10/2017 10:09 PM  
 Leukocyte Esterase NEGATIVE  09/10/2017 10:09 PM  
 Epithelial cells MODERATE (A) 09/10/2017 10:09 PM  
 Bacteria NEGATIVE  09/10/2017 10:09 PM  
 WBC 5-10 09/10/2017 10:09 PM  
 RBC 0-5 09/10/2017 10:09 PM  
 
 
 
Medications Reviewed:  
 
Current Facility-Administered Medications Medication Dose Route Frequency  predniSONE (DELTASONE) tablet 40 mg  40 mg Oral DAILY WITH BREAKFAST  albuterol-ipratropium (DUO-NEB) 2.5 MG-0.5 MG/3 ML  3 mL Nebulization Q4H PRN  piperacillin-tazobactam (ZOSYN) 3.375 g in 0.9% sodium chloride (MBP/ADV) 100 mL  3.375 g IntraVENous Q8H  
  apixaban (ELIQUIS) tablet 5 mg  5 mg Oral BID  
 guaiFENesin (ROBITUSSIN) 100 mg/5 mL oral liquid 100 mg  100 mg Oral Q4H PRN  
 arformoteroL (BROVANA) neb solution 15 mcg  15 mcg Nebulization BID RT  
 oxymetazoline (AFRIN) 0.05 % nasal spray 2 Spray  2 Spray Both Nostrils BID PRN  polyethylene glycol (MIRALAX) packet 17 g  17 g Oral DAILY PRN  
 diphenhydrAMINE (BENADRYL) capsule 25 mg  25 mg Oral Q6H PRN  
 sertraline (ZOLOFT) tablet 25 mg  25 mg Oral DAILY  levETIRAcetam (KEPPRA) tablet 500 mg  500 mg Oral BID  sodium chloride (NS) flush 5-40 mL  5-40 mL IntraVENous Q8H  
 sodium chloride (NS) flush 5-40 mL  5-40 mL IntraVENous PRN  
 acetaminophen (TYLENOL) tablet 650 mg  650 mg Oral Q4H PRN  
 ferrous sulfate tablet 325 mg  325 mg Oral ACB  hydroxyzine HCL (ATARAX) tablet 25 mg  25 mg Oral BID  montelukast (SINGULAIR) tablet 10 mg  10 mg Oral DAILY  pantoprazole (PROTONIX) tablet 40 mg  40 mg Oral ACB&D  
 budesonide (PULMICORT) 250 mcg/2ml nebulizer susp  250 mcg Nebulization BID RT  
 albuterol (PROVENTIL VENTOLIN) nebulizer solution 2.5 mg  2.5 mg Nebulization Q2H PRN  
 glucose chewable tablet 16 g  4 Tab Oral PRN  
 glucagon (GLUCAGEN) injection 1 mg  1 mg IntraMUSCular PRN  
 dextrose 10% infusion 0-250 mL  0-250 mL IntraVENous PRN  
 insulin lispro (HUMALOG) injection   SubCUTAneous AC&HS  
 
______________________________________________________________________ EXPECTED LENGTH OF STAY: 4d 7h 
ACTUAL LENGTH OF STAY:          Patricia Sorto MD  
 
Patient's emergency contacts: 
Extended Emergency Contact Information Primary Emergency Contact: Deven Emanuel Address: [address] [place] [address] Home Phone: [phone] Mobile Phone: [phone] Relation:

## 2020-03-10 NOTE — PROGRESS NOTES
Transition of Care Plan to SNF/Rehab Communication to Patient/Family: Met with patient and family and they are agreeable to the transition plan. The Plan for Transition of Care is related to the following treatment goals: [facility] The Patient and/or patient representative was provided with a choice of provider and agrees  with the discharge plan. Yes [x] No [] A Freedom of choice list was provided with basic dialogue that supports the patient's individualized plan of care/goals and shares the quality data associated with the providers. Yes [x] No [] SNF/Rehab Transition: 
Patient has been accepted to [facility] and meets criteria for admission. Patient will transported by [facility] and expected to leave at 3:30/4:00. Communication to SNF/Rehab: 
Bedside RN,Treva has been notified to update the transition plan to the facility and call report [phone], wing 1 . Discharge information has been updated on the AVS. And communicated to facility via [employer], or CC link. Discharge instructions to be fax'd to facility at [phone] Patient has been identified as part of the [employer].  For Care Coordination associated with that Bundle Program, please contact Silver Robbins [phone] Bundle information has been communication to facility. Nursing Please include all hard scripts for controlled substances, med rec and dc summary, and AVS in packet. Reviewed and confirmed with facility liaison at  [phone] that they can manage the patient care needs for the following:  
 
Norma Cazares with (X) only those applicable: 
Medication: 
[x]Medications are available at the facility [x]IV Antibiotics []Controlled Substance  hard copies available sent. [x]Weekly Labs Equipment: 
[]CPAP/BiPAP []Wound Vacuum []Travis or Urinary Device [x]PICC/Central Line []Nebulizer []Ventilator Treatment: 
[]Isolation (for MRSA, VRE, etc.) []Surgical Drain Management []Tracheostomy Care 
[]Dressing Changes []Dialysis with transportation []PEG Care 
[x]Oxygen []Daily Weights for Heart Failure Dietary: 
[x]Any diet limitations []Tube Feedings []Total Parenteral Management (TPN) Financial Resources: 
[]Medicaid Application Completed [x]UAI Completed and copy given to pt/family 
and copy given to pt/family 
[]A screening has previously been completed. []Level II Completed 
 
[] Private pay individual who will not become  
financially eligible for Medicaid within 6 months from admission to a [address] [place] [address] facility. [] Individual refused to have screening conducted. []Medicaid Application Completed []The screening denied because it was determined individual did not need/did not qualify for nursing facility level of care. [] Out of state residents seeking direct admission to a [address] facility. [] Individuals who are inpatients of an out of state hospital, or in state or out of state veterans/ hospital and seek direct admission to a [address] facility [] Individuals who are pateints or residents of a state owned/operated facility that is licensed by [employer] ([employer]) and seek direct admission to [address] facility [] A screening not required for enrollment in [facility] services as set out in 12 VAC 30- [] [facility]) staff shall perform screenings of the [facility] clients. Advanced Care Plan: 
[]Surrogate Decision Maker of Care 
[]POA []Communicated Code Status and copy sent. Other:  [employer] to deliver trilogy to facility today per Sean Lechuga MS

## 2020-03-10 NOTE — PROGRESS NOTES
[facility], Critical Care, and Sleep Medicine Progress Note Name: Itzel Zamora MRN: [id number] : 1945 Hospital: [facility]  Date: 3/10/2020 IMPRESSION:  
· AE of COPD- due to PE and probable RLL PNA ( followed by Dr Medhat Brooke as an outpatient basis) · Acute on chronic Resp acidosis with  metabolic alkaosis, likely secondary to ongoing diuretics on top of chronic resp acidosis. Suspsect that this is driving hypoxemia and his WOB. No, pO2, pCO2 and bicarb are better because of diamox. Improving · RUL PE 
· PH- ECHO  EF 60% no MR or AS PASP 66 RV nml · COPD · DM2 RECOMMENDATIONS:  
· Pt will need ECHO in 6 months to reassess- if PASP still > 60 will need w/u for other causes PH (group I or 2 etc) · Wean steroids · eliquis · Nebs, go to home inhalers at discharge · Continue NIV at night and PRN during the day · Going to rehab facility at discharge; set up for NIV while there because they cannot accept trilogy unit · Trilogy setup. Bipap (including s/t) and cpap will not adequately manage his hypercapnia for the long term. AVAPS mode in the form of trilogy will be the most appropriate tool. I have discussed with patient and sister and they are both in agreement. This is for the management of hypercapnia in the setting of COPD and he does not have any signs suggestive of YOVANY. · Follow up Chest xray in 4-6 wks with Dr Vick Hopkins · + pseudomonas in sputum; now on zosyn alone; can narrow to augmentin or levaquin to complete course Subjective:  
 
3/10 Working with PT. Looks better 3/9 Feeling better overall 3/6 Eating breakfast 
breathing well 3/ Increased O2 requirement. + sputum production 3/3 Working with PT Possibly leaving today Noted increased anxiety 3/2 He is feeling better  Doing better overall today I called and discussed with sister  Clinical status is improving  Feeling a little better today. Used NIV overnight. He is mildly dyspneic but was able to eat his breakfast. Denies cough or hemoptysis or chest pain. 2/25 A/o; talkative. On NIV for WOB  
 
2/24 On NIV  
 
2/22 This patient has been seen and evaluated at the request of Dr. Edilberto Branch for SOB. Patient is a 77 y.o. male H/o COPD on spiriva and follows with Dr. Brittanie Donahue. Pt admitted with SOB and RUL PE. Started on eliquis. Pt alert on bipap and nebs Less SOb moves all ext equally no CP Past Medical History:  
Diagnosis Date  Asthma   
 hx of/has wheezing  Cancer [facility])   
 prostate - not treated yet  Cerebral artery occlusion with [facility] infarction [address] [address]  Chronic obstructive pulmonary disease ([facility] [address]  Epilepsy ([facility] [address]  Ill-defined condition   
 shortness of breath - being evaluated  Psychiatric disorder   
 mental disability Past Surgical History:  
Procedure Laterality Date  COLONOSCOPY Left 10/4/2017 COLONOSCOPY performed by Rocio Caceres MD at [facility] ENDOSCOPY  
 HX INTRACRANIAL ANEURYSM REPAIR    
 HX OTHER SURGICAL    
 cyst removed from neck  HX PROSTATE SURGERY    
 HX SPLENECTOMY Prior to Admission medications Medication Sig Start Date End Date Taking? Authorizing Provider  
sertraline (ZOLOFT) 25 mg tablet Take 25 mg by mouth daily. Yes Provider, Historical  
atorvastatin (LIPITOR) 40 mg tablet Take 40 mg by mouth daily. Yes Provider, Historical  
arformoteroL (BROVANA) 15 mcg/2 mL nebu neb solution 15 mcg by Nebulization route two (2) times a day. Yes Provider, Historical  
multivitamin (ONE A DAY) tablet Take 1 Tab by mouth daily. Yes Provider, Historical  
ferrous sulfate 325 mg (65 mg iron) tablet Take 325 mg by mouth Daily (before breakfast). Yes Provider, Historical  
fluticasone propionate (FLONASE ALLERGY RELIEF) 50 mcg/actuation nasal spray 2 Sprays by Both Nostrils route daily.    Yes Provider, Historical  
 hydroxyzine HCL (ATARAX) 25 mg tablet Take 25 mg by mouth two (2) times a day. Yes Provider, Historical  
ipratropium (ATROVENT) 0.02 % soln 0.5 mg by Other route every eight (8) hours. Oral inhalation   Yes Provider, Historical  
metFORMIN (GLUCOPHAGE) 500 mg tablet Take 500 mg by mouth daily (with breakfast). Yes Provider, Historical  
montelukast (SINGULAIR) 10 mg tablet Take 10 mg by mouth daily. Yes Provider, Historical  
predniSONE (DELTASONE) 10 mg tablet Take 20 mg by mouth daily (with breakfast). COPD exacerbation   Yes Provider, Historical  
budesonide-formoteroL (SYMBICORT) 160-4.5 mcg/actuation HFAA Take 2 Puffs by inhalation two (2) times a day. Yes Provider, Historical  
levalbuterol tartrate (XOPENEX) 45 mcg/actuation inhaler Take 1 Puff by inhalation every eight (8) hours. Yes Provider, Historical  
cetirizine (ZYRTEC) 10 mg tablet Take 10 mg by mouth daily. Yes Provider, Historical  
levETIRAcetam (KEPPRA) 500 mg tablet TAKE 1 TABLET BY MOUTH TWICE A DAY 11/8/19  Yes Nirali Howard DO  
pantoprazole (PROTONIX) 40 mg tablet Take 1 Tab by mouth Before breakfast and dinner. 10/6/17  Yes Kylee Callejas MD  
umeclidinium (INCRUSE ELLIPTA) 62.5 mcg/actuation inhaler Take 1 Puff by inhalation daily. Indications: Rescue inhaler   Yes Provider, Historical  
 
No Known Allergies Social History Tobacco Use  Smoking status: Current Every Day Smoker  Smokeless tobacco: Never Used  Tobacco comment: cigarettes Substance Use Topics  Alcohol use: No  
  
Family History Problem Relation Age of Onset  Colon Cancer Maternal Aunt  Colon Cancer Maternal Aunt  Colon Cancer Maternal Aunt Current Facility-Administered Medications Medication Dose Route Frequency  predniSONE (DELTASONE) tablet 40 mg  40 mg Oral DAILY WITH BREAKFAST  piperacillin-tazobactam (ZOSYN) 3.375 g in 0.9% sodium chloride (MBP/ADV) 100 mL  3.375 g IntraVENous Q8H  
  apixaban (ELIQUIS) tablet 5 mg  5 mg Oral BID  arformoteroL (BROVANA) neb solution 15 mcg  15 mcg Nebulization BID RT  
 sertraline (ZOLOFT) tablet 25 mg  25 mg Oral DAILY  levETIRAcetam (KEPPRA) tablet 500 mg  500 mg Oral BID  sodium chloride (NS) flush 5-40 mL  5-40 mL IntraVENous Q8H  
 ferrous sulfate tablet 325 mg  325 mg Oral ACB  hydroxyzine HCL (ATARAX) tablet 25 mg  25 mg Oral BID  montelukast (SINGULAIR) tablet 10 mg  10 mg Oral DAILY  pantoprazole (PROTONIX) tablet 40 mg  40 mg Oral ACB&D  
 budesonide (PULMICORT) 250 mcg/2ml nebulizer susp  250 mcg Nebulization BID RT  
 insulin lispro (HUMALOG) injection   SubCUTAneous AC&HS Review of Systems: 
 
 
Objective:  
Vital Signs:   
Visit Vitals /81 (BP 1 Location: Right arm, BP Patient Position: At rest) Pulse 84 Temp 98 °F (36.7 °C) Resp 21 Ht 6' 2\" (1.88 m) Wt 85.9 kg (189 lb 6 oz) SpO2 96% BMI 24.31 kg/m² O2 Device: Nasal cannula O2 Flow Rate (L/min): 3 l/min Temp (24hrs), Av.1 °F (36.7 °C), Min:97.8 °F (36.6 °C), Max:98.4 °F (36.9 °C) Intake/Output:  
Last shift:      03/10 07 - 03/10 1900 In: 100 [I.V.:100] Out: - Last 3 shifts: 1901 - 03/10 0700 In: 1380 [P.O.:480; I.V.:900] Out: - Intake/Output Summary (Last 24 hours) at 3/10/2020 1135 Last data filed at 3/10/2020 0730 Gross per 24 hour Intake 1240 ml Output  Net 1240 ml Physical Exam:  
General:  Alert, cooperative, no distress, appears stated age. Head:  Normocephalic, without obvious abnormality, atraumatic. Eyes:  Conjunctivae/corneas clear. Nose: Nares normal. Septum midline. Mucosa normal. No drainage or sinus tenderness. Lungs:   Clear to auscultation bilaterally. Mild tachypnea, no accessory muscle use. Heart:  Regular rate and rhythm, S1, S2 normal, no murmur, click, rub or gallop. Abdomen:   Protuberant / distended, non-tender. Extremities: Extremities normal, atraumatic, no cyanosis or edema. SCDs Pulses: 2+ and symmetric all extremities. Skin: Skin color, texture, turgor normal. No rashes or lesions Data review:  
 
Recent Results (from the past 24 hour(s)) GLUCOSE, POC Collection Time: 03/09/20 11:41 AM  
Result Value Ref Range Glucose (POC) 124 (H) 65 - 100 mg/dL Performed by Cheri LIN   
GLUCOSE, POC Collection Time: 03/09/20  4:26 PM  
Result Value Ref Range Glucose (POC) 274 (H) 65 - 100 mg/dL Performed by Romana Shah, POC Collection Time: 03/09/20  9:40 PM  
Result Value Ref Range Glucose (POC) 180 (H) 65 - 100 mg/dL Performed by Namrata Barreto GLUCOSE, POC Collection Time: 03/10/20  8:11 AM  
Result Value Ref Range Glucose (POC) 116 (H) 65 - 100 mg/dL Performed by [facility]: 
 
 
  
Joel Zapata PA-C

## 2020-03-10 NOTE — PROCEDURES
PICC Placement Note PRE-PROCEDURE VERIFICATION Correct Procedure: yes Correct Site:  yes Temperature: Temp: 98.1 °F (36.7 °C), Temperature Source: Temp Source: Oral 
Recent Labs 03/09/20 
0154 BUN 17 CREA 0.64* * WBC 10.5 Allergies: Patient has no known allergies. Education materials, including PICC Booklet, for PICC Care given to patient: yes. See Patient Education activity for further details. PROCEDURE DETAIL A single lumen PICC line was started for antibiotic therapy. The following documentation is in addition to the PICC properties in the lines/airways flowsheet : 
Lot #: [id number] Was xylocaine 1% used intradermally:  yes Catheter Length: 40 (cm) Vein Selection for PICC:right brachial 
Central Line Bundle followed yes Complication Related to Insertion: none The placement was verified by ECG/Sapiens technology: The  tip location is in the superior vena cava. See ECG results for PICC tip placement. Report given to nurse:  Joseluis Christine RN Line is okay to use.  
 
Bryon Juares RN

## 2020-03-11 NOTE — PROGRESS NOTES
Transition of care outreach postponed for 14 days due to patient's discharge to SNF. Per EMR patient discharged to [address].

## 2020-03-28 NOTE — PROGRESS NOTES
Outgoing call placed to facility regarding update of patient status. Spoke with staff member patient identified utilizing 2 identifiers. Introduced self and reason for the call. Staff member reports patient currently admitted to the facility. Will follow up in 14 days.

## 2020-04-09 PROBLEM — R09.02 HYPOXIA: Status: ACTIVE | Noted: 2020-01-01

## 2020-04-09 NOTE — ED TRIAGE NOTES
Pt comes in from [facility] for hypoxia, according to EMS pt was in mid 80's on RA, pt on 10L via NRB upon arrival. Pt on isolation unit at [facility], staff unsure as of why. Pt has no complaints.

## 2020-04-09 NOTE — ROUTINE PROCESS
TRANSFER - OUT REPORT: 
 
Verbal report given to Abhishek Riddle RN(name) on [facility] [place]  being transferred to (unit) for routine progression of care Report consisted of patients Situation, Background, Assessment and  
Recommendations(SBAR). Information from the following report(s) SBAR, Kardex, ED Summary, [facility] and Recent Results was reviewed with the receiving nurse. Lines:  
Peripheral IV 04/09/20 Left Hand (Active) Site Assessment Clean, dry, & intact 4/9/2020  2:52 PM  
Phlebitis Assessment 0 4/9/2020  2:52 PM  
Infiltration Assessment 0 4/9/2020  2:52 PM  
   
Peripheral IV 04/09/20 Right Hand (Active) Site Assessment Clean, dry, & intact 4/9/2020  3:02 PM  
Phlebitis Assessment 0 4/9/2020  3:02 PM  
Infiltration Assessment 0 4/9/2020  3:02 PM  
  
 
Opportunity for questions and clarification was provided. Patient transported with: 
 O2 @ 4 liters  
tech

## 2020-04-09 NOTE — PROGRESS NOTES
Admission Medication Reconciliation: 
 
Information obtained from:  Aniceto Hernández medication list 
RxQuery data available¹:  YES Comments/Recommendations: Patient presenting from [facility] facility where he currently resides. Current medication list requested and faxed over from facility. Updated PTA meds/reviewed patient's allergies as best possible based on medication list. 
 
1)  Of note, patient has numerous medications ordered at [place] for COPD management, including inhalers and nebulized medications. Patient also on prednisone 30 mg daily for COPD/Asthma for 7 days per med list until 4/13. Appears patient otherwise normally on prednisone 20 mg daily. Medication list from [facility] had both loratadine and cetirizine listed as daily medications (duplicate therapy). Unclear if patient is taking both, or one vs the other. Also unsure if patient still taking robitussin-DM. 2)  Medication changes (since last review): Added - Albuterol neb 
- Heparin lock inj 
- Saline nasal spray - Docusate - Loratadine - Robitussin-DM Adjusted - Atorvastatin (from daily to QHS) - Xopenex inhaler (from q8h to BID) - Pantoprazole (from BID to daily) - Prednisone (from 20 mg daily to 30 mg daily x 7 days) Removed - Arformoterol neb soln - Ipratropium neb soln - Montelukast 
  
¹RxQuery pharmacy benefit data reflects medications filled and processed through the patient's insurance, however  
this data does NOT capture whether the medication was picked up or is currently being taken by the patient. Allergies:  Patient has no known allergies. Significant PMH/Disease States:  
Past Medical History:  
Diagnosis Date Asthma   
 hx of/has wheezing Cancer [facility])   
 prostate - not treated yet Cerebral artery occlusion with cerebral infarction ([facility] [address] Chronic obstructive pulmonary disease (HCC) Epilepsy ([facility] [address] Ill-defined condition shortness of breath - being evaluated Psychiatric disorder   
 mental disability Chief Complaint for this Admission: Chief Complaint Patient presents with Shortness of Breath Prior to Admission Medications:  
Prior to Admission Medications Prescriptions Last Dose Informant Taking? albuterol (PROVENTIL VENTOLIN) 2.5 mg /3 mL (0.083 %) nebu   Yes Si.5 mg by Nebulization route four (4) times daily. apixaban (ELIQUIS) 5 mg tablet   Yes Sig: Take 1 Tab by mouth two (2) times a day. atorvastatin (LIPITOR) 40 mg tablet   Yes Sig: Take 40 mg by mouth nightly. budesonide-formoteroL (SYMBICORT) 160-4.5 mcg/actuation HFAA   Yes Sig: Take 2 Puffs by inhalation two (2) times a day. cetirizine (ZYRTEC) 10 mg tablet   Unknown Sig: Take 10 mg by mouth daily. docusate sodium (COLACE) 100 mg capsule   Yes Sig: Take 100 mg by mouth two (2) times a day. ferrous sulfate 325 mg (65 mg iron) tablet   Yes Sig: Take 325 mg by mouth Daily (before breakfast). fluticasone propionate (FLONASE ALLERGY RELIEF) 50 mcg/actuation nasal spray   Yes Si Sprays by Both Nostrils route daily. guaiFENesin-dextromethorphan (Siltussin-DM) 100-10 mg/5 mL syrup   Unknown Sig: Take 10 mL by mouth every six (6) hours as needed for Cough or Congestion. For 5 days  
heparin, porcine, pf (heparin LockFlush,Porcine,,PF,) 10 unit/mL injection   Yes Sig: by InterCATHeter route as needed. Flush per facility protocol  
hydroxyzine HCL (ATARAX) 25 mg tablet   Yes Sig: Take 25 mg by mouth two (2) times a day. levETIRAcetam (KEPPRA) 500 mg tablet   Yes Sig: TAKE 1 TABLET BY MOUTH TWICE A DAY  
levalbuterol tartrate (XOPENEX) 45 mcg/actuation inhaler   Yes Sig: Take 1 Puff by inhalation two (2) times a day. loratadine (CLARITIN) 10 mg tablet   Unknown Sig: Take 10 mg by mouth daily. metFORMIN (GLUCOPHAGE) 500 mg tablet   Yes Sig: Take 500 mg by mouth daily (with breakfast). multivitamin (ONE A DAY) tablet   Yes Sig: Take 1 Tab by mouth daily. pantoprazole (PROTONIX) 40 mg tablet   Yes Sig: Take 40 mg by mouth daily. predniSONE (DELTASONE) 10 mg tablet   Yes Sig: Take 30 mg by mouth daily. 30 mg daily for COPD/Asthma for 7 days until   
sertraline (ZOLOFT) 25 mg tablet   Yes Sig: Take 25 mg by mouth daily. sodium chloride ([place] Nasal) 0.65 % nasal squeeze bottle   Yes Si [place] by Both Nostrils route every two (2) hours as needed for Congestion. umeclidinium (INCRUSE ELLIPTA) 62.5 mcg/actuation inhaler   Yes Sig: Take 1 Puff by inhalation daily. Indications: Rescue inhaler Facility-Administered Medications: None Please contact the main inpatient pharmacy with any questions or concerns at [phone] and we will direct you to the clinical pharmacist covering this patient's care while in-house.   
JULIO Ardon

## 2020-04-09 NOTE — H&P
[facility] Adult  Hospitalist Group History and Physical 
 
Primary Care Provider: Kelly Pardo MD 
Date of Service:  4/9/2020 Subjective:  
 
Paras Ba is a 77 y.o. male who presents with shortness of breath. He arrives from [place] and per EMS noted to be in an isolation unit. PMH significant for COPD, with last exacerbation 03/2020, DVT of RLE and PE, colon CA, DM2, and bilateral femoral aneurysms. Onset of symptoms was around noon today post exercise with physical therapy. He states his breathing has improved with rest and is currently on 4LNC, he wears 3LNC at baseline. Patient reports productive (grey to clear) cough onset this morning. He denies headache, CP, fever, chills, N/V/D, abd pain, dysuria. 2+ edema noted in BLE, patient denies significant hx of lower extremity edema. Patient denies any other symptoms at this time. He is resting comfortably without signs of distress. Review of Systems: A comprehensive review of systems was negative except for that written in the History of Present Illness. Past Medical History:  
Diagnosis Date  Asthma   
 hx of/has wheezing  Cancer [facility])   
 prostate - not treated yet  Cerebral artery occlusion with cerebral infarction ([facility] [address]  Chronic obstructive pulmonary disease ([facility] [address]  Epilepsy ([facility] [address]  Ill-defined condition   
 shortness of breath - being evaluated  Psychiatric disorder   
 mental disability Past Surgical History:  
Procedure Laterality Date  COLONOSCOPY Left 10/4/2017 COLONOSCOPY performed by Joyce Wallis MD at [facility] ENDOSCOPY  
 HX INTRACRANIAL ANEURYSM REPAIR    
 HX OTHER SURGICAL    
 cyst removed from neck  HX PROSTATE SURGERY    
 HX SPLENECTOMY Prior to Admission medications Medication Sig Start Date End Date Taking? Authorizing Provider  
apixaban (ELIQUIS) 5 mg tablet Take 1 Tab by mouth two (2) times a day.  3/10/20   Julio Valdez MD  
 sertraline (ZOLOFT) 25 mg tablet Take 25 mg by mouth daily. Provider, Historical  
atorvastatin (LIPITOR) 40 mg tablet Take 40 mg by mouth daily. Provider, Historical  
arformoteroL (BROVANA) 15 mcg/2 mL nebu neb solution 15 mcg by Nebulization route two (2) times a day. Provider, Historical  
multivitamin (ONE A DAY) tablet Take 1 Tab by mouth daily. Provider, Historical  
ferrous sulfate 325 mg (65 mg iron) tablet Take 325 mg by mouth Daily (before breakfast). Provider, Historical  
fluticasone propionate (FLONASE ALLERGY RELIEF) 50 mcg/actuation nasal spray 2 Sprays by Both Nostrils route daily. Provider, Historical  
hydroxyzine HCL (ATARAX) 25 mg tablet Take 25 mg by mouth two (2) times a day. Provider, Historical  
ipratropium (ATROVENT) 0.02 % soln 0.5 mg by Other route every eight (8) hours. Oral inhalation    Provider, Historical  
metFORMIN (GLUCOPHAGE) 500 mg tablet Take 500 mg by mouth daily (with breakfast). Provider, Historical  
montelukast (SINGULAIR) 10 mg tablet Take 10 mg by mouth daily. Provider, Historical  
predniSONE (DELTASONE) 10 mg tablet Take 20 mg by mouth daily (with breakfast). COPD exacerbation    Provider, Historical  
budesonide-formoteroL (SYMBICORT) 160-4.5 mcg/actuation HFAA Take 2 Puffs by inhalation two (2) times a day. Provider, Historical  
levalbuterol tartrate (XOPENEX) 45 mcg/actuation inhaler Take 1 Puff by inhalation every eight (8) hours. Provider, Historical  
cetirizine (ZYRTEC) 10 mg tablet Take 10 mg by mouth daily. Provider, Historical  
levETIRAcetam (KEPPRA) 500 mg tablet TAKE 1 TABLET BY MOUTH TWICE A DAY 11/8/19   Nirali Howard DO  
pantoprazole (PROTONIX) 40 mg tablet Take 1 Tab by mouth Before breakfast and dinner. 10/6/17   Pavan Callejas MD  
umeclidinium (INCRUSE ELLIPTA) 62.5 mcg/actuation inhaler Take 1 Puff by inhalation daily. Indications: Rescue inhaler    Provider, Historical  
 
No Known Allergies Family History Problem Relation Age of Onset  Colon Cancer Maternal Aunt  Colon Cancer Maternal Aunt  Colon Cancer Maternal Aunt SOCIAL HISTORY: 
Patient resides at [facility]. Patient ambulates with assistance. Smoking history: packs, 2 per day x 50yrs Alcohol history: former Objective:  
 
 
Physical Exam:  
Visit Vitals /67 Pulse (!) 102 Temp 98.9 °F (37.2 °C) Resp 20 SpO2 95% General:  Alert, cooperative, no distress, appears stated age. Head:  Normocephalic, without obvious abnormality, atraumatic. Eyes:  Conjunctivae/corneas clear. Nose: Nares normal. Septum midline. Mucosa normal. No drainage. Neck: Supple, symmetrical, trachea midline Lungs:   Regular rhythm, no accessory muscles use. Chest wall:  No deformity. Heart:  Sinus tachycardia on monitor. Abdomen:   Soft, non-tender. Extremities: Extremities normal, atraumatic, 2+ edema in BLE. Pulses: 2+ and symmetric all extremities. Skin: Skin color, texture, turgor normal. No rashes or lesions Neurologic: CNII-XII intact. Normal strength, sensation and reflexes throughout. Cap refill: Brisk, less than 3 seconds Pulses: 2+, symmetric in all extremities ECG:  sinus tachycardia, PAC's noted Data Review: All diagnostic labs and studies have been reviewed. Chest x-ray pending. Assessment:  
 
Active Problems: Hypoxia (4/9/2020) Plan: Hypoxia Reported persistent SOB post physical therapy Initially on NRB per ED note, currently on NC Patient on 4L NC (baseline 3LNC) ABG- PO2 62 (appears baseline for patient) SpO2 currently mid 90's Wean O2 down to 3L if SpO2 greater than 90% Will continue to monitor Resp panel pending Rule out Covid 19- test pending COPD exacerbation secondary to respiratory Infection? Frequent exacerbations Respiratory infection (positive culture on 3/4/2020) PRN inhaler, refrain from nebs due to COVID rule out Type II Diabetes Melitis Hold PO meds Start sliding scale correctional insulin Monitor BG Hx Pulmonary Embolism and DVT 
CTA of chest on 3/3/2020 neg for PE Repeat BLE vein duplex Continue home Eliquis Seizures secondary to Epilepsy Continue home [address] FUNCTIONAL STATUS PRIOR TO HOSPITALIZATION (including history of recent falls):Ambulates with assistance Signed By: Princess Avelar NP April 9, 2020

## 2020-04-09 NOTE — ED NOTES
Pt transitioned from NRB to 4L NC, sats in ke 90's, pt given call bell, bed locked and in low position. Pt has no complaints. Will continue to monitor. ER Rm 16 placed on Neg Pressure. 3:33 PM 
RT notified of ABG. RN received call from [facility] and sister). She would like to be notified of pt's dispo. Per her, pt does wear 3L NC at baseline.

## 2020-04-09 NOTE — PROGRESS NOTES
TRANSFER - IN REPORT: 
 
Verbal report received from Lesley (name) on Swathi Ricci  being received from ED (unit) for routine progression of care Report consisted of patients Situation, Background, Assessment and  
Recommendations(SBAR). Information from the following report(s) SBAR, Kardex, ED Summary, [facility] and Recent Results was reviewed with the receiving nurse. Opportunity for questions and clarification was provided. Assessment completed upon patients arrival to unit and care assumed.

## 2020-04-09 NOTE — ED PROVIDER NOTES
71-year-old male with history of COPD, on 2 to 3 L (with activity) by nasal cannula at baseline, epilepsy, prior CVA, prostate cancer, presents to the emergency department by EMS from [facility] living [facility] where he was reportedly staying in the isolation unit, but per EMS staff was unsure as of why. Patient states that starting this morning he had increased shortness of breath and nonproductive cough but denies fevers, chills, myalgias, rhinorrhea, sore throat, nausea, vomiting, diarrhea, chest pain. He states that he normally uses albuterol and has been using this as Rx'd at his facility to no avail. He was seen to be hypoxic to the mid 80s based on 10 L by nonrebreather by staff there. Patient was GCS 15 in no acute distress mildly tachycardic but satting in the mid 90s on 10 L by NRB. Denies any known sick contacts or recent travel. Past Medical History:  
Diagnosis Date  Asthma   
 hx of/has wheezing  Cancer [facility])   
 prostate - not treated yet  Cerebral artery occlusion with cerebral infarction ([facility] [address]  Chronic obstructive pulmonary disease ([facility] [address]  Epilepsy ([facility] [address]  Ill-defined condition   
 shortness of breath - being evaluated  Psychiatric disorder   
 mental disability Past Surgical History:  
Procedure Laterality Date  COLONOSCOPY Left 10/4/2017 COLONOSCOPY performed by Julia Herrera MD at [facility] ENDOSCOPY  
 HX INTRACRANIAL ANEURYSM REPAIR    
 HX OTHER SURGICAL    
 cyst removed from neck  HX PROSTATE SURGERY    
 HX SPLENECTOMY Family History:  
Problem Relation Age of Onset  Colon Cancer Maternal Aunt  Colon Cancer Maternal Aunt  Colon Cancer Maternal Aunt Social History Socioeconomic History  Marital status: LEGALLY  Spouse name: Not on file  Number of children: Not on file  Years of education: Not on file  Highest education level: Not on file Occupational History  Not on file Social Needs  Financial resource strain: Not on file  Food insecurity Worry: Not on file Inability: Not on file  Transportation needs Medical: Not on file Non-medical: Not on file Tobacco Use  Smoking status: Current Every Day Smoker  Smokeless tobacco: Never Used  Tobacco comment: cigarettes Substance and Sexual Activity  Alcohol use: No  
 Drug use: No  
 Sexual activity: Not on file Lifestyle  Physical activity Days per week: Not on file Minutes per session: Not on file  Stress: Not on file Relationships  Social connections Talks on phone: Not on file Gets together: Not on file Attends Taoist service: Not on file Active member of club or organization: Not on file Attends meetings of clubs or organizations: Not on file Relationship status: Not on file  Intimate partner violence Fear of current or ex partner: Not on file Emotionally abused: Not on file Physically abused: Not on file Forced sexual activity: Not on file Other Topics Concern  Not on file Social History Narrative  Not on file ALLERGIES: Patient has no known allergies. Review of Systems Constitutional: Positive for activity change. Negative for appetite change, chills and fever. HENT: Negative for congestion, rhinorrhea, sinus pain, sneezing and sore throat. Eyes: Negative for photophobia and visual disturbance. Respiratory: Positive for cough and shortness of breath. Cardiovascular: Negative for chest pain. Gastrointestinal: Negative for abdominal pain, blood in stool, constipation, diarrhea, nausea and vomiting. Genitourinary: Negative for difficulty urinating, dysuria, flank pain, hematuria, penile pain and testicular pain. Musculoskeletal: Negative for arthralgias, back pain, myalgias and neck pain. Skin: Negative for rash and wound. Neurological: Negative for syncope, weakness, light-headedness, numbness and headaches. Psychiatric/Behavioral: Negative for self-injury and suicidal ideas. All other systems reviewed and are negative. Vitals:  
 04/09/20 1530 04/09/20 1600 04/09/20 1630 04/09/20 1700 BP: 109/67 103/71 108/66 153/79 Pulse: (!) 102 (!) 104 (!) 102 (!) 109 Resp: 20 18 23 27 Temp:    99.2 °F (37.3 °C) SpO2: 95% 96% 98% 95% Physical Exam 
Vitals signs and nursing note reviewed. Constitutional:   
   General: He is not in acute distress. Appearance: Normal appearance. He is not diaphoretic. Comments: Chronically ill-appearing HENT:  
   Head: Normocephalic and atraumatic. Nose: Nose normal.  
Eyes:  
   Extraocular Movements: Extraocular movements intact. Conjunctiva/sclera: Conjunctivae normal.  
   Pupils: Pupils are equal, round, and reactive to light. Neck: Musculoskeletal: Neck supple. Cardiovascular:  
   Rate and Rhythm: Normal rate and regular rhythm. Heart sounds: Normal heart sounds. Pulmonary:  
   Effort: Pulmonary effort is normal.  
   Breath sounds: Decreased breath sounds present. Comments: Speaking in full sentences Abdominal:  
   General: There is no distension. Palpations: Abdomen is soft. Tenderness: There is no abdominal tenderness. Musculoskeletal:     
   General: No tenderness. Skin: 
   General: Skin is warm and dry. Neurological:  
   General: No focal deficit present. Mental Status: He is alert and oriented to person, place, and time. Cranial Nerves: No cranial nerve deficit. Sensory: No sensory deficit. Motor: No weakness. Coordination: Coordination normal.  
 
  
 
MDM 
 72-year-old male with history of COPD presents with worsened shortness of breath and increased oxygen demand and cough. Patient here from [facility] assisted living so high suspicion for coronavirus at this time.   Initially he was on nonrebreather at 10 L but was able to be weaned down to 4 L by nasal cannula. Case was discussed with infection control and they agreed with plan to test along with respiratory panel PCR. Tachycardic but afebrile, stable blood pressure Labs show hypoxia on ABG but no acidosis, normal pH, CO2 62. D-dimer elevated 1.76, normal lactic acid, no leukocytosis or anemia, troponin, normal BNP He was placed on 4 L by nasal cannula and stabilized, and appeared comfortable in no distress. Do not feel that patient requires intubation at this time. Chest x-ray was viewed by myself and read by radiology showing diminished opacities in the lungs. Procedures Total critical care time spent exclusive of procedures:  45 minutes 8:40 PM discussed case with Dr. Clementine Paris in the ED to see the patient and admit them to his service. 1444 EKG shows sinus tachycardia with PACs with nonspecific T wave flattening inferiorly but no ST elevation or depression. Dick Soto was evaluated in the Emergency Department on 4/9/2020 for the symptoms described in the history of present illness. He/she was evaluated in the context of the global COVID-19 pandemic, which necessitated consideration that the patient might be at risk for infection with the SARS-CoV-2 virus that causes COVID-19. Institutional protocols and algorithms that pertain to the evaluation of patients at risk for COVID-19 are in a state of rapid change based on information released by regulatory bodies including the CDC and federal and state organizations. These policies and algorithms were followed during the patient's care in the ED.

## 2020-04-10 NOTE — CONSULTS
[facility] 
 
ICU Intensivist- Critical Care Progress Note Name: Felix Degroot : 1945 MRN: [id number] Admit: 2020  2:36 PM   
 
Diagnosis:  
 
Principal Problem: 
Acute hypoxic respiratory failure Problem List: 
2020: Hypoxia 2020: COPD exacerbation ([facility] [address] 2020: Acute respiratory failure with hypoxia (HCC) 2017: GI bleed 2017: Slurred speech 2017: Seizures ([facility] [address] 2017: Altered mental status 2017: Cerebrovascular accident (CVA) due to thrombosis of right  
posterior cerebral artery ([facility] [address] ICU Comprehensive Plan of Care:  
 
Plans for this Shift: 1. Neurological -GCS 15. Continue patient on Keppra for seizures. 2. Respiratory - Patient has acute hypoxic respiratory failure secondary to COPD exacerbation. Chest x-ray negative for any acute findings minus severe emphysema and hyperinflated lungs. Plan is to continue patient on every 4 hour albuterol inhaler. Patient is coded positive. Patient be placed in negative pressure room. Patient to be started on high flow nasal cannula 10 L and FiO2 50%. ABG shows pH 7.40, CO2 59.2, PO2 46, bicarbonate 36.7. Patient is fully compensated. P.o. prednisone discontinued. Patient started on IV methylprednisolone 40 mg every 8 hours. Will taper as needed. Patient started on Symbicort inhaler as well. Medical team will follow-up. Chest x-ray and ABG in a.m. We will give the patient 2 g IV magnesium to help for bronchodilation. 3. Hemodynamics -no signs of hypotension. Ejection fraction 55/60%. 4. ID -no leukocytosis WBC is 10.7. Afebrile. UTI negative. 5. Hematology/oncology -patient has prior history of prostate cancer. Currently on Eliquis for anticoagulation therapy 6. Disposition -patient to be transferred to intermediate stepdown unit. I have spoken with the hospitalist team and the patient has any further deterioration he will be admitted to the ICU.   Currently his hypoxia appears to be driven from his severe emphysema and his COPD exacerbation. Once this is treated hopefully the patient's hypoxia will improve. We will continue to monitor. We appreciate you consulting us s participate in the patient's care. Subjective:  
 
Progress Note:  
4/10/2020  
12:35 AM  
 
Reason for ICU Admission:  
Acute hypoxemic respiratory failure ([facility] [address] Acute on chronic COPD exacerbation Severe emphysema Hypoxemia History of seizures HPI: I was contacted by the medical team due to the patient experiencing severe hypoxemia which was life-threatening. PO2 was approximately 46. ABG showed full compensation. pH 7.40, CO2 59.2, PO2 46, bicarbonate 36.7. Patient has no leukocytosis WBC is 10.7. Afebrile. Urine cultures are negative. Renal function is okay. Urine drug screen negative. Patient CT scan shows severe emphysema. Chest x-ray shows hyperinflation of the lungs. Ejection fraction 55/60%. Patient takes Eliquis for anticoagulation therapy. Patient is COVID-19 positive test.  Plan is to start the patient on albuterol every 4 hours inhaled. Patient started on methylprednisolone 40 mg every 8 hours. Patient started on Symbicort inhaler as well. Will titrate IV steroids accordingly. Patient placed on high flow nasal cannula 10 L. FiO2 50%. Plan is to maintain oxygen saturation between 88 to 92%. Patient wears home oxygen 2 to 3 L. We will repeat chest x-ray and ABG in a.m. Patient be transferred to intermediate stepdown unit and placed in negative pressure room. Hospitalist team has been gracious enough to help us with transferring this patient to the intermediate stepdown unit. Critical care team is happy to help with the case and we appreciate your consultation. If patient further decompensates he can be moved to the ICU.  
Past Medical History:  
 
 has a past medical history of Asthma, Cancer ([facility] [address] Cerebral artery occlusion with cerebral infarction [facility]), Chronic obstructive pulmonary disease ([facility] [address] Epilepsy ([facility] [address] Ill-defined condition, and Psychiatric disorder. He also has no past medical history of Unspecified adverse effect of anesthesia or Unspecified sleep apnea. Past Surgical History:  
 
 has a past surgical history that includes hx other surgical; colonoscopy (Left, 10/4/2017); hx prostate surgery; hx splenectomy; and hx intracranial aneurysm repair. Current Medications:  
 
Current Facility-Administered Medications Medication Dose Route Frequency  methylPREDNISolone (PF) (SOLU-MEDROL) injection 40 mg  40 mg IntraVENous Q8H  
 magnesium sulfate 2 g/50 ml IVPB (premix or compounded)  2 g IntraVENous ONCE  
 albuterol (PROVENTIL HFA, VENTOLIN HFA, PROAIR HFA) inhaler 2 Puff  2 Puff Inhalation Q4H PRN  
 acetaminophen (TYLENOL) tablet 650 mg  650 mg Oral Q6H PRN Or  
 acetaminophen (TYLENOL) suppository 650 mg  650 mg Rectal Q6H PRN  
 sodium chloride (NS) flush 5-40 mL  5-40 mL IntraVENous Q8H  
 sodium chloride (NS) flush 5-40 mL  5-40 mL IntraVENous PRN  
 HYDROcodone-acetaminophen (NORCO) 5-325 mg per tablet 1 Tab  1 Tab Oral Q4H PRN  
 insulin lispro (HUMALOG) injection   SubCUTAneous AC&HS  
 glucose chewable tablet 16 g  4 Tab Oral PRN  
 glucagon (GLUCAGEN) injection 1 mg  1 mg IntraMUSCular PRN  
 dextrose 10% infusion 0-250 mL  0-250 mL IntraVENous PRN  
 apixaban (ELIQUIS) tablet 5 mg  5 mg Oral BID  atorvastatin (LIPITOR) tablet 40 mg  40 mg Oral QHS  cetirizine (ZYRTEC) tablet 10 mg  10 mg Oral DAILY  ferrous sulfate tablet 325 mg  325 mg Oral ACB  fluticasone propionate (FLONASE) 50 mcg/actuation nasal spray 2 Spray  2 Spray Both Nostrils DAILY  hydrOXYzine HCL (ATARAX) tablet 25 mg  25 mg Oral BID  levalbuterol tartrate (XOPENEX) 45 mcg/actuation inhaler HFAA 1 Puff  1 Puff Inhalation BID  levETIRAcetam (KEPPRA) tablet 500 mg  500 mg Oral BID  
  pantoprazole (PROTONIX) tablet 40 mg  40 mg Oral DAILY  sertraline (ZOLOFT) tablet 25 mg  25 mg Oral DAILY  calcium carbonate (TUMS) chewable tablet 200 mg [elemental]  200 mg Oral TID PRN Allergies/Social/Family History: No Known Allergies Social History Tobacco Use  Smoking status: Current Every Day Smoker  Smokeless tobacco: Never Used  Tobacco comment: cigarettes Substance Use Topics  Alcohol use: No  
  
Family History Problem Relation Age of Onset  Colon Cancer Maternal Aunt  Colon Cancer Maternal Aunt  Colon Cancer Maternal Aunt Review of Systems: A comprehensive review of systems was negative except for: Respiratory: positive for dyspnea on exertion or emphysema Musculoskeletal: positive for muscle weakness Neurological: positive for seizures Objective:  
Vital Signs: 
Visit Vitals /63 (BP 1 Location: Left arm, BP Patient Position: At rest) Pulse (!) 112 Temp 99.1 °F (37.3 °C) Comment: tylenol givin Resp 25 SpO2 93% O2 Flow Rate (L/min): 5 l/min O2 Device: Nasal cannula Temp (24hrs), Av.8 °F (37.1 °C), Min:98 °F (36.7 °C), Max:99.2 °F (37.3 °C) Intake/Output:  
 
Intake/Output Summary (Last 24 hours) at 4/10/2020 0035 Last data filed at 2020 1907 Gross per 24 hour Intake  Output 101 ml Net -101 ml Physical Exam: 
 
General:   Alert, cooperative, no distress, appears stated age. Eyes:   Conjunctivae/corneas clear. PERRL, EOMs intact. Ears:   Normal external ear canals bilaterally. Nose:   No drainage or sinus tenderness. Mouth/Throat:  Moist mucous membranes. Dentition normal.   
Neck:  Symmetrical, trachea midline, no JVD or carotid bruit. Back:    No CVA tenderness to percussion. Lungs:    Clear to auscultation bilaterally. Heart:   Regular rate and rhythm. S1, S2 normal, without murmur, click, rub or gallop. Abdomen:    Normoactive bowel sounds. Soft, non-tender, no masses or organomegaly. Extremities:  Atraumatic, no cyanosis or edema. Vascular:  Pulses 2+ and symmetric UE/LE bilat Skin:  Normal color, non-diaphoretic, positive capillary refill (less than 4 seconds). No rashes or lesions. Lymph nodes:  No Lymphadenopathy. Neurologic:  CN II-XII intact. Normal strength. LABS AND  DATA: Personally reviewed Recent Labs 04/09/20 
1457 WBC 10.7 HGB 11.8* HCT 39.3  Recent Labs 04/09/20 
1457   
K 3.8 CL 95* CO2 35* BUN 16  
CREA 0.69* * CA 9.9 Recent Labs 04/09/20 
1457 SGOT 16  
AP 77  
TP 6.8 ALB 3.2*  
GLOB 3.6 Recent Labs 04/09/20 
1457 INR 1.1 PTP 11.4* APTT 33.4* Recent Labs 04/09/20 
2224 04/09/20 
1601 PHI 7.400 [address] PCO2I 59.2* 62.0*  
PO2I 46* 64* FIO2I 44 0.32 Recent Labs 04/09/20 
1457 TROIQ <0.05 Ventilator Settings: 
Mode Rate Tidal Volume Pressure FiO2 PEEP Peak airway pressure:     
Minute ventilation:     
 
 
MEDS: Reviewed RADIOLOGY: 
Xr Chest [facility] Result Date: 4/9/2020 IMPRESSION: 1. Diminished opacities in the lungs. Assessment:  
 
Hospital Problems  Date Reviewed: 3/16/2018 Codes Class Noted POA Hypoxia ICD-10-CM: R09.02 
ICD-9-CM: 799.02  4/9/2020 Unknown Multidisciplinary Rounds Completed: Yes ABCDEF Bundle/Checklist 
Pain Medications: None Mobility: Bedrest 
PT/OT: None Restraints: None needed at this time Discussed Plan of Care (goals of care): Yes Addressed Code Status: Full Code CARDIOVASCULAR Cardiac Gtts: None SBP Goal of: > 90 mmHg MAP Goal of: > 65 mmHg Transfusion Trigger (Hgb): <7 g/dL RESPIRATORY Vent Goals:  
Head of bed > 30 degrees Incentive spirometry DVT Prophylaxis (if no, list reason): Eliquis SPO2 Goal: > 92% Pulmonary toilet: Albuterol GI/ 
 Travis Catheter Present: No 
GI Prophylaxis: Pepcid (famotidine) Nutrition: Yes IVFs: None Bowel Movement: Yes Bowel Regimen: Docusate (Colace) Insulin: No history of diabetes ANTIBIOTICS Antibiotics: 
None T/L/D Tubes: None Lines: Peripheral IV Drains: None SPECIAL EQUIPMENT None DISPOSITION Transfer to non-ICU bed CRITICAL CARE CONSULTANT NOTE I provided a face-to-face bedside physician/patient encounter, greater than the usual and customary amount normally needed, due to the high complexity of medical decision-making required. I reviewed and interpreted patient data including clinical events, labs, images, vital signs, I/O's, and examined patient. I have actively participated in multi-disciplinary discussions (nursing staff, radiology, laboratory) regarding the case in formulating an optimal therapeutic plan, and effecting a management strategy for this patient. NOTE OF PERSONAL INVOLVEMENT IN CARE This patient has a high probability of imminent, clinically significant deterioration, which requires the highest level of preparedness to intervene urgently. I participated in the decision-making and personally managed, or directed the management of, a myriad of life and organ supporting interventions which required my frequent-interval clinical reassessments, in order to treat or prevent imminent deterioration. I personally spent 35 minutes of critical care time. This is time spent at this critically ill patient's bedside actively involved in patient care as well as the coordination of care and discussions with the patient's family. This does not include any procedural time which has been billed separately. Lucy Becker FNP-BC, NP-C, CCRN-CMC, BS Staff [address] [facility] 4/10/2020

## 2020-04-10 NOTE — PROGRESS NOTES
Jacey NP Progress note Name: Jennifer Rodriguez YOB: 1945 MRN: [id number] Admission Date: 4/9/2020  2:36 PM 
 
Date of service: 4/10/2020 1:11 AM 
 
Subjective:  
 
Asked by primary RN to evaluate patient after receiving ABG result. On 4L NC Lab Results Component Value Date/Time PHI 7.400 04/09/2020 10:24 PM  
 PCO2I 59.2 (H) 04/09/2020 10:24 PM  
 PO2I 46 (LL) 04/09/2020 10:24 PM  
 HCO3I 36.7 (H) 04/09/2020 10:24 PM  
 FIO2I 44 04/09/2020 10:24 PM  
 
Pt without complaint. Objective:  
 
Examined in private room. Pt was seated in bed. No complaints. Constitutional:  No acute distress, cooperative, pleasant Psych:  Good insight, Not anxious nor agitated. Neurologic:  WALESKA, EOMI. AAOx3, CN II-XII reviewed Resp:  CTA diminished w scattered ronchi bilaterally. No wheezing or/crackles. Resps easy and unlabored. No accessory muscle use. Able to speak in full sentences. HEENT:  Oral mucosa moist, oropharynx benign. CV:  Regular rhythm, normal rate, no murmurs, gallops, rubs GI:  Soft, non distended, non tender. normoactive bowel sounds, no hepatosplenomegaly Peripheral Vascular: No edema, warm, 2+ pulses throughout Full PPE to include gown, gloves, N95 mask and face shield were worn during the entirety of this encounter. Assessment & Plan:  
 
Acute on Chronic Hypoxia, PUI COVID,COPD 
D/W intensivist: transfer to [facility] for high flow oxygen, nebs Repeat ABG Gayle Seip, MSN, RN, NP-C [employer] and cell: [phone]

## 2020-04-10 NOTE — PROGRESS NOTES
P;t w/ acute on chronic hypercapnic hypoxemic resp failure Spoke with [facility] for possible evaluation and transfer to ICU.  
 
Dr. Jose Miguel Caban will evaluate the patient and decide if he needs to be transferred to ICU

## 2020-04-10 NOTE — PROGRESS NOTES
Hospitalist Progress Note Hospital summary: Cyndi Willams is a 77 y.o. male who presents with shortness of breath. He arrives from [place] and per EMS noted to be in an isolation unit. PMH significant for COPD, with last exacerbation 03/2020, DVT of RLE and PE, colon CA, DM2, and bilateral femoral aneurysms. Onset of symptoms was around noon today post exercise with physical therapy. He states his breathing has improved with rest and is currently on 4LNC, he wears 3LNC at baseline. Patient reports productive (grey to clear) cough onset this morning. He denies headache, CP, fever, chills, N/V/D, abd pain, dysuria. 2+ edema noted in BLE, patient denies significant hx of lower extremity edema. Patient denies any other symptoms at this time. He is resting comfortably without signs of distress. 4/9/2020 Assessment/Plan: 
Acute on chronic hypoxic respiratory failure COVID 19 positive Hx COPD  
- Patient currently on 30l HFNC (baseline 3LNC) - Resp panel negative  
- sputum cx pending - CXR: Bilateral lower lobe airspace disease - appreciate intensivist input  
- started on plaquenil 4/10 
- work up for LD, ferritin, procalcitonin, CRP , D dimer, IL 6 ordered  
- started on po azithromycin - Albuterol MDI. Dced steroids  
- on Zinc and vitamin C 
  
Type II Diabetes mellitus Hold PO meds Start sliding scale correctional insulin Monitor BG 
  
Hx Pulmonary Embolism and DVT 
CTA of chest on 3/3/2020 neg for PE Repeat BLE vein duplex Continue home Eliquis 
  
Hx  Epilepsy Continue home Keppra HLD - statin Depression - zoloft High risk for decline   
 
Code status: full. Sister is mpoa DVT prophylaxis: Eliquis Disposition: TBD. [facility] with [facility] 
---------------------------------------------- 
 
CC: sob S: Patient is seen and examined at bedside.  He feels better than yesterday. Sob improving. Elif Zamora has productive cough. Denied fever. Spoke with sister Darren Gastelum and niece on phone and updated patient's status on being Covid positive and HFNC 30l. Review of Systems: A comprehensive review of systems was negative except for that written in the HPI. O: 
Visit Vitals /68 (BP 1 Location: Left arm, BP Patient Position: At rest) Pulse 86 Temp 98.3 °F (36.8 °C) Resp 20 Ht 6' 2.02\" (1.88 m) Wt 70.5 kg (155 lb 6.4 oz) SpO2 95% BMI 19.94 kg/m² PHYSICAL EXAM: 
Gen: Resp distress HEENT: anicteric sclerae, normal conjunctiva, oropharynx clear, MM moist 
Neck: supple, trachea midline, no adenopathy Heart: RRR, no MRG, no JVD, no peripheral edema Lungs: decreased at bases Abd: soft, NT, ND, BS+, no organomegaly Extr: warm Skin: dry, no rash Neuro: CN II-XII grossly intact, normal speech, moves all extremities Psych: normal mood, appropriate affect Intake/Output Summary (Last 24 hours) at 4/10/2020 1443 Last data filed at 4/9/2020 1907 Gross per 24 hour Intake  Output 101 ml Net -101 ml Recent labs & imaging reviewed: 
Recent Results (from the past 24 hour(s)) EKG, 12 LEAD, INITIAL Collection Time: 04/09/20  2:44 PM  
Result Value Ref Range Ventricular Rate 112 BPM  
 Atrial Rate 112 BPM  
 P-R Interval 112 ms QRS Duration 88 ms Q-T Interval 330 ms QTC Calculation (Bezet) 450 ms Calculated R Axis 145 degrees Calculated T Axis 141 degrees Diagnosis Sinus tachycardia with premature atrial complexes Left posterior fascicular block Nonspecific T wave abnormality When compared with ECG of 08-MAR-2020 12:24, Nonspecific T wave abnormality now evident in Inferior leads Confirmed by Luciana Sanon M.D., Tadeo Natarajan ([id number]) on 4/9/2020 2:90:54 PM 
  
METABOLIC PANEL, COMPREHENSIVE Collection Time: 04/09/20  2:57 PM  
Result Value Ref Range Sodium 137 136 - 145 mmol/L  Potassium 3.8 3.5 - 5.1 mmol/L  
 Chloride 95 (L) 97 - 108 mmol/L  
 CO2 35 (H) 21 - 32 mmol/L Anion gap 7 5 - 15 mmol/L Glucose 138 (H) 65 - 100 mg/dL BUN 16 6 - 20 MG/DL Creatinine 0.69 (L) 0.70 - 1.30 MG/DL  
 BUN/Creatinine ratio 23 (H) 12 - 20 GFR est AA >60 >60 ml/min/1.73m2 GFR est non-AA >60 >60 ml/min/1.73m2 Calcium 9.9 8.5 - 10.1 MG/DL Bilirubin, total 0.4 0.2 - 1.0 MG/DL  
 ALT (SGPT) 20 12 - 78 U/L  
 AST (SGOT) 16 15 - 37 U/L Alk. phosphatase 77 45 - 117 U/L Protein, total 6.8 6.4 - 8.2 g/dL Albumin 3.2 (L) 3.5 - 5.0 g/dL Globulin 3.6 2.0 - 4.0 g/dL A-G Ratio 0.9 (L) 1.1 - 2.2    
CBC WITH AUTOMATED DIFF Collection Time: 04/09/20  2:57 PM  
Result Value Ref Range WBC 10.7 4.1 - 11.1 K/uL  
 RBC 4.52 4.10 - 5.70 M/uL  
 HGB 11.8 (L) 12.1 - 17.0 g/dL HCT 39.3 36.6 - 50.3 % MCV 86.9 80.0 - 99.0 FL  
 MCH 26.1 26.0 - 34.0 PG  
 MCHC 30.0 30.0 - 36.5 g/dL  
 RDW 19.2 (H) 11.5 - 14.5 % PLATELET 550 962 - 870 K/uL MPV 9.9 8.9 - 12.9 FL  
 NRBC 0.0 0  WBC ABSOLUTE NRBC 0.00 0.00 - 0.01 K/uL NEUTROPHILS 90 (H) 32 - 75 % LYMPHOCYTES 5 (L) 12 - 49 % MONOCYTES 4 (L) 5 - 13 % EOSINOPHILS 0 0 - 7 % BASOPHILS 0 0 - 1 % IMMATURE GRANULOCYTES 1 (H) 0.0 - 0.5 % ABS. NEUTROPHILS 9.7 (H) 1.8 - 8.0 K/UL  
 ABS. LYMPHOCYTES 0.5 (L) 0.8 - 3.5 K/UL  
 ABS. MONOCYTES 0.4 0.0 - 1.0 K/UL  
 ABS. EOSINOPHILS 0.0 0.0 - 0.4 K/UL  
 ABS. BASOPHILS 0.0 0.0 - 0.1 K/UL  
 ABS. IMM. GRANS. 0.1 (H) 0.00 - 0.04 K/UL  
 DF SMEAR SCANNED    
 RBC COMMENTS ANISOCYTOSIS 1+ 
    
 RBC COMMENTS HYPOCHROMIA 1+ SAMPLES BEING HELD Collection Time: 04/09/20  2:57 PM  
Result Value Ref Range SAMPLES BEING HELD 1RED,1BLU,1UA,1UC   
 COMMENT Add-on orders for these samples will be processed based on acceptable specimen integrity and analyte stability, which may vary by analyte. TROPONIN I Collection Time: 04/09/20  2:57 PM  
Result Value Ref Range Troponin-I, Qt. <0.05 <0.05 ng/mL NT-PRO BNP Collection Time: 04/09/20  2:57 PM  
Result Value Ref Range NT pro-BNP 52 <125 PG/ML  
LACTIC ACID Collection Time: 04/09/20  2:57 PM  
Result Value Ref Range Lactic acid 1.2 0.4 - 2.0 MMOL/L  
CULTURE, BLOOD, PAIRED Collection Time: 04/09/20  2:57 PM  
Result Value Ref Range Special Requests: NO SPECIAL REQUESTS Culture result: NO GROWTH AFTER 14 HOURS    
D DIMER Collection Time: 04/09/20  2:57 PM  
Result Value Ref Range D-dimer 1.76 (H) 0.00 - 0.65 mg/L FEU PROTHROMBIN TIME + INR Collection Time: 04/09/20  2:57 PM  
Result Value Ref Range INR 1.1 0.9 - 1.1 Prothrombin time 11.4 (H) 9.0 - 11.1 sec PTT Collection Time: 04/09/20  2:57 PM  
Result Value Ref Range aPTT 33.4 (H) 22.1 - 32.0 sec  
 aPTT, therapeutic range     58.0 - 77.0 SECS  
RESPIRATORY PANEL,PCR,NASOPHARYNGEAL Collection Time: 04/09/20  3:17 PM  
Result Value Ref Range Adenovirus Not detected NOTD Coronavirus 229E Not detected NOTD Coronavirus HKU1 Not detected NOTD Coronavirus CVNL63 Not detected NOTD Coronavirus OC43 Not detected NOTD Metapneumovirus Not detected NOTD Rhinovirus and Enterovirus Not detected NOTD Influenza A Not detected NOTD Influenza A, subtype H1 Not detected NOTD Influenza A, subtype H3 Not detected NOTD INFLUENZA A H1N1 PCR Not detected NOTD Influenza B Not detected NOTD Parainfluenza 1 Not detected NOTD Parainfluenza 2 Not detected NOTD Parainfluenza 3 Not detected NOTD Parainfluenza virus 4 Not detected NOTD    
 RSV by PCR Not detected NOTD B. parapertussis, PCR Not detected NOTD Bordetella pertussis - PCR Not detected NOTD Chlamydophila pneumoniae DNA, QL, PCR Not detected NOTD Mycoplasma pneumoniae DNA, QL, PCR Not detected NOTD    
SARS-COV-2 Collection Time: 04/09/20  3:17 PM  
Result Value Ref Range Specimen source Nasopharyngeal    
 SARS-CoV-2 Detected (A) NOTD POC EG7 Collection Time: 04/09/20  4:01 PM  
Result Value Ref Range Calcium, ionized (POC) 1.22 1.12 - 1.32 mmol/L  
 FIO2 (POC) 0.32 % pH (POC) 7.392 7.35 - 7.45    
 pCO2 (POC) 62.0 (H) 35.0 - 45.0 MMHG  
 pO2 (POC) 64 (L) 80 - 100 MMHG  
 HCO3 (POC) 37.7 (H) 22 - 26 MMOL/L Base excess (POC) 13 mmol/L  
 sO2 (POC) 91 (L) 92 - 97 % Site LEFT BRACHIAL Device: NASAL CANNULA Flow rate (POC) 3 L/M Allens test (POC) YES Specimen type (POC) ARTERIAL Total resp. rate 19 CULTURE, RESPIRATORY/SPUTUM/BRONCH W GRAM STAIN Collection Time: 04/09/20  7:08 PM  
Result Value Ref Range Special Requests: NO SPECIAL REQUESTS    
 GRAM STAIN 1+ WBCS SEEN    
 GRAM STAIN RARE EPITHELIAL CELLS SEEN    
 GRAM STAIN 1+ GRAM POSITIVE RODS    
 GRAM STAIN 1+ GRAM POSITIVE COCCI IN CLUSTERS    
 GRAM STAIN RARE GRAM POSITIVE COCCI IN PAIRS    
 GRAM STAIN RARE GRAM POSITIVE COCCI IN CHAINS Culture result: HEAVY NORMAL RESPIRATORY DAVID SO FAR    
GLUCOSE, POC Collection Time: 04/09/20  9:20 PM  
Result Value Ref Range Glucose (POC) 191 (H) 65 - 100 mg/dL Performed by Gema LIN   
POC EG7 Collection Time: 04/09/20 10:24 PM  
Result Value Ref Range Calcium, ionized (POC) 1.20 1. 12 - 1.32 mmol/L  
 FIO2 (POC) 44 % pH (POC) 7.400 7.35 - 7.45    
 pCO2 (POC) 59.2 (H) 35.0 - 45.0 MMHG  
 pO2 (POC) 46 (LL) 80 - 100 MMHG  
 HCO3 (POC) 36.7 (H) 22 - 26 MMOL/L Base excess (POC) 12 mmol/L  
 sO2 (POC) 81 (L) 92 - 97 % Site RIGHT RADIAL Device: NASAL CANNULA Flow rate (POC) 6 L/M Allens test (POC) YES Specimen type (POC) ARTERIAL Total resp. rate 20 METABOLIC PANEL, COMPREHENSIVE Collection Time: 04/10/20  4:16 AM  
Result Value Ref Range Sodium 138 136 - 145 mmol/L Potassium 3.6 3.5 - 5.1 mmol/L  Chloride 98 97 - 108 mmol/L  
 CO2 38 (H) 21 - 32 mmol/L  
 Anion gap 2 (L) 5 - 15 mmol/L Glucose 120 (H) 65 - 100 mg/dL BUN 16 6 - 20 MG/DL Creatinine 0.64 (L) 0.70 - 1.30 MG/DL  
 BUN/Creatinine ratio 25 (H) 12 - 20 GFR est AA >60 >60 ml/min/1.73m2 GFR est non-AA >60 >60 ml/min/1.73m2 Calcium 9.4 8.5 - 10.1 MG/DL Bilirubin, total 0.3 0.2 - 1.0 MG/DL  
 ALT (SGPT) 22 12 - 78 U/L  
 AST (SGOT) 16 15 - 37 U/L Alk. phosphatase 69 45 - 117 U/L Protein, total 6.3 (L) 6.4 - 8.2 g/dL Albumin 2.9 (L) 3.5 - 5.0 g/dL Globulin 3.4 2.0 - 4.0 g/dL A-G Ratio 0.9 (L) 1.1 - 2.2    
CBC WITH AUTOMATED DIFF Collection Time: 04/10/20  4:16 AM  
Result Value Ref Range WBC 10.0 4.1 - 11.1 K/uL  
 RBC 4.25 4. 10 - 5.70 M/uL  
 HGB 11.0 (L) 12.1 - 17.0 g/dL HCT 37.0 36.6 - 50.3 % MCV 87.1 80.0 - 99.0 FL  
 MCH 25.9 (L) 26.0 - 34.0 PG  
 MCHC 29.7 (L) 30.0 - 36.5 g/dL  
 RDW 19.1 (H) 11.5 - 14.5 % PLATELET 297 488 - 242 K/uL MPV 10.0 8.9 - 12.9 FL  
 NRBC 0.0 0  WBC ABSOLUTE NRBC 0.00 0.00 - 0.01 K/uL NEUTROPHILS 81 (H) 32 - 75 % LYMPHOCYTES 13 12 - 49 % MONOCYTES 5 5 - 13 % EOSINOPHILS 0 0 - 7 % BASOPHILS 0 0 - 1 % IMMATURE GRANULOCYTES 1 (H) 0.0 - 0.5 % ABS. NEUTROPHILS 8.1 (H) 1.8 - 8.0 K/UL  
 ABS. LYMPHOCYTES 1.3 0.8 - 3.5 K/UL  
 ABS. MONOCYTES 0.5 0.0 - 1.0 K/UL  
 ABS. EOSINOPHILS 0.0 0.0 - 0.4 K/UL  
 ABS. BASOPHILS 0.0 0.0 - 0.1 K/UL  
 ABS. IMM. GRANS. 0.1 (H) 0.00 - 0.04 K/UL  
 DF AUTOMATED MAGNESIUM Collection Time: 04/10/20  4:16 AM  
Result Value Ref Range Magnesium 2.9 (H) 1.6 - 2.4 mg/dL PHOSPHORUS Collection Time: 04/10/20  4:16 AM  
Result Value Ref Range Phosphorus 4.0 2.6 - 4.7 MG/DL  
POC EG7 Collection Time: 04/10/20  4:20 AM  
Result Value Ref Range  Calcium, ionized (POC) 1.26 1.12 - 1.32 mmol/L  
 pH (POC) 7.356 7.35 - 7.45    
 pCO2 (POC) 67.9 (H) 35.0 - 45.0 MMHG  
 pO2 (POC) 62 (L) 80 - 100 MMHG  
 HCO3 (POC) 38.0 (H) 22 - 26 MMOL/L  
 Base excess (POC) 12 mmol/L  
 sO2 (POC) 89 (L) 92 - 97 % Site RIGHT RADIAL Device: NASAL CANNULA Flow rate (POC) 4 L/M Allens test (POC) YES Specimen type (POC) ARTERIAL Total resp. rate 16 GLUCOSE, POC Collection Time: 04/10/20  8:57 AM  
Result Value Ref Range Glucose (POC) 157 (H) 65 - 100 mg/dL Performed by Carol Crawford Recent Labs 04/10/20 
0416 04/09/20 
1457 WBC 10.0 10.7 HGB 11.0* 11.8* HCT 37.0 39.3  274 Recent Labs 04/10/20 
0416 04/09/20 
1457  137  
K 3.6 3.8 CL 98 95* CO2 38* 35* BUN 16 16 CREA 0.64* 0.69* * 138* CA 9.4 9.9 MG 2.9*  --   
PHOS 4.0  --   
 
Recent Labs 04/10/20 
0416 04/09/20 
1457 SGOT 16 16 ALT 22 20 AP 69 77 TBILI 0.3 0.4 TP 6.3* 6.8 ALB 2.9* 3.2*  
GLOB 3.4 3.6 Recent Labs 04/09/20 
1457 INR 1.1 PTP 11.4* APTT 33.4* No results for input(s): FE, TIBC, PSAT, FERR in the last 72 hours. No results found for: FOL, RBCF No results for input(s): PH, PCO2, PO2 in the last 72 hours. Recent Labs 04/09/20 
1457 TROIQ <0.05 Lab Results Component Value Date/Time Cholesterol, total 187 09/11/2017 05:53 AM  
 HDL Cholesterol 63 09/11/2017 05:53 AM  
 LDL, calculated 109 (H) 09/11/2017 05:53 AM  
 Triglyceride 75 09/11/2017 05:53 AM  
 CHOL/HDL Ratio 3.0 09/11/2017 05:53 AM  
 
Lab Results Component Value Date/Time Glucose (POC) 157 (H) 04/10/2020 08:57 AM  
 Glucose (POC) 191 (H) 04/09/2020 09:20 PM  
 Glucose (POC) 180 (H) 03/10/2020 11:47 AM  
 Glucose (POC) 116 (H) 03/10/2020 08:11 AM  
 Glucose (POC) 180 (H) 03/09/2020 09:40 PM  
 
Lab Results Component Value Date/Time  Color YELLOW/STRAW 09/10/2017 10:09 PM  
 Appearance CLEAR 09/10/2017 10:09 PM  
 Specific gravity 1.010 09/10/2017 10:09 PM  
 pH (UA) 5.0 09/10/2017 10:09 PM  
 Protein NEGATIVE  09/10/2017 10:09 PM  
 Glucose NEGATIVE  09/10/2017 10:09 PM  
 Ketone NEGATIVE  09/10/2017 10:09 PM  
 Bilirubin NEGATIVE  09/10/2017 10:09 PM  
 Urobilinogen 0.2 09/10/2017 10:09 PM  
 Nitrites NEGATIVE  09/10/2017 10:09 PM  
 Leukocyte Esterase NEGATIVE  09/10/2017 10:09 PM  
 Epithelial cells MODERATE (A) 09/10/2017 10:09 PM  
 Bacteria NEGATIVE  09/10/2017 10:09 PM  
 WBC 5-10 09/10/2017 10:09 PM  
 RBC 0-5 09/10/2017 10:09 PM  
 
 
Med list reviewed Current Facility-Administered Medications Medication Dose Route Frequency  albuterol (PROVENTIL HFA, VENTOLIN HFA, PROAIR HFA) inhaler 2 Puff  2 Puff Inhalation Q4H RT  
 fluticasone (FLOVENT HFA) 110 mcg inhaler  2 Puff Inhalation BID RT  
 ascorbic acid (vitamin C) (VITAMIN C) tablet 500 mg  500 mg Oral BID  zinc sulfate (ZINCATE) 220 mg capsule 1 Cap  1 Cap Oral DAILY  hydroxychloroquine (PLAQUENIL) tablet 400 mg  400 mg Oral BID Followed by  
Ozark Fossa ON 4/11/2020] hydroxychloroquine (PLAQUENIL) tablet 200 mg  200 mg Oral BID  [START ON 4/11/2020] azithromycin (ZITHROMAX) tablet 250 mg  250 mg Oral DAILY  albuterol (PROVENTIL HFA, VENTOLIN HFA, PROAIR HFA) inhaler 2 Puff  2 Puff Inhalation Q4H PRN  
 acetaminophen (TYLENOL) tablet 650 mg  650 mg Oral Q6H PRN Or  
 acetaminophen (TYLENOL) suppository 650 mg  650 mg Rectal Q6H PRN  
 sodium chloride (NS) flush 5-40 mL  5-40 mL IntraVENous Q8H  
 sodium chloride (NS) flush 5-40 mL  5-40 mL IntraVENous PRN  
 HYDROcodone-acetaminophen (NORCO) 5-325 mg per tablet 1 Tab  1 Tab Oral Q4H PRN  
 insulin lispro (HUMALOG) injection   SubCUTAneous AC&HS  
 glucose chewable tablet 16 g  4 Tab Oral PRN  
 glucagon (GLUCAGEN) injection 1 mg  1 mg IntraMUSCular PRN  
 dextrose 10% infusion 0-250 mL  0-250 mL IntraVENous PRN  
 apixaban (ELIQUIS) tablet 5 mg  5 mg Oral BID  atorvastatin (LIPITOR) tablet 40 mg  40 mg Oral QHS  cetirizine (ZYRTEC) tablet 10 mg  10 mg Oral DAILY  ferrous sulfate tablet 325 mg  325 mg Oral ACB  fluticasone propionate (FLONASE) 50 mcg/actuation nasal spray 2 Spray  2 Spray Both Nostrils DAILY  hydrOXYzine HCL (ATARAX) tablet 25 mg  25 mg Oral BID  levETIRAcetam (KEPPRA) tablet 500 mg  500 mg Oral BID  pantoprazole (PROTONIX) tablet 40 mg  40 mg Oral DAILY  sertraline (ZOLOFT) tablet 25 mg  25 mg Oral DAILY  calcium carbonate (TUMS) chewable tablet 200 mg [elemental]  200 mg Oral TID PRN Care Plan discussed with:  Patient/Family and Nurse Nickolas Marshall MD 
Internal Medicine Date of Service: 4/10/2020

## 2020-04-10 NOTE — PROGRESS NOTES
2218: pt MEWS score 3. Temp 99.1 (tylenol given), , /63, RR 25 O2 89% on 7L nasal cannula. Paged MD to aware of MEWS score as well as positive COVID test result and critical ABG value. 2258: returned page from MD, will re-call with orders. 0000: MD ordered to taper down nasal cannula flow rate to stat to 90% and above, will re-call with further orders. 0045: transfer order to [facility]. 
 
0120: TRANSFER - OUT REPORT: 
 
Verbal report given to (name) augustus Martin  being transferred to [facility]) for change in patient condition(Abnormal ABG) Report consisted of patients Situation, Background, Assessment and  
Recommendations(SBAR). Information from the following report(s) SBAR, Kardex and MAR was reviewed with the receiving nurse. Lines:  
Peripheral IV 04/09/20 Left Hand (Active) Site Assessment Clean, dry, & intact 4/9/2020 10:18 PM  
Phlebitis Assessment 0 4/9/2020 10:18 PM  
Infiltration Assessment 0 4/9/2020 10:18 PM  
Dressing Status Clean, dry, & intact 4/9/2020 10:18 PM  
Dressing Type Transparent;Tape 4/9/2020 10:18 PM  
Hub Color/Line Status Capped 4/9/2020 10:18 PM  
Action Taken Open ports on tubing capped 4/9/2020 10:18 PM  
Alcohol Cap Used Yes 4/9/2020 10:18 PM  
   
Peripheral IV 04/09/20 Right Hand (Active) Site Assessment Clean, dry, & intact 4/9/2020 10:18 PM  
Phlebitis Assessment 0 4/9/2020 10:18 PM  
Infiltration Assessment 0 4/9/2020 10:18 PM  
Dressing Status Clean, dry, & intact 4/9/2020 10:18 PM  
Dressing Type Transparent;Tape 4/9/2020 10:18 PM  
Hub Color/Line Status Capped 4/9/2020 10:18 PM  
Action Taken Open ports on tubing capped 4/9/2020 10:18 PM  
Alcohol Cap Used Yes 4/9/2020 10:18 PM  
  
 
Opportunity for questions and clarification was provided. Patient transported with: 
 Monitor O2 @ 4 liters Registered Nurse

## 2020-04-10 NOTE — PROGRESS NOTES
Bedside and Verbal shift change report given to Rylie (oncoming nurse) by Giles Hernandez (offgoing nurse). Report included the following information SBAR, Kardex and MAR.

## 2020-04-10 NOTE — PROGRESS NOTES
Problem: Pressure Injury - Risk of 
Goal: *Prevention of pressure injury Description: Document Carlos Enrique Scale and appropriate interventions in the flowsheet. Outcome: Progressing Towards Goal 
Note: Pressure Injury Interventions: 
Sensory Interventions: Assess changes in LOC, Check visual cues for pain, Keep linens dry and wrinkle-free Moisture Interventions: Absorbent underpads, Minimize layers Activity Interventions: Increase time out of bed, Pressure redistribution bed/mattress(bed type) Mobility Interventions: HOB 30 degrees or less, PT/OT evaluation, Pressure redistribution bed/mattress (bed type) Nutrition Interventions: Document food/fluid/supplement intake, Offer support with meals,snacks and hydration Friction and Shear Interventions: HOB 30 degrees or less Problem: Falls - Risk of 
Goal: *Absence of Falls Description: Document Garrison Chandler Fall Risk and appropriate interventions in the flowsheet. Outcome: Progressing Towards Goal 
Note: Fall Risk Interventions: 
Mobility Interventions: Communicate number of staff needed for ambulation/transfer, Patient to call before getting OOB, Utilize walker, cane, or other assistive device Medication Interventions: Bed/chair exit alarm, Patient to call before getting OOB Elimination Interventions: Call light in reach, Patient to call for help with toileting needs, Urinal in reach Bedside and Verbal shift change report given to Jan Rivera (oncoming nurse) by Axel Emmanuel (offgoing nurse). Report included the following information SBAR, Kardex, ED Summary, Procedure Summary, Intake/Output, MAR, and Recent Results.

## 2020-04-10 NOTE — PROGRESS NOTES
Problem: Pressure Injury - Risk of 
Goal: *Prevention of pressure injury Description: Document Carlos Enrique Scale and appropriate interventions in the flowsheet. Outcome: Progressing Towards Goal 
Note: Pressure Injury Interventions: 
Sensory Interventions: Assess changes in LOC, Assess need for specialty bed, Check visual cues for pain, Discuss PT/OT consult with provider, Float heels, Keep linens dry and wrinkle-free, Turn and reposition approx. every two hours (pillows and wedges if needed), Minimize linen layers Moisture Interventions: Absorbent underpads, Maintain skin hydration (lotion/cream), Minimize layers, Moisture barrier, Offer toileting Q_hr, Limit adult briefs, Check for incontinence Q2 hours and as needed, Assess need for specialty bed Activity Interventions: Assess need for specialty bed, Increase time out of bed, Pressure redistribution bed/mattress(bed type), PT/OT evaluation Mobility Interventions: Assess need for specialty bed, Float heels, HOB 30 degrees or less, Pressure redistribution bed/mattress (bed type), PT/OT evaluation, Turn and reposition approx. every two hours(pillow and wedges) Nutrition Interventions: Document food/fluid/supplement intake, Discuss nutritional consult with provider, Offer support with meals,snacks and hydration Friction and Shear Interventions: Apply protective barrier, creams and emollients, Lift sheet, Lift team/patient mobility team, Minimize layers, Transferring/repositioning devices Problem: Falls - Risk of 
Goal: *Absence of Falls Description: Document Vernon Meehan Fall Risk and appropriate interventions in the flowsheet.  
Outcome: Progressing Towards Goal 
Note: Fall Risk Interventions: 
Mobility Interventions: Assess mobility with egress test, Communicate number of staff needed for ambulation/transfer, OT consult for ADLs, Patient to call before getting OOB, PT Consult for mobility concerns, PT Consult for assist device competence, Utilize walker, cane, or other assistive device, Utilize gait belt for transfers/ambulation, Strengthening exercises (ROM-active/passive) Medication Interventions: Assess postural VS orthostatic hypotension, Evaluate medications/consider consulting pharmacy, Patient to call before getting OOB, Teach patient to arise slowly, Utilize gait belt for transfers/ambulation Elimination Interventions: Call light in reach, Stay With Me (per policy), Toilet paper/wipes in reach, Toileting schedule/hourly rounds, Urinal in reach, Elevated toilet seat, Patient to call for help with toileting needs Bedside shift change report given to [address] [place] [address] (oncoming nurse) by Rosangela Mendoza (offgoing nurse). Report included the following information SBAR, Kardex, ED Summary, Intake/Output, MAR, Recent Results, Med Rec Status, Cardiac Rhythm NSR, Alarm Parameters  and Quality Measures.

## 2020-04-10 NOTE — PROGRESS NOTES
ELLIOT: 
 
CM notified by RN that [facility] and might need [facility] at [facility]. MD please consult care management for trilogy with order if needed. CM to contact Rocael Cunha with [facility] at [phone] if referral for trilogy is necessary. Patient's sister is requesting [facility] if appropriate at discharge rather than SNF. Care Management Interventions PCP Verified by CM: Yes Mode of Transport at Discharge: (TBD) Transition of Care Consult (CM Consult): Discharge Planning MyChart Signup: No 
Discharge Durable Medical Equipment: No 
Physical Therapy Consult: No 
Occupational Therapy Consult: No 
Current Support Network: Other(Lives with sister) Confirm Follow Up Transport: Family Discharge Location Discharge Placement: (TBD) Reason for Admission:   Hypoxia RUR Score:   20% PCP: First and Last name:  Jimmy Razo MD 
 Name of Practice:  
 Are you a current patient: Yes/No: Yes Approximate date of last visit: 6 months ago Do you (patient/family) have any concerns for transition/discharge? Patient's sister does not want him to go to SNF. She would like HH with [facility] if possible. Plan for utilizing home health:   No orders at this time. Current Advanced Directive/Advance Care Plan: On File Transition of Care Plan:    
 
Patient lives in a one story house with his sister, Shar Arrington [phone], and her . Patient came to [facility] from [facility], but does not want to return there at discharge. Patient has used [facility] in the past and would like to use them again if appropriate. Patient has a walker at home. He gets his prescriptions filled at [facility] and [facility]. Surendra Crandall, RN/CRM

## 2020-04-11 NOTE — PROGRESS NOTES
1449: In pt room doing 1430 vitals. 1st BP 89/59 rechecked it 97/72, HR sinus tach low 100's. Pt has no complaints breathing 20RR and 95% SPO2 on Hi-flow oxygen Checked his blood sugar at 1243pm and it was 78 so I gave him some orange juice since he has not eaten lunch because he is waiting for his sister to bring his denture cream. Dr. Stephanie Esparza notified orders received for IV fluids D5%. Problem: Pressure Injury - Risk of 
Goal: *Prevention of pressure injury Description: Document Carlos Enrique Scale and appropriate interventions in the flowsheet. Outcome: Progressing Towards Goal 
Note: Pressure Injury Interventions: 
Sensory Interventions: Assess changes in LOC, Assess need for specialty bed, Float heels, Keep linens dry and wrinkle-free, Maintain/enhance activity level, Minimize linen layers, Pressure redistribution bed/mattress (bed type), Monitor skin under medical devices, Pad between skin to skin Moisture Interventions: Absorbent underpads, Apply protective barrier, creams and emollients, Limit adult briefs, Maintain skin hydration (lotion/cream), Minimize layers, Offer toileting Q_hr, Moisture barrier Activity Interventions: Assess need for specialty bed, Increase time out of bed, Pressure redistribution bed/mattress(bed type), PT/OT evaluation Mobility Interventions: Assess need for specialty bed, Float heels, HOB 30 degrees or less, Pressure redistribution bed/mattress (bed type), PT/OT evaluation, Turn and reposition approx. every two hours(pillow and wedges) Nutrition Interventions: Document food/fluid/supplement intake, Discuss nutritional consult with provider, Offer support with meals,snacks and hydration Friction and Shear Interventions: Apply protective barrier, creams and emollients, Lift team/patient mobility team, Minimize layers, Sit at 90-degree angle Problem: Falls - Risk of 
Goal: *Absence of Falls Description: Document Danielle Zaldivar Fall Risk and appropriate interventions in the flowsheet. Outcome: Progressing Towards Goal 
Note: Fall Risk Interventions: 
Mobility Interventions: Assess mobility with egress test, Communicate number of staff needed for ambulation/transfer, Patient to call before getting OOB, PT Consult for mobility concerns, PT Consult for assist device competence, Strengthening exercises (ROM-active/passive), Utilize walker, cane, or other assistive device, Utilize gait belt for transfers/ambulation, OT consult for ADLs Medication Interventions: Assess postural VS orthostatic hypotension, Evaluate medications/consider consulting pharmacy, Teach patient to arise slowly, Utilize gait belt for transfers/ambulation, Patient to call before getting OOB Elimination Interventions: Call light in reach, Elevated toilet seat, Patient to call for help with toileting needs, Stay With Me (per policy), Toilet paper/wipes in reach, Toileting schedule/hourly rounds, Urinal in reach Problem: Breathing Pattern - Ineffective Goal: *Absence of hypoxia Outcome: Progressing Towards Goal 
Note: Pt on hiflow with no complaints of worsening shortness of breath, SPO2 greater than 95%. Will continue to monitor the patient. EKG: completed and on chart. Bedside shift change report given to [address] [place] [address] (oncoming nurse) by David Sen (offgoing nurse). Report included the following information SBAR, Kardex, Intake/Output, MAR, Recent Results, Med Rec Status, Cardiac Rhythm Sinus Tach, Alarm Parameters  and Quality Measures.

## 2020-04-11 NOTE — PROGRESS NOTES
Bedside and Verbal shift change report given to [address] (oncoming nurse) by Rachna Benitez (offgoing nurse). Report included the following information SBAR, Kardex, ED Summary, Intake/Output, MAR, Accordion and Cardiac Rhythm NST. Last 3 Recorded Weights in this Encounter 04/10/20 0418 04/11/20 0000 04/11/20 0327 Weight: 70.5 kg (155 lb 6.4 oz) 70.9 kg (156 lb 4.8 oz) 71.3 kg (157 lb 3.2 oz) Problem: Pressure Injury - Risk of 
Goal: *Prevention of pressure injury Description: Document Carlos Enrique Scale and appropriate interventions in the flowsheet. Outcome: Progressing Towards Goal 
Note: Pressure Injury Interventions: 
Sensory Interventions: Assess changes in LOC, Assess need for specialty bed, Avoid rigorous massage over bony prominences, Keep linens dry and wrinkle-free, Minimize linen layers, Pressure redistribution bed/mattress (bed type), Turn and reposition approx. every two hours (pillows and wedges if needed) Moisture Interventions: Absorbent underpads, Assess need for specialty bed, Check for incontinence Q2 hours and as needed, Minimize layers Activity Interventions: Assess need for specialty bed, Increase time out of bed, Pressure redistribution bed/mattress(bed type), PT/OT evaluation Mobility Interventions: Assess need for specialty bed, Pressure redistribution bed/mattress (bed type), HOB 30 degrees or less, Turn and reposition approx. every two hours(pillow and wedges) Nutrition Interventions: Document food/fluid/supplement intake, Discuss nutritional consult with provider, Offer support with meals,snacks and hydration Friction and Shear Interventions: Apply protective barrier, creams and emollients, HOB 30 degrees or less, Minimize layers Problem: Falls - Risk of 
Goal: *Absence of Falls Description: Document Sue Alcaraz Fall Risk and appropriate interventions in the flowsheet. Outcome: Progressing Towards Goal 
Note: Fall Risk Interventions: Mobility Interventions: Patient to call before getting OOB, Strengthening exercises (ROM-active/passive), OT consult for ADLs, PT Consult for mobility concerns, Communicate number of staff needed for ambulation/transfer Medication Interventions: Assess postural VS orthostatic hypotension, Evaluate medications/consider consulting pharmacy, Patient to call before getting OOB, Teach patient to arise slowly Elimination Interventions: Call light in reach, Patient to call for help with toileting needs Problem: Breathing Pattern - Ineffective Goal: *Absence of hypoxia Outcome: Progressing Towards Goal 
Note: Patient on HiFlow with scheduled Albuterol HFA and Flovent to keep SPO2 above 88%

## 2020-04-11 NOTE — PROGRESS NOTES
Hospitalist Progress Note Hospital summary: Paras Ba is a 77 y.o. male who presents with shortness of breath. He arrives from [place] and per EMS noted to be in an isolation unit. PMH significant for COPD, with last exacerbation 03/2020, DVT of RLE and PE, colon CA, DM2, and bilateral femoral aneurysms. Onset of symptoms was around noon today post exercise with physical therapy. He states his breathing has improved with rest and is currently on 4LNC, he wears 3LNC at baseline. Patient reports productive (grey to clear) cough onset this morning. He denies headache, CP, fever, chills, N/V/D, abd pain, dysuria. 2+ edema noted in BLE, patient denies significant hx of lower extremity edema. Patient denies any other symptoms at this time. He is resting comfortably without signs of distress. 4/9/2020 Assessment/Plan: 
Acute on chronic hypoxic respiratory failure - worsening COVID 19 positive Hx COPD  
- Patient currently on 45l HFNC (baseline 3LNC) - Resp panel negative  
- sputum cx - prelim - staph aureus - CXR: Bilateral lower lobe airspace disease - ABG 4/11 showed worsening hypoxia - appreciate intensivist input  
- started on plaquenil 4/10 
-  Normal  and  procalcitonin <0.05, CRP elevated >9.5. IL 6 pending - c/w po azithromycin - Albuterol MDI. Dced steroids  
- on Zinc and vitamin C 
  
Type II Diabetes mellitus Hold PO meds Start sliding scale correctional insulin Monitor BG 
  
Hx Pulmonary Embolism and DVT 
CTA of chest on 3/3/2020 neg for PE Repeat BLE vein duplex Continue home Eliquis 
  
Hx Epilepsy - Continue home Keppra HLD - statin Depression - zoloft High risk for decline   
 
Code status: full. Sister is mpoa DVT prophylaxis: Eliquis Disposition: TBD. Pt came from [facility] but lives with sister 
---------------------------------------------- 
 
CC: sob S: Patient is seen and examined at bedside. He states that he feels ok but pt appears to be tachypnic. He desaturated last night and HFNC increased to 45L Discussed with nursing. Discussed with ICU attending - monitor on floor for now. Review of Systems: A comprehensive review of systems was negative except for that written in the HPI. O: 
Visit Vitals /66 (BP 1 Location: Left arm, BP Patient Position: At rest) Pulse (!) 106 Temp 100.3 °F (37.9 °C) Resp 23 Ht 6' 2.02\" (1.88 m) Wt 71.3 kg (157 lb 3.2 oz) SpO2 98% BMI 20.17 kg/m² PHYSICAL EXAM: 
Gen: Resp distress HEENT: anicteric sclerae, normal conjunctiva, oropharynx clear, MM moist 
Neck: supple, trachea midline, no adenopathy Heart: RRR, no MRG, no JVD, no peripheral edema Lungs: tachypnea,  decreased at bases Abd: soft, NT, ND, BS+, no organomegaly Extr: warm Skin: dry, no rash Neuro: alert, normal speech, moves all extremities Psych: normal mood, appropriate affect Intake/Output Summary (Last 24 hours) at 4/11/2020 1439 Last data filed at 4/11/2020 0401 Gross per 24 hour Intake 0 ml Output 490 ml Net -490 ml Recent labs & imaging reviewed: 
Recent Results (from the past 24 hour(s)) GLUCOSE, POC Collection Time: 04/10/20  5:41 PM  
Result Value Ref Range Glucose (POC) 102 (H) 65 - 100 mg/dL Performed by Promise Barbour GLUCOSE, POC Collection Time: 04/10/20  9:27 PM  
Result Value Ref Range Glucose (POC) 131 (H) 65 - 100 mg/dL Performed by Holli Puckett METABOLIC PANEL, COMPREHENSIVE Collection Time: 04/11/20  3:36 AM  
Result Value Ref Range Sodium 138 136 - 145 mmol/L Potassium 3.5 3.5 - 5.1 mmol/L Chloride 98 97 - 108 mmol/L  
 CO2 38 (H) 21 - 32 mmol/L Anion gap 2 (L) 5 - 15 mmol/L Glucose 96 65 - 100 mg/dL BUN 13 6 - 20 MG/DL Creatinine 0.57 (L) 0.70 - 1.30 MG/DL  
 BUN/Creatinine ratio 23 (H) 12 - 20 GFR est AA >60 >60 ml/min/1.73m2 GFR est non-AA >60 >60 ml/min/1.73m2 Calcium 9.3 8.5 - 10.1 MG/DL Bilirubin, total 0.2 0.2 - 1.0 MG/DL  
 ALT (SGPT) 23 12 - 78 U/L  
 AST (SGOT) 18 15 - 37 U/L Alk. phosphatase 67 45 - 117 U/L Protein, total 6.5 6.4 - 8.2 g/dL Albumin 2.9 (L) 3.5 - 5.0 g/dL Globulin 3.6 2.0 - 4.0 g/dL A-G Ratio 0.8 (L) 1.1 - 2.2    
CBC WITH AUTOMATED DIFF Collection Time: 04/11/20  3:36 AM  
Result Value Ref Range WBC 7.8 4.1 - 11.1 K/uL  
 RBC 4.53 4.10 - 5.70 M/uL  
 HGB 11.7 (L) 12.1 - 17.0 g/dL HCT 39.5 36.6 - 50.3 % MCV 87.2 80.0 - 99.0 FL  
 MCH 25.8 (L) 26.0 - 34.0 PG  
 MCHC 29.6 (L) 30.0 - 36.5 g/dL  
 RDW 18.8 (H) 11.5 - 14.5 % PLATELET 484 736 - 431 K/uL MPV 9.2 8.9 - 12.9 FL  
 NRBC 0.3 (H) 0  WBC ABSOLUTE NRBC 0.02 (H) 0.00 - 0.01 K/uL NEUTROPHILS 78 (H) 32 - 75 % LYMPHOCYTES 16 12 - 49 % MONOCYTES 5 5 - 13 % EOSINOPHILS 0 0 - 7 % BASOPHILS 0 0 - 1 % IMMATURE GRANULOCYTES 1 (H) 0.0 - 0.5 % ABS. NEUTROPHILS 6.2 1.8 - 8.0 K/UL  
 ABS. LYMPHOCYTES 1.2 0.8 - 3.5 K/UL  
 ABS. MONOCYTES 0.4 0.0 - 1.0 K/UL  
 ABS. EOSINOPHILS 0.0 0.0 - 0.4 K/UL  
 ABS. BASOPHILS 0.0 0.0 - 0.1 K/UL  
 ABS. IMM. GRANS. 0.0 0.00 - 0.04 K/UL  
 DF AUTOMATED MAGNESIUM Collection Time: 04/11/20  3:36 AM  
Result Value Ref Range Magnesium 1.9 1.6 - 2.4 mg/dL PHOSPHORUS Collection Time: 04/11/20  3:36 AM  
Result Value Ref Range Phosphorus 2.6 2.6 - 4.7 MG/DL  
GLUCOSE, POC Collection Time: 04/11/20  8:45 AM  
Result Value Ref Range Glucose (POC) 104 (H) 65 - 100 mg/dL Performed by Domenic Pickard POC EG7 Collection Time: 04/11/20 10:38 AM  
Result Value Ref Range Calcium, ionized (POC) 1.26 1.12 - 1.32 mmol/L  
 FIO2 (POC) 70 % pH (POC) 7.405 7.35 - 7.45    
 pCO2 (POC) 61.7 (H) 35.0 - 45.0 MMHG  
 pO2 (POC) 53 (L) 80 - 100 MMHG  
 HCO3 (POC) 38.6 (H) 22 - 26 MMOL/L Base excess (POC) 14 mmol/L  
 sO2 (POC) 86 (L) 92 - 97 % Site RIGHT RADIAL Device: High Flow Nasal Cannula Flow rate (POC) 45 L/M Allens test (POC) YES Specimen type (POC) ARTERIAL Total resp. rate 18 GLUCOSE, POC Collection Time: 04/11/20 12:43 PM  
Result Value Ref Range Glucose (POC) 78 65 - 100 mg/dL Performed by Jair Ragland Recent Labs 04/11/20 
8555 04/10/20 
4637 WBC 7.8 10.0 HGB 11.7* 11.0*  
HCT 39.5 37.0  255 Recent Labs 04/11/20 
0804 04/10/20 
0416 04/09/20 
1457  138 137  
K 3.5 3.6 3.8 CL 98 98 95* CO2 38* 38* 35* BUN 13 16 16 CREA 0.57* 0.64* 0.69* GLU 96 120* 138* CA 9.3 9.4 9.9 MG 1.9 2.9*  --   
PHOS 2.6 4.0  --   
 
Recent Labs 04/11/20 
4560 04/10/20 
0416 04/09/20 
1457 SGOT 18 16 16 ALT 23 22 20 AP 67 69 77 TBILI 0.2 0.3 0.4 TP 6.5 6.3* 6.8 ALB 2.9* 2.9* 3.2*  
GLOB 3.6 3.4 3.6 Recent Labs 04/09/20 
1457 INR 1.1 PTP 11.4* APTT 33.4* No results for input(s): FE, TIBC, PSAT, FERR in the last 72 hours. No results found for: FOL, RBCF No results for input(s): PH, PCO2, PO2 in the last 72 hours. Recent Labs 04/09/20 
1457 TROIQ <0.05 Lab Results Component Value Date/Time Cholesterol, total 187 09/11/2017 05:53 AM  
 HDL Cholesterol 63 09/11/2017 05:53 AM  
 LDL, calculated 109 (H) 09/11/2017 05:53 AM  
 Triglyceride 75 09/11/2017 05:53 AM  
 CHOL/HDL Ratio 3.0 09/11/2017 05:53 AM  
 
Lab Results Component Value Date/Time Glucose (POC) 78 04/11/2020 12:43 PM  
 Glucose (POC) 104 (H) 04/11/2020 08:45 AM  
 Glucose (POC) 131 (H) 04/10/2020 09:27 PM  
 Glucose (POC) 102 (H) 04/10/2020 05:41 PM  
 Glucose (POC) 157 (H) 04/10/2020 08:57 AM  
 
Lab Results Component Value Date/Time  Color YELLOW/STRAW 09/10/2017 10:09 PM  
 Appearance CLEAR 09/10/2017 10:09 PM  
 Specific gravity 1.010 09/10/2017 10:09 PM  
 pH (UA) 5.0 09/10/2017 10:09 PM  
 Protein NEGATIVE  09/10/2017 10:09 PM  
 Glucose NEGATIVE  09/10/2017 10:09 PM  
 Ketone NEGATIVE  09/10/2017 10:09 PM  
 Bilirubin NEGATIVE  09/10/2017 10:09 PM  
 Urobilinogen 0.2 09/10/2017 10:09 PM  
 Nitrites NEGATIVE  09/10/2017 10:09 PM  
 Leukocyte Esterase NEGATIVE  09/10/2017 10:09 PM  
 Epithelial cells MODERATE (A) 09/10/2017 10:09 PM  
 Bacteria NEGATIVE  09/10/2017 10:09 PM  
 WBC 5-10 09/10/2017 10:09 PM  
 RBC 0-5 09/10/2017 10:09 PM  
 
 
Med list reviewed Current Facility-Administered Medications Medication Dose Route Frequency  albuterol (PROVENTIL HFA, VENTOLIN HFA, PROAIR HFA) inhaler 2 Puff  2 Puff Inhalation Q4H RT  
 fluticasone (FLOVENT HFA) 110 mcg inhaler  2 Puff Inhalation BID RT  
 ascorbic acid (vitamin C) (VITAMIN C) tablet 500 mg  500 mg Oral BID  zinc sulfate (ZINCATE) 220 mg capsule 1 Cap  1 Cap Oral DAILY  hydroxychloroquine (PLAQUENIL) tablet 200 mg  200 mg Oral BID  
 azithromycin (ZITHROMAX) tablet 250 mg  250 mg Oral DAILY  albuterol (PROVENTIL HFA, VENTOLIN HFA, PROAIR HFA) inhaler 2 Puff  2 Puff Inhalation Q4H PRN  
 acetaminophen (TYLENOL) tablet 650 mg  650 mg Oral Q6H PRN Or  
 acetaminophen (TYLENOL) suppository 650 mg  650 mg Rectal Q6H PRN  
 sodium chloride (NS) flush 5-40 mL  5-40 mL IntraVENous Q8H  
 sodium chloride (NS) flush 5-40 mL  5-40 mL IntraVENous PRN  
 HYDROcodone-acetaminophen (NORCO) 5-325 mg per tablet 1 Tab  1 Tab Oral Q4H PRN  
 insulin lispro (HUMALOG) injection   SubCUTAneous AC&HS  
 glucose chewable tablet 16 g  4 Tab Oral PRN  
 glucagon (GLUCAGEN) injection 1 mg  1 mg IntraMUSCular PRN  
 dextrose 10% infusion 0-250 mL  0-250 mL IntraVENous PRN  
 apixaban (ELIQUIS) tablet 5 mg  5 mg Oral BID  atorvastatin (LIPITOR) tablet 40 mg  40 mg Oral QHS  cetirizine (ZYRTEC) tablet 10 mg  10 mg Oral DAILY  ferrous sulfate tablet 325 mg  325 mg Oral ACB  hydrOXYzine HCL (ATARAX) tablet 25 mg  25 mg Oral BID  
  levETIRAcetam (KEPPRA) tablet 500 mg  500 mg Oral BID  pantoprazole (PROTONIX) tablet 40 mg  40 mg Oral DAILY  sertraline (ZOLOFT) tablet 25 mg  25 mg Oral DAILY  calcium carbonate (TUMS) chewable tablet 200 mg [elemental]  200 mg Oral TID PRN Care Plan discussed with:  Patient/Family and Nurse Chadd Celeste MD 
Internal Medicine Date of Service: 4/11/2020

## 2020-04-12 NOTE — PROCEDURES
[facility] Procedure Note - Intubation:  
Performed by Juan C Rankin MD . Immediately prior to the procedure, the patient was reevaluated and found suitable for the planned procedure and any planned medications. Immediately prior to the procedure a time out was called to verify the correct patient, procedure, equipment, staff, and marking as appropriate. Medications given were etomidate 20 mg venous x1 succinylcholine 150 mg intravenous x1 A number 7.5 mm 
 
ETT was placed to 23 cm at the gumline. Placement was evaluated by noting capnometer and direct visualization Attempts required: Single. Complications: Post intubation arrest.  
The procedure was completed, the patient received norepinephrine for hypotension. Following that the patient continued to have hypoxemia, the patient went into pulseless electrical activity.

## 2020-04-12 NOTE — PROGRESS NOTES
Spiritual Care Assessment/Progress Note [address] 
 
 
NAME: Kanu Augustine      MRN: [id number] AGE: 77 y.o. SEX: male Restoration Affiliation: Aleatha Closs Language: [place] 4/12/2020     Total Time (in minutes): 50  
 
Spiritual Assessment begun in [address] through conversation with: 
  
    []Patient        [] Family    [] Friend(s) Reason for Consult: Rapid response team  
 
Spiritual beliefs: (Please include comment if needed) 
   [] Identifies with a meg tradition:     
   [] Supported by a meg community:        
   [] Claims no spiritual orientation:       
   [] Seeking spiritual identity:            
   [] Adheres to an individual form of spirituality:       
   [x] Not able to assess:                   
 
    
Identified resources for coping:  
   [] Prayer                           
   [] Music                  [] Guided Imagery 
   [] Family/friends                 [] Pet visits [] Devotional reading                         [] Unknown 
   [] Other:                                          
 
 
Interventions offered during this visit: (See comments for more details) Patient Interventions: (Inpatient Death) Plan of Care: 
 
 [] Support spiritual and/or cultural needs  
 [] Support AMD and/or advance care planning process    
 [] Support grieving process 
 [] Coordinate Rites and/or Rituals  
 [] Coordination with community clergy [] No spiritual needs identified at this time 
 [] Detailed Plan of Care below (See Comments)  [] Make referral to Music Therapy 
[] Make referral to Pet Therapy    
[] Make referral to Addiction services 
[] Make referral to [facility] 
[] Make referral to Spiritual Care Partner 
[] No future visits requested       
[] Follow up visits as needed Comments: Responded to RRT called for Mr Lolis Bellamy in room 417. No family was present. Patient on isolation so  did not enter room.  RRT changed to Code Blue and patient was transferred to [address] [place] [address] where a second Code was called. Informed by nurse, Luis Armando Muller, that patient had been pronounced dead. This  asked Dr Corina Tompkins to let the family know that  was available for support by phone when she notified them of patient's death. Dr Corina Tompkins informed family as requested and they declined support. : Rev. Michelle Huntley. Blake Taylor; [facility], to contact [phone] [facility] call: [phone]

## 2020-04-12 NOTE — PROGRESS NOTES
1413: TRANSFER - IN REPORT: 
 
 Leonor Valiente  being received from [facility] (unit) for urgent transfer , patient arrived with RT, MD and RN for emergency intubation for respiratory arrest. Pt is being bagged, O2 sats 62%. Moving extremities. 18g PIV R hand and 22g R forearm, both flushed and intact. 1420: Pt given 20mg etomidate and 150mg succinylcholine IV.  
 
1421: Pt intubated by Dr. Kalen Castorena. On 100% fiO2, O2 sats 75%. 1427: No pulse, rhythm PEA, code blue called and compressions started. 1mg IV epi given. 1430: R femoral central line placed by Dr. Kalen Castorena. 1mg IV epi given via central line. 1430: Normal saline bolus running by gravity/pressure. 1433: 1mg IV epi given. Pt remains in PEA. 50mEq IV sodium bicarb 8.4% given. 1436: 1mg IV epi given. 1439: 1mg IV epi given. 1g calcium chloride IV.  
 
1441: 1mg IV epi given. 1442: 50mEq IV sodium bicarb 8.4% given. 1443: Compressions stopped. Pt remains pulseless. Code stopped and death pronounced by Dr. Kalen Castorena. Dr. Sabina Maki notified. [facility] notified. Family/medical POA notified.

## 2020-04-12 NOTE — PROCEDURES
[facility] Procedure Note - Central Venous Access:  
Performed by Power Muse MD 
 
Obtained the line was placed during cardiac arrest in the emergency basis no consent was able to be obtained. Immediately prior to the procedure, the patient was reevaluated and found suitable for the planned procedure and any planned medications. Immediately prior to the procedure a time out was called to verify the correct patient, procedure, equipment, staff, and marking as appropriate. The site was prepped with chlorhexidine using Seldinger technique a 7 Uzbek triple-lumen was placed in the right femoral vein via direct cannulation with 1 number of attempts for intravenous access for ACLS protocol. There was good blood return. The following complications were encountered: None A follow-up chest x-ray was not needed as the catheter was placed in the right groin. Ordered post procedure. The procedure was done while the cardiopulmonary resuscitation by chest compression was continued. Jasper Shen

## 2020-04-12 NOTE — PROGRESS NOTES
1330: Anesthesia (Dr. Chad Lau) at bedside to perform central line placement with primary nurse, MD made aware that consent form was at the bedside and needed signatures, anesthesiologist began procedure. Right IJ and EJ placement and Left insertion unsuccessful. Patient tolerated procedure poorly, complaining of pain during procedure. Patient started complaining of chest pain immediately after procedure ended. 1341: Dr. Stephanie Esparza notified of unsuccessful central line placement and patient in distress complaining of right sided chest pain. Patient moaning stating chest pain is on both right and left side \"I can't take this much longer\", Pt also stating \"please help me Shannon\". Charge nurse made aware of situation and EKG brought to bedside. 1348: Patient SPO2 on hi-flow 77% patient still in distress, RR called and Dr. Stephanie Esparza aware and on the way. RT paged. 1352: Primary nurse, RT, RRT nurse, CCT and additional RN at [facility]) at bedside. Patient /115,  sinus tach, RR 26, SPO2 73% (see flowsheet and RR documentation) 1355: Ambubag with filter applied to patient for oxygen support brought SPO2 up to 95% Patient moaning, eyes open responding to voice. See RR documentation. 1410: patient transferred to ICU for intubation. Transfer out to ICU where ICU team took over patient care. Problem: Pressure Injury - Risk of 
Goal: *Prevention of pressure injury Description: Document Carlos Enrique Scale and appropriate interventions in the flowsheet. Outcome: Progressing Towards Goal 
Note: Pressure Injury Interventions: 
Sensory Interventions: Assess changes in LOC, Discuss PT/OT consult with provider, Float heels, Keep linens dry and wrinkle-free, Maintain/enhance activity level, Minimize linen layers, Monitor skin under medical devices, Turn and reposition approx. every two hours (pillows and wedges if needed) Moisture Interventions: Absorbent underpads, Assess need for specialty bed, Check for incontinence Q2 hours and as needed, Limit adult briefs, Maintain skin hydration (lotion/cream), Minimize layers, Moisture barrier, Offer toileting Q_hr Activity Interventions: Assess need for specialty bed, Increase time out of bed, Pressure redistribution bed/mattress(bed type), PT/OT evaluation Mobility Interventions: Assess need for specialty bed, Float heels, HOB 30 degrees or less, Pressure redistribution bed/mattress (bed type), PT/OT evaluation, Turn and reposition approx. every two hours(pillow and wedges) Nutrition Interventions: Document food/fluid/supplement intake, Discuss nutritional consult with provider, Offer support with meals,snacks and hydration Friction and Shear Interventions: Apply protective barrier, creams and emollients, Lift sheet, HOB 30 degrees or less, Minimize layers Problem: Falls - Risk of 
Goal: *Absence of Falls Description: Document Manny Dowd Fall Risk and appropriate interventions in the flowsheet. Outcome: Progressing Towards Goal 
Note: Fall Risk Interventions: 
Mobility Interventions: Assess mobility with egress test, Communicate number of staff needed for ambulation/transfer, OT consult for ADLs, Patient to call before getting OOB, PT Consult for mobility concerns, Strengthening exercises (ROM-active/passive), Utilize walker, cane, or other assistive device, Utilize gait belt for transfers/ambulation, PT Consult for assist device competence Medication Interventions: Assess postural VS orthostatic hypotension, Evaluate medications/consider consulting pharmacy, Patient to call before getting OOB, Teach patient to arise slowly, Utilize gait belt for transfers/ambulation Elimination Interventions: Call light in reach, Elevated toilet seat, Patient to call for help with toileting needs, Stay With Me (per policy), Toilet paper/wipes in reach, Urinal in reach, Toileting schedule/hourly rounds

## 2020-04-12 NOTE — PROGRESS NOTES
Bedside and Verbal shift change report given to Armida Boeck (oncoming nurse) by Jessica Driscoll (offgoing nurse). Report included the following information SBAR, Kardex, Intake/Output, MAR, Accordion and Cardiac Rhythm NSR/ST. Last 3 Recorded Weights in this Encounter 04/11/20 0000 04/11/20 0327 04/12/20 0000 Weight: 70.9 kg (156 lb 4.8 oz) 71.3 kg (157 lb 3.2 oz) 71 kg (156 lb 8 oz) Paged hospitalist regarding several unsuccessful IV attempts in order to administer continuous D5 fluids. Pt becoming upset with multiple stick attempts from different staff members. Also, Pt's bedtime glucose value was 100. Was informed by hospitalist that glucose should be fine through the night and to monitor for changes with AM glucose check. hypoglycemia precautions in place. Problem: Pressure Injury - Risk of 
Goal: *Prevention of pressure injury Description: Document Carlos Enrique Scale and appropriate interventions in the flowsheet. Outcome: Progressing Towards Goal 
Note: Pressure Injury Interventions: 
Sensory Interventions: Assess changes in LOC, Assess need for specialty bed, Check visual cues for pain, Float heels, Keep linens dry and wrinkle-free, Minimize linen layers, Monitor skin under medical devices, Pressure redistribution bed/mattress (bed type), Turn and reposition approx. every two hours (pillows and wedges if needed) Moisture Interventions: Absorbent underpads, Assess need for specialty bed, Check for incontinence Q2 hours and as needed, Internal/External urinary devices, Minimize layers, Maintain skin hydration (lotion/cream) Activity Interventions: Assess need for specialty bed, Increase time out of bed, Pressure redistribution bed/mattress(bed type), PT/OT evaluation Mobility Interventions: Assess need for specialty bed, HOB 30 degrees or less, Pressure redistribution bed/mattress (bed type), PT/OT evaluation, Turn and reposition approx. every two hours(pillow and wedges) Nutrition Interventions: Document food/fluid/supplement intake, Discuss nutritional consult with provider, Offer support with meals,snacks and hydration Friction and Shear Interventions: Apply protective barrier, creams and emollients, HOB 30 degrees or less, Minimize layers Problem: Falls - Risk of 
Goal: *Absence of Falls Description: Document Earma Brandin Fall Risk and appropriate interventions in the flowsheet. Outcome: Progressing Towards Goal 
Note: Fall Risk Interventions: 
Mobility Interventions: Communicate number of staff needed for ambulation/transfer, Patient to call before getting OOB, PT Consult for mobility concerns, OT consult for ADLs Medication Interventions: Assess postural VS orthostatic hypotension, Evaluate medications/consider consulting pharmacy, Patient to call before getting OOB, Teach patient to arise slowly Elimination Interventions: Call light in reach, Patient to call for help with toileting needs, Stay With Me (per policy), Toileting schedule/hourly rounds Problem: Breathing Pattern - Ineffective Goal: *Absence of hypoxia Outcome: Progressing Towards Goal 
Note: Pt saturating around 95% on Hi Flow. RT lowered settings to 40L O2 and 70% FIO2 and the patient maintained oxygenation.

## 2020-04-12 NOTE — PROGRESS NOTES
Hospitalist Progress Note Hospital summary: Evelyn Silverman is a 77 y.o. male who presents with shortness of breath. He arrives from [place] and per EMS noted to be in an isolation unit. PMH significant for COPD, with last exacerbation 03/2020, DVT of RLE and PE, colon CA, DM2, and bilateral femoral aneurysms. Onset of symptoms was around noon today post exercise with physical therapy. He states his breathing has improved with rest and is currently on 4LNC, he wears 3LNC at baseline. Patient reports productive (grey to clear) cough onset this morning. He denies headache, CP, fever, chills, N/V/D, abd pain, dysuria. 2+ edema noted in BLE, patient denies significant hx of lower extremity edema. Patient denies any other symptoms at this time. He is resting comfortably without signs of distress. 4/9/2020 Assessment/Plan: 
Acute on chronic hypoxic respiratory failure - not improving COVID 19 Positive Hx COPD  
- Patient currently on 40l HFNC (baseline 3LNC) - Resp panel negative  
- sputum cx - prelim - staph aureus - CXR: Bilateral lower lobe airspace disease - ABG 4/11 showed worsening hypoxia - appreciate intensivist input  
- started on plaquenil 4/10 
-  Normal  and  procalcitonin <0.05, CRP elevated >9.5. IL 6 normal  
- c/w po azithromycin - Albuterol MDI. Dced steroids  
- on Zinc and vitamin C 
  
Type II Diabetes mellitus : A1c is 7.8 in 2/20. c/w ISS. Mildly hypoglycemic yesterday due to poor oral intake  
  
Hx Pulmonary Embolism and DVT 
CTA of chest on 3/3/2020 neg for PE Continue home Eliquis 
  
Hx Epilepsy - Continue home Keppra HLD - statin Depression - zoloft High risk for decline   
 
Code status: Full. Sister is mpoa DVT prophylaxis: Eliquis Disposition: TBD. Pt came from [facility] but lives with sister 
---------------------------------------------- 
 
CC: sob S: Patient is seen and examined at bedside. Pt states he feels sob and tired. RR in 20's. Explained again that he is high risk for decline and would monitor him closely. Discussed with nursing. He had a fever spike of 100.5 last night. Spoke with sister David Dudley on phone and updated the patient Review of Systems: A comprehensive review of systems was negative except for that written in the HPI. O: 
Visit Vitals /80 (BP 1 Location: Left arm, BP Patient Position: At rest) Pulse (!) 106 Temp 98.8 °F (37.1 °C) Resp 15 Ht 6' 2.02\" (1.88 m) Wt 71 kg (156 lb 8 oz) SpO2 94% BMI 20.08 kg/m² PHYSICAL EXAM: 
Gen: Resp distress HEENT: anicteric sclerae, normal conjunctiva, oropharynx clear, MM moist 
Neck: supple, trachea midline, no adenopathy Heart: RRR, no MRG, no JVD, no peripheral edema Lungs: tachypnea,  decreased at bases Abd: soft, NT, ND, BS+, no organomegaly Extr: warm Skin: dry, no rash Neuro: alert, normal speech, moves all extremities Psych: normal mood, appropriate affect Intake/Output Summary (Last 24 hours) at 4/12/2020 1055 Last data filed at 4/12/2020 0000 Gross per 24 hour Intake 0 ml Output 1150 ml Net -1150 ml Recent labs & imaging reviewed: 
Recent Results (from the past 24 hour(s)) GLUCOSE, POC Collection Time: 04/11/20 12:43 PM  
Result Value Ref Range Glucose (POC) 78 65 - 100 mg/dL Performed by Angel Rose GLUCOSE, POC Collection Time: 04/11/20  6:05 PM  
Result Value Ref Range Glucose (POC) 98 65 - 100 mg/dL Performed by Angel Rose GLUCOSE, POC Collection Time: 04/11/20  9:03 PM  
Result Value Ref Range Glucose (POC) 100 65 - 100 mg/dL Performed by [facility] METABOLIC PANEL, COMPREHENSIVE Collection Time: 04/12/20  7:05 AM  
Result Value Ref Range Sodium 134 (L) 136 - 145 mmol/L Potassium 3.8 3.5 - 5.1 mmol/L  Chloride 94 (L) 97 - 108 mmol/L  
 CO2 34 (H) 21 - 32 mmol/L  
 Anion gap 6 5 - 15 mmol/L Glucose 109 (H) 65 - 100 mg/dL BUN 13 6 - 20 MG/DL Creatinine 0.62 (L) 0.70 - 1.30 MG/DL  
 BUN/Creatinine ratio 21 (H) 12 - 20 GFR est AA >60 >60 ml/min/1.73m2 GFR est non-AA >60 >60 ml/min/1.73m2 Calcium 9.9 8.5 - 10.1 MG/DL Bilirubin, total 0.4 0.2 - 1.0 MG/DL  
 ALT (SGPT) 26 12 - 78 U/L  
 AST (SGOT) 30 15 - 37 U/L Alk. phosphatase 75 45 - 117 U/L Protein, total 7.0 6.4 - 8.2 g/dL Albumin 2.8 (L) 3.5 - 5.0 g/dL Globulin 4.2 (H) 2.0 - 4.0 g/dL A-G Ratio 0.7 (L) 1.1 - 2.2    
CBC WITH AUTOMATED DIFF Collection Time: 04/12/20  7:05 AM  
Result Value Ref Range WBC 10.1 4.1 - 11.1 K/uL  
 RBC 5.24 4.10 - 5.70 M/uL  
 HGB 13.5 12.1 - 17.0 g/dL HCT 47.1 36.6 - 50.3 % MCV 89.9 80.0 - 99.0 FL  
 MCH 25.8 (L) 26.0 - 34.0 PG  
 MCHC 28.7 (L) 30.0 - 36.5 g/dL  
 RDW 19.3 (H) 11.5 - 14.5 % PLATELET 564 340 - 606 K/uL MPV 9.0 8.9 - 12.9 FL  
 NRBC 0.0 0  WBC ABSOLUTE NRBC 0.00 0.00 - 0.01 K/uL NEUTROPHILS 65 32 - 75 % LYMPHOCYTES 30 12 - 49 % MONOCYTES 4 (L) 5 - 13 % EOSINOPHILS 1 0 - 7 % BASOPHILS 0 0 - 1 % IMMATURE GRANULOCYTES 0 0.0 - 0.5 % ABS. NEUTROPHILS 6.6 1.8 - 8.0 K/UL  
 ABS. LYMPHOCYTES 3.0 0.8 - 3.5 K/UL  
 ABS. MONOCYTES 0.4 0.0 - 1.0 K/UL  
 ABS. EOSINOPHILS 0.1 0.0 - 0.4 K/UL  
 ABS. BASOPHILS 0.0 0.0 - 0.1 K/UL  
 ABS. IMM. GRANS. 0.0 0.00 - 0.04 K/UL  
 DF SMEAR SCANNED    
 RBC COMMENTS ANISOCYTOSIS 1+ 
    
 RBC COMMENTS HYPOCHROMIA 1+ MAGNESIUM Collection Time: 04/12/20  7:05 AM  
Result Value Ref Range Magnesium 1.9 1.6 - 2.4 mg/dL PHOSPHORUS Collection Time: 04/12/20  7:05 AM  
Result Value Ref Range Phosphorus 3.6 2.6 - 4.7 MG/DL  
GLUCOSE, POC Collection Time: 04/12/20 10:07 AM  
Result Value Ref Range Glucose (POC) 94 65 - 100 mg/dL Performed by Garret Gould Recent Labs 04/12/20 
9738 04/11/20 
9624 WBC 10.1 7.8 HGB 13.5 11.7*  
 HCT 47.1 39.5  226 Recent Labs 04/12/20 
5056 04/11/20 
4852 04/10/20 
0248 * 138 138  
K 3.8 3.5 3.6 CL 94* 98 98 CO2 34* 38* 38* BUN 13 13 16 CREA 0.62* 0.57* 0.64* * 96 120* CA 9.9 9.3 9.4 MG 1.9 1.9 2.9*  
PHOS 3.6 2.6 4.0 Recent Labs 04/12/20 
0648 04/11/20 
6216 04/10/20 
1657 SGOT 30 18 16 ALT 26 23 22 AP 75 67 69 TBILI 0.4 0.2 0.3 TP 7.0 6.5 6.3* ALB 2.8* 2.9* 2.9*  
GLOB 4.2* 3.6 3.4 Recent Labs 04/09/20 
1457 INR 1.1 PTP 11.4* APTT 33.4* No results for input(s): FE, TIBC, PSAT, FERR in the last 72 hours. No results found for: FOL, RBCF No results for input(s): PH, PCO2, PO2 in the last 72 hours. Recent Labs 04/09/20 
1457 TROIQ <0.05 Lab Results Component Value Date/Time Cholesterol, total 187 09/11/2017 05:53 AM  
 HDL Cholesterol 63 09/11/2017 05:53 AM  
 LDL, calculated 109 (H) 09/11/2017 05:53 AM  
 Triglyceride 75 09/11/2017 05:53 AM  
 CHOL/HDL Ratio 3.0 09/11/2017 05:53 AM  
 
Lab Results Component Value Date/Time Glucose (POC) 94 04/12/2020 10:07 AM  
 Glucose (POC) 100 04/11/2020 09:03 PM  
 Glucose (POC) 98 04/11/2020 06:05 PM  
 Glucose (POC) 78 04/11/2020 12:43 PM  
 Glucose (POC) 104 (H) 04/11/2020 08:45 AM  
 
Lab Results Component Value Date/Time Color YELLOW/STRAW 09/10/2017 10:09 PM  
 Appearance CLEAR 09/10/2017 10:09 PM  
 Specific gravity 1.010 09/10/2017 10:09 PM  
 pH (UA) 5.0 09/10/2017 10:09 PM  
 Protein NEGATIVE  09/10/2017 10:09 PM  
 Glucose NEGATIVE  09/10/2017 10:09 PM  
 Ketone NEGATIVE  09/10/2017 10:09 PM  
 Bilirubin NEGATIVE  09/10/2017 10:09 PM  
 Urobilinogen 0.2 09/10/2017 10:09 PM  
 Nitrites NEGATIVE  09/10/2017 10:09 PM  
 Leukocyte Esterase NEGATIVE  09/10/2017 10:09 PM  
 Epithelial cells MODERATE (A) 09/10/2017 10:09 PM  
 Bacteria NEGATIVE  09/10/2017 10:09 PM  
 WBC 5-10 09/10/2017 10:09 PM  
 RBC 0-5 09/10/2017 10:09 PM  
 
 
Med list reviewed Current Facility-Administered Medications Medication Dose Route Frequency  dextrose 5% infusion  50 mL/hr IntraVENous CONTINUOUS  
 albuterol (PROVENTIL HFA, VENTOLIN HFA, PROAIR HFA) inhaler 2 Puff  2 Puff Inhalation Q4H RT  
 fluticasone (FLOVENT HFA) 110 mcg inhaler  2 Puff Inhalation BID RT  
 ascorbic acid (vitamin C) (VITAMIN C) tablet 500 mg  500 mg Oral BID  zinc sulfate (ZINCATE) 220 mg capsule 1 Cap  1 Cap Oral DAILY  hydroxychloroquine (PLAQUENIL) tablet 200 mg  200 mg Oral BID  
 azithromycin (ZITHROMAX) tablet 250 mg  250 mg Oral DAILY  albuterol (PROVENTIL HFA, VENTOLIN HFA, PROAIR HFA) inhaler 2 Puff  2 Puff Inhalation Q4H PRN  
 acetaminophen (TYLENOL) tablet 650 mg  650 mg Oral Q6H PRN Or  
 acetaminophen (TYLENOL) suppository 650 mg  650 mg Rectal Q6H PRN  
 sodium chloride (NS) flush 5-40 mL  5-40 mL IntraVENous Q8H  
 sodium chloride (NS) flush 5-40 mL  5-40 mL IntraVENous PRN  
 HYDROcodone-acetaminophen (NORCO) 5-325 mg per tablet 1 Tab  1 Tab Oral Q4H PRN  
 insulin lispro (HUMALOG) injection   SubCUTAneous AC&HS  
 glucose chewable tablet 16 g  4 Tab Oral PRN  
 glucagon (GLUCAGEN) injection 1 mg  1 mg IntraMUSCular PRN  
 dextrose 10% infusion 0-250 mL  0-250 mL IntraVENous PRN  
 apixaban (ELIQUIS) tablet 5 mg  5 mg Oral BID  atorvastatin (LIPITOR) tablet 40 mg  40 mg Oral QHS  cetirizine (ZYRTEC) tablet 10 mg  10 mg Oral DAILY  ferrous sulfate tablet 325 mg  325 mg Oral ACB  hydrOXYzine HCL (ATARAX) tablet 25 mg  25 mg Oral BID  levETIRAcetam (KEPPRA) tablet 500 mg  500 mg Oral BID  pantoprazole (PROTONIX) tablet 40 mg  40 mg Oral DAILY  sertraline (ZOLOFT) tablet 25 mg  25 mg Oral DAILY  calcium carbonate (TUMS) chewable tablet 200 mg [elemental]  200 mg Oral TID PRN Care Plan discussed with:  Patient/Family and Nurse Chadd Celeste MD 
Internal Medicine Date of Service: 4/12/2020

## 2020-04-12 NOTE — PERIOP NOTES
Opened chart for info access for employee needle stick. Entry for info as requested by Loni Coppola.

## 2020-04-12 NOTE — PROCEDURES
Central Line attempt Performed by: Diane Harris MD 
Authorized by: Diane Harris MD  
 
Indications: vascular access Preanesthetic Checklist: patient identified, risks and benefits discussed, anesthesia consent, site marked, patient being monitored and timeout performed Timeout Time: 12:30 Pre-procedure: All elements of maximal sterile barrier technique followed? Yes   
2% Chlorhexidine for cutaneous antisepsis and Hand hygiene performed prior to catheter insertion Procedure:  
Prep:  Chlorhexidine Orientation:  Right Patient position:  Flat Number of attempts:  1 Successful placement: No   
 
Assessment:  
 
 
 
Patient tolerance:  Patient tolerated the procedure well with no immediate complications RIJ c line attempted without success, EJ also attempted. Procedure aborted. Needlestick noticed after procedure. PAPR and full protective gear utilized.

## 2020-04-12 NOTE — PROGRESS NOTES
[facility] called to room for Rapid Response. Patient in respiratory distress but alert. Sats 60% on high flow. Ambu bag with filter applied. Sats up to 95%. 1400- Code Blue called for emergent intubation. Intensivist Jaime Davalos) at the bedside. Plans made to emergently transfer to ICU and intubate there. 1427- Patient now pulseless after intubation. Code Blue called. 1445- Robert Hernandez called to stop. Patient has . Author was donned in PAPR, gown, and gloves throughout the emergency.

## 2020-04-12 NOTE — DISCHARGE SUMMARY
[id number] Adult  Hospitalist Group Death Discharge Summary PATIENT ID: Kanu Augustine MRN: [id number] YOB: 1945 DATE OF ADMISSION: 4/9/2020  2:36 PM   
PRIMARY CARE PROVIDER: Dionte Weeks MD  
ATTENDING PHYSICIAN: Chadd Celeste MD 
CONSULTATIONS:  
IP CONSULT TO HOSPITALIST 
IP CONSULT TO INTENSIVIST 
 
PROCEDURES/SURGERIES:  
* No surgery found * REASON FOR ADMISSION: Acute hypoxemic respiratory failure ([facility] [address] HOSPITAL PROBLEM LIST: 
Patient Active Problem List  
Diagnosis Code  Slurred speech R47.81  
 Seizures ([address] R56.9  Altered mental status R41.82  
 Cerebrovascular accident (CVA) due to thrombosis of right posterior cerebral artery ([facility] [address] S29.158  Acute hypoxemic respiratory failure (HCC) J96.01  
 COPD exacerbation (HCC) J44.1  Hypoxia R09.02  
 
 
DATE AND TIME OF DEATH: 4/12/2020 at  02.43pm   
 
CODE STATUS AT DISCHARGE: 
X Full Code DNR Partial  
 Comfort Care Brief HPI and Hospital Course:   
 
Thuan Marin a 77 y. o. male who presents with shortness of breath.  He arrives from [place] and per EMS noted to be in an isolation unit.  PMH significant for COPD, with last exacerbation 03/2020, DVT of RLE and PE, colon CA, DM2, and bilateral femoral aneurysms. Onset of symptoms was around noon today post exercise with physical therapy. Alyssa Smoker states his breathing has improved with rest and is currently on 4LNC, he wears 3LNC at baseline.  Patient reports productive (grey to clear) cough onset this morning.  He denies headache, CP, fever, chills, N/V/D, abd pain, dysuria.  2+ edema noted in BLE, patient denies significant hx of lower extremity edema.  Patient denies any other symptoms at this time. Alyssa Smoker is resting comfortably without signs of distress. 4/9/2020 Acute on chronic hypoxic respiratory failure Pneumonia 2/2 COVID 19 Positive Hx COPD  
- Patient on 40l HFNC (baseline 3LNC) - Resp panel negative - sputum cx - prelim - staph aureus - CXR: Bilateral lower lobe airspace disease - ABG 4/11 showed worsening hypoxia - appreciate intensivist input  
- started on plaquenil 4/10 
-  Normal  and  procalcitonin <0.05, CRP elevated >9.5. IL 6 normal  
- c/w po azithromycin - Albuterol MDI. Dced steroids  
- on Zinc and vitamin C 
 
4/12:  
Patient lost IV access and unable to obtain peripheral access. Anesthesiology consulted for Central line. Unsuccessful attempt for RIJ mckeon and EJ line. Patient c/o chest pain immediately after the above procedure. He desaturated to 77%. RRT called at 1350 Pt desaturated to 60% , started on ambu bag and matthew went upto 95% Spoke with intensivist Dr. Gwendolyn Crowley regarding ICU transfer Code blue called on floor for emergent intubation but pt did not lose pulse. Intensivist decided to intubate in ICU and pt transferred by bagging with ambu Notified Sister Ann Marie Hsu about patient deterioration and transfer to ICU for intubation. Patient intubated at [phone] and Second Code blue Called at 8707 when pt lost pulse. CPR, multiple rounds of Epi given and code was called off at [phone] and pt pronounced dead by intensivist.  
 
Khalida Cisneros was notified of patient's death. 
  
Type II Diabetes mellitus : A1c is 7.8 in 2/20. On ISS Hx Pulmonary Embolism and DVT: on home Eliquis Hx Epilepsy - Continue home Keppra HLD - statin Depression - zoloft Patient was pronounced dead on 4/12/2020  at 02 43. Cause of Death: 
Immediate: Covid 19 infection Events related to death: 
Was code called: YES  notified: Juarez Flores Family notified: Stephanie Nickon Autopsy requested: NO 
Death certificate completed: NO 
Code Status Prior to Death: Full Code Signed:  
Isidra Lugo MD 
Date of Service:  4/12/2020 
5:42 PM

## 2020-04-13 LAB
BACTERIA SPEC CULT: ABNORMAL
GRAM STN SPEC: ABNORMAL
SERVICE CMNT-IMP: ABNORMAL

## 2020-04-14 LAB
BACTERIA SPEC CULT: NORMAL
SERVICE CMNT-IMP: NORMAL

## 2020-04-16 NOTE — CDMP QUERY
Pt admitted with COVID-19, COPD exacerbation, and acute respiratory failure, noted to have elevated Temp to 100.5 on 4/11 and 102.8 on 4/12. If possible, please document in the progress notes and discharge summary if you are evaluating and /or treating any of the following:  Sepsis, not present on admission, causative organism __________ (please specify)  No Sepsis, localized infection only _________ (please specify)  Other, please specify  Clinically unable to determine The medical record reflects the following: 
   Risk Factors: COVID-19 detected, COPD, advanced age Clinical Indicators: pt with COVID-19 infection and COPD exacerbation with ? respiratory infection on H&P  WBC normal with neutrophils 90 on admission  Temp to 100.5 on 4/11 and 102.8 on 4/12, HR >90, RR >20 Treatment: Tylenol 650 mg po, plaquenil 2 times daily started 4/10, Zithromax po daily, monitoring Thank you, Jacey Self RN 
[facility] 
[phone]

## 2020-04-16 NOTE — CDMP QUERY
Pt admitted with COVID-19, COPD exacerbation, and acute respiratory failure. Pt noted to have CXR with opacities and sputum cx with heavy MRSA. After further study is it possile to determine if you were evaluating or treating any of the following:  Acute lower respiratory infection POA  Pneumonia (please specify type) POA  Other, please specify  Unable to determine The medical record reflects the following: 
  Risk Factors: advanced age (71 yo), arriving from [address] Clinical Indicators: COVID-19 detected  sputum cx with heavy MRSA  CXR- Diminished opacities in the lungs. H&P- COPD exacerbation secondary to respiratory Infection? Treatment: COVID testing, CXR, sputum cx, plaquenil 2 times daily started 4/10, Zithromax po daily, droplet plus precautions, monitoring Thank you, Stacy Kay RN 
[facility] 
[phone]

## 2020-04-18 NOTE — PROGRESS NOTES
Critical Care Update Code Blue Note - Leader : Dr. Zen Pinto Late Entry 
 
  
1427: No pulse, rhythm PEA, code blue called and compressions started. 1mg IV epi given.  
  
1430: R femoral central line placed by Dr. Belgica Uriostegui. 1mg IV epi given via central line.  
  
1430: Normal saline bolus running by gravity/pressure.  
  
1433: 1mg IV epi given. Pt remains in PEA. 50mEq IV sodium bicarb 8.4% given.  
  
1436: 1mg IV epi given. 
  
1439: 1mg IV epi given. 1g calcium chloride IV.  
  
1441: 1mg IV epi given.  
  
1442: 50mEq IV sodium bicarb 8.4% given.  
  
1443: Compressions stopped. Pt remains pulseless. Code stopped and death pronounced at [phone] by Dr. Yonatan Collier [facility]

## 2022-01-03 NOTE — PROGRESS NOTES
Does the Patient have a central line?  yes:   Device: Port  Central Line Site: Right  and Chest  Central Line Site Appearance: WDL  Is this a port? Yes: Implanted port accessed using a 20 gauge non-coring needle primed with 0.9% sodium chloride. Good blood return obtained.  Blood obtained and sent to lab.  Site Care: Aseptic site care per protocol, Occlusive transparent dressing applied and Injection caps changed/applied  Central line flushed with: 20 ml 0.9 normal saline  Central line removed: no  Richards Needle: Richards needle left in place    Supervising Provider is:  [place]       Problem: Mobility Impaired (Adult and Pediatric) Goal: *Acute Goals and Plan of Care (Insert Text) Description FUNCTIONAL STATUS PRIOR TO ADMISSION: Pt admitted from SNF and unclear of level of assistance requiring. Prior to rehab, pt was independent with functional mobility. HOME SUPPORT PRIOR TO ADMISSION: The patient lived with sister and brother-in-law. Pt's sister works during the day and brother-in-law assists pt with ADLs prior to leaving the house for a couple hours/day. Pt receives assistance with bathing from an aide 1x/wk for 1 hour. Physical Therapy Goals Initiated 3/5/2020- Goals downgraded following transfer to CCU for respiratory distress 1. Patient will move from supine to sit and sit to supine , scoot up and down and roll side to side in bed with supervision/set-up within 7 day(s). 2.  Patient will transfer from bed to chair and chair to bed with minimal assistance using the least restrictive device within 7 day(s). 3.  Patient will perform sit to stand with supervision/set-up within 7 day(s). 4.  Patient will ambulate with minimal assistance for 50 feet with the least restrictive device within 7 day(s). 5.  Patient will ascend/descend 2 stairs with bilateral handrail(s) with minimal assistance within 7 day(s). Initiated 2/26/2020 1. Patient will move from supine to sit and sit to supine , scoot up and down and roll side to side in bed with modified independence within 7 day(s). 2.  Patient will transfer from bed to chair and chair to bed with supervision/set-up using the least restrictive device within 7 day(s). 3.  Patient will perform sit to stand with supervision/set-up within 7 day(s). 4.  Patient will ambulate with supervision/set-up for 150 feet with the least restrictive device within 7 day(s). 5.  Patient will ascend/descend 2 stairs with bilateral handrail(s) with minimal assistance/contact guard assist within 7 day(s). Outcome: Progressing Towards Goal 
 PHYSICAL THERAPY TREATMENT Patient: Rosey Hernandez (80 y.o. male) Date: 3/10/2020 Diagnosis: Acute respiratory failure with hypoxia ([facility] [J96.01] CH[facility] (congest[facility] heart failure) ([facility] [I50.9] COPD exacerbation ([facility] [J44.1] <principal problem not specified> Precautions: Fall Chart, physical therapy assessment, plan of care and goals were reviewed. ASSESSMENT Patient continues with skilled PT services and is progressing towards goals. Today pt was received in bed. He was on 3 liters of 02 throughout session. At rest 02 sats ranged 92-94% and HR ranged 79-85 bpm. Sitting at the EOB his 02 sats ranged 90-91% and his HR was 96 bpm. He amb 20 feet with walker support and post amb his 02 sat was 87 and HR was 117 bpm. After two minutes of seated rest and pursed lip breathing his 02 sat was 90% and HR was in the 80s. Overall, he tries all that is asked of him and is moving fairly well but remains significantly limited by decreased activity tolerance, in particular by his dropping 02 sats. Continue to recommend rehab. Nina Lau Current Level of Function Impacting Discharge (mobility/balance): impaired standing balance, up ot min assist provided. Other factors to consider for discharge: anxiety, far from his baseline, heart failure, DVT, PE 
    
 
PLAN : 
Patient continues to benefit from skilled intervention to address the above impairments. Continue treatment per established plan of care. to address goals. Recommendation for discharge: (in order for the patient to meet his/her long term goals) Therapy up to 5 days/week in SNF setting This discharge recommendation: 
Has been made in collaboration with the attending provider and/or case management IF patient discharges home will need the following DME: none SUBJECTIVE:  
Patient stated I'm doing better.  OBJECTIVE DATA SUMMARY:  
Chart checked, pt cleared by nursing Critical Behavior: Neurologic State: Alert, Eyes open spontaneously Orientation Level: Oriented X4 Cognition: Appropriate decision making, Appropriate safety awareness, Follows commands Safety/Judgement: Decreased awareness of need for safety Functional Mobility Training: 
Bed Mobility: 
  
  
Sit to Supine: Supervision Transfers: 
Sit to Stand: Minimum assistance Stand to Sit: Contact guard assistance Balance: 
Sitting: Intact; Without support Standing: Impaired Standing - Static: Constant support;Good Standing - Dynamic : Constant support; Adron Hearing Ambulation/Gait Training: 
Distance (ft): 20 Feet (ft) Assistive Device: Gait belt;Walker, rolling Ambulation - Level of Assistance: Contact guard assistance Gait Abnormalities: Decreased step clearance Base of Support: Widened Speed/Sylvia: Slow Step Length: Left shortened;Right shortened Stairs: Therapeutic Exercises:  
Pursed lip breathing Pain Rating: 
None rated Activity Tolerance:  
Fair, desaturates with exertion and requires oxygen, requires rest breaks, observed SOB with activity, and see assessment above Please refer to the flowsheet for vital signs taken during this treatment. After treatment patient left in no apparent distress:  
Sitting in chair and Call bell within reach COMMUNICATION/COLLABORATION:  
The patients plan of care was discussed with: Registered nurse and Case management. Ruth Cavanaugh Time Calculation: 27 mins

## 2022-07-28 NOTE — PROGRESS NOTES
2240: Asked RT about recommendation concerning Hi-Flow settings due to pt SPO2 decreasing intermittently down to 82% even after applying a new pulse oximeter. RT adjusted settings, which I documented in the flowsheet. Verbal orders for homecare once a week for 4 weeks.    Leidy Beckwith on 7/28/2022 at 2:57 PM

## 2024-01-18 NOTE — PROGRESS NOTES
Problem: Self Care Deficits Care Plan (Adult) Goal: *Acute Goals and Plan of Care (Insert Text) Description FUNCTIONAL STATUS PRIOR TO ADMISSION: Per chart review, patient was admitted from a SNF. However patient stated he was admitted from his 2-story house. Patient is a questionable historian at this, however states he owned a wheelchair for community mobility and a shower chair for bathing. HOME SUPPORT: The patient lived with his sister but did not require assist. 
 
Occupational Therapy Goals Initiated 2/26/2020 1. Patient will perform lower body dressing with supervision/set-up within 7 day(s). 2.  Patient will perform bathing with supervision/set-up within 7 day(s). 3.  Patient will perform grooming tasks standing with supervision/set-up within 7 day(s). 4.  Patient will perform toilet transfers with supervision/set-up within 7 day(s). 5.  Patient will perform all aspects of toileting with supervision/set-up within 7 day(s). 6.  Patient will participate in upper extremity therapeutic exercise/activities with supervision/set-up for 5 minutes within 7 day(s). 7.  Patient will utilize energy conservation techniques during functional activities with verbal cues within 7 day(s). Outcome: Progressing Towards Goal 
  
OCCUPATIONAL THERAPY TREATMENT Patient: Felipe Mandujano (6 y.o. male) Date: 2/27/2020 Diagnosis: Acute respiratory failure with hypoxia ([facility] [J96.01] CHF (congestive heart failure) ([facility] [I50.9] <principal problem not specified> Precautions: Fall Chart, occupational therapy assessment, plan of care, and goals were reviewed. ASSESSMENT Patient continues with skilled OT services and is progressing towards goals. Requiring less assistance for bed mobility, sit to stand and transfer to chair. He is on 7lpm and says between 98% at rest and 91% after getting to chair HR . Performs best when using RW.  Stated his sister is helping him with decision regarding rehab. Would benefit from continued therapy to increase activity and endurance. Current Level of Function Impacting Discharge (ADLs): Min A Other factors to consider for discharge: none PLAN : 
Patient continues to benefit from skilled intervention to address the above impairments. Continue treatment per established plan of care. to address goals. Recommend with staff: Up in chair Recommend next OT session: ADLS Recommendation for discharge: (in order for the patient to meet his/her long term goals) Therapy up to 5 days/week in SNF setting This discharge recommendation: A follow-up discussion with the attending provider and/or case management is planned IF patient discharges home will need the following DME: walker: rolling SUBJECTIVE:  
Patient stated I feel better when I use the walker.  OBJECTIVE DATA SUMMARY:  
Cognitive/Behavioral Status: 
Neurologic State: Alert;Eyes open spontaneously Orientation Level: Oriented X4 Cognition: Follows commands Functional Mobility and Transfers for ADLs: 
Bed Mobility: 
Supine to Sit: Stand-by assistance Transfers: 
Sit to Stand: Contact guard assistance Bed to Chair: Contact guard assistance Balance: 
 Fair with RW standing balance Pain: 
None reported Activity Tolerance:  
Fair Please refer to the flowsheet for vital signs taken during this treatment. After treatment patient left in no apparent distress:  
Sitting in chair, Call bell within reach, and Bed / chair alarm activated COMMUNICATION/COLLABORATION:  
The patients plan of care was discussed with: Registered Nurse Titus Macias Time Calculation: 25 mins room air

## 2024-11-04 NOTE — DIABETES MGMT
DTC Consult Note    Recommendations/ Comments:  Please consider ordering POC glucose checks Q6hrs. Pt will need the following new rx's at d/c:    -  Glucose test strips for Accu Chek Guide  -  rx for Accu chek guide lancets       Consult received for:       [x]             New diagnosis         Chart reviewed and initial evaluation complete on Oswaldo Goldmann. Patient is a 71 y.o. male with newly diagnosed with DM2. Assessed and instructed patient on the following:   ·  Overview of DM, interpretation of lab results, blood sugar goals, hypoglycemia prevention and treatment, exercise, SMBG skills, nutrition, referred to Diabetes Educator, site rotation and follow up with PCP    Encouraged the following:   increased exercise, dietary modifications: Plate method,3 meals a day, regular blood sugar monitoring: once daily, follow up with PCP  Provided patient with the following: [x]             Survival skills education materials               []             Insulin education materials               []             CHO counting education materials               [x]             Outpatient DTC contact number               [x]             Glucometer - Accu Chek Guide      Discussed with patient need for follow up appointment for diabetes management after discharge. A1c:   Lab Results   Component Value Date/Time    Hemoglobin A1c 6.5 09/11/2017 05:53 AM       Recent Glucose Results: No results found for: GLU, GLUPOC, GLUCPOC     Lab Results   Component Value Date/Time    Creatinine 0.51 10/01/2017 04:04 AM     Estimated Creatinine Clearance: 150 mL/min (based on Cr of 0.51). Active Orders   Diet    DIET FULL LIQUID    DIET NPO    DIET NPO        PO intake: Patient Vitals for the past 72 hrs:   % Diet Eaten   10/02/17 1351 100 %   10/01/17 1726 100 %   10/01/17 0826 100 %   09/30/17 1441 100 %       Current hospital DM medication:  Metformin 500 mg once daily with dinner.      Will continue to follow as needed. Thank you.     Jayesh Amaro RD Cardiac

## (undated) DEVICE — BAG BELONG PT PERS CLEAR HANDL

## (undated) DEVICE — QUILTED PREMIUM COMFORT UNDERPAD,EXTRA HEAVY: Brand: WINGS

## (undated) DEVICE — KENDALL RADIOLUCENT FOAM MONITORING ELECTRODE -RECTANGULAR SHAPE: Brand: KENDALL

## (undated) DEVICE — SET ADMIN 16ML TBNG L100IN 2 Y INJ SITE IV PIGGY BK DISP

## (undated) DEVICE — KIT IV STRT W CHLORAPREP PD 1ML

## (undated) DEVICE — AIRLIFE™ U/CONNECT-IT OXYGEN TUBING 7 FEET (2.1 M) CRUSH-RESISTANT OXYGEN TUBING, VINYL TIPPED: Brand: AIRLIFE™

## (undated) DEVICE — CATH IV AUTOGRD BC BLU 22GA 25 -- INSYTE

## (undated) DEVICE — NEEDLE HYPO 18GA L1.5IN PNK S STL HUB POLYPR SHLD REG BVL

## (undated) DEVICE — 1200 GUARD II KIT W/5MM TUBE W/O VAC TUBE: Brand: GUARDIAN

## (undated) DEVICE — FORCEPS BX L240CM JAW DIA2.8MM L CAP W/ NDL MIC MESH TOOTH

## (undated) DEVICE — SET EXTN TBNG L BOR 4 W STPCOCK ST 32IN PRIMING VOL 6ML

## (undated) DEVICE — Device

## (undated) DEVICE — BAG SPEC BIOHZD LF 2MIL 6X10IN -- CONVERT TO ITEM 357326

## (undated) DEVICE — CONNECTOR TBNG AUX H2O JET DISP FOR OLY 160/180 SER

## (undated) DEVICE — ENDO CARRY-ON PROCEDURE KIT INCLUDES ENZYMATIC SPONGE, GAUZE, BIOHAZARD LABEL, TRAY, LUBRICANT, DIRTY SCOPE LABEL, WATER LABEL, TRAY, DRAWSTRING PAD, AND DEFENDO 4-PIECE KIT.: Brand: ENDO CARRY-ON PROCEDURE KIT

## (undated) DEVICE — BW-412T DISP COMBO CLEANING BRUSH: Brand: SINGLE USE COMBINATION CLEANING BRUSH

## (undated) DEVICE — SYRINGE MED 20ML STD CLR PLAS LUERLOCK TIP N CTRL DISP

## (undated) DEVICE — CONTAINER SPEC 20 ML LID NEUT BUFF FORMALIN 10 % POLYPR STS

## (undated) DEVICE — Z DISCONTINUED USE 2751540 TUBING IRRIG L10IN DISP PMP ENDOGATOR

## (undated) DEVICE — CANN NASAL O2 CAPNOGRAPHY AD -- FILTERLINE

## (undated) DEVICE — SOLIDIFIER FLUID 3000 CC ABSORB